# Patient Record
Sex: MALE | Race: BLACK OR AFRICAN AMERICAN | NOT HISPANIC OR LATINO | Employment: OTHER | URBAN - METROPOLITAN AREA
[De-identification: names, ages, dates, MRNs, and addresses within clinical notes are randomized per-mention and may not be internally consistent; named-entity substitution may affect disease eponyms.]

---

## 2017-01-03 ENCOUNTER — TELEPHONE (OUTPATIENT)
Dept: ENDOCRINOLOGY | Facility: CLINIC | Age: 57
End: 2017-01-03

## 2017-01-03 DIAGNOSIS — I15.2 HYPERTENSION ASSOCIATED WITH DIABETES: ICD-10-CM

## 2017-01-03 DIAGNOSIS — E55.9 VITAMIN D DEFICIENCY: ICD-10-CM

## 2017-01-03 DIAGNOSIS — E11.59 HYPERTENSION ASSOCIATED WITH DIABETES: ICD-10-CM

## 2017-01-03 NOTE — TELEPHONE ENCOUNTER
Left vm message for patient to call back regarding scheduling labs as well as a urine collection prior to his next visit.  Orders are in so he can schedule with whoever answers the phone. He does not necessarily need to talk with me

## 2017-01-03 NOTE — TELEPHONE ENCOUNTER
----- Message from Cira Wilson sent at 1/3/2017  8:06 AM CST -----  Contact: pt  [phone]  Oz  -   Patient calling to get labs orders drawn before seeing the DrMarjorie  .   Patient can be reached at [phone]  Thanks,

## 2017-01-11 ENCOUNTER — TELEPHONE (OUTPATIENT)
Dept: UROLOGY | Facility: CLINIC | Age: 57
End: 2017-01-11

## 2017-01-11 NOTE — TELEPHONE ENCOUNTER
----- Message from Radha Rai sent at 1/11/2017  8:16 AM CST -----  Contact: Pt  Pt called to speak to the nurse to schedule a follow up appt with the provider for elevated PSA and would like a call back.     Pt can be reached at [phone].    Thanks

## 2017-01-23 ENCOUNTER — TELEPHONE (OUTPATIENT)
Dept: ENDOCRINOLOGY | Facility: CLINIC | Age: 57
End: 2017-01-23

## 2017-01-23 NOTE — TELEPHONE ENCOUNTER
Spoke with patient and gave results and instructions per dr rebolledo patient verbalize understanding

## 2017-01-23 NOTE — TELEPHONE ENCOUNTER
----- Message from Ines Bell LPN sent at 1/23/2017 10:32 AM CST -----  Contact: Self [phone]  I gave it to you it was some lab results   ----- Message -----     From: Lauren Munguia MD     Sent: 1/23/2017  10:24 AM       To: Ines Bell LPN    What fax, no idea.   SD   ----- Message -----     From: Ines Bell LPN     Sent: 1/20/2017  11:55 AM       To: Lauren Munguia MD        ----- Message -----     From: Ila Otoole     Sent: 1/20/2017  10:16 AM       To: Nilda BEE Staff    Pt is requesting a call to discuss information that was faxed earlier in the week. Pt can be reached at [phone].

## 2017-01-23 NOTE — TELEPHONE ENCOUNTER
----- Message from Viri Emanuel sent at 1/23/2017 10:19 AM CST -----  Contact: Patient Angel:   Patient is asking if his medicine needs to be decreased.  Patient can be reached at [phone].  Please call.    Thanks

## 2017-01-23 NOTE — TELEPHONE ENCOUNTER
Logs:  Before breakfast 89 - 96  Before dinner: 102 - 112    LABS:  MCR 67.1mg/g  TSH 0.4  A1C 5.9%   Hgb 14.9 g/dl   Creat 1.1 mg/dl  Calcium 9 mg/dl       PLAN:  Please call patient.   A1C is well controlled, good kidney function  Continue glucophage and levemir  No hypoglycemic episodes but only checking twice daily.

## 2018-07-06 ENCOUNTER — TELEPHONE (OUTPATIENT)
Dept: ORTHOPEDICS | Facility: CLINIC | Age: 58
End: 2018-07-06
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
## 2020-07-21 ENCOUNTER — LAB VISIT (OUTPATIENT)
Dept: PRIMARY CARE CLINIC | Facility: OTHER | Age: 60
End: 2020-07-21
Attending: INTERNAL MEDICINE
Payer: COMMERCIAL

## 2020-07-21 DIAGNOSIS — Z11.59 SCREENING FOR VIRAL DISEASE: ICD-10-CM

## 2020-07-21 PROCEDURE — U0003 INFECTIOUS AGENT DETECTION BY NUCLEIC ACID (DNA OR RNA); SEVERE ACUTE RESPIRATORY SYNDROME CORONAVIRUS 2 (SARS-COV-2) (CORONAVIRUS DISEASE [COVID-19]), AMPLIFIED PROBE TECHNIQUE, MAKING USE OF HIGH THROUGHPUT TECHNOLOGIES AS DESCRIBED BY CMS-2020-01-R: HCPCS

## 2020-07-25 LAB — SARS-COV-2 RNA RESP QL NAA+PROBE: NEGATIVE

## 2022-04-28 ENCOUNTER — HOSPITAL ENCOUNTER (INPATIENT)
Facility: HOSPITAL | Age: 62
LOS: 4 days | Discharge: HOME HEALTH | DRG: 871 | End: 2022-05-02
Attending: EMERGENCY MEDICINE | Admitting: EMERGENCY MEDICINE
Payer: COMMERCIAL

## 2022-04-28 DIAGNOSIS — E78.5 HYPERLIPIDEMIA, UNSPECIFIED HYPERLIPIDEMIA TYPE: ICD-10-CM

## 2022-04-28 DIAGNOSIS — R33.9 URINARY RETENTION: ICD-10-CM

## 2022-04-28 DIAGNOSIS — N13.8 BENIGN PROSTATIC HYPERPLASIA WITH URINARY OBSTRUCTION: ICD-10-CM

## 2022-04-28 DIAGNOSIS — R73.9 HYPERGLYCEMIA: ICD-10-CM

## 2022-04-28 DIAGNOSIS — E87.5 HYPERKALEMIA: ICD-10-CM

## 2022-04-28 DIAGNOSIS — N40.1 BENIGN PROSTATIC HYPERPLASIA WITH URINARY OBSTRUCTION: ICD-10-CM

## 2022-04-28 DIAGNOSIS — R00.0 TACHYCARDIA: ICD-10-CM

## 2022-04-28 DIAGNOSIS — N17.9 ACUTE RENAL FAILURE: ICD-10-CM

## 2022-04-28 DIAGNOSIS — I10 PRIMARY HYPERTENSION: ICD-10-CM

## 2022-04-28 DIAGNOSIS — E83.39 HYPERPHOSPHATEMIA: ICD-10-CM

## 2022-04-28 DIAGNOSIS — E87.20 METABOLIC ACIDOSIS: ICD-10-CM

## 2022-04-28 DIAGNOSIS — N13.9 OBSTRUCTIVE UROPATHY: ICD-10-CM

## 2022-04-28 DIAGNOSIS — D64.9 NORMOCYTIC ANEMIA: ICD-10-CM

## 2022-04-28 DIAGNOSIS — I10 HYPERTENSION, UNSPECIFIED TYPE: ICD-10-CM

## 2022-04-28 DIAGNOSIS — I16.1 HYPERTENSIVE EMERGENCY: ICD-10-CM

## 2022-04-28 DIAGNOSIS — N17.9 AKI (ACUTE KIDNEY INJURY): Primary | ICD-10-CM

## 2022-04-28 DIAGNOSIS — N13.30 BILATERAL HYDRONEPHROSIS: ICD-10-CM

## 2022-04-28 DIAGNOSIS — R07.9 CHEST PAIN: ICD-10-CM

## 2022-04-28 LAB
ALBUMIN SERPL BCP-MCNC: 3.1 G/DL (ref 3.5–5.2)
ALP SERPL-CCNC: 113 U/L (ref 55–135)
ALT SERPL W/O P-5'-P-CCNC: 18 U/L (ref 10–44)
ANION GAP SERPL CALC-SCNC: 32 MMOL/L (ref 8–16)
AST SERPL-CCNC: 16 U/L (ref 10–40)
B-OH-BUTYR BLD STRIP-SCNC: 4.4 MMOL/L (ref 0–0.5)
BACTERIA #/AREA URNS HPF: ABNORMAL /HPF
BASOPHILS # BLD AUTO: 0.02 K/UL (ref 0–0.2)
BASOPHILS NFR BLD: 0.1 % (ref 0–1.9)
BILIRUB SERPL-MCNC: 0.4 MG/DL (ref 0.1–1)
BILIRUB UR QL STRIP: NEGATIVE
BUN SERPL-MCNC: 142 MG/DL (ref 8–23)
CALCIUM SERPL-MCNC: 9.2 MG/DL (ref 8.7–10.5)
CHLORIDE SERPL-SCNC: 91 MMOL/L (ref 95–110)
CLARITY UR: ABNORMAL
CO2 SERPL-SCNC: 6 MMOL/L (ref 23–29)
COLOR UR: YELLOW
CREAT SERPL-MCNC: 18.8 MG/DL (ref 0.5–1.4)
DIFFERENTIAL METHOD: ABNORMAL
EOSINOPHIL # BLD AUTO: 0 K/UL (ref 0–0.5)
EOSINOPHIL NFR BLD: 0 % (ref 0–8)
ERYTHROCYTE [DISTWIDTH] IN BLOOD BY AUTOMATED COUNT: 13.3 % (ref 11.5–14.5)
EST. GFR  (AFRICAN AMERICAN): 3 ML/MIN/1.73 M^2
EST. GFR  (NON AFRICAN AMERICAN): 2 ML/MIN/1.73 M^2
GLUCOSE SERPL-MCNC: 244 MG/DL (ref 70–110)
GLUCOSE UR QL STRIP: ABNORMAL
HCO3 UR-SCNC: 9.3 MMOL/L (ref 24–28)
HCT VFR BLD AUTO: 38.2 % (ref 40–54)
HCT VFR BLD CALC: 39 %PCV (ref 36–54)
HGB BLD-MCNC: 13 G/DL
HGB BLD-MCNC: 13.7 G/DL (ref 14–18)
HGB UR QL STRIP: ABNORMAL
HYALINE CASTS #/AREA URNS LPF: 2 /LPF
IMM GRANULOCYTES # BLD AUTO: 0.18 K/UL (ref 0–0.04)
IMM GRANULOCYTES NFR BLD AUTO: 1.3 % (ref 0–0.5)
KETONES UR QL STRIP: NEGATIVE
LACTATE SERPL-SCNC: 1.5 MMOL/L (ref 0.5–2.2)
LEUKOCYTE ESTERASE UR QL STRIP: ABNORMAL
LIPASE SERPL-CCNC: 233 U/L (ref 4–60)
LYMPHOCYTES # BLD AUTO: 0.9 K/UL (ref 1–4.8)
LYMPHOCYTES NFR BLD: 6.5 % (ref 18–48)
MAGNESIUM SERPL-MCNC: 3.1 MG/DL (ref 1.6–2.6)
MCH RBC QN AUTO: 29.7 PG (ref 27–31)
MCHC RBC AUTO-ENTMCNC: 35.9 G/DL (ref 32–36)
MCV RBC AUTO: 83 FL (ref 82–98)
MICROSCOPIC COMMENT: ABNORMAL
MONOCYTES # BLD AUTO: 1.6 K/UL (ref 0.3–1)
MONOCYTES NFR BLD: 11.9 % (ref 4–15)
NEUTROPHILS # BLD AUTO: 10.7 K/UL (ref 1.8–7.7)
NEUTROPHILS NFR BLD: 80.2 % (ref 38–73)
NITRITE UR QL STRIP: NEGATIVE
NRBC BLD-RTO: 0 /100 WBC
PCO2 BLDA: 26.3 MMHG (ref 35–45)
PH SMN: 7.16 [PH] (ref 7.35–7.45)
PH UR STRIP: 6 [PH] (ref 5–8)
PHOSPHATE SERPL-MCNC: 14.5 MG/DL (ref 2.7–4.5)
PLATELET # BLD AUTO: 332 K/UL (ref 150–450)
PMV BLD AUTO: 9.6 FL (ref 9.2–12.9)
PO2 BLDA: 29 MMHG (ref 40–60)
POC BE: -19 MMOL/L
POC SATURATED O2: 40 % (ref 95–100)
POC TCO2: 10 MMOL/L (ref 24–29)
POTASSIUM BLD-SCNC: 5.2 MMOL/L (ref 3.5–5.1)
POTASSIUM SERPL-SCNC: 5.6 MMOL/L (ref 3.5–5.1)
PROT SERPL-MCNC: 7.7 G/DL (ref 6–8.4)
PROT UR QL STRIP: ABNORMAL
RBC # BLD AUTO: 4.62 M/UL (ref 4.6–6.2)
RBC #/AREA URNS HPF: 5 /HPF (ref 0–4)
SAMPLE: ABNORMAL
SODIUM BLD-SCNC: 126 MMOL/L (ref 136–145)
SODIUM SERPL-SCNC: 129 MMOL/L (ref 136–145)
SP GR UR STRIP: 1.01 (ref 1–1.03)
SQUAMOUS #/AREA URNS HPF: 4 /HPF
TROPONIN I SERPL DL<=0.01 NG/ML-MCNC: 0.08 NG/ML (ref 0–0.03)
URN SPEC COLLECT METH UR: ABNORMAL
UROBILINOGEN UR STRIP-ACNC: NEGATIVE EU/DL
WBC # BLD AUTO: 13.4 K/UL (ref 3.9–12.7)
WBC #/AREA URNS HPF: 22 /HPF (ref 0–5)
YEAST URNS QL MICRO: ABNORMAL

## 2022-04-28 PROCEDURE — 84484 ASSAY OF TROPONIN QUANT: CPT | Performed by: STUDENT IN AN ORGANIZED HEALTH CARE EDUCATION/TRAINING PROGRAM

## 2022-04-28 PROCEDURE — 80053 COMPREHEN METABOLIC PANEL: CPT | Performed by: EMERGENCY MEDICINE

## 2022-04-28 PROCEDURE — 87040 BLOOD CULTURE FOR BACTERIA: CPT | Performed by: STUDENT IN AN ORGANIZED HEALTH CARE EDUCATION/TRAINING PROGRAM

## 2022-04-28 PROCEDURE — 96361 HYDRATE IV INFUSION ADD-ON: CPT

## 2022-04-28 PROCEDURE — 12000002 HC ACUTE/MED SURGE SEMI-PRIVATE ROOM

## 2022-04-28 PROCEDURE — 85025 COMPLETE CBC W/AUTO DIFF WBC: CPT | Performed by: EMERGENCY MEDICINE

## 2022-04-28 PROCEDURE — 87086 URINE CULTURE/COLONY COUNT: CPT | Performed by: EMERGENCY MEDICINE

## 2022-04-28 PROCEDURE — 93010 EKG 12-LEAD: ICD-10-PCS | Mod: ,,, | Performed by: INTERNAL MEDICINE

## 2022-04-28 PROCEDURE — 96374 THER/PROPH/DIAG INJ IV PUSH: CPT

## 2022-04-28 PROCEDURE — U0002 COVID-19 LAB TEST NON-CDC: HCPCS | Performed by: STUDENT IN AN ORGANIZED HEALTH CARE EDUCATION/TRAINING PROGRAM

## 2022-04-28 PROCEDURE — 83735 ASSAY OF MAGNESIUM: CPT | Performed by: EMERGENCY MEDICINE

## 2022-04-28 PROCEDURE — 99291 CRITICAL CARE FIRST HOUR: CPT | Mod: 25

## 2022-04-28 PROCEDURE — 81000 URINALYSIS NONAUTO W/SCOPE: CPT | Performed by: EMERGENCY MEDICINE

## 2022-04-28 PROCEDURE — 93005 ELECTROCARDIOGRAM TRACING: CPT

## 2022-04-28 PROCEDURE — 83605 ASSAY OF LACTIC ACID: CPT | Performed by: EMERGENCY MEDICINE

## 2022-04-28 PROCEDURE — 25000003 PHARM REV CODE 250: Performed by: EMERGENCY MEDICINE

## 2022-04-28 PROCEDURE — 82010 KETONE BODYS QUAN: CPT | Performed by: EMERGENCY MEDICINE

## 2022-04-28 PROCEDURE — 63600175 PHARM REV CODE 636 W HCPCS: Performed by: EMERGENCY MEDICINE

## 2022-04-28 PROCEDURE — 84100 ASSAY OF PHOSPHORUS: CPT | Performed by: EMERGENCY MEDICINE

## 2022-04-28 PROCEDURE — 83690 ASSAY OF LIPASE: CPT | Performed by: EMERGENCY MEDICINE

## 2022-04-28 PROCEDURE — 82803 BLOOD GASES ANY COMBINATION: CPT

## 2022-04-28 PROCEDURE — 93010 ELECTROCARDIOGRAM REPORT: CPT | Mod: ,,, | Performed by: INTERNAL MEDICINE

## 2022-04-28 PROCEDURE — 82962 GLUCOSE BLOOD TEST: CPT

## 2022-04-28 PROCEDURE — 99900035 HC TECH TIME PER 15 MIN (STAT)

## 2022-04-28 RX ORDER — HEPARIN SODIUM 5000 [USP'U]/ML
5000 INJECTION, SOLUTION INTRAVENOUS; SUBCUTANEOUS EVERY 8 HOURS
Status: DISCONTINUED | OUTPATIENT
Start: 2022-04-29 | End: 2022-05-02 | Stop reason: HOSPADM

## 2022-04-28 RX ORDER — IBUPROFEN 200 MG
24 TABLET ORAL
Status: DISCONTINUED | OUTPATIENT
Start: 2022-04-29 | End: 2022-04-29

## 2022-04-28 RX ORDER — NALOXONE HCL 0.4 MG/ML
0.02 VIAL (ML) INJECTION
Status: DISCONTINUED | OUTPATIENT
Start: 2022-04-29 | End: 2022-04-29

## 2022-04-28 RX ORDER — IBUPROFEN 200 MG
16 TABLET ORAL
Status: DISCONTINUED | OUTPATIENT
Start: 2022-04-29 | End: 2022-05-02 | Stop reason: HOSPADM

## 2022-04-28 RX ORDER — LABETALOL HYDROCHLORIDE 5 MG/ML
10 INJECTION, SOLUTION INTRAVENOUS ONCE
Status: DISCONTINUED | OUTPATIENT
Start: 2022-04-28 | End: 2022-04-28

## 2022-04-28 RX ORDER — HYDRALAZINE HYDROCHLORIDE 20 MG/ML
5 INJECTION INTRAMUSCULAR; INTRAVENOUS
Status: COMPLETED | OUTPATIENT
Start: 2022-04-28 | End: 2022-04-28

## 2022-04-28 RX ORDER — GLUCAGON 1 MG
1 KIT INJECTION
Status: DISCONTINUED | OUTPATIENT
Start: 2022-04-29 | End: 2022-04-29

## 2022-04-28 RX ORDER — SODIUM CHLORIDE 0.9 % (FLUSH) 0.9 %
10 SYRINGE (ML) INJECTION EVERY 12 HOURS PRN
Status: DISCONTINUED | OUTPATIENT
Start: 2022-04-29 | End: 2022-05-02 | Stop reason: HOSPADM

## 2022-04-28 RX ADMIN — HYDRALAZINE HYDROCHLORIDE 5 MG: 20 INJECTION, SOLUTION INTRAMUSCULAR; INTRAVENOUS at 09:04

## 2022-04-28 RX ADMIN — SODIUM CHLORIDE 1000 ML: 0.9 INJECTION, SOLUTION INTRAVENOUS at 08:04

## 2022-04-28 RX ADMIN — SODIUM CHLORIDE 1000 ML: 0.9 INJECTION, SOLUTION INTRAVENOUS at 09:04

## 2022-04-29 PROBLEM — N13.30 BILATERAL HYDRONEPHROSIS: Status: ACTIVE | Noted: 2022-04-29

## 2022-04-29 PROBLEM — I16.1 HYPERTENSIVE EMERGENCY: Status: RESOLVED | Noted: 2022-04-28 | Resolved: 2022-04-29

## 2022-04-29 PROBLEM — N13.9 OBSTRUCTIVE UROPATHY: Status: ACTIVE | Noted: 2022-04-29

## 2022-04-29 PROBLEM — E83.39 HYPERPHOSPHATEMIA: Status: ACTIVE | Noted: 2022-04-29

## 2022-04-29 PROBLEM — D64.9 NORMOCYTIC ANEMIA: Status: ACTIVE | Noted: 2022-04-29

## 2022-04-29 PROBLEM — N13.8 BENIGN PROSTATIC HYPERPLASIA WITH URINARY OBSTRUCTION: Status: ACTIVE | Noted: 2022-04-29

## 2022-04-29 PROBLEM — N40.1 BENIGN PROSTATIC HYPERPLASIA WITH URINARY OBSTRUCTION: Status: ACTIVE | Noted: 2022-04-29

## 2022-04-29 PROBLEM — R33.9 URINARY RETENTION: Status: ACTIVE | Noted: 2022-04-29

## 2022-04-29 LAB
ALBUMIN SERPL BCP-MCNC: 2.5 G/DL (ref 3.5–5.2)
ALP SERPL-CCNC: 97 U/L (ref 55–135)
ALT SERPL W/O P-5'-P-CCNC: 21 U/L (ref 10–44)
AMPHET+METHAMPHET UR QL: NEGATIVE
ANION GAP SERPL CALC-SCNC: 18 MMOL/L (ref 8–16)
ANION GAP SERPL CALC-SCNC: 20 MMOL/L (ref 8–16)
ANION GAP SERPL CALC-SCNC: 23 MMOL/L (ref 8–16)
ANION GAP SERPL CALC-SCNC: 24 MMOL/L (ref 8–16)
ANION GAP SERPL CALC-SCNC: 26 MMOL/L (ref 8–16)
ANION GAP SERPL CALC-SCNC: 26 MMOL/L (ref 8–16)
ANION GAP SERPL CALC-SCNC: 27 MMOL/L (ref 8–16)
AORTIC ROOT ANNULUS: 3.1 CM
AST SERPL-CCNC: 11 U/L (ref 10–40)
AV INDEX (PROSTH): 0.9
AV MEAN GRADIENT: 7 MMHG
AV PEAK GRADIENT: 13 MMHG
AV VALVE AREA: 2.43 CM2
AV VELOCITY RATIO: 0.66
BARBITURATES UR QL SCN>200 NG/ML: NEGATIVE
BASOPHILS # BLD AUTO: 0.02 K/UL (ref 0–0.2)
BASOPHILS NFR BLD: 0.2 % (ref 0–1.9)
BENZODIAZ UR QL SCN>200 NG/ML: NEGATIVE
BILIRUB SERPL-MCNC: 0.5 MG/DL (ref 0.1–1)
BNP SERPL-MCNC: 148 PG/ML (ref 0–99)
BSA FOR ECHO PROCEDURE: 1.9 M2
BUN SERPL-MCNC: 114 MG/DL (ref 8–23)
BUN SERPL-MCNC: 116 MG/DL (ref 8–23)
BUN SERPL-MCNC: 117 MG/DL (ref 8–23)
BUN SERPL-MCNC: 125 MG/DL (ref 8–23)
BUN SERPL-MCNC: 134 MG/DL (ref 8–23)
BUN SERPL-MCNC: 134 MG/DL (ref 8–23)
BUN SERPL-MCNC: 137 MG/DL (ref 8–23)
BZE UR QL SCN: NEGATIVE
CALCIUM SERPL-MCNC: 8.5 MG/DL (ref 8.7–10.5)
CALCIUM SERPL-MCNC: 8.5 MG/DL (ref 8.7–10.5)
CALCIUM SERPL-MCNC: 8.7 MG/DL (ref 8.7–10.5)
CALCIUM SERPL-MCNC: 8.8 MG/DL (ref 8.7–10.5)
CALCIUM SERPL-MCNC: 9.1 MG/DL (ref 8.7–10.5)
CALCIUM SERPL-MCNC: 9.3 MG/DL (ref 8.7–10.5)
CALCIUM SERPL-MCNC: 9.3 MG/DL (ref 8.7–10.5)
CANNABINOIDS UR QL SCN: NEGATIVE
CHLORIDE SERPL-SCNC: 100 MMOL/L (ref 95–110)
CHLORIDE SERPL-SCNC: 101 MMOL/L (ref 95–110)
CHLORIDE SERPL-SCNC: 102 MMOL/L (ref 95–110)
CHLORIDE SERPL-SCNC: 98 MMOL/L (ref 95–110)
CHLORIDE SERPL-SCNC: 99 MMOL/L (ref 95–110)
CHOLEST SERPL-MCNC: 143 MG/DL (ref 120–199)
CHOLEST/HDLC SERPL: 4.5 {RATIO} (ref 2–5)
CO2 SERPL-SCNC: 10 MMOL/L (ref 23–29)
CO2 SERPL-SCNC: 16 MMOL/L (ref 23–29)
CO2 SERPL-SCNC: 19 MMOL/L (ref 23–29)
CO2 SERPL-SCNC: 23 MMOL/L (ref 23–29)
CO2 SERPL-SCNC: 6 MMOL/L (ref 23–29)
CO2 SERPL-SCNC: 9 MMOL/L (ref 23–29)
CO2 SERPL-SCNC: 9 MMOL/L (ref 23–29)
CREAT SERPL-MCNC: 11.4 MG/DL (ref 0.5–1.4)
CREAT SERPL-MCNC: 12.2 MG/DL (ref 0.5–1.4)
CREAT SERPL-MCNC: 13.4 MG/DL (ref 0.5–1.4)
CREAT SERPL-MCNC: 14.6 MG/DL (ref 0.5–1.4)
CREAT SERPL-MCNC: 16.1 MG/DL (ref 0.5–1.4)
CREAT SERPL-MCNC: 16.1 MG/DL (ref 0.5–1.4)
CREAT SERPL-MCNC: 16.9 MG/DL (ref 0.5–1.4)
CREAT UR-MCNC: 31.9 MG/DL (ref 23–375)
CREAT UR-MCNC: 44.2 MG/DL (ref 23–375)
CREAT UR-MCNC: 47.2 MG/DL (ref 23–375)
CTP QC/QA: YES
CV ECHO LV RWT: 0.91 CM
DIFFERENTIAL METHOD: ABNORMAL
DOP CALC AO PEAK VEL: 1.78 M/S
DOP CALC AO VTI: 19.55 CM
DOP CALC LVOT AREA: 2.7 CM2
DOP CALC LVOT DIAMETER: 1.85 CM
DOP CALC LVOT PEAK VEL: 1.18 M/S
DOP CALC LVOT STROKE VOLUME: 47.45 CM3
DOP CALC MV VTI: 12.64 CM
DOP CALCLVOT PEAK VEL VTI: 17.66 CM
E WAVE DECELERATION TIME: 155.23 MSEC
E/A RATIO: 0.73
E/E' RATIO: 7.29 M/S
ECHO LV POSTERIOR WALL: 1.56 CM (ref 0.6–1.1)
EJECTION FRACTION: 55 %
EOSINOPHIL # BLD AUTO: 0 K/UL (ref 0–0.5)
EOSINOPHIL NFR BLD: 0 % (ref 0–8)
ERYTHROCYTE [DISTWIDTH] IN BLOOD BY AUTOMATED COUNT: 13.3 % (ref 11.5–14.5)
EST. GFR  (AFRICAN AMERICAN): 3 ML/MIN/1.73 M^2
EST. GFR  (AFRICAN AMERICAN): 4 ML/MIN/1.73 M^2
EST. GFR  (AFRICAN AMERICAN): 4 ML/MIN/1.73 M^2
EST. GFR  (AFRICAN AMERICAN): 5 ML/MIN/1.73 M^2
EST. GFR  (AFRICAN AMERICAN): 5 ML/MIN/1.73 M^2
EST. GFR  (NON AFRICAN AMERICAN): 3 ML/MIN/1.73 M^2
EST. GFR  (NON AFRICAN AMERICAN): 4 ML/MIN/1.73 M^2
EST. GFR  (NON AFRICAN AMERICAN): 4 ML/MIN/1.73 M^2
ESTIMATED AVG GLUCOSE: ABNORMAL MG/DL (ref 68–131)
ETHANOL SERPL-MCNC: <10 MG/DL
FERRITIN SERPL-MCNC: 785 NG/ML (ref 20–300)
FRACTIONAL SHORTENING: 27 % (ref 28–44)
GLUCOSE SERPL-MCNC: 221 MG/DL (ref 70–110)
GLUCOSE SERPL-MCNC: 243 MG/DL (ref 70–110)
GLUCOSE SERPL-MCNC: 274 MG/DL (ref 70–110)
GLUCOSE SERPL-MCNC: 276 MG/DL (ref 70–110)
GLUCOSE SERPL-MCNC: 276 MG/DL (ref 70–110)
GLUCOSE SERPL-MCNC: 319 MG/DL (ref 70–110)
GLUCOSE SERPL-MCNC: 374 MG/DL (ref 70–110)
HBA1C MFR BLD: >14 % (ref 4–5.6)
HCT VFR BLD AUTO: 37.3 % (ref 40–54)
HDLC SERPL-MCNC: 32 MG/DL (ref 40–75)
HDLC SERPL: 22.4 % (ref 20–50)
HGB BLD-MCNC: 13.3 G/DL (ref 14–18)
IMM GRANULOCYTES # BLD AUTO: 0.14 K/UL (ref 0–0.04)
IMM GRANULOCYTES NFR BLD AUTO: 1.2 % (ref 0–0.5)
INTERVENTRICULAR SEPTUM: 1.65 CM (ref 0.6–1.1)
IRON SERPL-MCNC: 75 UG/DL (ref 45–160)
LA MAJOR: 4.53 CM
LA MINOR: 3.88 CM
LA WIDTH: 3.11 CM
LDLC SERPL CALC-MCNC: 88.2 MG/DL (ref 63–159)
LEFT ATRIUM SIZE: 2.87 CM
LEFT ATRIUM VOLUME INDEX MOD: 11.1 ML/M2
LEFT ATRIUM VOLUME INDEX: 16.9 ML/M2
LEFT ATRIUM VOLUME MOD: 20.89 CM3
LEFT ATRIUM VOLUME: 31.71 CM3
LEFT INTERNAL DIMENSION IN SYSTOLE: 2.48 CM (ref 2.1–4)
LEFT VENTRICLE DIASTOLIC VOLUME INDEX: 25.45 ML/M2
LEFT VENTRICLE DIASTOLIC VOLUME: 47.85 ML
LEFT VENTRICLE MASS INDEX: 111 G/M2
LEFT VENTRICLE SYSTOLIC VOLUME INDEX: 11.6 ML/M2
LEFT VENTRICLE SYSTOLIC VOLUME: 21.81 ML
LEFT VENTRICULAR INTERNAL DIMENSION IN DIASTOLE: 3.41 CM (ref 3.5–6)
LEFT VENTRICULAR MASS: 208.99 G
LV LATERAL E/E' RATIO: 7.29 M/S
LV SEPTAL E/E' RATIO: 7.29 M/S
LYMPHOCYTES # BLD AUTO: 0.7 K/UL (ref 1–4.8)
LYMPHOCYTES NFR BLD: 6 % (ref 18–48)
MAGNESIUM SERPL-MCNC: 2.8 MG/DL (ref 1.6–2.6)
MCH RBC QN AUTO: 29.4 PG (ref 27–31)
MCHC RBC AUTO-ENTMCNC: 35.7 G/DL (ref 32–36)
MCV RBC AUTO: 82 FL (ref 82–98)
METHADONE UR QL SCN>300 NG/ML: NEGATIVE
MONOCYTES # BLD AUTO: 1.5 K/UL (ref 0.3–1)
MONOCYTES NFR BLD: 12.3 % (ref 4–15)
MV MEAN GRADIENT: 0 MMHG
MV PEAK A VEL: 0.7 M/S
MV PEAK E VEL: 0.51 M/S
MV PEAK GRADIENT: 2 MMHG
MV STENOSIS PRESSURE HALF TIME: 45.02 MS
MV VALVE AREA BY CONTINUITY EQUATION: 3.75 CM2
MV VALVE AREA P 1/2 METHOD: 4.89 CM2
NEUTROPHILS # BLD AUTO: 9.5 K/UL (ref 1.8–7.7)
NEUTROPHILS NFR BLD: 80.3 % (ref 38–73)
NONHDLC SERPL-MCNC: 111 MG/DL
NRBC BLD-RTO: 0 /100 WBC
OPIATES UR QL SCN: NEGATIVE
OSMOLALITY SERPL: 340 MOSM/KG (ref 280–300)
OSMOLALITY UR: 263 MOSM/KG (ref 50–1200)
PCP UR QL SCN>25 NG/ML: NEGATIVE
PHOSPHATE SERPL-MCNC: 11.7 MG/DL (ref 2.7–4.5)
PLATELET # BLD AUTO: 339 K/UL (ref 150–450)
PMV BLD AUTO: 9.5 FL (ref 9.2–12.9)
POCT GLUCOSE: 254 MG/DL (ref 70–110)
POCT GLUCOSE: 271 MG/DL (ref 70–110)
POCT GLUCOSE: 282 MG/DL (ref 70–110)
POCT GLUCOSE: 317 MG/DL (ref 70–110)
POCT GLUCOSE: 379 MG/DL (ref 70–110)
POTASSIUM SERPL-SCNC: 3.3 MMOL/L (ref 3.5–5.1)
POTASSIUM SERPL-SCNC: 3.7 MMOL/L (ref 3.5–5.1)
POTASSIUM SERPL-SCNC: 4.2 MMOL/L (ref 3.5–5.1)
POTASSIUM SERPL-SCNC: 4.4 MMOL/L (ref 3.5–5.1)
POTASSIUM SERPL-SCNC: 4.8 MMOL/L (ref 3.5–5.1)
POTASSIUM SERPL-SCNC: 4.8 MMOL/L (ref 3.5–5.1)
POTASSIUM SERPL-SCNC: 5.8 MMOL/L (ref 3.5–5.1)
PROCALCITONIN SERPL IA-MCNC: 2.62 NG/ML
PROT SERPL-MCNC: 6.6 G/DL (ref 6–8.4)
PROT UR-MCNC: 534 MG/DL (ref 0–15)
PROT/CREAT UR: 11.31 MG/G{CREAT} (ref 0–0.2)
RA MAJOR: 3.4 CM
RA PRESSURE: 3 MMHG
RA WIDTH: 2.47 CM
RBC # BLD AUTO: 4.53 M/UL (ref 4.6–6.2)
RIGHT VENTRICULAR END-DIASTOLIC DIMENSION: 2.11 CM
SALICYLATES SERPL-MCNC: <5 MG/DL (ref 15–30)
SARS-COV-2 RDRP RESP QL NAA+PROBE: NEGATIVE
SATURATED IRON: 36 % (ref 20–50)
SODIUM SERPL-SCNC: 131 MMOL/L (ref 136–145)
SODIUM SERPL-SCNC: 135 MMOL/L (ref 136–145)
SODIUM SERPL-SCNC: 135 MMOL/L (ref 136–145)
SODIUM SERPL-SCNC: 136 MMOL/L (ref 136–145)
SODIUM SERPL-SCNC: 138 MMOL/L (ref 136–145)
SODIUM SERPL-SCNC: 139 MMOL/L (ref 136–145)
SODIUM SERPL-SCNC: 142 MMOL/L (ref 136–145)
SODIUM UR-SCNC: 84 MMOL/L (ref 20–250)
TDI LATERAL: 0.07 M/S
TDI SEPTAL: 0.07 M/S
TDI: 0.07 M/S
TOTAL IRON BINDING CAPACITY: 206 UG/DL (ref 250–450)
TOXICOLOGY INFORMATION: NORMAL
TRANSFERRIN SERPL-MCNC: 139 MG/DL (ref 200–375)
TRIGL SERPL-MCNC: 114 MG/DL (ref 30–150)
TROPONIN I SERPL DL<=0.01 NG/ML-MCNC: 0.09 NG/ML (ref 0–0.03)
TROPONIN I SERPL DL<=0.01 NG/ML-MCNC: 0.1 NG/ML (ref 0–0.03)
TSH SERPL DL<=0.005 MIU/L-ACNC: 0.54 UIU/ML (ref 0.4–4)
WBC # BLD AUTO: 11.84 K/UL (ref 3.9–12.7)

## 2022-04-29 PROCEDURE — 25000003 PHARM REV CODE 250: Performed by: STUDENT IN AN ORGANIZED HEALTH CARE EDUCATION/TRAINING PROGRAM

## 2022-04-29 PROCEDURE — 99222 PR INITIAL HOSPITAL CARE,LEVL II: ICD-10-PCS | Mod: ,,, | Performed by: UROLOGY

## 2022-04-29 PROCEDURE — 82077 ASSAY SPEC XCP UR&BREATH IA: CPT | Performed by: STUDENT IN AN ORGANIZED HEALTH CARE EDUCATION/TRAINING PROGRAM

## 2022-04-29 PROCEDURE — 11000001 HC ACUTE MED/SURG PRIVATE ROOM

## 2022-04-29 PROCEDURE — 25000003 PHARM REV CODE 250

## 2022-04-29 PROCEDURE — 84100 ASSAY OF PHOSPHORUS: CPT | Performed by: STUDENT IN AN ORGANIZED HEALTH CARE EDUCATION/TRAINING PROGRAM

## 2022-04-29 PROCEDURE — 83735 ASSAY OF MAGNESIUM: CPT | Performed by: STUDENT IN AN ORGANIZED HEALTH CARE EDUCATION/TRAINING PROGRAM

## 2022-04-29 PROCEDURE — 85025 COMPLETE CBC W/AUTO DIFF WBC: CPT | Performed by: STUDENT IN AN ORGANIZED HEALTH CARE EDUCATION/TRAINING PROGRAM

## 2022-04-29 PROCEDURE — 63600175 PHARM REV CODE 636 W HCPCS: Performed by: STUDENT IN AN ORGANIZED HEALTH CARE EDUCATION/TRAINING PROGRAM

## 2022-04-29 PROCEDURE — 84466 ASSAY OF TRANSFERRIN: CPT | Performed by: STUDENT IN AN ORGANIZED HEALTH CARE EDUCATION/TRAINING PROGRAM

## 2022-04-29 PROCEDURE — 80048 BASIC METABOLIC PNL TOTAL CA: CPT | Mod: XB | Performed by: STUDENT IN AN ORGANIZED HEALTH CARE EDUCATION/TRAINING PROGRAM

## 2022-04-29 PROCEDURE — 80053 COMPREHEN METABOLIC PANEL: CPT | Performed by: STUDENT IN AN ORGANIZED HEALTH CARE EDUCATION/TRAINING PROGRAM

## 2022-04-29 PROCEDURE — 83930 ASSAY OF BLOOD OSMOLALITY: CPT | Performed by: STUDENT IN AN ORGANIZED HEALTH CARE EDUCATION/TRAINING PROGRAM

## 2022-04-29 PROCEDURE — 80179 DRUG ASSAY SALICYLATE: CPT | Performed by: STUDENT IN AN ORGANIZED HEALTH CARE EDUCATION/TRAINING PROGRAM

## 2022-04-29 PROCEDURE — 99291 PR CRITICAL CARE, E/M 30-74 MINUTES: ICD-10-PCS | Mod: ,,, | Performed by: INTERNAL MEDICINE

## 2022-04-29 PROCEDURE — C9399 UNCLASSIFIED DRUGS OR BIOLOG: HCPCS

## 2022-04-29 PROCEDURE — 83935 ASSAY OF URINE OSMOLALITY: CPT | Performed by: STUDENT IN AN ORGANIZED HEALTH CARE EDUCATION/TRAINING PROGRAM

## 2022-04-29 PROCEDURE — 83880 ASSAY OF NATRIURETIC PEPTIDE: CPT | Performed by: STUDENT IN AN ORGANIZED HEALTH CARE EDUCATION/TRAINING PROGRAM

## 2022-04-29 PROCEDURE — 83036 HEMOGLOBIN GLYCOSYLATED A1C: CPT | Performed by: STUDENT IN AN ORGANIZED HEALTH CARE EDUCATION/TRAINING PROGRAM

## 2022-04-29 PROCEDURE — 80307 DRUG TEST PRSMV CHEM ANLYZR: CPT | Performed by: STUDENT IN AN ORGANIZED HEALTH CARE EDUCATION/TRAINING PROGRAM

## 2022-04-29 PROCEDURE — 84443 ASSAY THYROID STIM HORMONE: CPT | Performed by: STUDENT IN AN ORGANIZED HEALTH CARE EDUCATION/TRAINING PROGRAM

## 2022-04-29 PROCEDURE — 51700 IRRIGATION OF BLADDER: CPT

## 2022-04-29 PROCEDURE — 94640 AIRWAY INHALATION TREATMENT: CPT

## 2022-04-29 PROCEDURE — 82570 ASSAY OF URINE CREATININE: CPT | Performed by: STUDENT IN AN ORGANIZED HEALTH CARE EDUCATION/TRAINING PROGRAM

## 2022-04-29 PROCEDURE — 80048 BASIC METABOLIC PNL TOTAL CA: CPT | Mod: 91,XB | Performed by: STUDENT IN AN ORGANIZED HEALTH CARE EDUCATION/TRAINING PROGRAM

## 2022-04-29 PROCEDURE — C9399 UNCLASSIFIED DRUGS OR BIOLOG: HCPCS | Performed by: STUDENT IN AN ORGANIZED HEALTH CARE EDUCATION/TRAINING PROGRAM

## 2022-04-29 PROCEDURE — 84484 ASSAY OF TROPONIN QUANT: CPT | Performed by: STUDENT IN AN ORGANIZED HEALTH CARE EDUCATION/TRAINING PROGRAM

## 2022-04-29 PROCEDURE — 84145 PROCALCITONIN (PCT): CPT | Performed by: STUDENT IN AN ORGANIZED HEALTH CARE EDUCATION/TRAINING PROGRAM

## 2022-04-29 PROCEDURE — 82728 ASSAY OF FERRITIN: CPT | Performed by: STUDENT IN AN ORGANIZED HEALTH CARE EDUCATION/TRAINING PROGRAM

## 2022-04-29 PROCEDURE — 84300 ASSAY OF URINE SODIUM: CPT | Performed by: STUDENT IN AN ORGANIZED HEALTH CARE EDUCATION/TRAINING PROGRAM

## 2022-04-29 PROCEDURE — 25000003 PHARM REV CODE 250: Performed by: INTERNAL MEDICINE

## 2022-04-29 PROCEDURE — 99291 CRITICAL CARE FIRST HOUR: CPT | Mod: ,,, | Performed by: INTERNAL MEDICINE

## 2022-04-29 PROCEDURE — 25000242 PHARM REV CODE 250 ALT 637 W/ HCPCS: Performed by: STUDENT IN AN ORGANIZED HEALTH CARE EDUCATION/TRAINING PROGRAM

## 2022-04-29 PROCEDURE — 63600175 PHARM REV CODE 636 W HCPCS

## 2022-04-29 PROCEDURE — 84156 ASSAY OF PROTEIN URINE: CPT | Performed by: INTERNAL MEDICINE

## 2022-04-29 PROCEDURE — 51702 INSERT TEMP BLADDER CATH: CPT

## 2022-04-29 PROCEDURE — 94760 N-INVAS EAR/PLS OXIMETRY 1: CPT

## 2022-04-29 PROCEDURE — 80061 LIPID PANEL: CPT | Performed by: STUDENT IN AN ORGANIZED HEALTH CARE EDUCATION/TRAINING PROGRAM

## 2022-04-29 PROCEDURE — 63600175 PHARM REV CODE 636 W HCPCS: Performed by: INTERNAL MEDICINE

## 2022-04-29 PROCEDURE — 99222 1ST HOSP IP/OBS MODERATE 55: CPT | Mod: ,,, | Performed by: UROLOGY

## 2022-04-29 PROCEDURE — 36415 COLL VENOUS BLD VENIPUNCTURE: CPT | Performed by: STUDENT IN AN ORGANIZED HEALTH CARE EDUCATION/TRAINING PROGRAM

## 2022-04-29 RX ORDER — CALCIUM GLUCONATE 20 MG/ML
1 INJECTION, SOLUTION INTRAVENOUS
Status: COMPLETED | OUTPATIENT
Start: 2022-04-29 | End: 2022-04-29

## 2022-04-29 RX ORDER — CALCIUM GLUCONATE 20 MG/ML
1 INJECTION, SOLUTION INTRAVENOUS EVERY 10 MIN PRN
Status: DISCONTINUED | OUTPATIENT
Start: 2022-04-29 | End: 2022-05-02 | Stop reason: HOSPADM

## 2022-04-29 RX ORDER — SODIUM CHLORIDE, SODIUM LACTATE, POTASSIUM CHLORIDE, CALCIUM CHLORIDE 600; 310; 30; 20 MG/100ML; MG/100ML; MG/100ML; MG/100ML
INJECTION, SOLUTION INTRAVENOUS CONTINUOUS
Status: DISCONTINUED | OUTPATIENT
Start: 2022-04-29 | End: 2022-05-02

## 2022-04-29 RX ORDER — ALBUTEROL SULFATE 2.5 MG/.5ML
10 SOLUTION RESPIRATORY (INHALATION)
Status: COMPLETED | OUTPATIENT
Start: 2022-04-29 | End: 2022-04-29

## 2022-04-29 RX ORDER — INSULIN ASPART 100 [IU]/ML
0-5 INJECTION, SOLUTION INTRAVENOUS; SUBCUTANEOUS EVERY 6 HOURS PRN
Status: DISCONTINUED | OUTPATIENT
Start: 2022-04-29 | End: 2022-04-29

## 2022-04-29 RX ORDER — POLYETHYLENE GLYCOL 3350 17 G/17G
17 POWDER, FOR SOLUTION ORAL 2 TIMES DAILY PRN
Status: DISCONTINUED | OUTPATIENT
Start: 2022-04-29 | End: 2022-05-02 | Stop reason: HOSPADM

## 2022-04-29 RX ORDER — CALCIUM GLUCONATE 20 MG/ML
1 INJECTION, SOLUTION INTRAVENOUS ONCE
Status: COMPLETED | OUTPATIENT
Start: 2022-04-29 | End: 2022-04-29

## 2022-04-29 RX ORDER — GLUCAGON 1 MG
1 KIT INJECTION
Status: DISCONTINUED | OUTPATIENT
Start: 2022-04-29 | End: 2022-04-29

## 2022-04-29 RX ORDER — NIFEDIPINE 30 MG/1
30 TABLET, EXTENDED RELEASE ORAL DAILY
Status: DISCONTINUED | OUTPATIENT
Start: 2022-04-29 | End: 2022-04-29

## 2022-04-29 RX ORDER — LABETALOL HYDROCHLORIDE 5 MG/ML
10 INJECTION, SOLUTION INTRAVENOUS EVERY 6 HOURS PRN
Status: DISCONTINUED | OUTPATIENT
Start: 2022-04-29 | End: 2022-04-29

## 2022-04-29 RX ORDER — MUPIROCIN 20 MG/G
OINTMENT TOPICAL 2 TIMES DAILY
Status: DISCONTINUED | OUTPATIENT
Start: 2022-04-29 | End: 2022-05-02 | Stop reason: HOSPADM

## 2022-04-29 RX ORDER — ATORVASTATIN CALCIUM 40 MG/1
40 TABLET, FILM COATED ORAL DAILY
Status: DISCONTINUED | OUTPATIENT
Start: 2022-04-29 | End: 2022-05-02 | Stop reason: HOSPADM

## 2022-04-29 RX ORDER — SEVELAMER CARBONATE 800 MG/1
800 TABLET, FILM COATED ORAL
Status: DISCONTINUED | OUTPATIENT
Start: 2022-04-29 | End: 2022-05-02

## 2022-04-29 RX ORDER — TAMSULOSIN HYDROCHLORIDE 0.4 MG/1
0.4 CAPSULE ORAL DAILY
Status: DISCONTINUED | OUTPATIENT
Start: 2022-04-29 | End: 2022-05-02 | Stop reason: HOSPADM

## 2022-04-29 RX ORDER — INSULIN ASPART 100 [IU]/ML
1-10 INJECTION, SOLUTION INTRAVENOUS; SUBCUTANEOUS EVERY 6 HOURS PRN
Status: DISCONTINUED | OUTPATIENT
Start: 2022-04-29 | End: 2022-05-02 | Stop reason: HOSPADM

## 2022-04-29 RX ADMIN — MUPIROCIN: 20 OINTMENT TOPICAL at 09:04

## 2022-04-29 RX ADMIN — INSULIN DETEMIR 10 UNITS: 100 INJECTION, SOLUTION SUBCUTANEOUS at 12:04

## 2022-04-29 RX ADMIN — INSULIN ASPART 4 UNITS: 100 INJECTION, SOLUTION INTRAVENOUS; SUBCUTANEOUS at 05:04

## 2022-04-29 RX ADMIN — CALCIUM GLUCONATE 1 G: 20 INJECTION, SOLUTION INTRAVENOUS at 12:04

## 2022-04-29 RX ADMIN — INSULIN ASPART 10 UNITS: 100 INJECTION, SOLUTION INTRAVENOUS; SUBCUTANEOUS at 12:04

## 2022-04-29 RX ADMIN — SODIUM BICARBONATE: 84 INJECTION, SOLUTION INTRAVENOUS at 02:04

## 2022-04-29 RX ADMIN — ATORVASTATIN CALCIUM 40 MG: 40 TABLET, FILM COATED ORAL at 12:04

## 2022-04-29 RX ADMIN — INSULIN ASPART 1 UNITS: 100 INJECTION, SOLUTION INTRAVENOUS; SUBCUTANEOUS at 02:04

## 2022-04-29 RX ADMIN — INSULIN DETEMIR 10 UNITS: 100 INJECTION, SOLUTION SUBCUTANEOUS at 06:04

## 2022-04-29 RX ADMIN — MUPIROCIN: 20 OINTMENT TOPICAL at 08:04

## 2022-04-29 RX ADMIN — INSULIN HUMAN 5 UNITS: 100 INJECTION, SOLUTION PARENTERAL at 04:04

## 2022-04-29 RX ADMIN — HEPARIN SODIUM 5000 UNITS: 5000 INJECTION INTRAVENOUS; SUBCUTANEOUS at 10:04

## 2022-04-29 RX ADMIN — ALBUTEROL SULFATE 10 MG: 2.5 SOLUTION RESPIRATORY (INHALATION) at 12:04

## 2022-04-29 RX ADMIN — HEPARIN SODIUM 5000 UNITS: 5000 INJECTION INTRAVENOUS; SUBCUTANEOUS at 05:04

## 2022-04-29 RX ADMIN — SODIUM BICARBONATE: 84 INJECTION, SOLUTION INTRAVENOUS at 06:04

## 2022-04-29 RX ADMIN — TAMSULOSIN HYDROCHLORIDE 0.4 MG: 0.4 CAPSULE ORAL at 09:04

## 2022-04-29 RX ADMIN — SEVELAMER CARBONATE 800 MG: 800 TABLET, FILM COATED ORAL at 12:04

## 2022-04-29 RX ADMIN — SEVELAMER CARBONATE 800 MG: 800 TABLET, FILM COATED ORAL at 09:04

## 2022-04-29 RX ADMIN — SODIUM CHLORIDE, SODIUM LACTATE, POTASSIUM CHLORIDE, AND CALCIUM CHLORIDE 1000 ML: .6; .31; .03; .02 INJECTION, SOLUTION INTRAVENOUS at 12:04

## 2022-04-29 RX ADMIN — HEPARIN SODIUM 5000 UNITS: 5000 INJECTION INTRAVENOUS; SUBCUTANEOUS at 01:04

## 2022-04-29 RX ADMIN — CALCIUM GLUCONATE 1 G: 20 INJECTION, SOLUTION INTRAVENOUS at 03:04

## 2022-04-29 RX ADMIN — SODIUM BICARBONATE: 84 INJECTION, SOLUTION INTRAVENOUS at 09:04

## 2022-04-29 RX ADMIN — SEVELAMER CARBONATE 800 MG: 800 TABLET, FILM COATED ORAL at 05:04

## 2022-04-29 RX ADMIN — CEFTRIAXONE 1 G: 1 INJECTION, SOLUTION INTRAVENOUS at 04:04

## 2022-04-29 RX ADMIN — SODIUM CHLORIDE, SODIUM LACTATE, POTASSIUM CHLORIDE, AND CALCIUM CHLORIDE: .6; .31; .03; .02 INJECTION, SOLUTION INTRAVENOUS at 08:04

## 2022-04-29 RX ADMIN — INSULIN ASPART 6 UNITS: 100 INJECTION, SOLUTION INTRAVENOUS; SUBCUTANEOUS at 06:04

## 2022-04-29 NOTE — EICU
New Patient Evaluation    HPI:  61 M history of DM (HbA1C 9.4) but not on medication for over a year, hypertension and dyslipidemia. He presented with generalized weakness, abdominal distension, decreased urine output, dry mouth and decreased appetite.    On work up creatinine 18.8, , K 5.6, Na 129, CO2 6, betahydroxybutyrate. Given fluids and insulin.    CT abdomen with markedly distended urinary bladder with hydronephrosis and hydroureter    Camera Assessment:  /73    O2 98%  Seen not in distress    Data:  WBC 13.4, H/H 13.7/38.2, platelets 332    Assessment and Plans:  · HERBERT secondary to obstructive uropathy. Tavera catheter inserted with initial output of 1 liter. Potassium shifted. Nephrology consulted.  · DM on sliding scale insulin. Ketones likely secondary to starvation as with poor intake.

## 2022-04-29 NOTE — NURSING TRANSFER
Nursing Transfer Note      4/29/2022     Reason patient is being transferred: downgraded    Transfer To: 468    Transfer via wheelchair    Transfer with cardiac monitoring    Transported by RN and transporter    Medicines sent: Insulin pens    Any special needs or follow-up needed: irrigate alvarenga Q 4hrs    Chart send with patient: Yes    Notified: spouse    Patient reassessed at: 4/29/220 1400     Upon arrival to floor: cardiac monitor applied, patient oriented to room, call bell in reach and bed in lowest position    All pt belongings transported with pt.   Report called to Antonette prior to transport.  Meds endorsed to Radha Dyer.

## 2022-04-29 NOTE — NURSING
Pt admitted from ED. DX ARF. Pt scooted over to bed and placed on continuous monitoring. VS wnl somewhat hypertensive. Will monitor. Tavera patent with bloody urine with sediment no clots. Instructed re NPO status and plan of care. No complaints of pain or n/v. Abdomen is somewhat distended but soft/ improved as per ED nurse as well. Labs to be drawn.Safety maintained. Wife at bs.

## 2022-04-29 NOTE — ED NOTES
RN in room with admit team. Dr. Lam. Informed them that Phos is 14.5. And Stated they were going to order medication for his blood pressure.

## 2022-04-29 NOTE — PLAN OF CARE
TN went to meet with patient. Patient reports he is independent at home and lives with his spouse Josseline. He does not have any DME or Home Health. Patient reports he will need a new glucometer at discharge. Patient still drives, but his wife will transport home. Patient does not have a PCP, but would like to be established with a PCP at discharge. He is agreeable with PCC follow-up at discharge. Patient encouraged to call with any questions or concerns.   will continue to follow patient through transitions of care and assist with any discharge needs.     TN requested PCC follow-up from access navigator. Will continue to follow patient and assess discharge needs/follow-ups needed. (Scheduled)    TN sent message to Roman (). VA listed as primary insurance. Patient mentioned he also has BCBS. Awaiting response. I did speak with patient again and he said he has BCBS and Medicare. He asked that BCBS be primary. Ave with UR updated. TN will call Roman.    1620--TN called and spoke with  Roman. He verified and patient has both BCBS and Medicare A. He stated Medicare A needs to be primary. BCBS is secondary and VA is 3rd. TN updated Ave with UR. Epic insurance information updated.    Patient Contacts    Name Relation Home Work Mobile   Josseline Bell Spouse   [phone]   Maria Eugenia Bell Daughter   [phone]     Future Appointments   Date Time Provider Department Center   5/23/2022 11:00 AM Yolanda Weaver MD [facility]        04/29/22 1215   Discharge Assessment   Assessment Type Discharge Planning Assessment   Confirmed/corrected address, phone number and insurance Yes   Confirmed Demographics Correct on Facesheet   Source of Information patient   Lives With significant other;spouse   Facility Arrived From: Home   Do you expect to return to your current living situation? Yes   Do you have help at home or someone to help you manage your care at home? Yes   Who  are your caregiver(s) and their phone number(s)? Spouse-Josseline   Prior to hospitilization cognitive status: Alert/Oriented   Current cognitive status: Alert/Oriented   Walking or Climbing Stairs Difficulty none   Dressing/Bathing Difficulty none   Equipment Currently Used at Home none   Readmission within 30 days? No   Do you take prescription medications? Yes   Do you have prescription coverage? Yes   Do you have any problems affording any of your prescribed medications? No   Who is going to help you get home at discharge? Spouse-Josseline   How do you get to doctors appointments? car, drives self   Are you on dialysis? No   Do you take coumadin? No   Discharge Plan A Home   Discharge Plan B Home with family   DME Needed Upon Discharge  glucometer   Discharge Plan discussed with: Patient   Discharge Barriers Identified None   Relationship/Environment   Name(s) of Who Lives With Patient [id number]     Mary Rios RN    [phone]

## 2022-04-29 NOTE — CONSULTS
[facility] Consult Note    Date of Admit: 4/28/2022    Chief Complaint/Reason for Consult     Hyperglycemia (Patient reports having an elevated blood glucose of 248 mg/dl at home. Also reports having abdominal pain with constipation x 1 week. ) and Abdominal Pain    Reason for consult: HERBERT, acidosis    Subjective:      History of Present Illness:  Angel Bell is a 61 y.o. male who  has a past medical history of Diabetes mellitus, Hyperlipemia, Hypertension, Migraine headache, and PTSD (post-traumatic stress disorder).. The patient presented to [facility] on 4/28/2022 with a primary complaint of elevated blood glucose. Reports that over the last few weeks he has had decreased urine output and worsening abdominal distention. Reports decreased appetite and worsening lethargy for the last month. Patient checked his blood glucose at home and it was 256 prompting patient and wife to present to hospital.     In ER found to have severe acidosis with bicarb of 6. BUN/Cr 142/18.8 on presentation with baseline Cr 1.5 from 2016. He was found to have distended bladder on imaging and alvarenga was placed; 6 L UOP since placement of alvarenga and patient reports improvement in symptoms.    Past Medical History:  Past Medical History:   Diagnosis Date    Diabetes mellitus     Hyperlipemia     Hypertension     Migraine headache     PTSD (post-traumatic stress disorder)        Past Surgical History:  History reviewed. No pertinent surgical history.    Allergies:  Review of patient's allergies indicates:  No Known Allergies    Home Medications:  Prior to Admission medications    Medication Sig Start Date End Date Taking? Authorizing Provider   aspirin 81 MG Chew Take 1 tablet (81 mg total) by mouth once daily. 7/5/16 7/5/17  Juve Farrar MD   atorvastatin (LIPITOR) 40 MG tablet Take 1 tablet (40 mg total) by mouth once daily. 7/5/16 7/5/17  Juve Farrar MD   blood sugar diagnostic Strp 1 strip by  "Misc.(Non-Drug; Combo Route) route 4 (four) times daily before meals and nightly. 16   Juve Farrar MD   insulin detemir (LEVEMIR FLEXTOUCH) 100 unit/mL (3 mL) SubQ InPn pen Inject 20 Units into the skin every evening. 16  Juve Farrar MD   lancets Misc 1 lancet by Misc.(Non-Drug; Combo Route) route 4 (four) times daily before meals and nightly. 16   Juve Farrar MD   metformin (GLUCOPHAGE) 1000 MG tablet  16   Historical Provider   metoprolol tartrate (LOPRESSOR) 50 MG tablet Take 1 tablet (50 mg total) by mouth 2 (two) times daily. 16  Anastacio Woods MD   pen needle, diabetic 29 gauge Ndle 1 Units by Misc.(Non-Drug; Combo Route) route 4 (four) times daily before meals and nightly. 16   Juve Farrar MD       Family History:  Family History   Problem Relation Age of Onset    Diabetes Mother        Social History:  Social History     Tobacco Use    Smoking status: Never Smoker    Smokeless tobacco: Never Used   Substance Use Topics    Alcohol use: No    Drug use: No       Review of Systems:  Pertinent positives and negatives are listed in HPI.  All other systems are reviewed and are negative.     Objective:   Last 24 Hour Vital Signs:  BP  Min: 114/55  Max: 226/102  Temp  Av.4 °F (36.9 °C)  Min: 98.1 °F (36.7 °C)  Max: 98.9 °F (37.2 °C)  Pulse  Av.5  Min: 97  Max: 132  Resp  Av.7  Min: 17  Max: 27  SpO2  Av.5 %  Min: 97 %  Max: 100 %  Height  Av' 7" (170.2 cm)  Min: 5' 7" (170.2 cm)  Max: 5' 7" (170.2 cm)  Weight  Av.6 kg (168 lb 12.7 oz)  Min: 76.2 kg (168 lb)  Max: 77.1 kg (170 lb)  Body mass index is 26.31 kg/m².  I/O last 3 completed shifts:  In: 3423.2 [I.V.:433.3; IV Piggyback:2989.9]  Out: 4955 [Urine:4955]    Physical Examination:  Gen: NAD, AAOx3  HEENT: NCAT, EOMI, PERRL, MM, pink conjunctiva  Neck: no thyroidmegaly, no LAD  Cardio: RRR, normal S1/S2, no MRG, JVP wnl  Lungs: CTAB  Abd: soft non tender, non " distended, normal bowel sounds, no hepatosplenomegaly  Ext: 2+ pulses B/L, no clubbing, cyanosis, edema  : Alvarenga in place  Skin: warm, dry, no rashes noted  Neuro: CN 2-12 grossly intact, UE/LE motor 5/5, sensation intact  Psych: conversational, responsive      Laboratory:  Most Recent Data:  CBC:   Lab Results   Component Value Date    WBC 11.84 04/29/2022    HGB 13.3 (L) 04/29/2022    HCT 37.3 (L) 04/29/2022     04/29/2022    MCV 82 04/29/2022    RDW 13.3 04/29/2022     BMP:   Lab Results   Component Value Date     04/29/2022    K 4.4 04/29/2022     04/29/2022    CO2 10 (L) 04/29/2022     (H) 04/29/2022    CREATININE 14.6 (H) 04/29/2022     (H) 04/29/2022    CALCIUM 8.8 04/29/2022    MG 2.8 (H) 04/29/2022    PHOS 11.7 (HH) 04/29/2022     Urinalysis:   Lab Results   Component Value Date    COLORU Yellow 04/28/2022    SPECGRAV 1.010 04/28/2022    NITRITE Negative 04/28/2022    KETONESU Negative 04/28/2022    UROBILINOGEN Negative 04/28/2022    WBCUA 22 (H) 04/28/2022       Radiology:  CT Abdomen Pelvis  Without Contrast   Final Result      Markedly distended urinary bladder with hydronephrosis and hydroureter.  Correlate for bladder outlet obstruction.      Increased perinephric stranding.  This can be seen with renal obstruction or pyelonephritis.         Electronically signed by: Yang Flaherty   Date:    04/28/2022   Time:    23:45      X-Ray Chest AP Portable   Final Result      No acute abnormality.         Electronically signed by: Yang Flaherty   Date:    04/28/2022   Time:    22:27           Assessment and Plan:      Angel Bell is a 61 y.o.male    HERBERT stage III on CKD stage IIIa  - baseline Cr around 1.5 from 2016  - history of uncontrolled DM; A1c > 14  - now presents with BUN/Cr 142/18.8. Found to have obstructive uropathy and alvarenga catheter placed; 6 L UOP subsequently and BUN/Cr improved to 125/14.6 today. Currently on bicarb drip; recommend continuing drip  and monitor renal function closely. No emergent indication for dialysis currently.  - strict I/O, daily weights  - please avoid gadolinium, fleets, phos-based laxatives, NSAIDs    Anion gap metabolic acidosis  - Bicarb of 6 on presentation. On bicarb drip; improving to 10 once obstructive uropathy resolved.    Hyperphosphatemia  - Improving with improvement in renal function. 14.5 -> 11.7. Continue Renvela.    Plan discussed with attending Dr. Holman.    Thank you for allowing us to participate in taking care of your patient. Please call us if you have any questions regarding this consult.     Que Palacio MD  [facility] Fellow    If after 5pm please forward any questions to the [facility] Fellow/Attending on call.

## 2022-04-29 NOTE — CONSULTS
Consult Note  [facility]    Attending: Gail Avina MD  Fellow: Steve  Admit Date: 4/28/2022  Today's Date: 04/29/2022  Reason for Consult:  Acute Renal Failure    SUBJECTIVE:     HPI: Mr. Bell is a 61-year-old man with a past medical history of type 2 diabetes, hyperlipidemia, hypertension, and migraine headaches who presented for a 2 week history of increasing confusion and urination.  History obtained from patient and wife who is at bedside.  Both report that over the last 2 weeks patient has had increasing confusion, abdominal distension and decreased activity.  Patient reports over this time frame that it he has had progressive abdominal distension and decreased urination volume with increased frequency.  He reports numerous nighttime awakenings with minimal urine flow.  Denies any fever/chills, flank pain, or shortness of breath.     Review of patient's allergies indicates:  No Known Allergies    Past Medical History:   Diagnosis Date    Diabetes mellitus     Hyperlipemia     Hypertension     Migraine headache     PTSD (post-traumatic stress disorder)      History reviewed. No pertinent surgical history.  Family History   Problem Relation Age of Onset    Diabetes Mother      Social History     Tobacco Use    Smoking status: Never Smoker    Smokeless tobacco: Never Used   Substance Use Topics    Alcohol use: No    Drug use: No       All medications reviewed.    Review of Systems   Constitutional: Positive for malaise/fatigue. Negative for chills, fever and weight loss.   HENT: Negative for sore throat.    Eyes: Negative for blurred vision.   Respiratory: Negative for cough, sputum production and shortness of breath.    Cardiovascular: Negative for chest pain and leg swelling.   Gastrointestinal: Negative for abdominal pain and melena.   Genitourinary: Positive for frequency and hematuria. Negative for dysuria and flank pain.   Musculoskeletal: Negative for myalgias.    Neurological: Negative for dizziness and loss of consciousness.   Psychiatric/Behavioral: Negative for substance abuse.       OBJECTIVE:     Vital Signs Trends/Hx Reviewed  Vitals:    04/29/22 0545 04/29/22 0600 04/29/22 0615 04/29/22 0630   BP:  (!) 120/59  (!) 123/56   Pulse: 102 105 101 105   Resp: (!) 21 (!) 22 20 19   Temp:       TempSrc:       SpO2: 98% 98% 97% 97%   Weight:       Height:         Physical Exam:  General: NAD, cooperative & interactive.  HEENT: AT/NC, PERRL, EOMI, oral and nasal mucosa moist.   Neck: Supple without JVD or palpable LAD.   Cardiac: normal rate, regular rhythm, 2/6 systolic murmur in left upper sternal border  Respiratory: Normal inspection. Symmetric chest rise. Normal palpation and percussion. Auscultation clear bilaterally. No increased work of breathing noted.   Abdomen: Soft, NT/ND. +BS. No hepatosplenomegaly.   : alvarenga in place with pink urine output, no suprapubic tenderness  Extremities: No edema.   Neuro: Grossly intact to brief exam. Oriented x3 with appropriate mood/affect to situation.       Laboratory:  Recent Labs   Lab 04/28/22 2143   PH 7.156*   PCO2 26.3*   PO2 29*   HCO3 9.3*   POCSATURATED 40*   BE -19     Recent Labs   Lab 04/29/22 0443   WBC 11.84   RBC 4.53*   HGB 13.3*   HCT 37.3*      MCV 82   MCH 29.4   MCHC 35.7     Recent Labs   Lab 04/29/22  0443   *  135*   K 4.8  4.8     100   CO2 9*  9*   *  134*   CREATININE 16.1*  16.1*   MG 2.8*       Microbiology Data:   Microbiology Results (last 7 days)     Procedure Component Value Units Date/Time    Blood culture [[id number]] Collected: 04/28/22 2217    Order Status: Sent Specimen: Blood Updated: 04/29/22 0116    Blood culture [[id number]] Collected: 04/28/22 2217    Order Status: Sent Specimen: Blood Updated: 04/29/22 0116    Urine culture [[id number]] Collected: 04/28/22 2010    Order Status: No result Specimen: Urine Updated: 04/28/22 2056           Chest Imaging:   No  new imaging.     Infusions:     sodium bicarbonate drip 125 mL/hr at 04/29/22 0720       Scheduled Medications:    cefTRIAXone (ROCEPHIN) IVPB  1 g Intravenous Q12H    heparin (porcine)  5,000 Units Subcutaneous Q8H    insulin detemir U-100  10 Units Subcutaneous Daily    mupirocin   Nasal BID    NIFEdipine  30 mg Oral Daily    sevelamer carbonate  800 mg Oral TID WM    tamsulosin  0.4 mg Oral Daily       PRN Medications:   [COMPLETED] calcium gluconate IVPB **AND** calcium gluconate IVPB, dextrose 10%, dextrose 10%, glucagon (human recombinant), glucose, glucose, insulin aspart U-100, labetalol, naloxone, polyethylene glycol, sodium chloride 0.9%    Assessment & Plan:   Patient Active Problem List   Diagnosis    Type 2 diabetes, uncontrolled, with renal manifestation    Metabolic acidosis    Hyperglycemia    Acute renal failure    Abnormal liver enzymes    Hyperlipidemia    Hypertension    Elevated PSA, less than 10 ng/ml    Family history of prostate cancer in father       ASSESSMENT & RECOMMENDATIONS     CNS/Neuro:  # acute encephalopathy, resolved  - encephalopathy likely secondary to patient's acute urinary retention  - TSH 0.536, urine tox negative, ethanol negative, salicylate negative  - patient returned to baseline mental status following relief of obstruction with catheter    Cardiovascular:  # history of hypertension  - on initial presentation severely hypertensive with blood pressure 220/110  - following relief of urinary obstruction, SBP is improved to 1 20s  - patient previously prescribed metoprolol tartrate in the outpatient setting for hypertension    Respiratory:  No acute issues    GI/Metabolic:  GI Ppx:  None  Diet:  Diabetic and renal    Renal:  # acute kidney injury on CKD secondary to obstructive uropathy  - patient initially presented with distended abdomen and bladder.  - CT abdomen demonstrated markedly distended bladder with hydronephrosis and hydroureter with increased  perinephric stranding.  - patient had Tavera catheter placed with immediate drainage of 3 L with another 1.6 L following irrigation Tavera  - patient initially demonstrated hyperkalemia of 5.8, status post shifting and calcium gluconate administration  - FeNa 34%  - UA, 3+ protein, 3+ glucose without ketones, 1+ leukocytes.  Pending culture.  - currently on Rocephin  - patient following relief of the obstruction continues to urinate with improving renal function  - nephrology following  - continue to monitor for need of renal replacement therapy  - continue sevelamer    # high anion gap metabolic acidosis secondary to azotemia  - patient patient on initial presentation demonstrated anion gap metabolic acidosis with AG of 34 and bicarb of 6.  Likely secondary elevated BUN  - patient currently on bicarb drip demonstrating continued improvement in anion gap and on 04/29/2022 AG 26  - lactate normal, glucose 250s on admission unlikely DKA    Heme:  DVT Ppx: heparin    Endo:   Strict Glucose control with goal 140-180  # type 2 diabetes mellitus  - PT previously prescribed metformin and lantus nightly  - patient has not taken any medication in several years  - HbA1c greater than 14%  - currently on Levemir 10 nightly, continue to monitor.    ID:  # urinary tract infection  - patient afebrile with mildly elevated WBC of 13.4 on admission  - UA positive for leukocytes but negative for esterase and nitrates.  - empiric coverage with Rocephin daily   - pending urine culture.    Code Status: Full    Thank you for allowing us to participate in the care of this patient. Please call with questions.    Yang Shepard MD  [facility], PGY-2      Pulmonary/Critical Care Attending with Dr. Shepard    Patient seen and examined on rounds with team after review of PMH, ON hospital course, labs and xrays.  I have reviewed, we discussed, and I have verified his findings, assessment and recommendations.  The patient presented  in a confused state and was found to have an obstructed bladder for quite some time, which improved once he had a Tavera catheter placed and his bladder was drained.  His BP was also very high on arrival, and that has also dropped after his obstruction was resolved.   His GFR also dropped significantly before his urinary obstruction was resolved and it remains in that range now.  He is being treated for a UTI.  This morning, he was awake, lucid and felt better.  His DM is not well-controlled and has been untreated for many years (~300's)  He was started on Levimer.  His CO2 is also improving but is still low.  Urology is also following him for HERBERT III on CKD III to see if and his kidneys will begin to improve.  Critical care time 34 min for review of chart, lab data and images, examination of the patient, monitoring, and adjusting as necessary the management plan for support of vital organ system (urinary/metabolic), and also for discussion with other caregivers.    Dora Correia MD  4/29/2022   2:22 PM

## 2022-04-29 NOTE — PLAN OF CARE
I have spoken to Dr. Asher with hospitals nephrology regarding patient and his acute renal failure. After reviewing labs and CT ab/pelvis I believe this is likely 2/2 urinary obstruction rather than DKA as initially reported. Tavera has been ordered STAT and patient has received 3 L IVF. Dr. Asher agrees with holding off on insulin gtt, and recommends shifting K and starting Bicarb drip at this time. Will check q4 hr BMP and continue to assess need for HD. Patient is agreeable to trialysis line and HD initiation in the event of emergent need.      Evy Martinez M.D.  [facility]  04/29/2022 12:18 AM

## 2022-04-29 NOTE — H&P
"[facility] Medicine H&P Note     Admitting Team: [facility] Hospitalist Team A  Attending Physician: Gail Avina MD  Resident: Juan  Intern: Karen    Date of Admit: 4/28/2022    Chief Complaint     Elevated Blood sugar x 1 day    Subjective:      History of Present Illness:  Angel Bell is a 61 year old male with a PMH of type 2 DM and HTN who is presenting with a chief complaint of elevated blood sugar x 1 day.    The patient was in their usual state of health until about 2 weeks ago when he began feeling "bad".  He symptoms are vague that have progressed over the last month but include decreased appetite and increased lethargy.  He is complaining of worsening abdominal distention over the last few days as well as decreased urinary output over the last month.  He is also complaining of dry mouth.   His wife is at bedside, who states he has had decreased energy, abdominal distention and shortness of breath on exertion over the last month.  They decided to check his blood sugar today  since he has not been feeling well and found that his BG was 256, which is why they decided to come to the hospital. He does have a history of type 2 diabetes, previously on insulin, but has not taken any of his medications in over a year.  He does not regularly check his blood sugar at home.  He denies any cough, SOB, CP, fevers, chills, body aches, polydipsia, polyuria or dysuria.      Past Medical History:  Past Medical History:   Diagnosis Date    Diabetes mellitus     Hyperlipemia     Migraine headache     PTSD (post-traumatic stress disorder)        Past Surgical History:  None    Allergies:  Review of patient's allergies indicates:  No Known Allergies    Home Medications:  None    Family History:  Mother - renal failure  Father - MI    Social History:  Social History     Tobacco Use    Smoking status: Never Smoker    Smokeless tobacco: Never Used   Substance Use Topics    Alcohol use: No    Drug use: No     Lives with " "wife    Review of Systems:  Pertinent items are noted in HPI. All other systems are reviewed and are negative.    Health Maintaince :   Primary Care Physician: none    Immunizations:   TDap not UTD  Flu not UTD  Pna not UTD  COVID X 2      Cancer Screening:  Colonoscopy: not UTD     Objective:   Last 24 Hour Vital Signs:  BP  Min: 154/73  Max: 226/102  Temp  Av.3 °F (36.8 °C)  Min: 98.1 °F (36.7 °C)  Max: 98.8 °F (37.1 °C)  Pulse  Av.9  Min: 97  Max: 132  Resp  Av.8  Min: 17  Max: 27  SpO2  Av.6 %  Min: 98 %  Max: 100 %  Height  Av' 7" (170.2 cm)  Min: 5' 7" (170.2 cm)  Max: 5' 7" (170.2 cm)  Weight  Av.7 kg (169 lb 1.6 oz)  Min: 76.5 kg (168 lb 10.4 oz)  Max: 77.1 kg (170 lb)  Body mass index is 26.41 kg/m².  No intake/output data recorded.    Physical Examination:  Examination  General: Patient lying in bed comfortably,  in NAD  Head: normocephalic, atraumatic  Eyes: PERRL, EOMI, no conjunctival injections or icterus  Mouth: MMM, posterior oropharynx without erythema  Neck: No thyromegaly or masses   Cardiac: RRR, no murmurs appreciated, no extra heart sounds  Pulmonary/Chest: CTAB, symmetric chest rise, no wheezing or crackles  GI: Distended, tender to palpation in lower quadrants; normoactive bowel sounds  Extremities: no edema, clubbing, or cyanosis  Skin: dry, warm, intact. No bruising or rashes.  Neuro: Alert and oriented, moving all extremities, no asterixis present      Laboratory:  Most Recent Data:  CBC:   Lab Results   Component Value Date    WBC 13.40 (H) 2022    HGB 13.7 (L) 2022    HCT 39 2022     2022    MCV 83 2022    RDW 13.3 2022     WBC Differential: 80.2 % N, 1.3 % Bands, 6.5 % L, 11.9 % M, 0 % Eo, 0.1 % Baso, no additional cells seen  BMP:   Lab Results   Component Value Date     (L) 2022    K 5.6 (H) 2022    CL 91 (L) 2022    CO2 6 (LL) 2022     (H) 2022    CREATININE 18.8 (H) " 04/28/2022     (H) 04/28/2022    CALCIUM 9.2 04/28/2022    MG 3.1 (H) 04/28/2022    PHOS 14.5 (HH) 04/28/2022     LFTs:   Lab Results   Component Value Date    PROT 7.7 04/28/2022    ALBUMIN 3.1 (L) 04/28/2022    BILITOT 0.4 04/28/2022    AST 16 04/28/2022    ALKPHOS 113 04/28/2022    ALT 18 04/28/2022     Coags:   Lab Results   Component Value Date    INR 1.1 07/11/2016     FLP:   Lab Results   Component Value Date    CHOL 246 (H) 07/03/2016    HDL 37 (L) 07/03/2016    LDLCALC 145.2 07/03/2016    TRIG 319 (H) 07/03/2016    CHOLHDL 15.0 (L) 07/03/2016     DM:   Lab Results   Component Value Date    HGBA1C 9.4 (H) 07/03/2016    LDLCALC 145.2 07/03/2016    CREATININE 18.8 (H) 04/28/2022     Thyroid:   Lab Results   Component Value Date    TSH 1.096 07/02/2016     Anemia: No results found for: IRON, TIBC, FERRITIN, CVINGGRK02, FOLATE  Cardiac:   Lab Results   Component Value Date    TROPONINI 0.084 (H) 04/28/2022    BNP <10 07/11/2016     Urinalysis:   Lab Results   Component Value Date    COLORU Yellow 04/28/2022    SPECGRAV 1.010 04/28/2022    NITRITE Negative 04/28/2022    KETONESU Negative 04/28/2022    UROBILINOGEN Negative 04/28/2022    WBCUA 22 (H) 04/28/2022       Trended Lab Data:  Recent Labs   Lab 04/28/22 2023 04/28/22 2143   WBC 13.40*  --    HGB 13.7*  --    HCT 38.2* 39     --    MCV 83  --    RDW 13.3  --    *  --    K 5.6*  --    CL 91*  --    CO2 6*  --    *  --    CREATININE 18.8*  --    *  --    PROT 7.7  --    ALBUMIN 3.1*  --    BILITOT 0.4  --    AST 16  --    ALKPHOS 113  --    ALT 18  --        Trended Cardiac Data:  Recent Labs   Lab 04/28/22  2142   TROPONINI 0.084*       Microbiology Data:  Bcx pending  Urine Cx pending    Other Results:  EKG (my interpretation): sinus tachycardia with evidence of LVH    Radiology:  Imaging Results          CT Abdomen Pelvis  Without Contrast (Final result)  Result time 04/28/22 23:45:25    Final result by Yang BEE  MD Krystina (04/28/22 23:45:25)                 Impression:      Markedly distended urinary bladder with hydronephrosis and hydroureter.  Correlate for bladder outlet obstruction.    Increased perinephric stranding.  This can be seen with renal obstruction or pyelonephritis.      Electronically signed by: Yang Flaherty  Date:    04/28/2022  Time:    23:45             Narrative:    EXAMINATION:  CT ABDOMEN PELVIS WITHOUT CONTRAST    CLINICAL HISTORY:  Abdominal pain, acute, nonlocalized;    TECHNIQUE:  Low dose axial images, sagittal and coronal reformations were obtained from the lung bases to the pubic symphysis, 30 mL of oral Omnipaque 350 was administered..    Low dose axial images, sagittal and coronal reformations were obtained from the lung bases to the pubic symphysis.  Oral contrast was not administered.    COMPARISON:  None    FINDINGS:  Heart: Normal in size. No pericardial effusion.    Lung Bases: Well aerated, without consolidation or pleural fluid.    Liver: Normal in size and attenuation, with no focal hepatic lesions.    Gallbladder: No calcified gallstones.    Bile Ducts: No evidence of dilated ducts.    Pancreas: No mass or peripancreatic fat stranding.    Spleen: Unremarkable.    Adrenals: Unremarkable.    Kidneys/ Ureters: Hydronephrosis and hydroureter without calcified stone.  Renal cyst noted mainly on the right.  Perinephric stranding in the perinephric fat.    Bladder: Bladder wall thickening and distension extending out of the pelvis to the level of the umbilicus.  No discrete mass is seen.    Reproductive organs: Prostate is enlarged measuring 6.4 x 4.8 by 7.4 cm.    GI Tract/Mesentery: No evidence of bowel obstruction or inflammation.  Small sliding-type hiatal hernia.    Peritoneal Space: No ascites. No free air.    Retroperitoneum: No significant adenopathy.    Abdominal wall: Unremarkable.    Vasculature: No significant atherosclerosis or aneurysm.    Bones: No acute fracture.                                X-Ray Chest AP Portable (Final result)  Result time 04/28/22 22:27:10    Final result by Yang Flaherty MD (04/28/22 22:27:10)                 Impression:      No acute abnormality.      Electronically signed by: Yang Flaherty  Date:    04/28/2022  Time:    22:27             Narrative:    EXAMINATION:  XR CHEST AP PORTABLE    CLINICAL HISTORY:  hypertensive emergency;    TECHNIQUE:  Single frontal view of the chest was performed.    COMPARISON:  07/11/2016    FINDINGS:  The lungs are clear, with normal appearance of pulmonary vasculature and no pleural effusion or pneumothorax.    The cardiac silhouette is normal in size. The hilar and mediastinal contours are unremarkable.    Bones are intact.                                 Assessment:     Angel Bell is a 61 year old male with a PMH of type 2 DM and HTN who is presenting with a chief complaint of elevated blood sugar x 1 day found to be in acute renal failure 2/2 enlarged prostate.    Plan:     Neuro  Per pt and wife he has been more lethargic over last 2 months.  Wife reports some episodes of confusion.   -Pt is alert and oriented x 4 on exam  -Symptoms of lethargy likely related to uremia with BUN of 142     Cardiovascular  #HTN  History of HTN, previously on Lopressor 50mg BID  -Reports not taking any medications in >1 year  -/102 on admit  -EKG sinus tachycardia with evidence of LVH, likely secondary to longstanding uncontrolled HTN  -given 5mg IV Hydralazine in ED  -MAP goal 110-124 in first 24 hours  -prn Labetalol overnight; starting on Nifedipine 30mg PO daily in AM     Respiratory  Sating 100% on RA; CXR without focal infiltrate or effusion     GI/FEN  Presenting with history of constipation relieved with OTC dulcolax  -prn miralax  NPO for now  Sodium Bicarb gtt in D5 @ 100ml/hr     Renal   #Acute Renal Failure 2/2 Obstructive Uropathy, likely BPH  Presenting with history of abdominal distention and decreased UOP for  2 weeks   -BUN/Cr 142/18.8; last documented Cr 1.6 5 years ago  -CT Abd/Pelvis revealed largely distended bladder with evidence of hydronephrosis  -FENa 22.6%, consistent with post-renal/obstructive   -Tavera inserted with >900ml UOP  -Nephrology consulted, no need for urgent dialysis at this point, continue management with relief of obstructive uropathy, bicarb gtt, and correction of electrolytes  -starting flomax  -shifting K, sevelamer for phos of 14.5    #AGMA  -CO2 6, AG 32 on admit  -beta hydroxybutyrate 4.4, UA negative for ketones  -LA 1.5  -  -VBG with pH 7.1  -starting on sodium bicarb drip for now, discussed case with nephrology, holding off on urgent dialysis and line placement for now    #Hyperkalemia  K 5.6 on admit, given calcium gluconate, sodium bicarb, albuterol; repeat 5.8, given calcium gluconate, shifted with IV insulin, repeat 4.8    #Hypochloremia  -likely secondary to volume overlod    #Hyponatremia  -likely secondary to volume overload    #Hyperphosphatemia  -secondary to renal failure  -starting on phos binders TID       Heme  #Normocytic Anemia  -Iron studies, ferritin, B12, Folate pending     Endocrine  #Diabetes Mellitus, type 2  Reports history of DM, previously on insulin, but not taking any medications in over a year  -A1C >14  -betahydroxybutyrate 4.4 on admit  -UA negative for ketones  -SSI, starting on Levemir 10 units      Infectious Disease  #UTI  -UA with bacteria, WBCs  -Ceftriaxone    DVT Ppx: sq heparin  Code: Full  Dispo: Admit to ICU       Zunilda Casanova MD  [facility] Resident, [facility]    [facility] Medicine Hospitalist Pager numbers:   U Hospitalist Medicine Team A (Juanjose/Fabrice): [phone]  U Hospitalist Medicine Team B (Michael/Shanti):  [phone]

## 2022-04-29 NOTE — PROGRESS NOTES
"[facility] Hospital Medicine Progress Note    Primary Team: [facility] Hospitalist Team A  Attending Physician: Gail Avina MD  Resident: Juan  Intern: Karen    Subjective:      No acute events overnight.  Mr. Bell is feeling well this morning.  He reports his abdominal distention and discomfort are improved after insertion of alvarenga catheter.      Objective:     Last 24 Hour Vital Signs:  BP  Min: 114/55  Max: 226/102  Temp  Av.4 °F (36.9 °C)  Min: 98.1 °F (36.7 °C)  Max: 98.9 °F (37.2 °C)  Pulse  Av  Min: 97  Max: 132  Resp  Av.8  Min: 17  Max: 27  SpO2  Av.6 %  Min: 97 %  Max: 100 %  Height  Av' 7" (170.2 cm)  Min: 5' 7" (170.2 cm)  Max: 5' 7" (170.2 cm)  Weight  Av.7 kg (168 lb 15.8 oz)  Min: 76.5 kg (168 lb 10.4 oz)  Max: 77.1 kg (170 lb)  I/O last 3 completed shifts:  In: 3423.2 [I.V.:433.3; IV Piggyback:2989.9]  Out: 4955 [Urine:4955]    Physical Examination:  Examination  General: Patient lying in bed comfortably,  in NAD  Head: normocephalic, atraumatic  Eyes: PERRL, EOMI, no conjunctival injections or icterus  Mouth: MMM, posterior oropharynx without erythema  Neck: No thyromegaly or masses   Cardiac: RRR, no murmurs appreciated, no extra heart sounds  Pulmonary/Chest: CTAB, symmetric chest rise, no wheezing or crackles  GI: Distended, tender to palpation in lower quadrants; normoactive bowel sounds  Extremities: no edema, clubbing, or cyanosis  Skin: dry, warm, intact. No bruising or rashes.  Neuro: Alert and oriented, moving all extremities, no asterixis present       Laboratory:  Laboratory Data   Recent Labs   Lab 22  2142 22  2143 22  2217 22  0209 22  0443   WBC 13.40*  --   --   --   --  11.84   HGB 13.7*  --   --   --   --  13.3*   HCT 38.2*  --  39  --   --  37.3*     --   --   --   --  339   MCV 83  --   --   --   --  82   *  --   --   --  131* 135*  135*   K 5.6*  --   --   --  5.8* 4.8  4.8   CL 91*  --   --   " --  98 100  100   CO2 6*  --   --   --  6* 9*  9*   *  --   --   --  137* 134*  134*   CREATININE 18.8*  --   --   --  16.9* 16.1*  16.1*   *  --   --   --  243* 276*  276*   CALCIUM 9.2  --   --   --  9.1 9.3  9.3   PROT 7.7  --   --   --   --  6.6   ALBUMIN 3.1*  --   --   --   --  2.5*   PHOS  --   --   --  14.5*  --  11.7*   MG  --   --   --  3.1*  --  2.8*   AST 16  --   --   --   --  11   ALT 18  --   --   --   --  21   ALKPHOS 113  --   --   --   --  97   TROPONINI  --  0.084*  --   --  0.096*  --    BNP  --   --   --   --  148*  --      Invalid input(s): POCTGLU  Recent Labs   Lab 04/29/22  0209   HGBA1C >14.0*       Microbiology Data   Bcx pending  Ucx pending    Other Results:  EKG (my interpretation): no new    Radiology Data  CT Abdomen Pelvis  Without Contrast   Final Result      Markedly distended urinary bladder with hydronephrosis and hydroureter.  Correlate for bladder outlet obstruction.      Increased perinephric stranding.  This can be seen with renal obstruction or pyelonephritis.         Electronically signed by: Yang Flaherty   Date:    04/28/2022   Time:    23:45      X-Ray Chest AP Portable   Final Result      No acute abnormality.         Electronically signed by: Yang Flaherty   Date:    04/28/2022   Time:    22:27      X-Ray Chest AP Portable    (Results Pending)       Current Medications:     Infusions:   sodium bicarbonate drip 125 mL/hr at 04/29/22 0720        Scheduled:   [START ON 4/30/2022] cefTRIAXone (ROCEPHIN) IVPB  1 g Intravenous Q24H    heparin (porcine)  5,000 Units Subcutaneous Q8H    insulin detemir U-100  10 Units Subcutaneous Daily    mupirocin   Nasal BID    NIFEdipine  30 mg Oral Daily    sevelamer carbonate  800 mg Oral TID WM    tamsulosin  0.4 mg Oral Daily        PRN:  [COMPLETED] calcium gluconate IVPB **AND** calcium gluconate IVPB, dextrose 10%, dextrose 10%, glucagon (human recombinant), glucose, glucose, insulin aspart U-100,  labetalol, naloxone, polyethylene glycol, sodium chloride 0.9%    Antibiotics and Day Number of Therapy:  Ceftriaxone    Lines and Day Number of Therapy:  Tavera    Assessment:   Angel Bell is a 61 year old male with a PMH of type 2 DM and HTN who is presenting with a chief complaint of elevated blood sugar x 1 day found to be in acute renal failure 2/2 obstructive uropathy 2/2 enlarged prostate.    Plan:     Neuro  Per pt and wife he has been more lethargic over last 2 months.  Wife reports some episodes of confusion.   -Pt is alert and oriented x 4 on exam  -Symptoms of lethargy likely related to uremia with BUN of 142     Cardiovascular  #HTN  History of HTN, previously on Lopressor 50mg BID  -Reports not taking any medications in >1 year  -/102 on admit  -EKG sinus tachycardia with evidence of LVH, likely secondary to longstanding uncontrolled HTN  -given 5mg IV Hydralazine in ED  -MAP goal 110-124 in first 24 hours  -Improving this morning; holding off on starting antihypertensives      Respiratory  Sating 100% on RA; CXR without focal infiltrate or effusion     GI/FEN  Presenting with history of constipation relieved with OTC dulcolax  -prn miralax  NPO for now  Sodium Bicarb gtt in D5 @ 100ml/hr     Renal   #Acute Renal Failure 2/2 Obstructive Uropathy, likely BPH  Presenting with history of abdominal distention and decreased UOP for 2 weeks   -BUN/Cr 142/18.8; last documented Cr 1.6 5 years ago  -CT Abd/Pelvis revealed largely distended bladder with evidence of hydronephrosis  -FENa 22.6%, consistent with post-renal/obstructive   -Tavera inserted with >900ml UOP  -Nephrology consulted, no need for urgent dialysis at this point, continue management with relief of obstructive uropathy, bicarb gtt, and correction of electrolytes  -Consulting urology, appreciate recs  -starting flomax  -shifting K, sevelamer for phos of 14.5     #AGMA  -CO2 6, AG 32 on admit >> 24  -beta hydroxybutyrate 4.4, UA negative for  ketones  -LA 1.5  -  -VBG with pH 7.1  -ethanol <10, salicylate <5, drug screen negative  -likely multifactorial secondary to renal failure, starvation ketosis  -starting on sodium bicarb drip for now, discussed case with nephrology, holding off on urgent dialysis and line placement for now     #Hyperkalemia  K 5.6 on admit, shifted x 2  -4.4 this morning; continue q4 BMPs     #Hypochloremia  -likely secondary to volume overload, resolved     #Hyponatremia  -likely secondary to volume overload, resolved     #Hyperphosphatemia  -secondary to renal failure  -starting on phos binders TID        Heme  #Normocytic Anemia  -Iron studies, ferritin, B12, Folate pending     Endocrine  #Diabetes Mellitus, type 2  Reports history of DM, previously on insulin, but not taking any medications in over a year  -A1C >14  -betahydroxybutyrate 4.4 on admit  -UA negative for ketones  -SSI, starting on Levemir 10 units      Infectious Disease  #UTI  -UA with bacteria, WBCs  -Ceftriaxone     DVT Ppx: sq heparin  Code: Full  Dispo: Admit to ICU         Zunilda Casanova MD  [facility], [facility]    [facility] Medicine Hospitalist Pager numbers:   [facility] Hospitalist Medicine Team A (Juanjose/Fabrice): [phone]  [facility] Hospitalist Medicine Team B (Michael/Shanti):  [phone]

## 2022-04-29 NOTE — ED NOTES
Patient ambulated to bathroom, steady gait to have a BM. Back in bed. Medication administered. Patient awake, alert, oriented. GCS of 15. Denies CP and SOB. No increased WOB.

## 2022-04-29 NOTE — CLINICAL REVIEW
IP Sepsis Screen (most recent)     Sepsis Screen - 04/29/22 4583     Is the patient's history or complaint suggestive of a possible infection? Yes  -LW    Are there at least two of the following signs and symptoms present? Yes  -LW    Sepsis signs/symptoms - Tachycardia Tachycardia     >90  -LW    Sepsis signs/symptoms - Tachypnea Tachypnea     >20  -LW    Are any of the following organ dysfunction criteria present and not considered to be due to a chronic condition? Yes  -LW    Organ Dysfunction Criteria Creatinine > 2.0  -LW    Initiate Sepsis Protocol No  -LW    Reason sepsis not considered Pt. receiving appropriate management  -LW          User Key  (r) = Recorded By, (t) = Taken By, (c) = Cosigned By    Initials Name    JUAN Zhong RN

## 2022-04-29 NOTE — CONSULTS
[facility] - Intensive Care  Urology  Consult Note    Patient Name: Angel eBll  MRN: [id number]  Admission Date: 4/28/2022  Attending Provider: Gail Avina MD   Consulting Provider: Ted Garvin MD  Principal Problem:Acute renal failure    Consults    Subjective:     HPI:   Angel Bell is a 61 y.o. male who presents with abdominal distention, lethargy, difficulty urinating.  Notes decreased UOP for 1-2 weeks and only small amounts of urine.  Denies any hematuria or dysuria.  Reports no significant LUTs prior to 1-2 weeks ago.  On admission noted to have ARF and CT showing bilateral hydronephrosis with distended bladder.  Tavera placed for >1 liter urine.  Urine now blood tinged but originally clear per patient/nurse.  Urology consulted.       H/o elevated PSA in past, no biopsy done.          Past Medical History:   Diagnosis Date    Diabetes mellitus     Hyperlipemia     Hypertension     Migraine headache     PTSD (post-traumatic stress disorder)        History reviewed. No pertinent surgical history.    Review of patient's allergies indicates:  No Known Allergies    Family History     Problem Relation (Age of Onset)    Diabetes Mother          Tobacco Use    Smoking status: Never Smoker    Smokeless tobacco: Never Used   Substance and Sexual Activity    Alcohol use: No    Drug use: No    Sexual activity: Not on file       Review of Systems   Constitutional: Positive for activity change and appetite change. Negative for chills and fever.   Respiratory: Negative for chest tightness and shortness of breath.    Cardiovascular: Negative for chest pain and palpitations.   Gastrointestinal: Negative for abdominal distention, abdominal pain, nausea and vomiting.   Genitourinary: Positive for difficulty urinating. Negative for flank pain, frequency, hematuria, nocturia and urgency.   Musculoskeletal: Negative for arthralgias and neck pain.   Skin: Negative for wound.   Neurological: Negative for  dizziness.   Psychiatric/Behavioral: Negative for confusion.       Objective:     Temp:  [98.1 °F (36.7 °C)-98.9 °F (37.2 °C)] 98.6 °F (37 °C)  Pulse:  [] 97  Resp:  [17-27] 19  SpO2:  [97 %-100 %] 97 %  BP: (114-226)/() 114/55       NAD  RRR  CTAB  ABD S/ND/NTTP       Penis normal       Alvarenga alvarenga clear slight red tinge w/o clots       Ext: no edema      Significant Labs:  BMP:  Recent Labs   Lab 04/28/22 2023 04/29/22  0209 04/29/22  0443   * 131* 135*  135*   K 5.6* 5.8* 4.8  4.8   CL 91* 98 100  100   CO2 6* 6* 9*  9*   * 137* 134*  134*   CREATININE 18.8* 16.9* 16.1*  16.1*   CALCIUM 9.2 9.1 9.3  9.3       CBC:  Recent Labs   Lab 04/28/22 2023 04/28/22 2143 04/29/22 0443   WBC 13.40*  --  11.84   HGB 13.7*  --  13.3*   HCT 38.2* 39 37.3*     --  339       Urinalysis  Recent Labs   Lab 04/28/22 2010   COLORU Yellow   SPECGRAV 1.010   PHUR 6.0   PROTEINUA 3+*   BACTERIA Moderate*   NITRITE Negative   LEUKOCYTESUR 1+*   UROBILINOGEN Negative   HYALINECASTS 2*       Results for orders placed or performed during the hospital encounter of 04/28/22 (from the past 2160 hour(s))   CT Abdomen Pelvis  Without Contrast    Impression    Markedly distended urinary bladder with hydronephrosis and hydroureter.  Correlate for bladder outlet obstruction.    Increased perinephric stranding.  This can be seen with renal obstruction or pyelonephritis.      Electronically signed by: Yang Flaherty  Date:    04/28/2022  Time:    23:45       Significant Imaging:  CT: I have reviewed all results within the past 24 hours and my personal findings are:  bilateral hydronephrosis, distended bladder, HERRERA      Assessment:     Problem Noted   Urinary Retention 4/29/2022    Distended bladder with bilateral hydroneprosis, ARF     Bilateral Hydronephrosis 4/29/2022   Acute Renal Failure 7/3/2016     Plan:    1. Irrigate alvarenga q4h, follow Cr, renal US on Monday to assess hydronephrosis  2. Limited   coverage over weekend  3. Will see Monday, call with questions    Thank you for your consult.     Ted Garvin MD  [facility]

## 2022-04-29 NOTE — ED PROVIDER NOTES
"Encounter Date: 4/28/2022       History     Chief Complaint   Patient presents with    Hyperglycemia     Patient reports having an elevated blood glucose of 248 mg/dl at home. Also reports having abdominal pain with constipation x 1 week.     Abdominal Pain     This is a 61-year-old male history of hypertension, diabetes PTSD presents the ER for multiple complaints.  Patient sources he has been taking his medication for low.  He reports there is no reason for him not to take it.  He states that it's my fault".  Also was complaining of constipation.  X1 week.  He took his laxatives today, had a bowel and endorses that it feels much better.        Review of patient's allergies indicates:  No Known Allergies  Past Medical History:   Diagnosis Date    Diabetes mellitus     Hyperlipemia     Hypertension     Migraine headache     PTSD (post-traumatic stress disorder)      History reviewed. No pertinent surgical history.  Family History   Problem Relation Age of Onset    Diabetes Mother      Social History     Tobacco Use    Smoking status: Never Smoker    Smokeless tobacco: Never Used   Substance Use Topics    Alcohol use: No    Drug use: No     Review of Systems   Constitutional: Negative for fatigue and fever.   Respiratory: Negative for shortness of breath.    Cardiovascular: Negative for chest pain.   Gastrointestinal: Positive for constipation. Negative for abdominal pain.   All other systems reviewed and are negative.      Physical Exam     Initial Vitals [04/28/22 1935]   BP Pulse Resp Temp SpO2   (S) (!) 226/102 (S) (!) 132 18 98.4 °F (36.9 °C) 100 %      MAP       --         Physical Exam    Nursing note and vitals reviewed.  Constitutional: He appears well-developed and well-nourished.   HENT:   Head: Normocephalic and atraumatic.   Eyes: Pupils are equal, round, and reactive to light.   Neck:   Normal range of motion.  Cardiovascular:   Tachycardic rate and rhythm   Pulmonary/Chest: Breath sounds " normal. No respiratory distress.   Abdominal: Abdomen is soft. He exhibits no distension. There is no abdominal tenderness.   Musculoskeletal:         General: Normal range of motion.      Cervical back: Normal range of motion.     Neurological: He is alert and oriented to person, place, and time. He has normal strength. GCS score is 15. GCS eye subscore is 4. GCS verbal subscore is 5. GCS motor subscore is 6.   Skin: Skin is warm and dry. Capillary refill takes less than 2 seconds.   Psychiatric: He has a normal mood and affect. Thought content normal.         ED Course   Critical Care    Date/Time: 4/29/2022 1:27 AM  Performed by: Margi Nunez MD  Authorized by: Margi Nunez MD   Total critical care time (exclusive of procedural time) : 55 minutes  Critical care was necessary to treat or prevent imminent or life-threatening deterioration of the following conditions: renal failure, metabolic crisis and dehydration.        Labs Reviewed   CBC W/ AUTO DIFFERENTIAL - Abnormal; Notable for the following components:       Result Value    WBC 13.40 (*)     Hemoglobin 13.7 (*)     Hematocrit 38.2 (*)     Immature Granulocytes 1.3 (*)     Gran # (ANC) 10.7 (*)     Immature Grans (Abs) 0.18 (*)     Lymph # 0.9 (*)     Mono # 1.6 (*)     Gran % 80.2 (*)     Lymph % 6.5 (*)     All other components within normal limits   COMPREHENSIVE METABOLIC PANEL - Abnormal; Notable for the following components:    Sodium 129 (*)     Potassium 5.6 (*)     Chloride 91 (*)     CO2 6 (*)     Glucose 244 (*)      (*)     Creatinine 18.8 (*)     Albumin 3.1 (*)     Anion Gap 32 (*)     eGFR if  3 (*)     eGFR if non  2 (*)     All other components within normal limits    Narrative:        critical result(s) called and verbal readback obtained from   Deshaun VILLARREAL 04/28/2022 21:09   LIPASE - Abnormal; Notable for the following components:    Lipase 233 (*)     All other components within normal  limits   URINALYSIS, REFLEX TO URINE CULTURE - Abnormal; Notable for the following components:    Appearance, UA Hazy (*)     Protein, UA 3+ (*)     Glucose, UA 3+ (*)     Occult Blood UA 2+ (*)     Leukocytes, UA 1+ (*)     All other components within normal limits    Narrative:     Specimen Source->Urine   BETA - HYDROXYBUTYRATE, SERUM - Abnormal; Notable for the following components:    Beta-Hydroxybutyrate 4.4 (*)     All other components within normal limits   URINALYSIS MICROSCOPIC - Abnormal; Notable for the following components:    RBC, UA 5 (*)     WBC, UA 22 (*)     Bacteria Moderate (*)     Hyaline Casts, UA 2 (*)     All other components within normal limits    Narrative:     Specimen Source->Urine   TROPONIN I - Abnormal; Notable for the following components:    Troponin I 0.084 (*)     All other components within normal limits   MAGNESIUM - Abnormal; Notable for the following components:    Magnesium 3.1 (*)     All other components within normal limits    Narrative:        critical result(s) called and verbal readback obtained from   Er by SP3 04/28/2022 22:54   PHOSPHORUS - Abnormal; Notable for the following components:    Phosphorus 14.5 (*)     All other components within normal limits    Narrative:        critical result(s) called and verbal readback obtained from   ER. by SP3 04/28/2022 22:53   ISTAT PROCEDURE - Abnormal; Notable for the following components:    POC PH 7.156 (*)     POC PCO2 26.3 (*)     POC PO2 29 (*)     POC HCO3 9.3 (*)     POC SATURATED O2 40 (*)     POC Sodium 126 (*)     POC Potassium 5.2 (*)     POC TCO2 10 (*)     All other components within normal limits   POCT GLUCOSE - Abnormal; Notable for the following components:    POCT Glucose 254 (*)     All other components within normal limits   CULTURE, URINE   CULTURE, BLOOD   CULTURE, BLOOD   LACTIC ACID, PLASMA   COMPREHENSIVE METABOLIC PANEL   MAGNESIUM   PHOSPHORUS   BASIC METABOLIC PANEL   LIPID PANEL   CBC W/ AUTO  DIFFERENTIAL   SARS-COV-2 RDRP GENE    Narrative:     This test utilizes isothermal nucleic acid amplification   technology to detect the SARS-CoV-2 RdRp nucleic acid segment.   The analytical sensitivity (limit of detection) is 125 genome   equivalents/mL.   A POSITIVE result implies infection with the SARS-CoV-2 virus;   the patient is presumed to be contagious.     A NEGATIVE result means that SARS-CoV-2 nucleic acids are not   present above the limit of detection. A NEGATIVE result should be   treated as presumptive. It does not rule out the possibility of   COVID-19 and should not be the sole basis for treatment decisions.   If COVID-19 is strongly suspected based on clinical and exposure   history, re-testing using an alternate molecular assay should be   considered.   This test is only for use under the Food and Drug   Administration s Emergency Use Authorization (EUA).   Commercial kits are provided by [employer].   Performance characteristics of the EUA have been independently   verified by [facility] Department of   Pathology and Laboratory Medicine.   _________________________________________________________________   The authorized Fact Sheet for Healthcare Providers and the authorized Fact   Sheet for Patients of the ID NOW COVID-19 are available on the FDA   website:     https://www.fda.gov/media/409472/download  https://www.fda.gov/media/975866/download          POCT GLUCOSE MONITORING CONTINUOUS   POCT GLUCOSE MONITORING CONTINUOUS          Imaging Results          CT Abdomen Pelvis  Without Contrast (Final result)  Result time 04/28/22 23:45:25    Final result by Yang Flaherty MD (04/28/22 23:45:25)                 Impression:      Markedly distended urinary bladder with hydronephrosis and hydroureter.  Correlate for bladder outlet obstruction.    Increased perinephric stranding.  This can be seen with renal obstruction or pyelonephritis.      Electronically signed  by: Yang Flaherty  Date:    04/28/2022  Time:    23:45             Narrative:    EXAMINATION:  CT ABDOMEN PELVIS WITHOUT CONTRAST    CLINICAL HISTORY:  Abdominal pain, acute, nonlocalized;    TECHNIQUE:  Low dose axial images, sagittal and coronal reformations were obtained from the lung bases to the pubic symphysis, 30 mL of oral Omnipaque 350 was administered..    Low dose axial images, sagittal and coronal reformations were obtained from the lung bases to the pubic symphysis.  Oral contrast was not administered.    COMPARISON:  None    FINDINGS:  Heart: Normal in size. No pericardial effusion.    Lung Bases: Well aerated, without consolidation or pleural fluid.    Liver: Normal in size and attenuation, with no focal hepatic lesions.    Gallbladder: No calcified gallstones.    Bile Ducts: No evidence of dilated ducts.    Pancreas: No mass or peripancreatic fat stranding.    Spleen: Unremarkable.    Adrenals: Unremarkable.    Kidneys/ Ureters: Hydronephrosis and hydroureter without calcified stone.  Renal cyst noted mainly on the right.  Perinephric stranding in the perinephric fat.    Bladder: Bladder wall thickening and distension extending out of the pelvis to the level of the umbilicus.  No discrete mass is seen.    Reproductive organs: Prostate is enlarged measuring 6.4 x 4.8 by 7.4 cm.    GI Tract/Mesentery: No evidence of bowel obstruction or inflammation.  Small sliding-type hiatal hernia.    Peritoneal Space: No ascites. No free air.    Retroperitoneum: No significant adenopathy.    Abdominal wall: Unremarkable.    Vasculature: No significant atherosclerosis or aneurysm.    Bones: No acute fracture.                               X-Ray Chest AP Portable (Final result)  Result time 04/28/22 22:27:10    Final result by Yang Flaherty MD (04/28/22 22:27:10)                 Impression:      No acute abnormality.      Electronically signed by: Yang Flaherty  Date:    04/28/2022  Time:    22:27              Narrative:    EXAMINATION:  XR CHEST AP PORTABLE    CLINICAL HISTORY:  hypertensive emergency;    TECHNIQUE:  Single frontal view of the chest was performed.    COMPARISON:  07/11/2016    FINDINGS:  The lungs are clear, with normal appearance of pulmonary vasculature and no pleural effusion or pneumothorax.    The cardiac silhouette is normal in size. The hilar and mediastinal contours are unremarkable.    Bones are intact.                                 Medications   sodium chloride 0.9% flush 10 mL (has no administration in time range)   naloxone 0.4 mg/mL injection 0.02 mg (has no administration in time range)   glucose chewable tablet 16 g (has no administration in time range)   glucose chewable tablet 24 g (has no administration in time range)   heparin (porcine) injection 5,000 Units (has no administration in time range)   sodium bicarbonate 150 mEq in dextrose 5 % 1,000 mL infusion ( Intravenous New Bag 4/29/22 0200)   calcium gluconat 1 g in NS IVPB (premixed) (0 g Intravenous Stopped 4/29/22 0106)     And   calcium gluconat 1 g in NS IVPB (premixed) (has no administration in time range)   glucagon (human recombinant) injection 1 mg (has no administration in time range)   dextrose 10% bolus 125 mL (has no administration in time range)   dextrose 10% bolus 250 mL (has no administration in time range)   insulin aspart U-100 pen 0-5 Units (1 Units Subcutaneous Given 4/29/22 0200)   tamsulosin 24 hr capsule 0.4 mg (has no administration in time range)   sevelamer carbonate tablet 800 mg (has no administration in time range)   mupirocin 2 % ointment (has no administration in time range)   cefTRIAXone (ROCEPHIN) 1 g/50 mL D5W IVPB (has no administration in time range)   labetaloL injection 10 mg (has no administration in time range)   NIFEdipine 24 hr tablet 30 mg (has no administration in time range)   calcium gluconat 1 g in NS IVPB (premixed) (has no administration in time range)   insulin regular injection  5 Units (has no administration in time range)   sodium chloride 0.9% bolus 1,000 mL (0 mLs Intravenous Stopped 4/28/22 2159)   sodium chloride 0.9% bolus 1,000 mL (0 mLs Intravenous Stopped 4/28/22 2325)   hydrALAZINE injection 5 mg (5 mg Intravenous Given 4/28/22 2132)   lactated ringers bolus 1,000 mL (0 mLs Intravenous Stopped 4/29/22 0115)   albuterol sulfate nebulizer solution 10 mg (10 mg Nebulization Given 4/29/22 0042)     Medical Decision Making:   Initial Assessment:   61-year-old male presents the ER for evaluation of multiple complaints.  Complaining of hypertension hyperuricemia constipation.  She has not been taking his medication for quite some time.  He requests referral for primary care.  He is persistently tachycardic aggressive physical exam reassuring.  Will plan basic blood work, restart medications, reassess.  Possible discharge..  Differential Diagnosis:   DKA, hyperglycemia, infection, HERBERT malignant hypertension other cause             ED Course as of 04/29/22 0317   u Apr 28, 2022 2202 EKG sinus tach 108 beats per minute LVH no STEMI [SE]   2218 Resting bed no acute distress.  Labs reviewed.  Anion gap metabolic acidosis noted.  HERBERT noted elevated beta. AGMA could be multifoactorial. Possible from uremia. Maybe dka, though low suspicion.  Discussed with [facility], request a Nephrology consult.  Nephrology will observe patient progressed heavy fluid resuscitation, insulin drip and reassess need for dialysis in the morning. [SE]      ED Course User Index  [SE] Margi Nunez MD             Clinical Impression:   Final diagnoses:  [R00.0] Tachycardia  [N17.9] HERBERT (acute kidney injury) (Primary)  [R73.9] Hyperglycemia  [I10] Hypertension, unspecified type  [N17.9] Acute renal failure          ED Disposition Condition    Admit               Margi Nunez MD  04/29/22 0317       Margi Nunez MD  04/29/22 0318

## 2022-04-29 NOTE — ED TRIAGE NOTES
Patient arrived to ED with wife stating he feels constipated, dehydrated, thirsty, and that his blood sugar at home was >200. States he has been feeling lousy for the past couple weeks but after checking his blood sugar this evening he decided to come in. Denies CP, SOB, dizziness, vision changes. Moving all extremities. Awake, alert, oriented.

## 2022-04-30 LAB
ALBUMIN SERPL BCP-MCNC: 2.3 G/DL (ref 3.5–5.2)
ALP SERPL-CCNC: 79 U/L (ref 55–135)
ALT SERPL W/O P-5'-P-CCNC: 9 U/L (ref 10–44)
ANION GAP SERPL CALC-SCNC: 14 MMOL/L (ref 8–16)
ANION GAP SERPL CALC-SCNC: 15 MMOL/L (ref 8–16)
ANION GAP SERPL CALC-SCNC: 17 MMOL/L (ref 8–16)
AST SERPL-CCNC: 7 U/L (ref 10–40)
BACTERIA UR CULT: NORMAL
BACTERIA UR CULT: NORMAL
BASOPHILS # BLD AUTO: 0.02 K/UL (ref 0–0.2)
BASOPHILS NFR BLD: 0.2 % (ref 0–1.9)
BILIRUB SERPL-MCNC: 0.5 MG/DL (ref 0.1–1)
BUN SERPL-MCNC: 102 MG/DL (ref 8–23)
BUN SERPL-MCNC: 102 MG/DL (ref 8–23)
BUN SERPL-MCNC: 109 MG/DL (ref 8–23)
BUN SERPL-MCNC: 86 MG/DL (ref 8–23)
BUN SERPL-MCNC: 94 MG/DL (ref 8–23)
CALCIUM SERPL-MCNC: 8.3 MG/DL (ref 8.7–10.5)
CALCIUM SERPL-MCNC: 8.3 MG/DL (ref 8.7–10.5)
CALCIUM SERPL-MCNC: 8.6 MG/DL (ref 8.7–10.5)
CALCIUM SERPL-MCNC: 8.9 MG/DL (ref 8.7–10.5)
CALCIUM SERPL-MCNC: 9 MG/DL (ref 8.7–10.5)
CHLORIDE SERPL-SCNC: 104 MMOL/L (ref 95–110)
CHLORIDE SERPL-SCNC: 105 MMOL/L (ref 95–110)
CHLORIDE SERPL-SCNC: 106 MMOL/L (ref 95–110)
CO2 SERPL-SCNC: 22 MMOL/L (ref 23–29)
CO2 SERPL-SCNC: 23 MMOL/L (ref 23–29)
CO2 SERPL-SCNC: 23 MMOL/L (ref 23–29)
CO2 SERPL-SCNC: 25 MMOL/L (ref 23–29)
CO2 SERPL-SCNC: 26 MMOL/L (ref 23–29)
CREAT SERPL-MCNC: 10.1 MG/DL (ref 0.5–1.4)
CREAT SERPL-MCNC: 7.5 MG/DL (ref 0.5–1.4)
CREAT SERPL-MCNC: 8.4 MG/DL (ref 0.5–1.4)
CREAT SERPL-MCNC: 9.5 MG/DL (ref 0.5–1.4)
CREAT SERPL-MCNC: 9.5 MG/DL (ref 0.5–1.4)
DIFFERENTIAL METHOD: ABNORMAL
EOSINOPHIL # BLD AUTO: 0 K/UL (ref 0–0.5)
EOSINOPHIL NFR BLD: 0.1 % (ref 0–8)
ERYTHROCYTE [DISTWIDTH] IN BLOOD BY AUTOMATED COUNT: 13.2 % (ref 11.5–14.5)
EST. GFR  (AFRICAN AMERICAN): 6 ML/MIN/1.73 M^2
EST. GFR  (AFRICAN AMERICAN): 7 ML/MIN/1.73 M^2
EST. GFR  (AFRICAN AMERICAN): 8 ML/MIN/1.73 M^2
EST. GFR  (NON AFRICAN AMERICAN): 5 ML/MIN/1.73 M^2
EST. GFR  (NON AFRICAN AMERICAN): 6 ML/MIN/1.73 M^2
EST. GFR  (NON AFRICAN AMERICAN): 7 ML/MIN/1.73 M^2
GLUCOSE SERPL-MCNC: 116 MG/DL (ref 70–110)
GLUCOSE SERPL-MCNC: 116 MG/DL (ref 70–110)
GLUCOSE SERPL-MCNC: 129 MG/DL (ref 70–110)
GLUCOSE SERPL-MCNC: 147 MG/DL (ref 70–110)
GLUCOSE SERPL-MCNC: 169 MG/DL (ref 70–110)
HCT VFR BLD AUTO: 32.8 % (ref 40–54)
HGB BLD-MCNC: 11.8 G/DL (ref 14–18)
IMM GRANULOCYTES # BLD AUTO: 0.13 K/UL (ref 0–0.04)
IMM GRANULOCYTES NFR BLD AUTO: 1.1 % (ref 0–0.5)
LYMPHOCYTES # BLD AUTO: 1.7 K/UL (ref 1–4.8)
LYMPHOCYTES NFR BLD: 13.6 % (ref 18–48)
MAGNESIUM SERPL-MCNC: 2.2 MG/DL (ref 1.6–2.6)
MCH RBC QN AUTO: 29.2 PG (ref 27–31)
MCHC RBC AUTO-ENTMCNC: 36 G/DL (ref 32–36)
MCV RBC AUTO: 81 FL (ref 82–98)
MONOCYTES # BLD AUTO: 1.6 K/UL (ref 0.3–1)
MONOCYTES NFR BLD: 12.8 % (ref 4–15)
NEUTROPHILS # BLD AUTO: 8.9 K/UL (ref 1.8–7.7)
NEUTROPHILS NFR BLD: 72.2 % (ref 38–73)
NRBC BLD-RTO: 0 /100 WBC
PHOSPHATE SERPL-MCNC: 7.4 MG/DL (ref 2.7–4.5)
PLATELET # BLD AUTO: 324 K/UL (ref 150–450)
PMV BLD AUTO: 9.2 FL (ref 9.2–12.9)
POTASSIUM SERPL-SCNC: 3.6 MMOL/L (ref 3.5–5.1)
POTASSIUM SERPL-SCNC: 3.7 MMOL/L (ref 3.5–5.1)
POTASSIUM SERPL-SCNC: 4 MMOL/L (ref 3.5–5.1)
PROT SERPL-MCNC: 5.3 G/DL (ref 6–8.4)
RBC # BLD AUTO: 4.04 M/UL (ref 4.6–6.2)
SODIUM SERPL-SCNC: 144 MMOL/L (ref 136–145)
SODIUM SERPL-SCNC: 145 MMOL/L (ref 136–145)
WBC # BLD AUTO: 12.34 K/UL (ref 3.9–12.7)

## 2022-04-30 PROCEDURE — 99232 SBSQ HOSP IP/OBS MODERATE 35: CPT | Mod: ,,, | Performed by: INTERNAL MEDICINE

## 2022-04-30 PROCEDURE — 25000003 PHARM REV CODE 250: Performed by: STUDENT IN AN ORGANIZED HEALTH CARE EDUCATION/TRAINING PROGRAM

## 2022-04-30 PROCEDURE — 51700 IRRIGATION OF BLADDER: CPT

## 2022-04-30 PROCEDURE — 97535 SELF CARE MNGMENT TRAINING: CPT

## 2022-04-30 PROCEDURE — 63600175 PHARM REV CODE 636 W HCPCS: Performed by: STUDENT IN AN ORGANIZED HEALTH CARE EDUCATION/TRAINING PROGRAM

## 2022-04-30 PROCEDURE — 25000003 PHARM REV CODE 250: Performed by: INTERNAL MEDICINE

## 2022-04-30 PROCEDURE — C9399 UNCLASSIFIED DRUGS OR BIOLOG: HCPCS | Performed by: STUDENT IN AN ORGANIZED HEALTH CARE EDUCATION/TRAINING PROGRAM

## 2022-04-30 PROCEDURE — 63600175 PHARM REV CODE 636 W HCPCS: Performed by: INTERNAL MEDICINE

## 2022-04-30 PROCEDURE — 36415 COLL VENOUS BLD VENIPUNCTURE: CPT | Performed by: STUDENT IN AN ORGANIZED HEALTH CARE EDUCATION/TRAINING PROGRAM

## 2022-04-30 PROCEDURE — 97165 OT EVAL LOW COMPLEX 30 MIN: CPT

## 2022-04-30 PROCEDURE — 83735 ASSAY OF MAGNESIUM: CPT | Performed by: STUDENT IN AN ORGANIZED HEALTH CARE EDUCATION/TRAINING PROGRAM

## 2022-04-30 PROCEDURE — 11000001 HC ACUTE MED/SURG PRIVATE ROOM

## 2022-04-30 PROCEDURE — 85025 COMPLETE CBC W/AUTO DIFF WBC: CPT | Performed by: STUDENT IN AN ORGANIZED HEALTH CARE EDUCATION/TRAINING PROGRAM

## 2022-04-30 PROCEDURE — 84100 ASSAY OF PHOSPHORUS: CPT | Performed by: STUDENT IN AN ORGANIZED HEALTH CARE EDUCATION/TRAINING PROGRAM

## 2022-04-30 PROCEDURE — 80048 BASIC METABOLIC PNL TOTAL CA: CPT | Mod: 91,XB | Performed by: STUDENT IN AN ORGANIZED HEALTH CARE EDUCATION/TRAINING PROGRAM

## 2022-04-30 PROCEDURE — 80053 COMPREHEN METABOLIC PANEL: CPT | Performed by: STUDENT IN AN ORGANIZED HEALTH CARE EDUCATION/TRAINING PROGRAM

## 2022-04-30 PROCEDURE — 99232 PR SUBSEQUENT HOSPITAL CARE,LEVL II: ICD-10-PCS | Mod: ,,, | Performed by: INTERNAL MEDICINE

## 2022-04-30 PROCEDURE — 97161 PT EVAL LOW COMPLEX 20 MIN: CPT

## 2022-04-30 PROCEDURE — 25000003 PHARM REV CODE 250

## 2022-04-30 PROCEDURE — 80048 BASIC METABOLIC PNL TOTAL CA: CPT | Mod: XB | Performed by: STUDENT IN AN ORGANIZED HEALTH CARE EDUCATION/TRAINING PROGRAM

## 2022-04-30 RX ADMIN — HEPARIN SODIUM 5000 UNITS: 5000 INJECTION INTRAVENOUS; SUBCUTANEOUS at 02:04

## 2022-04-30 RX ADMIN — MUPIROCIN: 20 OINTMENT TOPICAL at 09:04

## 2022-04-30 RX ADMIN — SEVELAMER CARBONATE 800 MG: 800 TABLET, FILM COATED ORAL at 12:04

## 2022-04-30 RX ADMIN — SEVELAMER CARBONATE 800 MG: 800 TABLET, FILM COATED ORAL at 07:04

## 2022-04-30 RX ADMIN — CEFTRIAXONE 1 G: 1 INJECTION, SOLUTION INTRAVENOUS at 03:04

## 2022-04-30 RX ADMIN — HEPARIN SODIUM 5000 UNITS: 5000 INJECTION INTRAVENOUS; SUBCUTANEOUS at 09:04

## 2022-04-30 RX ADMIN — TAMSULOSIN HYDROCHLORIDE 0.4 MG: 0.4 CAPSULE ORAL at 09:04

## 2022-04-30 RX ADMIN — SODIUM CHLORIDE, SODIUM LACTATE, POTASSIUM CHLORIDE, AND CALCIUM CHLORIDE: .6; .31; .03; .02 INJECTION, SOLUTION INTRAVENOUS at 02:04

## 2022-04-30 RX ADMIN — SEVELAMER CARBONATE 800 MG: 800 TABLET, FILM COATED ORAL at 04:04

## 2022-04-30 RX ADMIN — INSULIN DETEMIR 20 UNITS: 100 INJECTION, SOLUTION SUBCUTANEOUS at 09:04

## 2022-04-30 RX ADMIN — ATORVASTATIN CALCIUM 40 MG: 40 TABLET, FILM COATED ORAL at 09:04

## 2022-04-30 RX ADMIN — HEPARIN SODIUM 5000 UNITS: 5000 INJECTION INTRAVENOUS; SUBCUTANEOUS at 05:04

## 2022-04-30 RX ADMIN — SODIUM CHLORIDE, SODIUM LACTATE, POTASSIUM CHLORIDE, AND CALCIUM CHLORIDE: .6; .31; .03; .02 INJECTION, SOLUTION INTRAVENOUS at 09:04

## 2022-04-30 RX ADMIN — POLYETHYLENE GLYCOL 3350 17 G: 17 POWDER, FOR SOLUTION ORAL at 11:04

## 2022-04-30 NOTE — NURSING
Care plans reviewed. No c/o of SOB or chest pains, no acute distress noted. Patient worked with PT/OT walking in the hallway, tolerated well. All medications given as ordered. Safety maintained, bed in lowest position, bed alarm on, bed wheels locked, call light with in reach. Will continue to monitor.

## 2022-04-30 NOTE — PLAN OF CARE
Problem: Occupational Therapy  Goal: Occupational Therapy Goal  Outcome: Adequate for Care Transition     Pt performing at baseline for ADLs and functional mobility. No further OT needs.

## 2022-04-30 NOTE — PT/OT/SLP EVAL
Physical Therapy Evaluation    Patient Name:  Angel Bell   MRN:  [id number]    Recommendations:     Discharge Recommendations:  home   Discharge Equipment Recommendations: none   Barriers to discharge: None    Assessment:     Angel Bell is a 61 y.o. male admitted with a medical diagnosis of HERBERT (acute kidney injury).  He presents with the following impairments/functional limitations:   (N/A) pt is functional at his prior level; no further acute PT needs required    Recent Surgery: * No surgery found *      Plan:     During this hospitalization, patient does not require further acute PT services    · Plan of Care Expires:  04/30/22    Subjective     Chief Complaint: catheter causing discomfort  Patient/Family Comments/goals: none stated  Pain/Comfort:  · Pain Rating 1: 0/10  · Pain Rating Post-Intervention 1: 0/10    Patients cultural, spiritual, Amish conflicts given the current situation: no    Living Environment:  Pt lives with spouse and family in [facility], no steps to enter, [place]   Previous level of function: independent; drives; does not own DME  Equipment Used at home:  none  Assistance upon Discharge: from family if required     Objective:     Communicated with nurse prior to session.  Patient found HOB elevated with telemetry, peripheral IV  upon PT entry to room.    General Precautions: Standard, fall   Orthopedic Precautions:N/A   Braces: N/A  Respiratory Status: Room air    Exams:  · Cognitive Exam: WFL; follows multistep commands  · Postural Exam:  Patient presented with the following abnormalities:    · -       No postural abnormalities identified  · Sensation: no complaints of paresthesias  · Skin Integrity/Edema: no noted swelling  · BLE ROM: WFL  · BLE Strength: WFL    Functional Mobility:  · Bed Mobility:     · Scooting: modified independence  · Supine to Sit: modified independence  · Sit to Supine: modified independence  · Transfers:     · Sit to Stand:  modified independence with no  AD  · Gait: Patient amb Shanna in hallways with balance challenges-head turns, body turns, quick stops, backward stepping; good josey, step length   · Balance: good to N    Therapeutic Activities and Exercises:   patient educated on role of PT/POC; importance of taking his meds in keeping diabetes from progressing as quickly and importance of keeping active.    AM-PAC 6 CLICK MOBILITY  Total Score:24     Patient left HOB elevated with all lines intact, call button in reach, bed alarm on, nurse notified and wife present.    GOALS:   Multidisciplinary Problems     Physical Therapy Goals     Not on file          Multidisciplinary Problems (Resolved)        Problem: Physical Therapy    Goal Priority Disciplines Outcome Goal Variances Interventions   Physical Therapy Goal   (Resolved)     PT, PT/OT Met                     History:     Past Medical History:   Diagnosis Date    Diabetes mellitus     Hyperlipemia     Hypertension     Migraine headache     PTSD (post-traumatic stress disorder)        History reviewed. No pertinent surgical history.    Time Tracking:     PT Received On: 04/30/22  PT Start Time: 1340     PT Stop Time: 1400  PT Total Time (min): 20 min with OT    Billable Minutes: Evaluation 10 Self care 10      04/30/2022

## 2022-04-30 NOTE — NURSING
Occupational Therapy  Visit Type: treatment  Precautions:  Medical precautions:  fall risk; standard precautions.  Goggles worn during session  Lines:     Basic: capped IV and telemetry  Vision:     Current vision: wears glasses all the time  Safety Measures: bed alarm and chair alarm    SUBJECTIVE  Patient agreed to participate in therapy this date.  \"I need to get to the bathroom.\"  Patient / Family Goal: unable to state    OBJECTIVE     Oriented to person     Disoriented to place and situation  Patient activity tolerance: 1 to 1 activity to rest  Functional Communication/Cognition    Overall status:  Impaired (baseline dementia)    Commands: follows one step commands with repetition.    Safety judgement: decreased awareness of need for assistance and decreased awareness of need for safety.    Awareness of deficits: decreased awareness of deficits and assistance required to compensate for deficits.  PostureStanding:  Trunk lean right, forward head and trunk lean anterior    Transfers:    Assistive devices: 2-wheeled walker, gait belt and 1 person    Sit to stand: minimal assist    Stand to sit: minimal assist    Training completed:    Tasks: sit to stand, stand to sit and toilet    Education details: patient safety and body mechanics    Cues for hand placement and sequencing; minimal assist for anterior weight shift with with sit to stand off toilet; cues for upright standing posture  Functional Ambulation:    Assistance:minimal assist   Assistive device:1 person, gait belt and 2-wheeled walker    Distance (ft): 15;15      Education details: patient safety and body mechanics  Details/Comments: Facilitory cues for upright standing posture; reduced carry-over towards end of session due to fatigue; fall risk; sequencing and safety cues  Activities of Daily Living (ADLs):  Grooming/Oral Hygiene:     Grooming assist: supervision    Oral hygiene assist: supervision    Position: standing at sink    Assist needed for:  Bladder irrigation complete, inserted 60 ml pink tinge flowing thru tube, tolerated well will monitor   set up, verbal cueing, supervision/safety, increased time to complete, standing with assistive device, wash/dry hands, wash/dry face, brushing hair and teeth care  Upper Body Dressing:    Assist: minimal assist    Position: chair    Assist needed for: verbal cueing, supervision/safety, increased time to complete, pull around back and fasteners  Lower Body Dressing:     Assist: minimal assist    Position: chair    Footwear assistance: maximal assist    Footwear position: chair    Assist needed for: set up, verbal cueing, supervision/safety, increased time to complete, don/doff left sock, don/doff right sock, thread left lower extremity into underwear, thread right lower extremity into underwear and pull up over hips  Toilet transfer:     Assist: minimal assist    Device: 2-wheeled walker, gait belt and 1 person  Toileting:     Assist: minimal assist    Assist needed for: verbal cueing, supervision/safety, increased time to complete, clothing management up, clothing management down and perineal hygiene    Equipment: grab bar use  Activities of daily living training:   Cues for safety/problem-solving; gradual gains in standing and activity tolerance; flexed posture in standing and cues for upright posture for safety.      Interventions    Training provided: ADL training, activity tolerance, compensatory techniques, transfer training, cognitive training, functional ambulation, postural re-education and safety training  Skilled input: verbal instruction/cues and tactile instruction/cues  Verbal Consent: Writer verbally educated and received verbal consent for hand placement, positioning of patient, and techniques to be performed today from patient for clothing adjustments for techniques and therapist position for techniques as described above and how they are pertinent to the patient's plan of care.        ASSESSMENT    Impairments: activity tolerance, balance, safety awareness, strength, bed mobility, cognitive,  decreased insight into deficits and range of motion  Functional Limitations: functional mobility, grooming, bathing, toileting, functional transfers, dressing, bed mobility, eating and showering  Patient seen on [address] [place] [address].  Today's treatment focused on walker safety during transfers and functional room ambulation, facilitating initiation and problem-solving with bathroom self cares of toileting, grooming and dressing tasks. Facilitating upright standing posture for fall prevention.  The patient is demonstrating gradual progress as evidenced by progressed from use of Elsa Stedy on previous session to use of two wheeled walker with minimal assist. Facilitory cues for upright standing posture. Improved initiation of toileting and grooming self cares with improved sequencing of these tasks. Gradual gains in activity tolerance.       At this time the patient continues to demonstrate impairments in activity tolerance, balance, cognitive changes, limited knowledge of compensatory strategies, ROM, safety awareness and strength which are limiting the performance of bed mobility, toilet transfers, tub/shower transfers, sit to/from stand transfers, stand pivot transfers, ambulation, upper body dressing, lower body dressing, upper body bathing, lower body bathing, item retrieval and grooming.   Further skilled occupational therapy services are reasonable and necessary to address the above impairments and performance deficits to maximize the patient's independence.    OT Identified Barriers to Discharge: fall risk; cogntive impairments           Discharge Recommendations  Recommendations for Discharge: OT WI: Less intensive rehab            PT/OT Mobility Equipment for Discharge: may need wheeled walker  PT/OT ADL Equipment for Discharge: shower bench/grab bars; expect limited carry-over of dressing adaptive equipment  OT Identified Barriers to Discharge: fall risk; cogntive impairments     Skilled therapy is required  to address these limitations in attempt to maximize the patient's independence.  Progress: improving as expected    End of Session:   Location: in chair  Safety measures: call light within reach and alarm system in place/re-engaged    PLAN  Suggestions for next session as indicated: Treatment plan for next session: Safe functional transfers; full body bathing/dressing with emphasis on lower body tasks; facilitate initiation of self cares; orientation to tasks as needed; fall prevention    OT Frequency: 5 days/week     Frequency Comments: 2/5 til 1/9, home alone, caretaker 12 hours/week; fall at home; recommend subacute rehab, [facility]     Interventions: activity tolerance training, ADL retraining, balance, functional transfer training, patient education, safety training, transfer training, therapeutic activity, bed mobility training and cognitive retraining  Agreement to plan and goals: patient agrees with goals and treatment plan      GOALS  Long Term Goals: (to be met by time of discharge from hospital)  Feeding: Patient will complete feeding tasks modified independent.  Grooming: Patient will complete grooming tasks in sitting supervision.  Upper body dressing: Patient will complete upper body dressing in sitting supervision.  Lower body dressing: Patient will complete lower body dressing in sitting supervision.  Toileting: Patient will complete toileting supervision.  Bathing: Patient will complete bathing sitting at sinksupervision   Documented in the chart in the following areas: Pain. Assessment. Plan.      Therapy procedure time and total treatment time can be found documented on the Time Entry flowsheet

## 2022-04-30 NOTE — PLAN OF CARE
Problem: Physical Therapy  Goal: Physical Therapy Goal  Outcome: Met   Patient performing transfers and amb at baseline of function; no further acute PT needs; can amb with supervision/SBA of nursing staff.

## 2022-04-30 NOTE — PT/OT/SLP EVAL
Occupational Therapy   Evaluation and Discharge Note    Name: Angel Bell  MRN: [id number]  Admitting Diagnosis:  HERBERT (acute kidney injury)   Recent Surgery: * No surgery found *      Recommendations:     Discharge Recommendations: home  Discharge Equipment Recommendations:  none  Barriers to discharge:  None    Assessment:     Angel Bell is a 61 y.o. male with a medical diagnosis of HERBERT (acute kidney injury). At this time, patient is functioning at their prior level of function and does not require further acute OT services.     Pt performing at baseline for ADLs and functional mobility. No further OT needs.     Plan:     During this hospitalization, patient does not require further acute OT services.  Please re-consult if situation changes.    · Plan of Care Reviewed with: patient, spouse, daughter    Subjective     Chief Complaint: catheter causing discomfort   Patient/Family Comments/goals: none stated     Occupational Profile:  Living Environment: with spouse and family in H, no steps to enter, WIS   Previous level of function: independent; drives; does not own DME  Equipment Used at home:  none  Assistance upon Discharge: from family if required     Pain/Comfort:  · Pain Rating 1: 0/10    Patients cultural, spiritual, Orthodox conflicts given the current situation:      Objective:     Communicated with: panfilo prior to session.   General Precautions: Standard, fall   Orthopedic Precautions:N/A   Braces: N/A    Occupational Performance:    Bed Mobility:    · Patient completed Scooting/Bridging with modified independence  · Patient completed Supine to Sit with modified independence  · Patient completed Sit to Supine with modified independence    Functional Mobility/Transfers:  · Patient completed Sit <> Stand Transfer with modified independence  with  no assistive device   · Functional Mobility: mod I without AD     Activities of Daily Living:  · Lower Body Dressing: modified independence       Cognitive/Visual Perceptual:  WFL    Physical Exam:  BUE WFL   No swelling noted     AMPAC 6 Click ADL:  AMPAC Total Score: 24    Treatment & Education:  Pt performing axs as above   Sat EOB and doffed/donned socks   Functional mobility performed in room and hallway   Education:    Patient left supine with all lines intact, call button in reach, bed alarm on, nsg notified and family  present    GOALS:   Multidisciplinary Problems     Occupational Therapy Goals        Problem: Occupational Therapy    Goal Priority Disciplines Outcome Interventions   Occupational Therapy Goal     OT, PT/OT Adequate for Care Transition                    History:     Past Medical History:   Diagnosis Date    Diabetes mellitus     Hyperlipemia     Hypertension     Migraine headache     PTSD (post-traumatic stress disorder)        History reviewed. No pertinent surgical history.    Time Tracking:     OT Date of Treatment: 04/30/22  OT Start Time: 1341  OT Stop Time: 1401  OT Total Time (min): 20 min    Billable Minutes:Evaluation 10 co tx with PT     4/30/2022

## 2022-04-30 NOTE — PROGRESS NOTES
"[facility] Hospital Medicine Progress Note    Primary Team: [facility] Hospitalist Team A  Attending Physician: Gail Avina MD  Resident: Dr. Flores  Intern: Dr. Moran    Subjective:      Overnight patient no events  Today patient feeling great; Step down to floor - on subq insulin    Continue Ceftriaxone for UTI  Continue Sevelamer for hyperphosphatemia (rest of electrolyte abnormalities resolved)    Anion Gap 17 (improving); Bicarb 22;   Glucose 147  BUN 94; Cr 8.4 ( improving)    Renal US on Monday to assess hydronephrosis; alvarenga irriagtion q4h; Following Cr  Echo: EF 55%; Trivial pericardial effusion  Denies HA, fever, chills, chest pain, SOB, abdominal pain, N/V/D.      Objective:     Last 24 Hour Vital Signs:  BP  Min: 95/50  Max: 139/64  Temp  Av.8 °F (36.6 °C)  Min: 97.3 °F (36.3 °C)  Max: 98.3 °F (36.8 °C)  Pulse  Av.6  Min: 81  Max: 124  Resp  Av.3  Min: 18  Max: 21  SpO2  Av.7 %  Min: 95 %  Max: 99 %  Height  Av' 7" (170.2 cm)  Min: 5' 7" (170.2 cm)  Max: 5' 7" (170.2 cm)  Weight  Av.2 kg (168 lb)  Min: 76.2 kg (168 lb)  Max: 76.2 kg (168 lb)    Intake/Output Summary (Last 24 hours) at 2022 0807  Last data filed at 2022 0604  Gross per 24 hour   Intake 1833.91 ml   Output 4295 ml   Net -2461.09 ml      Physical Examination:    Examination  General: Patient lying in bed comfortably,  in NAD  Head: normocephalic, atraumatic  Eyes: PERRL, EOMI, no conjunctival injections or icterus  Mouth: MMM, posterior oropharynx without erythema  Neck: No thyromegaly or masses   Cardiac: RRR, no murmurs appreciated, no extra heart sounds  Pulmonary/Chest: CTAB, symmetric chest rise, no wheezing or crackles  GI: Distended, non- tender to palpation in lower quadrants; normoactive bowel sounds  Extremities: no edema, clubbing, or cyanosis  Skin: dry, warm, intact. No bruising or rashes.  Neuro: Alert and oriented, moving all extremities, no asterixis present    Laboratory:  Laboratory Data " Reviewed: yes  Pertinent Findings:  Recent Labs   Lab 04/28/22 2023 04/28/22  2143 04/29/22  0209 04/29/22  0443 04/29/22  0758 04/29/22 2026 04/30/22  0046 04/30/22  0416   WBC 13.40*  --   --  11.84  --   --   --  12.34   HGB 13.7*  --   --  13.3*  --   --   --  11.8*   HCT 38.2* 39  --  37.3*  --   --   --  32.8*     --   --  339  --   --   --  324   MCV 83  --   --  82  --   --   --  81*   RDW 13.3  --   --  13.3  --   --   --  13.2   *  --    < > 135*  135*   < > 142 145 145  145   K 5.6*  --    < > 4.8  4.8   < > 3.3* 3.6 3.7  3.7   CL 91*  --    < > 100  100   < > 101 104 105  105   CO2 6*  --    < > 9*  9*   < > 23 26 23  23   *  --    < > 134*  134*   < > 114* 109* 102*  102*   CREATININE 18.8*  --    < > 16.1*  16.1*   < > 11.4* 10.1* 9.5*  9.5*   *  --    < > 276*  276*   < > 221* 129* 116*  116*   PROT 7.7  --   --  6.6  --   --   --  5.3*   ALBUMIN 3.1*  --   --  2.5*  --   --   --  2.3*   BILITOT 0.4  --   --  0.5  --   --   --  0.5   AST 16  --   --  11  --   --   --  7*   ALKPHOS 113  --   --  97  --   --   --  79   ALT 18  --   --  21  --   --   --  9*    < > = values in this interval not displayed.       Microbiology Data Reviewed: yes  Pertinent Findings:  Microbiology Results (last 7 days)     Procedure Component Value Units Date/Time    Blood culture [[id number]] Collected: 04/28/22 2217    Order Status: Completed Specimen: Blood Updated: 04/30/22 0745     Blood Culture, Routine No Growth to date    Blood culture [[id number]] Collected: 04/28/22 2217    Order Status: Completed Specimen: Blood Updated: 04/30/22 0715     Blood Culture, Routine No Growth to date    Urine culture [[id number]] Collected: 04/28/22 2010    Order Status: Completed Specimen: Urine Updated: 04/30/22 0054     Urine Culture, Routine Multiple organisms isolated. None in predominance.  Repeat if      clinically necessary.    Narrative:      Specimen Source->Urine           Other  Results:  EKG (my interpretation):   ECG Results          EKG 12-lead (Final result)  Result time 04/29/22 08:16:16    Final result by Interface, Lab In [facility] (04/29/22 08:16:16)                 Narrative:    Test Reason : R00.0,    Vent. Rate : 108 BPM     Atrial Rate : 108 BPM     P-R Int : 158 ms          QRS Dur : 070 ms      QT Int : 322 ms       P-R-T Axes : 071 034 081 degrees     QTc Int : 431 ms    Sinus tachycardia  LVH with repolarization abnormality  Nonspecific ST and T wave abnormality  Abnormal ECG  When compared with ECG of 11-JUL-2016 09:39,  Non-specific change in ST segment in Inferior leads  Non-specific change in ST segment in Lateral leads  Nonspecific T wave abnormality now evident in Lateral leads  Confirmed by Caroline Lopez MD ([id number]) on 4/29/2022 8:16:05 AM    Referred By: [email or web address]   SELF           Confirmed By:Caroline Lopez MD                             Results for orders placed or performed during the hospital encounter of 04/28/22   EKG 12-lead    Collection Time: 04/28/22  8:26 PM    Narrative    Test Reason : R00.0,    Vent. Rate : 108 BPM     Atrial Rate : 108 BPM     P-R Int : 158 ms          QRS Dur : 070 ms      QT Int : 322 ms       P-R-T Axes : 071 034 081 degrees     QTc Int : 431 ms    Sinus tachycardia  LVH with repolarization abnormality  Nonspecific ST and T wave abnormality  Abnormal ECG  When compared with ECG of 11-JUL-2016 09:39,  Non-specific change in ST segment in Inferior leads  Non-specific change in ST segment in Lateral leads  Nonspecific T wave abnormality now evident in Lateral leads  Confirmed by Caroline Lopez MD ([id number]) on 4/29/2022 8:16:05 AM    Referred By: [email or web address]   SELF           Confirmed By:Caroline Lopez MD      Radiology Data Reviewed: yes  Pertinent Findings:  X-Ray Chest AP Portable    Result Date: 4/28/2022  EXAMINATION: XR CHEST AP PORTABLE CLINICAL HISTORY: hypertensive emergency; TECHNIQUE: Single frontal view of the chest was performed.  COMPARISON: 07/11/2016 FINDINGS: The lungs are clear, with normal appearance of pulmonary vasculature and no pleural effusion or pneumothorax. The cardiac silhouette is normal in size. The hilar and mediastinal contours are unremarkable. Bones are intact.     No acute abnormality. Electronically signed by: Yang Flaherty Date:    04/28/2022 Time:    22:27    Echo    Result Date: 4/29/2022  · Concentric remodeling and normal systolic function. · The estimated ejection fraction is 55%. · Normal left ventricular diastolic function. · Normal right ventricular size with normal right ventricular systolic function. · Normal central venous pressure (3 mmHg). · Trivial pericardial effusion.      CT Abdomen Pelvis  Without Contrast    Result Date: 4/28/2022  EXAMINATION: CT ABDOMEN PELVIS WITHOUT CONTRAST CLINICAL HISTORY: Abdominal pain, acute, nonlocalized; TECHNIQUE: Low dose axial images, sagittal and coronal reformations were obtained from the lung bases to the pubic symphysis, 30 mL of oral Omnipaque 350 was administered.. Low dose axial images, sagittal and coronal reformations were obtained from the lung bases to the pubic symphysis.  Oral contrast was not administered. COMPARISON: None FINDINGS: Heart: Normal in size. No pericardial effusion. Lung Bases: Well aerated, without consolidation or pleural fluid. Liver: Normal in size and attenuation, with no focal hepatic lesions. Gallbladder: No calcified gallstones. Bile Ducts: No evidence of dilated ducts. Pancreas: No mass or peripancreatic fat stranding. Spleen: Unremarkable. Adrenals: Unremarkable. Kidneys/ Ureters: Hydronephrosis and hydroureter without calcified stone.  Renal cyst noted mainly on the right.  Perinephric stranding in the perinephric fat. Bladder: Bladder wall thickening and distension extending out of the pelvis to the level of the umbilicus.  No discrete mass is seen. Reproductive organs: Prostate is enlarged measuring 6.4 x 4.8 by 7.4 cm. GI  Tract/Mesentery: No evidence of bowel obstruction or inflammation.  Small sliding-type hiatal hernia. Peritoneal Space: No ascites. No free air. Retroperitoneum: No significant adenopathy. Abdominal wall: Unremarkable. Vasculature: No significant atherosclerosis or aneurysm. Bones: No acute fracture.     Markedly distended urinary bladder with hydronephrosis and hydroureter.  Correlate for bladder outlet obstruction. Increased perinephric stranding.  This can be seen with renal obstruction or pyelonephritis. Electronically signed by: Yang Flaherty Date:    04/28/2022 Time:    23:45     Current Medications:     Scheduled:   atorvastatin  40 mg Oral Daily    cefTRIAXone (ROCEPHIN) IVPB  1 g Intravenous Q24H    heparin (porcine)  5,000 Units Subcutaneous Q8H    insulin detemir U-100  20 Units Subcutaneous Daily    mupirocin   Nasal BID    sevelamer carbonate  800 mg Oral TID WM    tamsulosin  0.4 mg Oral Daily         Infusions:   lactated ringers 150 mL/hr at 04/29/22 2012        PRN:  [COMPLETED] calcium gluconate IVPB **AND** calcium gluconate IVPB, dextrose 10%, glucose, insulin aspart U-100, polyethylene glycol, sodium chloride 0.9%  Antibiotics and Day Number of Therapy:  Antibiotics (From admission, onward)            Start     Stop Route Frequency Ordered    04/30/22 0415  cefTRIAXone (ROCEPHIN) 1 g/50 mL D5W IVPB         -- IV Every 24 hours (non-standard times) 04/29/22 0747    04/29/22 0900  mupirocin 2 % ointment         05/04 0859 Nasl 2 times daily 04/29/22 0142           Lines and Day Number of Therapy:      Assessment:     Angel Bell is a 61 y.o. male with a PMHx of  Patient Active Problem List    Diagnosis Date Noted    Urinary retention 04/29/2022    Bilateral hydronephrosis 04/29/2022    Obstructive uropathy 04/29/2022    Normocytic anemia 04/29/2022    Benign prostatic hyperplasia with urinary obstruction 04/29/2022    Hyperphosphatemia 04/29/2022    Elevated PSA, less than  10 ng/ml 08/16/2016    Family history of prostate cancer in father 08/16/2016    Hypertension 07/12/2016    Hyperlipidemia 07/04/2016    Type 2 diabetes, uncontrolled, with renal manifestation 07/03/2016    Hyperkalemia 07/03/2016    Metabolic acidosis 07/03/2016    Hyperglycemia 07/03/2016    HERBERT (acute kidney injury) 07/03/2016    Abnormal liver enzymes 07/03/2016       Plan:     #AGMA  -CO2 6, AG 32 on admit  - AG 17 today improving  -beta hydroxybutyrate 4.4, UA negative for ketones  -starting on sodium bicarb drip for now, discussed case with nephrology, holding off on urgent dialysis and line placement for now  - Pt step down  - LR gtt  - switched to Insulin Detemir 20 units subq    #Acute Renal Failure 2/2 Obstructive Uropathy, likely BPH  Presenting with history of abdominal distention and decreased UOP for 2 weeks   -BUN/Cr 142/18.8; last documented Cr 1.6 5 years ago  -CT Abd/Pelvis revealed largely distended bladder with evidence of hydronephrosis  -FENa 22.6%, consistent with post-renal/obstructive   -Tavera inserted with >900ml UOP  -Nephrology consulted, no need for urgent dialysis at this point, continue management with relief of obstructive uropathy, bicarb gtt, and correction of electrolytes  -starting flomax  -shifting K, sevelamer for phos of 14.5    #Diabetes Mellitus, type 2  Reports history of DM, previously on insulin, but not taking any medications in over a year  -A1C >14  -betahydroxybutyrate 4.4 on admit  -UA negative for ketones    #UTI  -UA with bacteria, WBCs  -continue Ceftriaxone 1g IV    #Hyperphosphatemia   -secondary to renal failure  -starting on phos binders TID     Acute Encephalopathy - resolved   Per pt and wife he has been more lethargic over last 2 months.  Wife reports some episodes of confusion.   - Sating 100% on RA; CXR without focal infiltrate or effusion  -Pt is alert and oriented x 4 today  -Symptoms of lethargy likely related to uremia with BUN of  142     #HTN - resolved  History of HTN, previously on Lopressor 50mg BID  -Reports not taking any medications in >1 year  -/102 on admit; 114/63 today  -EKG sinus tachycardia with evidence of LVH, likely secondary to longstanding uncontrolled HTN  Echo: EF 55%; Trivial pericardial effusion  -given 5mg IV Hydralazine in ED  -MAP goal 110-124 in first 24 hours  -prn Labetalol overnight;   - starting on Nifedipine 30mg PO daily in AM    #Hyperkalemia - resolved  K 5.6 on admit, given calcium gluconate, sodium bicarb, albuterol; repeat 5.8, given calcium gluconate, shifted with IV insulin, repeat 4.8     #Hypochloremia - resolved  -likely secondary to volume overlod     #Hyponatremia - resolved  -likely secondary to volume overload     #Normocytic Anemia  - Ferritin 785; Iron 75; Transferrin 139; TIBC 206         DVT Ppx: sq heparin  Code: Full  Dispo: step down to ICU; continued management/monitoring of AG closure    Miguelito Moran MD  [facility] Internal Medicine, [facility] Medicine Hospitalist Pager numbers:   [facility] Hospitalist Medicine Team A (Juanjose/Fabrice): [phone]  [facility] Hospitalist Medicine Team B (Michael/Shanti):  [phone]

## 2022-05-01 LAB
ALBUMIN SERPL BCP-MCNC: 2.1 G/DL (ref 3.5–5.2)
ALP SERPL-CCNC: 60 U/L (ref 55–135)
ALT SERPL W/O P-5'-P-CCNC: 14 U/L (ref 10–44)
ANION GAP SERPL CALC-SCNC: 11 MMOL/L (ref 8–16)
AST SERPL-CCNC: 11 U/L (ref 10–40)
BASOPHILS # BLD AUTO: 0.03 K/UL (ref 0–0.2)
BASOPHILS NFR BLD: 0.4 % (ref 0–1.9)
BILIRUB SERPL-MCNC: 0.5 MG/DL (ref 0.1–1)
BUN SERPL-MCNC: 59 MG/DL (ref 8–23)
CALCIUM SERPL-MCNC: 8.7 MG/DL (ref 8.7–10.5)
CHLORIDE SERPL-SCNC: 111 MMOL/L (ref 95–110)
CO2 SERPL-SCNC: 25 MMOL/L (ref 23–29)
CREAT SERPL-MCNC: 5.2 MG/DL (ref 0.5–1.4)
DIFFERENTIAL METHOD: ABNORMAL
EOSINOPHIL # BLD AUTO: 0 K/UL (ref 0–0.5)
EOSINOPHIL NFR BLD: 0.2 % (ref 0–8)
ERYTHROCYTE [DISTWIDTH] IN BLOOD BY AUTOMATED COUNT: 13.9 % (ref 11.5–14.5)
EST. GFR  (AFRICAN AMERICAN): 13 ML/MIN/1.73 M^2
EST. GFR  (NON AFRICAN AMERICAN): 11 ML/MIN/1.73 M^2
GLUCOSE SERPL-MCNC: 109 MG/DL (ref 70–110)
HCT VFR BLD AUTO: 31 % (ref 40–54)
HGB BLD-MCNC: 10.8 G/DL (ref 14–18)
IMM GRANULOCYTES # BLD AUTO: 0.09 K/UL (ref 0–0.04)
IMM GRANULOCYTES NFR BLD AUTO: 1.1 % (ref 0–0.5)
LYMPHOCYTES # BLD AUTO: 1.6 K/UL (ref 1–4.8)
LYMPHOCYTES NFR BLD: 19.2 % (ref 18–48)
MAGNESIUM SERPL-MCNC: 1.8 MG/DL (ref 1.6–2.6)
MCH RBC QN AUTO: 29.3 PG (ref 27–31)
MCHC RBC AUTO-ENTMCNC: 34.8 G/DL (ref 32–36)
MCV RBC AUTO: 84 FL (ref 82–98)
MONOCYTES # BLD AUTO: 1 K/UL (ref 0.3–1)
MONOCYTES NFR BLD: 12.1 % (ref 4–15)
NEUTROPHILS # BLD AUTO: 5.4 K/UL (ref 1.8–7.7)
NEUTROPHILS NFR BLD: 67 % (ref 38–73)
NRBC BLD-RTO: 0 /100 WBC
PHOSPHATE SERPL-MCNC: 4 MG/DL (ref 2.7–4.5)
PLATELET # BLD AUTO: 325 K/UL (ref 150–450)
PMV BLD AUTO: 9.3 FL (ref 9.2–12.9)
POCT GLUCOSE: 208 MG/DL (ref 70–110)
POCT GLUCOSE: 77 MG/DL (ref 70–110)
POTASSIUM SERPL-SCNC: 3.6 MMOL/L (ref 3.5–5.1)
PROT SERPL-MCNC: 5.7 G/DL (ref 6–8.4)
RBC # BLD AUTO: 3.69 M/UL (ref 4.6–6.2)
SODIUM SERPL-SCNC: 147 MMOL/L (ref 136–145)
WBC # BLD AUTO: 8.12 K/UL (ref 3.9–12.7)

## 2022-05-01 PROCEDURE — 80053 COMPREHEN METABOLIC PANEL: CPT | Performed by: STUDENT IN AN ORGANIZED HEALTH CARE EDUCATION/TRAINING PROGRAM

## 2022-05-01 PROCEDURE — 84100 ASSAY OF PHOSPHORUS: CPT | Performed by: STUDENT IN AN ORGANIZED HEALTH CARE EDUCATION/TRAINING PROGRAM

## 2022-05-01 PROCEDURE — 63600175 PHARM REV CODE 636 W HCPCS: Performed by: STUDENT IN AN ORGANIZED HEALTH CARE EDUCATION/TRAINING PROGRAM

## 2022-05-01 PROCEDURE — 85025 COMPLETE CBC W/AUTO DIFF WBC: CPT | Performed by: STUDENT IN AN ORGANIZED HEALTH CARE EDUCATION/TRAINING PROGRAM

## 2022-05-01 PROCEDURE — 51700 IRRIGATION OF BLADDER: CPT

## 2022-05-01 PROCEDURE — 99232 SBSQ HOSP IP/OBS MODERATE 35: CPT | Mod: ,,, | Performed by: INTERNAL MEDICINE

## 2022-05-01 PROCEDURE — 11000001 HC ACUTE MED/SURG PRIVATE ROOM

## 2022-05-01 PROCEDURE — 25000003 PHARM REV CODE 250: Performed by: STUDENT IN AN ORGANIZED HEALTH CARE EDUCATION/TRAINING PROGRAM

## 2022-05-01 PROCEDURE — 36415 COLL VENOUS BLD VENIPUNCTURE: CPT | Performed by: STUDENT IN AN ORGANIZED HEALTH CARE EDUCATION/TRAINING PROGRAM

## 2022-05-01 PROCEDURE — 83735 ASSAY OF MAGNESIUM: CPT | Performed by: STUDENT IN AN ORGANIZED HEALTH CARE EDUCATION/TRAINING PROGRAM

## 2022-05-01 PROCEDURE — 25000003 PHARM REV CODE 250: Performed by: INTERNAL MEDICINE

## 2022-05-01 PROCEDURE — 25000003 PHARM REV CODE 250

## 2022-05-01 PROCEDURE — 63600175 PHARM REV CODE 636 W HCPCS: Performed by: INTERNAL MEDICINE

## 2022-05-01 PROCEDURE — 99232 PR SUBSEQUENT HOSPITAL CARE,LEVL II: ICD-10-PCS | Mod: ,,, | Performed by: INTERNAL MEDICINE

## 2022-05-01 RX ADMIN — SODIUM CHLORIDE, SODIUM LACTATE, POTASSIUM CHLORIDE, AND CALCIUM CHLORIDE: .6; .31; .03; .02 INJECTION, SOLUTION INTRAVENOUS at 04:05

## 2022-05-01 RX ADMIN — MUPIROCIN: 20 OINTMENT TOPICAL at 09:05

## 2022-05-01 RX ADMIN — SODIUM CHLORIDE, SODIUM LACTATE, POTASSIUM CHLORIDE, AND CALCIUM CHLORIDE: .6; .31; .03; .02 INJECTION, SOLUTION INTRAVENOUS at 06:05

## 2022-05-01 RX ADMIN — SODIUM CHLORIDE, SODIUM LACTATE, POTASSIUM CHLORIDE, AND CALCIUM CHLORIDE: .6; .31; .03; .02 INJECTION, SOLUTION INTRAVENOUS at 11:05

## 2022-05-01 RX ADMIN — HEPARIN SODIUM 5000 UNITS: 5000 INJECTION INTRAVENOUS; SUBCUTANEOUS at 05:05

## 2022-05-01 RX ADMIN — HEPARIN SODIUM 5000 UNITS: 5000 INJECTION INTRAVENOUS; SUBCUTANEOUS at 09:05

## 2022-05-01 RX ADMIN — HEPARIN SODIUM 5000 UNITS: 5000 INJECTION INTRAVENOUS; SUBCUTANEOUS at 02:05

## 2022-05-01 RX ADMIN — SEVELAMER CARBONATE 800 MG: 800 TABLET, FILM COATED ORAL at 08:05

## 2022-05-01 RX ADMIN — ATORVASTATIN CALCIUM 40 MG: 40 TABLET, FILM COATED ORAL at 08:05

## 2022-05-01 RX ADMIN — SEVELAMER CARBONATE 800 MG: 800 TABLET, FILM COATED ORAL at 11:05

## 2022-05-01 RX ADMIN — TAMSULOSIN HYDROCHLORIDE 0.4 MG: 0.4 CAPSULE ORAL at 08:05

## 2022-05-01 RX ADMIN — SEVELAMER CARBONATE 800 MG: 800 TABLET, FILM COATED ORAL at 05:05

## 2022-05-01 RX ADMIN — MUPIROCIN: 20 OINTMENT TOPICAL at 08:05

## 2022-05-01 RX ADMIN — CEFTRIAXONE 1 G: 1 INJECTION, SOLUTION INTRAVENOUS at 04:05

## 2022-05-01 NOTE — NURSING
Care plans reviewed. No c/o of SOB or chest pains, no acute distress noted. Patient continues on LR at 175 mL/hr. All medications given as ordered. Safety maintained, bed in the lowest position, bed alarm on, bed wheels locked, call light with in reach. Will continue to monitor.

## 2022-05-01 NOTE — PROGRESS NOTES
Progress Note  Nephrology      Consult Requested By: Gail Avina MD      SUBJECTIVE:     Overnight events  Patient is a 61 y.o. male seen and examined.  He is looking much better today and is without c/o CO, SOB, N/V, abdominal pain, C/F.    Patient Active Problem List   Diagnosis    Type 2 diabetes, uncontrolled, with renal manifestation    Hyperkalemia    Metabolic acidosis    Hyperglycemia    HERBERT (acute kidney injury)    Abnormal liver enzymes    Hyperlipidemia    Hypertension    Elevated PSA, less than 10 ng/ml    Family history of prostate cancer in father    Urinary retention    Bilateral hydronephrosis    Obstructive uropathy    Normocytic anemia    Benign prostatic hyperplasia with urinary obstruction    Hyperphosphatemia     Past Medical History:   Diagnosis Date    Diabetes mellitus     Hyperlipemia     Hypertension     Migraine headache     PTSD (post-traumatic stress disorder)               OBJECTIVE:     Vitals:    05/01/22 0400 05/01/22 0443 05/01/22 0748 05/01/22 0800   BP:  137/65 (!) 141/75    BP Location:  Right arm Left arm    Patient Position:  Lying Lying    Pulse: 86 93 95 100   Resp:  18 20    Temp:  97.5 °F (36.4 °C) 98.3 °F (36.8 °C)    TempSrc:  Oral Oral    SpO2:  96% 98%    Weight:  74 kg (163 lb 2.3 oz)     Height:           Temp: 98.3 °F (36.8 °C) (05/01/22 0748)  Pulse: 100 (05/01/22 0800)  Resp: 20 (05/01/22 0748)  BP: (!) 141/75 (05/01/22 0748)  SpO2: 98 % (05/01/22 0748)    Date 05/01/22 0700 - 05/02/22 0659   Shift 8428-3769 5703-8101 0303-2334 24 Hour Total   INTAKE   Shift Total(mL/kg)       OUTPUT   Urine(mL/kg/hr) 600   600   Shift Total(mL/kg) 600(8.1)   600(8.1)   Weight (kg) 74 74 74 74             Medications:   atorvastatin  40 mg Oral Daily    cefTRIAXone (ROCEPHIN) IVPB  1 g Intravenous Q24H    heparin (porcine)  5,000 Units Subcutaneous Q8H    insulin detemir U-100  10 Units Subcutaneous Daily    mupirocin   Nasal BID    sevelamer  carbonate  800 mg Oral TID WM    tamsulosin  0.4 mg Oral Daily      lactated ringers 175 mL/hr at 05/01/22 0748               Physical Exam:    General appearance: well developed, well nourished  Lungs:  clear to auscultation bilaterally and normal respiratory effort  Chest wall: no tenderness  Heart: regular rate and rhythm, S1, S2 normal, no murmur, click, rub or gallop  Abdomen: soft, non-tender non-distented; bowel sounds normal; no masses,  no organomegaly  Extremities: no cyanosis or edema, or clubbing  Skin: Skin color, texture, turgor normal. No rashes or lesions  Neurologic: Normal strength and tone. No focal numbness or weakness    Laboratory:    Lab 04/29/22  0443 04/29/22  0758 04/30/22  0416 04/30/22  0818 04/30/22  1206 05/01/22  0256   WBC 11.84  --  12.34  --   --  8.12   HGB 13.3*  --  11.8*  --   --  10.8*   HCT 37.3*  --  32.8*  --   --  31.0*     --  324  --   --  325   MCV 82  --  81*  --   --  84   RDW 13.3  --  13.2  --   --  13.9   *  135*   < > 145  145 145 144 147*   K 4.8  4.8   < > 3.7  3.7 3.7 4.0 3.6     100   < > 105  105 106 105 111*   CO2 9*  9*   < > 23  23 22* 25 25   *  134*   < > 102*  102* 94* 86* 59*   CREATININE 16.1*  16.1*   < > 9.5*  9.5* 8.4* 7.5* 5.2*   *  276*   < > 116*  116* 147* 169* 109   PROT 6.6  --  5.3*  --   --  5.7*   ALBUMIN 2.5*  --  2.3*  --   --  2.1*   BILITOT 0.5  --  0.5  --   --  0.5   AST 11  --  7*  --   --  11   ALKPHOS 97  --  79  --   --  60   ALT 21  --  9*  --   --  14         ABG  Labs reviewed  Recent Results (from the past 336 hour(s))   Basic Metabolic Panel (BMP)    Collection Time: 04/30/22 12:06 PM   Result Value Ref Range    Sodium 144 136 - 145 mmol/L    Potassium 4.0 3.5 - 5.1 mmol/L    Chloride 105 95 - 110 mmol/L    CO2 25 23 - 29 mmol/L    BUN 86 (H) 8 - 23 mg/dL    Creatinine 7.5 (H) 0.5 - 1.4 mg/dL    Calcium 9.0 8.7 - 10.5 mg/dL    Anion Gap 14 8 - 16 mmol/L   Basic Metabolic Panel  (BMP)    Collection Time: 04/30/22  8:18 AM   Result Value Ref Range    Sodium 145 136 - 145 mmol/L    Potassium 3.7 3.5 - 5.1 mmol/L    Chloride 106 95 - 110 mmol/L    CO2 22 (L) 23 - 29 mmol/L    BUN 94 (H) 8 - 23 mg/dL    Creatinine 8.4 (H) 0.5 - 1.4 mg/dL    Calcium 8.6 (L) 8.7 - 10.5 mg/dL    Anion Gap 17 (H) 8 - 16 mmol/L   Basic Metabolic Panel (BMP)    Collection Time: 04/30/22  4:16 AM   Result Value Ref Range    Sodium 145 136 - 145 mmol/L    Potassium 3.7 3.5 - 5.1 mmol/L    Chloride 105 95 - 110 mmol/L    CO2 23 23 - 29 mmol/L     (H) 8 - 23 mg/dL    Creatinine 9.5 (H) 0.5 - 1.4 mg/dL    Calcium 8.3 (L) 8.7 - 10.5 mg/dL    Anion Gap 17 (H) 8 - 16 mmol/L     Recent Results (from the past 336 hour(s))   CBC with Automated Differential    Collection Time: 05/01/22  2:56 AM   Result Value Ref Range    WBC 8.12 3.90 - 12.70 K/uL    Hemoglobin 10.8 (L) 14.0 - 18.0 g/dL    Hematocrit 31.0 (L) 40.0 - 54.0 %    Platelets 325 150 - 450 K/uL   CBC with Automated Differential    Collection Time: 04/30/22  4:16 AM   Result Value Ref Range    WBC 12.34 3.90 - 12.70 K/uL    Hemoglobin 11.8 (L) 14.0 - 18.0 g/dL    Hematocrit 32.8 (L) 40.0 - 54.0 %    Platelets 324 150 - 450 K/uL   CBC with Automated Differential    Collection Time: 04/29/22  4:43 AM   Result Value Ref Range    WBC 11.84 3.90 - 12.70 K/uL    Hemoglobin 13.3 (L) 14.0 - 18.0 g/dL    Hematocrit 37.3 (L) 40.0 - 54.0 %    Platelets 339 150 - 450 K/uL     Urinalysis  No results for input(s): COLORU, CLARITYU, SPECGRAV, PHUR, PROTEINUA, GLUCOSEU, BILIRUBINCON, BLOODU, WBCU, RBCU, BACTERIA, MUCUS, NITRITE, LEUKOCYTESUR, UROBILINOGEN, HYALINECASTS in the last 24 hours.    Diagnostic Results:  X-Ray: Reviewed  US: Reviewed  Echo: Reviewed  ACCESS    ASSESSMENT/PLAN:     Patient Active Problem List   Diagnosis    Type 2 diabetes, uncontrolled, with renal manifestation    Hyperkalemia    Metabolic acidosis    Hyperglycemia    HERBERT (acute kidney injury)     Abnormal liver enzymes    Hyperlipidemia    Hypertension    Elevated PSA, less than 10 ng/ml    Family history of prostate cancer in father    Urinary retention    Bilateral hydronephrosis    Obstructive uropathy    Normocytic anemia    Benign prostatic hyperplasia with urinary obstruction    Hyperphosphatemia       Plan:   Angel Bell is a 61 y.o.male     1.HERBERT stage III on CKD stage IIIa  - baseline Cr around 1.5 from 2016  - history of uncontrolled DM; A1c > 14  - now presents with BUN/Cr 142/18.8. Found to have obstructive uropathy and alvarenga catheter placed; 6 L UOP subsequently and BUN/Cr improved to 125/14.6 -->86/7.5 -->59/5.2 today. Monitor renal function closely. No emergent indication for dialysis currently.  - strict I/O, daily weights  - please avoid gadolinium, fleets, phos-based laxatives, NSAIDs     2.Anion gap metabolic acidosis  - Bicarb of 6 on presentation, now 25. Much better today.     3. Hyperphosphatemia  - Improving with improvement in renal function. 14.5 -> 11.7 -->7.4. Continue Renvela.    4. UTI - On Ceftriaxone    5. DM 2 - A1c > 14 - Needs tight glycemic control.    6. Hyperkalemia - Better, now may have to replete it K goes lower.       Thank you for allowing us to participate in taking care of your patient. Please call us if you have any questions regarding this consult.     Garret Asher, DO  [facility]

## 2022-05-01 NOTE — PROGRESS NOTES
"Progress Note  Nephrology      Consult Requested By: Gail Avina MD      SUBJECTIVE:     Overnight events  Patient is a 61 y.o. male presented with hyperglycemia, confusion, and severe acidosis. It turned out the patient had severe obstructive nephropathy when a alvarenga was finally placed with rapid improvement in clinical parameters and symptoms.  He is currently without c/o CP, SOB, N/V.    "Angel Bell is a 61 y.o. male who  has a past medical history of Diabetes mellitus, Hyperlipemia, Hypertension, Migraine headache, and PTSD (post-traumatic stress disorder).. The patient presented to [facility] on 4/28/2022 with a primary complaint of elevated blood glucose. Reports that over the last few weeks he has had decreased urine output and worsening abdominal distention. Reports decreased appetite and worsening lethargy for the last month. Patient checked his blood glucose at home and it was 256 prompting patient and wife to present to hospital."      "In ER found to have severe acidosis with bicarb of 6. BUN/Cr 142/18.8 on presentation with baseline Cr 1.5 from 2016. He was found to have distended bladder on imaging and alvarenga was placed; 6 L UOP since placement of alvarenga and patient reports improvement in symptoms."    Patient Active Problem List   Diagnosis    Type 2 diabetes, uncontrolled, with renal manifestation    Hyperkalemia    Metabolic acidosis    Hyperglycemia    HERBERT (acute kidney injury)    Abnormal liver enzymes    Hyperlipidemia    Hypertension    Elevated PSA, less than 10 ng/ml    Family history of prostate cancer in father    Urinary retention    Bilateral hydronephrosis    Obstructive uropathy    Normocytic anemia    Benign prostatic hyperplasia with urinary obstruction    Hyperphosphatemia     Past Medical History:   Diagnosis Date    Diabetes mellitus     Hyperlipemia     Hypertension     Migraine headache     PTSD (post-traumatic stress disorder)  "              OBJECTIVE:     Vitals:    05/01/22 0400 05/01/22 0443 05/01/22 0748 05/01/22 0800   BP:  137/65 (!) 141/75    BP Location:  Right arm Left arm    Patient Position:  Lying Lying    Pulse: 86 93 95 100   Resp:  18 20    Temp:  97.5 °F (36.4 °C) 98.3 °F (36.8 °C)    TempSrc:  Oral Oral    SpO2:  96% 98%    Weight:  74 kg (163 lb 2.3 oz)     Height:           Temp: 98.3 °F (36.8 °C) (05/01/22 0748)  Pulse: 100 (05/01/22 0800)  Resp: 20 (05/01/22 0748)  BP: (!) 141/75 (05/01/22 0748)  SpO2: 98 % (05/01/22 0748)    Date 05/01/22 0700 - 05/02/22 0659   Shift 3529-9366 2782-8076 2081-4336 24 Hour Total   INTAKE   Shift Total(mL/kg)       OUTPUT   Urine(mL/kg/hr) 600   600   Shift Total(mL/kg) 600(8.1)   600(8.1)   Weight (kg) 74 74 74 74             Medications:   atorvastatin  40 mg Oral Daily    cefTRIAXone (ROCEPHIN) IVPB  1 g Intravenous Q24H    heparin (porcine)  5,000 Units Subcutaneous Q8H    insulin detemir U-100  10 Units Subcutaneous Daily    mupirocin   Nasal BID    sevelamer carbonate  800 mg Oral TID WM    tamsulosin  0.4 mg Oral Daily      lactated ringers 175 mL/hr at 05/01/22 0748               Physical Exam:  General appearance: well developed, well nourished  Lungs:  clear to auscultation bilaterally and normal respiratory effort  Chest wall: no tenderness  Heart: regular rate and rhythm, S1, S2 normal, no murmur, click, rub or gallop  Abdomen: soft, non-tender non-distented; bowel sounds normal; no masses,  no organomegaly  Extremities: no cyanosis or edema, or clubbing  Skin: Skin color, texture, turgor normal. No rashes or lesions  Neurologic: Normal strength and tone. No focal numbness or weakness      Laboratory:  ABG  Labs reviewed  Recent Results (from the past 336 hour(s))   Basic Metabolic Panel (BMP)    Collection Time: 04/30/22 12:06 PM   Result Value Ref Range    Sodium 144 136 - 145 mmol/L    Potassium 4.0 3.5 - 5.1 mmol/L    Chloride 105 95 - 110 mmol/L    CO2 25 23 - 29  mmol/L    BUN 86 (H) 8 - 23 mg/dL    Creatinine 7.5 (H) 0.5 - 1.4 mg/dL    Calcium 9.0 8.7 - 10.5 mg/dL    Anion Gap 14 8 - 16 mmol/L   Basic Metabolic Panel (BMP)    Collection Time: 04/30/22  8:18 AM   Result Value Ref Range    Sodium 145 136 - 145 mmol/L    Potassium 3.7 3.5 - 5.1 mmol/L    Chloride 106 95 - 110 mmol/L    CO2 22 (L) 23 - 29 mmol/L    BUN 94 (H) 8 - 23 mg/dL    Creatinine 8.4 (H) 0.5 - 1.4 mg/dL    Calcium 8.6 (L) 8.7 - 10.5 mg/dL    Anion Gap 17 (H) 8 - 16 mmol/L   Basic Metabolic Panel (BMP)    Collection Time: 04/30/22  4:16 AM   Result Value Ref Range    Sodium 145 136 - 145 mmol/L    Potassium 3.7 3.5 - 5.1 mmol/L    Chloride 105 95 - 110 mmol/L    CO2 23 23 - 29 mmol/L     (H) 8 - 23 mg/dL    Creatinine 9.5 (H) 0.5 - 1.4 mg/dL    Calcium 8.3 (L) 8.7 - 10.5 mg/dL    Anion Gap 17 (H) 8 - 16 mmol/L     Recent Results (from the past 336 hour(s))   CBC with Automated Differential    Collection Time: 05/01/22  2:56 AM   Result Value Ref Range    WBC 8.12 3.90 - 12.70 K/uL    Hemoglobin 10.8 (L) 14.0 - 18.0 g/dL    Hematocrit 31.0 (L) 40.0 - 54.0 %    Platelets 325 150 - 450 K/uL   CBC with Automated Differential    Collection Time: 04/30/22  4:16 AM   Result Value Ref Range    WBC 12.34 3.90 - 12.70 K/uL    Hemoglobin 11.8 (L) 14.0 - 18.0 g/dL    Hematocrit 32.8 (L) 40.0 - 54.0 %    Platelets 324 150 - 450 K/uL   CBC with Automated Differential    Collection Time: 04/29/22  4:43 AM   Result Value Ref Range    WBC 11.84 3.90 - 12.70 K/uL    Hemoglobin 13.3 (L) 14.0 - 18.0 g/dL    Hematocrit 37.3 (L) 40.0 - 54.0 %    Platelets 339 150 - 450 K/uL     Urinalysis  No results for input(s): COLORU, CLARITYU, SPECGRAV, PHUR, PROTEINUA, GLUCOSEU, BILIRUBINCON, BLOODU, WBCU, RBCU, BACTERIA, MUCUS, NITRITE, LEUKOCYTESUR, UROBILINOGEN, HYALINECASTS in the last 24 hours.    Diagnostic Results:  X-Ray: Reviewed  US: Reviewed  Echo: Reviewed  ACCESS    ASSESSMENT/PLAN:     Patient Active Problem List    Diagnosis    Type 2 diabetes, uncontrolled, with renal manifestation    Hyperkalemia    Metabolic acidosis    Hyperglycemia    HERBERT (acute kidney injury)    Abnormal liver enzymes    Hyperlipidemia    Hypertension    Elevated PSA, less than 10 ng/ml    Family history of prostate cancer in father    Urinary retention    Bilateral hydronephrosis    Obstructive uropathy    Normocytic anemia    Benign prostatic hyperplasia with urinary obstruction    Hyperphosphatemia       Plan:   Angel Bell is a 61 y.o.male     1.HERBERT stage III on CKD stage IIIa  - baseline Cr around 1.5 from 2016  - history of uncontrolled DM; A1c > 14  - now presents with BUN/Cr 142/18.8. Found to have obstructive uropathy and alvarenga catheter placed; 6 L UOP subsequently and BUN/Cr improved to 125/14.6 -->86/7.5 today. Currently off bicarb drip; recommend continuing drip and monitor renal function closely. No emergent indication for dialysis currently.  - strict I/O, daily weights  - please avoid gadolinium, fleets, phos-based laxatives, NSAIDs     2.Anion gap metabolic acidosis  - Bicarb of 6 on presentation. Much better today.     3. Hyperphosphatemia  - Improving with improvement in renal function. 14.5 -> 11.7 -->7.4. Continue Renvela.    4. UTI - On Ceftriaxone     5. DM 2 - A1c > 14 - Needs tight glycemic control.     6. Hyperkalemia - Better, now may have to replete it K goes lower.     Thank you for allowing us to participate in taking care of your patient. Please call us if you have any questions regarding this consult.      Garret Asher, DO  [facility]

## 2022-05-01 NOTE — PROGRESS NOTES
[facility] Medicine Progress Note    Primary Team: [facility] [place] Hospitalist Team A  Attending Physician: Gail Avina MD  Resident: Dr. Flores  Intern: Dr. Regalado    Subjective:      No acute events overnight.  Reports feeling well without complaints.  No back pain, abdominal pain, fever, chills, dysuria.     Objective:     Last 24 Hour Vital Signs:  BP  Min: 132/64  Max: 141/75  Temp  Av.5 °F (36.4 °C)  Min: 96.6 °F (35.9 °C)  Max: 98.3 °F (36.8 °C)  Pulse  Av.3  Min: 81  Max: 103  Resp  Av.8  Min: 18  Max: 20  SpO2  Av.2 %  Min: 96 %  Max: 98 %  Weight  Av kg (163 lb 2.3 oz)  Min: 74 kg (163 lb 2.3 oz)  Max: 74 kg (163 lb 2.3 oz)    Intake/Output Summary (Last 24 hours) at 2022 0759  Last data filed at 2022 0705  Gross per 24 hour   Intake --   Output 5270 ml   Net -5270 ml      Physical Examination:    Examination  General: Patient lying in bed comfortably,  in NAD  Head: normocephalic, atraumatic  Eyes: PERRL, EOMI, no conjunctival injections or icterus  Mouth: MMM  Neck: No thyromegaly or masses   Cardiac: RRR, no murmurs appreciated, no extra heart sounds  Pulmonary/Chest: CTAB, symmetric chest rise, no wheezing or crackles  GI: Distended, non- tender to palpation  Extremities: no edema, clubbing, or cyanosis  Skin: dry, warm, intact. No bruising or rashes.  Neuro: Alert and oriented, moving all extremities    Laboratory:  Laboratory Data Reviewed: yes  Pertinent Findings:  Recent Labs   Lab 22  0443 22  0758 22  0416 22  0818 22  1206 22  0256   WBC 11.84  --  12.34  --   --  8.12   HGB 13.3*  --  11.8*  --   --  10.8*   HCT 37.3*  --  32.8*  --   --  31.0*     --  324  --   --  325   MCV 82  --  81*  --   --  84   RDW 13.3  --  13.2  --   --  13.9   *  135*   < > 145  145 145 144 147*   K 4.8  4.8   < > 3.7  3.7 3.7 4.0 3.6     100   < > 105  105 106 105 111*   CO2 9*  9*   < > 23  23 22* 25 25   *  134*    < > 102*  102* 94* 86* 59*   CREATININE 16.1*  16.1*   < > 9.5*  9.5* 8.4* 7.5* 5.2*   *  276*   < > 116*  116* 147* 169* 109   PROT 6.6  --  5.3*  --   --  5.7*   ALBUMIN 2.5*  --  2.3*  --   --  2.1*   BILITOT 0.5  --  0.5  --   --  0.5   AST 11  --  7*  --   --  11   ALKPHOS 97  --  79  --   --  60   ALT 21  --  9*  --   --  14    < > = values in this interval not displayed.       Microbiology Data Reviewed: yes  Pertinent Findings:  Microbiology Results (last 7 days)     Procedure Component Value Units Date/Time    Blood culture [[id number]] Collected: 04/28/22 2217    Order Status: Completed Specimen: Blood Updated: 05/01/22 0613     Blood Culture, Routine No Growth to date      No Growth to date    Blood culture [[id number]] Collected: 04/28/22 2217    Order Status: Completed Specimen: Blood Updated: 05/01/22 0613     Blood Culture, Routine No Growth to date      No Growth to date    Urine culture [[id number]] Collected: 04/28/22 2010    Order Status: Completed Specimen: Urine Updated: 04/30/22 0054     Urine Culture, Routine Multiple organisms isolated. None in predominance.  Repeat if      clinically necessary.    Narrative:      Specimen Source->Urine           Other Results:  EKG (my interpretation):   ECG Results          EKG 12-lead (Final result)  Result time 04/29/22 08:16:16    Final result by Interface, Lab In [facility] (04/29/22 08:16:16)                 Narrative:    Test Reason : R00.0,    Vent. Rate : 108 BPM     Atrial Rate : 108 BPM     P-R Int : 158 ms          QRS Dur : 070 ms      QT Int : 322 ms       P-R-T Axes : 071 034 081 degrees     QTc Int : 431 ms    Sinus tachycardia  LVH with repolarization abnormality  Nonspecific ST and T wave abnormality  Abnormal ECG  When compared with ECG of 11-JUL-2016 09:39,  Non-specific change in ST segment in Inferior leads  Non-specific change in ST segment in Lateral leads  Nonspecific T wave abnormality now evident in Lateral leads  Confirmed  by Caroline Lopez MD ([id number]) on 4/29/2022 8:16:05 AM    Referred By: [email or web address]   SELF           Confirmed By:Caroline Lopez MD                             Results for orders placed or performed during the hospital encounter of 04/28/22   EKG 12-lead    Collection Time: 04/28/22  8:26 PM    Narrative    Test Reason : R00.0,    Vent. Rate : 108 BPM     Atrial Rate : 108 BPM     P-R Int : 158 ms          QRS Dur : 070 ms      QT Int : 322 ms       P-R-T Axes : 071 034 081 degrees     QTc Int : 431 ms    Sinus tachycardia  LVH with repolarization abnormality  Nonspecific ST and T wave abnormality  Abnormal ECG  When compared with ECG of 11-JUL-2016 09:39,  Non-specific change in ST segment in Inferior leads  Non-specific change in ST segment in Lateral leads  Nonspecific T wave abnormality now evident in Lateral leads  Confirmed by Caroline Lopez MD ([id number]) on 4/29/2022 8:16:05 AM    Referred By: AAAREFROSY   SELF           Confirmed By:Caroline Lopez MD      Radiology Data Reviewed: yes  Pertinent Findings:  X-Ray Chest AP Portable    Result Date: 4/28/2022  EXAMINATION: XR CHEST AP PORTABLE CLINICAL HISTORY: hypertensive emergency; TECHNIQUE: Single frontal view of the chest was performed. COMPARISON: 07/11/2016 FINDINGS: The lungs are clear, with normal appearance of pulmonary vasculature and no pleural effusion or pneumothorax. The cardiac silhouette is normal in size. The hilar and mediastinal contours are unremarkable. Bones are intact.     No acute abnormality. Electronically signed by: Yang Flaherty Date:    04/28/2022 Time:    22:27    Echo    Result Date: 4/29/2022  · Concentric remodeling and normal systolic function. · The estimated ejection fraction is 55%. · Normal left ventricular diastolic function. · Normal right ventricular size with normal right ventricular systolic function. · Normal central venous pressure (3 mmHg). · Trivial pericardial effusion.      CT Abdomen Pelvis  Without Contrast    Result Date:  4/28/2022  EXAMINATION: CT ABDOMEN PELVIS WITHOUT CONTRAST CLINICAL HISTORY: Abdominal pain, acute, nonlocalized; TECHNIQUE: Low dose axial images, sagittal and coronal reformations were obtained from the lung bases to the pubic symphysis, 30 mL of oral Omnipaque 350 was administered.. Low dose axial images, sagittal and coronal reformations were obtained from the lung bases to the pubic symphysis.  Oral contrast was not administered. COMPARISON: None FINDINGS: Heart: Normal in size. No pericardial effusion. Lung Bases: Well aerated, without consolidation or pleural fluid. Liver: Normal in size and attenuation, with no focal hepatic lesions. Gallbladder: No calcified gallstones. Bile Ducts: No evidence of dilated ducts. Pancreas: No mass or peripancreatic fat stranding. Spleen: Unremarkable. Adrenals: Unremarkable. Kidneys/ Ureters: Hydronephrosis and hydroureter without calcified stone.  Renal cyst noted mainly on the right.  Perinephric stranding in the perinephric fat. Bladder: Bladder wall thickening and distension extending out of the pelvis to the level of the umbilicus.  No discrete mass is seen. Reproductive organs: Prostate is enlarged measuring 6.4 x 4.8 by 7.4 cm. GI Tract/Mesentery: No evidence of bowel obstruction or inflammation.  Small sliding-type hiatal hernia. Peritoneal Space: No ascites. No free air. Retroperitoneum: No significant adenopathy. Abdominal wall: Unremarkable. Vasculature: No significant atherosclerosis or aneurysm. Bones: No acute fracture.     Markedly distended urinary bladder with hydronephrosis and hydroureter.  Correlate for bladder outlet obstruction. Increased perinephric stranding.  This can be seen with renal obstruction or pyelonephritis. Electronically signed by: Yang Flaherty Date:    04/28/2022 Time:    23:45     Current Medications:     Scheduled:   atorvastatin  40 mg Oral Daily    cefTRIAXone (ROCEPHIN) IVPB  1 g Intravenous Q24H    heparin (porcine)  5,000  Units Subcutaneous Q8H    insulin detemir U-100  20 Units Subcutaneous Daily    mupirocin   Nasal BID    sevelamer carbonate  800 mg Oral TID WM    tamsulosin  0.4 mg Oral Daily         Infusions:   lactated ringers 175 mL/hr at 05/01/22 0748        PRN:  [COMPLETED] calcium gluconate IVPB **AND** calcium gluconate IVPB, dextrose 10%, glucose, insulin aspart U-100, polyethylene glycol, sodium chloride 0.9%  Antibiotics and Day Number of Therapy:  Antibiotics (From admission, onward)            Start     Stop Route Frequency Ordered    04/30/22 0415  cefTRIAXone (ROCEPHIN) 1 g/50 mL D5W IVPB         -- IV Every 24 hours (non-standard times) 04/29/22 0747    04/29/22 0900  mupirocin 2 % ointment         05/04 0859 Nasl 2 times daily 04/29/22 0142           Lines and Day Number of Therapy:      Assessment:     Angel Bell is a 61 y.o. male with a PMHx of  Patient Active Problem List    Diagnosis Date Noted    Urinary retention 04/29/2022    Bilateral hydronephrosis 04/29/2022    Obstructive uropathy 04/29/2022    Normocytic anemia 04/29/2022    Benign prostatic hyperplasia with urinary obstruction 04/29/2022    Hyperphosphatemia 04/29/2022    Elevated PSA, less than 10 ng/ml 08/16/2016    Family history of prostate cancer in father 08/16/2016    Hypertension 07/12/2016    Hyperlipidemia 07/04/2016    Type 2 diabetes, uncontrolled, with renal manifestation 07/03/2016    Hyperkalemia 07/03/2016    Metabolic acidosis 07/03/2016    Hyperglycemia 07/03/2016    HERBERT (acute kidney injury) 07/03/2016    Abnormal liver enzymes 07/03/2016       Plan:     #AGMA  -CO2 6, AG 32 on admit  -beta hydroxybutyrate 4.4, UA negative for ketones  -starting on sodium bicarb drip for now, discussed case with nephrology, holding off on urgent dialysis and line placement for now  - switched to Insulin Detemir 10 units subq, continue to monitor glucose  -Na and Cl increased as stated below, AG closed, increased LR  per below    #Acute Renal Failure 2/2 Obstructive Uropathy, likely BPH  Presenting with history of abdominal distention and decreased UOP for 2 weeks   -BUN/Cr 142/18.8; last documented Cr 1.6 5 years ago  -CT Abd/Pelvis revealed largely distended bladder with evidence of hydronephrosis  -FENa 22.6%, consistent with post-renal/obstructive   -Tavera inserted with >900ml UOP  -Nephrology consulted, no need for urgent dialysis at this point, continue management with relief of obstructive uropathy  -starting flomax  -shifted K (stable), sevelamer for phos of 14.5     #Diabetes Mellitus, type 2  Reports history of DM, previously on insulin, but not taking any medications in over a year  -A1C >14  -betahydroxybutyrate 4.4 on admit  -UA negative for ketones  -started subQ detemir 20U, glucose dropped, will decrease to 15U    #Hyponatremia - resolved, now hypernatremic s/p post-obstructive UTI  -initially fluid overloaded, improved  -Cr improving  -Na 142>>>147 s/p sepsis 2/2 post-obstructive UTI  -net negative 4.6L in 24h  -increased fluids to  cc/hr    #UTI  -UA with bacteria, WBCs  -continue Ceftriaxone 1g IV q24h    #Hyperphosphatemia   -secondary to renal failure  -started on phos binders TID (sevelamer 800mg TID)     Acute Encephalopathy - resolved   Per pt and wife he has been more lethargic over last 2 months.  Wife reports some episodes of confusion.   - Sating 100% on RA; CXR without focal infiltrate or effusion  -Pt is alert and oriented x 4 today  -Symptoms of lethargy likely related to uremia with BUN of 142, improved     #HTN - resolved  History of HTN, previously on Lopressor 50mg BID  -Reports not taking any medications in >1 year  -/102 on admit; 114/63 today  -EKG sinus tachycardia with evidence of LVH, likely secondary to longstanding uncontrolled HTN  Echo: EF 55%; Trivial pericardial effusion  -given 5mg IV Hydralazine in ED  -MAP goal 110-124 in first 24 hours  -prn Labetalol overnight;   -  started Nifedipine 30mg PO daily in AM    #Hyperkalemia - resolved  K 5.6 on admit, given calcium gluconate, sodium bicarb, albuterol; repeat 5.8, given calcium gluconate, shifted with IV insulin, repeat 4.8, stable     #Hypochloremia - resolved, now hyperchloremic  -likely secondary to volume overload, improved; followed by volume depletion 2/2 post-obstructive UTI  -Cr improving  -increased LR to 175cc/hr  -continue to monitor     #Normocytic Anemia  - Ferritin 785; Iron 75; Transferrin 139; TIBC 206         DVT Ppx: heparin  Code: Full  Dispo: pending improvement in kidney function    Idania Regalado MD  [facility], [facility]    [facility] Medicine Hospitalist Pager numbers:   [facility] Hospitalist Medicine Team A (Juanjose/Fabrice): [phone]  [facility] Hospitalist Medicine Team B (Michael/Shanti):  [phone]

## 2022-05-02 VITALS
OXYGEN SATURATION: 97 % | SYSTOLIC BLOOD PRESSURE: 157 MMHG | TEMPERATURE: 98 F | HEART RATE: 91 BPM | BODY MASS INDEX: 25.6 KG/M2 | HEIGHT: 67 IN | WEIGHT: 163.13 LBS | DIASTOLIC BLOOD PRESSURE: 74 MMHG | RESPIRATION RATE: 18 BRPM

## 2022-05-02 LAB
ALBUMIN SERPL BCP-MCNC: 2.2 G/DL (ref 3.5–5.2)
ALP SERPL-CCNC: 61 U/L (ref 55–135)
ALT SERPL W/O P-5'-P-CCNC: 9 U/L (ref 10–44)
ANION GAP SERPL CALC-SCNC: 9 MMOL/L (ref 8–16)
AST SERPL-CCNC: 13 U/L (ref 10–40)
BASOPHILS # BLD AUTO: 0.02 K/UL (ref 0–0.2)
BASOPHILS NFR BLD: 0.3 % (ref 0–1.9)
BILIRUB SERPL-MCNC: 0.4 MG/DL (ref 0.1–1)
BUN SERPL-MCNC: 28 MG/DL (ref 8–23)
CALCIUM SERPL-MCNC: 8 MG/DL (ref 8.7–10.5)
CHLORIDE SERPL-SCNC: 110 MMOL/L (ref 95–110)
CO2 SERPL-SCNC: 24 MMOL/L (ref 23–29)
CREAT SERPL-MCNC: 3.1 MG/DL (ref 0.5–1.4)
DIFFERENTIAL METHOD: ABNORMAL
EOSINOPHIL # BLD AUTO: 0 K/UL (ref 0–0.5)
EOSINOPHIL NFR BLD: 0.5 % (ref 0–8)
ERYTHROCYTE [DISTWIDTH] IN BLOOD BY AUTOMATED COUNT: 13.7 % (ref 11.5–14.5)
EST. GFR  (AFRICAN AMERICAN): 24 ML/MIN/1.73 M^2
EST. GFR  (NON AFRICAN AMERICAN): 20 ML/MIN/1.73 M^2
GLUCOSE SERPL-MCNC: 183 MG/DL (ref 70–110)
HCT VFR BLD AUTO: 29.5 % (ref 40–54)
HGB BLD-MCNC: 9.8 G/DL (ref 14–18)
IMM GRANULOCYTES # BLD AUTO: 0.07 K/UL (ref 0–0.04)
IMM GRANULOCYTES NFR BLD AUTO: 0.9 % (ref 0–0.5)
LYMPHOCYTES # BLD AUTO: 1.4 K/UL (ref 1–4.8)
LYMPHOCYTES NFR BLD: 18.6 % (ref 18–48)
MAGNESIUM SERPL-MCNC: 1.4 MG/DL (ref 1.6–2.6)
MCH RBC QN AUTO: 29 PG (ref 27–31)
MCHC RBC AUTO-ENTMCNC: 33.2 G/DL (ref 32–36)
MCV RBC AUTO: 87 FL (ref 82–98)
MONOCYTES # BLD AUTO: 0.8 K/UL (ref 0.3–1)
MONOCYTES NFR BLD: 10.2 % (ref 4–15)
NEUTROPHILS # BLD AUTO: 5.4 K/UL (ref 1.8–7.7)
NEUTROPHILS NFR BLD: 69.5 % (ref 38–73)
NRBC BLD-RTO: 0 /100 WBC
PHOSPHATE SERPL-MCNC: 2.1 MG/DL (ref 2.7–4.5)
PLATELET # BLD AUTO: 285 K/UL (ref 150–450)
PMV BLD AUTO: 8.6 FL (ref 9.2–12.9)
POTASSIUM SERPL-SCNC: 4 MMOL/L (ref 3.5–5.1)
PROT SERPL-MCNC: 4.9 G/DL (ref 6–8.4)
RBC # BLD AUTO: 3.38 M/UL (ref 4.6–6.2)
SODIUM SERPL-SCNC: 143 MMOL/L (ref 136–145)
WBC # BLD AUTO: 7.76 K/UL (ref 3.9–12.7)

## 2022-05-02 PROCEDURE — 63600175 PHARM REV CODE 636 W HCPCS: Performed by: STUDENT IN AN ORGANIZED HEALTH CARE EDUCATION/TRAINING PROGRAM

## 2022-05-02 PROCEDURE — 99232 SBSQ HOSP IP/OBS MODERATE 35: CPT | Mod: ,,, | Performed by: UROLOGY

## 2022-05-02 PROCEDURE — 36415 COLL VENOUS BLD VENIPUNCTURE: CPT | Performed by: STUDENT IN AN ORGANIZED HEALTH CARE EDUCATION/TRAINING PROGRAM

## 2022-05-02 PROCEDURE — 83735 ASSAY OF MAGNESIUM: CPT | Performed by: STUDENT IN AN ORGANIZED HEALTH CARE EDUCATION/TRAINING PROGRAM

## 2022-05-02 PROCEDURE — 80053 COMPREHEN METABOLIC PANEL: CPT | Performed by: STUDENT IN AN ORGANIZED HEALTH CARE EDUCATION/TRAINING PROGRAM

## 2022-05-02 PROCEDURE — 99232 PR SUBSEQUENT HOSPITAL CARE,LEVL II: ICD-10-PCS | Mod: ,,, | Performed by: UROLOGY

## 2022-05-02 PROCEDURE — 63600175 PHARM REV CODE 636 W HCPCS: Performed by: INTERNAL MEDICINE

## 2022-05-02 PROCEDURE — 84100 ASSAY OF PHOSPHORUS: CPT | Performed by: STUDENT IN AN ORGANIZED HEALTH CARE EDUCATION/TRAINING PROGRAM

## 2022-05-02 PROCEDURE — 85025 COMPLETE CBC W/AUTO DIFF WBC: CPT | Performed by: STUDENT IN AN ORGANIZED HEALTH CARE EDUCATION/TRAINING PROGRAM

## 2022-05-02 PROCEDURE — 25000003 PHARM REV CODE 250: Performed by: STUDENT IN AN ORGANIZED HEALTH CARE EDUCATION/TRAINING PROGRAM

## 2022-05-02 PROCEDURE — 25000003 PHARM REV CODE 250

## 2022-05-02 RX ORDER — INSULIN ASPART 100 [IU]/ML
4 INJECTION, SOLUTION INTRAVENOUS; SUBCUTANEOUS 3 TIMES DAILY
Qty: 15 ML | Refills: 3 | Status: SHIPPED | OUTPATIENT
Start: 2022-05-02 | End: 2022-09-14

## 2022-05-02 RX ORDER — NIFEDIPINE 30 MG/1
30 TABLET, EXTENDED RELEASE ORAL DAILY
Status: DISCONTINUED | OUTPATIENT
Start: 2022-05-02 | End: 2022-05-02 | Stop reason: HOSPADM

## 2022-05-02 RX ORDER — DEXTROSE 4 G
1 TABLET,CHEWABLE ORAL 4 TIMES DAILY
Qty: 1 EACH | Refills: 0 | Status: SHIPPED | OUTPATIENT
Start: 2022-05-02 | End: 2023-03-08

## 2022-05-02 RX ORDER — TAMSULOSIN HYDROCHLORIDE 0.4 MG/1
0.4 CAPSULE ORAL DAILY
Qty: 30 CAPSULE | Refills: 11 | Status: SHIPPED | OUTPATIENT
Start: 2022-05-03 | End: 2023-03-08

## 2022-05-02 RX ORDER — NIFEDIPINE 30 MG/1
30 TABLET, EXTENDED RELEASE ORAL DAILY
Qty: 30 TABLET | Refills: 3 | Status: SHIPPED | OUTPATIENT
Start: 2022-05-03 | End: 2022-07-13 | Stop reason: SDUPTHER

## 2022-05-02 RX ORDER — INSULIN ASPART 100 [IU]/ML
4 INJECTION, SOLUTION INTRAVENOUS; SUBCUTANEOUS
Status: DISCONTINUED | OUTPATIENT
Start: 2022-05-02 | End: 2022-05-02 | Stop reason: HOSPADM

## 2022-05-02 RX ORDER — ATORVASTATIN CALCIUM 40 MG/1
40 TABLET, FILM COATED ORAL DAILY
Qty: 30 TABLET | Refills: 3 | Status: SHIPPED | OUTPATIENT
Start: 2022-05-03 | End: 2022-08-17 | Stop reason: SDUPTHER

## 2022-05-02 RX ADMIN — SODIUM CHLORIDE, SODIUM LACTATE, POTASSIUM CHLORIDE, AND CALCIUM CHLORIDE: .6; .31; .03; .02 INJECTION, SOLUTION INTRAVENOUS at 04:05

## 2022-05-02 RX ADMIN — TAMSULOSIN HYDROCHLORIDE 0.4 MG: 0.4 CAPSULE ORAL at 08:05

## 2022-05-02 RX ADMIN — HEPARIN SODIUM 5000 UNITS: 5000 INJECTION INTRAVENOUS; SUBCUTANEOUS at 02:05

## 2022-05-02 RX ADMIN — SEVELAMER CARBONATE 800 MG: 800 TABLET, FILM COATED ORAL at 08:05

## 2022-05-02 RX ADMIN — CEFTRIAXONE 1 G: 1 INJECTION, SOLUTION INTRAVENOUS at 04:05

## 2022-05-02 RX ADMIN — NIFEDIPINE 30 MG: 30 TABLET, FILM COATED, EXTENDED RELEASE ORAL at 11:05

## 2022-05-02 RX ADMIN — ATORVASTATIN CALCIUM 40 MG: 40 TABLET, FILM COATED ORAL at 08:05

## 2022-05-02 RX ADMIN — MUPIROCIN: 20 OINTMENT TOPICAL at 08:05

## 2022-05-02 RX ADMIN — HEPARIN SODIUM 5000 UNITS: 5000 INJECTION INTRAVENOUS; SUBCUTANEOUS at 05:05

## 2022-05-02 RX ADMIN — INSULIN ASPART 4 UNITS: 100 INJECTION, SOLUTION INTRAVENOUS; SUBCUTANEOUS at 11:05

## 2022-05-02 NOTE — SUBJECTIVE & OBJECTIVE
Interval History:    Pt stable- doing better overall and feels better- wants to go home.      Review of patient's allergies indicates:  No Known Allergies  Current Facility-Administered Medications   Medication Frequency    atorvastatin tablet 40 mg Daily    calcium gluconat 1 g in NS IVPB (premixed) Q10 Min PRN    cefTRIAXone (ROCEPHIN) 1 g/50 mL D5W IVPB Q24H    dextrose 10% bolus 125 mL PRN    glucose chewable tablet 16 g PRN    heparin (porcine) injection 5,000 Units Q8H    insulin aspart U-100 pen 1-10 Units Q6H PRN    insulin aspart U-100 pen 4 Units TIDWM    [START ON 5/3/2022] insulin detemir U-100 pen 15 Units Daily    mupirocin 2 % ointment BID    NIFEdipine 24 hr tablet 30 mg Daily    polyethylene glycol packet 17 g BID PRN    sodium chloride 0.9% flush 10 mL Q12H PRN    tamsulosin 24 hr capsule 0.4 mg Daily       Objective:     Vital Signs (Most Recent):  Temp: 98.2 °F (36.8 °C) (05/02/22 1132)  Pulse: 91 (05/02/22 1132)  Resp: 18 (05/02/22 1132)  BP: (!) 157/74 (05/02/22 1132)  SpO2: 97 % (05/02/22 1132)  O2 Device (Oxygen Therapy): room air (04/29/22 1200)   Vital Signs (24h Range):  Temp:  [97.8 °F (36.6 °C)-99 °F (37.2 °C)] 98.2 °F (36.8 °C)  Pulse:  [70-99] 91  Resp:  [18] 18  SpO2:  [97 %-99 %] 97 %  BP: (155-168)/(70-91) 157/74     Weight: 74 kg (163 lb 2.3 oz) (05/01/22 0443)  Body mass index is 25.55 kg/m².  Body surface area is 1.87 meters squared.    I/O last 3 completed shifts:  In: 240 [P.O.:240]  Out: 7770 [Urine:7650; Other:120]    Physical Exam  Vitals reviewed.   Constitutional:       Appearance: Normal appearance.   HENT:      Head: Normocephalic and atraumatic.   Cardiovascular:      Rate and Rhythm: Normal rate and regular rhythm.      Heart sounds: Normal heart sounds.   Pulmonary:      Breath sounds: Normal breath sounds.   Abdominal:      General: Bowel sounds are normal.      Palpations: Abdomen is soft.   Musculoskeletal:         General: No tenderness.      Cervical back: Neck  supple.   Skin:     General: Skin is warm and dry.   Neurological:      General: No focal deficit present.      Mental Status: He is alert. Mental status is at baseline.       Significant Labs:  Cardiac Markers: No results for input(s): CKMB, TROPONINT, MYOGLOBIN in the last 168 hours.  CBC:   Recent Labs   Lab 05/02/22  0355   WBC 7.76   RBC 3.38*   HGB 9.8*   HCT 29.5*      MCV 87   MCH 29.0   MCHC 33.2     CMP:   Recent Labs   Lab 05/02/22  0355   *   CALCIUM 8.0*   ALBUMIN 2.2*   PROT 4.9*      K 4.0   CO2 24      BUN 28*   CREATININE 3.1*   ALKPHOS 61   ALT 9*   AST 13   BILITOT 0.4     Recent Labs   Lab 04/28/22 2010   COLORU Yellow   SPECGRAV 1.010   PHUR 6.0   PROTEINUA 3+*   BACTERIA Moderate*   NITRITE Negative   LEUKOCYTESUR 1+*   UROBILINOGEN Negative   HYALINECASTS 2*        Significant Imaging:  Labs: Reviewed  X-Ray: Reviewed  US: Reviewed

## 2022-05-02 NOTE — PHARMACY MED REC
"Admission Medication History     The home medication history was taken by Coral Soto PharmD.    Medication history obtained from patient    You may go to "Admission" then "Reconcile Home Medications" tabs to review and/or act upon these items.      The home medication list has been updated by the Pharmacy department.    Please read ALL comments highlighted in yellow.    Please address this information as you see fit.     Feel free to contact us if you have any questions or require assistance.    Patient reports taking no home medications        Coral Soto PharmD.                .          "

## 2022-05-02 NOTE — ASSESSMENT & PLAN NOTE
HERBERT due to obstruction- cont indwelling alvarenga catheter and monitor urine output.  Cont to match intake to urine output to avoid post obstructive ATN due to overdiuresis.

## 2022-05-02 NOTE — PLAN OF CARE
Patient has PCC follow-up scheduled. TN requested Urology and Nephrology from access navigator. Awaiting appointment to be scheduled. Therapy recommends home, no DME needs. Patient is a potential discharge for today. He will go home with a alvarenga. Team also reports patient will go home on insulin injections. TN placed DM educator OP referral. I also requested MD team write for a new glucometer (Dr. Nails notified). Will update nursing staff. TN will continue to follow.    1352--Discharge orders noted. No DME or HH ordered. PCC, Urology and DM educator follow-up scheduled. Nephrology follow-up requested.     1437--TN spoke with patient via [employer] monitor. He was aware of follow-up appointments scheduled. He will be called with Nephrology follow-up information. No further CM needs.    1722--CM received call from Nurse Crandall patient is requesting HH for his alvarenga. I informed nurse patient needs to be taught prior to discharge home. Bedside nurse did educate. HH nurse would not be there everyday to perform alvarenga care. Nurse stated she spoke with MD team and they are writing home health orders. They understand HH nurse will not come everyday.CM awaiting orders.    MD placed HH orders for a SN. I did inform the team nurse will not come out everyday, unsure how many visits he would qualify for. I sent HH orders to [facility] via [employer] to see about acceptance. CM will follow-up tomorrow on referral.    5/3 at 0917--[facility] unable to see patient until Friday. Referral sent to [facility]. TN spoke with Nena. They have accepted patient and will see tomorrow. TN to notify patient. I also updated Roe with [facility].    I called patient and updated him on home health information. All questions answered.    [facility] [phone]  [address] [place] [id number] Steps: Follow upAppointment: Instructions: Home Health Company    Patient  Contacts    Name Relation Home Work Mobile   Josseline Bell Spouse   [phone]   Maria Eugenia Bell Daughter   [phone]     Future Appointments   Date Time Provider Department Center   5/17/2022  9:30 AM Tde Garvin MD [facility] UROLOGY [facility]   5/19/2022 10:30 AM Iqra Angela RD [facility] HELEN [place]   5/23/2022 11:00 AM Yolanda Weaver MD [facility]     Garret Asher, DO Garret Asher, [phone] [address] [place] [id number] Next Steps: Follow up Appointment: Instructions: Nephrology Follow-up. You will be contacted with follow-up information. Provider will be assigned.     05/02/22 1349   Final Note   Assessment Type Final Discharge Note   Hospital Resources/Appts/Education Provided Appointments scheduled by Navigator/Coordinator   Post-Acute Status   Discharge Delays None known at this time     Mary Rios RN    [phone]

## 2022-05-02 NOTE — PROGRESS NOTES
[facility] - Telemetry  Urology  Progress Note    Patient Name: Angel Bell  MRN: [id number]  Admission Date: 4/28/2022  Hospital Length of Stay: 4 days        Subjective:     Interval History: TONI tolerating alvarenga, good UOP.     Objective:     Temp:  [97.8 °F (36.6 °C)-99.1 °F (37.3 °C)] 98.5 °F (36.9 °C)  Pulse:  [] 70  Resp:  [18-20] 18  SpO2:  [97 %-99 %] 98 %  BP: (139-163)/(70-83) 155/70       NAD  RRR  CTAB  ABD S/ND/NTTP       Penis normal        Alvarenga clear    Significant Labs:  BMP:  Recent Labs   Lab 04/30/22  1206 05/01/22  0256 05/02/22  0355    147* 143   K 4.0 3.6 4.0    111* 110   CO2 25 25 24   BUN 86* 59* 28*   CREATININE 7.5* 5.2* 3.1*   CALCIUM 9.0 8.7 8.0*       CBC:  Recent Labs   Lab 04/30/22  0416 05/01/22  0256 05/02/22  0355   WBC 12.34 8.12 7.76   HGB 11.8* 10.8* 9.8*   HCT 32.8* 31.0* 29.5*    325 285     QUOC:  Right moderate and left mild hydronephrosis, improved from prior. BWT.     Urology Specific Assessment:     Problem Noted   Urinary Retention 4/29/2022    Distended bladder with bilateral hydroneprosis, ARF     Bilateral Hydronephrosis 4/29/2022   Mauricio (Acute Kidney Injury) 7/3/2016     Plan:   1. Renal function improved  2. QUOC reviewed, chronic hydronephrosis that is improving.  Given cr improvement would not place stents  3. Recommend keep alvarenga on d/c and follow up in office in 2 weeks with QUOC prior  4. Will need bladder outlet procedure after  5. Will sign off please call with questions.       Ted Garvin MD  Urology

## 2022-05-02 NOTE — MEDICAL/APP STUDENT
[facility] Medicine Progress Note    Primary Team: [facility] Hospitalist Team A  Attending Physician: Gail Avina MD  Resident: Juan  Intern: Jesu    Subjective:      Mr. Bell resting comfortably this morning, feeling well without complaints. He had no acute events overnight. Denies abdominal pain, n/v, dysuria, fever/chills, back pain. He is tolerating diet. Last BM was yesterday. UOP net negative 13.9.      Objective:     Last 24 Hour Vital Signs:  BP  Min: 139/70  Max: 163/83  Temp  Av.6 °F (37 °C)  Min: 97.8 °F (36.6 °C)  Max: 99.1 °F (37.3 °C)  Pulse  Av.7  Min: 70  Max: 100  Resp  Av  Min: 18  Max: 20  SpO2  Av.2 %  Min: 97 %  Max: 99 %  I/O last 3 completed shifts:  In: 240 [P.O.:240]  Out: 7770 [Urine:7650; Other:120]    Physical Examination:  General: Patient lying in bed comfortably,  in NAD  Head: normocephalic, atraumatic  Eyes: PERRL, EOMI, no conjunctival injections or icterus  Mouth: MMM  Neck: No thyromegaly or masses   Cardiac: RRR, no murmurs appreciated, no extra heart sounds  Pulmonary/Chest: CTAB, symmetric chest rise, no wheezing or crackles  GI: Distended, non- tender to palpation  Extremities: no edema, clubbing, or cyanosis  Skin: dry, warm, intact. No bruising or rashes.  Neuro: Alert and oriented, moving all extremities    Laboratory:  Laboratory Data Reviewed: yes  Pertinent Findings:  H/H: 9.8/29.5    BUN: 28  Cr: 3.1    Glucose: 183    Ca: 8.0  Phos: 2.1  M.4    Albumin: 2.2    Microbiology Data Reviewed: yes  Pertinent Findings:   Blood Culture - No growth to date   Urine Culture - multiple organisms isolated - none in predominance    Other Results:  EKG (my interpretation): NSR with evidence of LVH, last EKG     Radiology Data Reviewed: yes  Pertinent Findings:   CXR:  No acute abnormality    : CT AP  Markedly distended urinary bladder with hydronephrosis and hydroureter.  Correlate for bladder outlet obstruction.  Increased  perinephric stranding.  This can be seen with renal obstruction or pyelonephritis.    4/29: TTE  · Concentric remodeling and normal systolic function.  · The estimated ejection fraction is 55%.  · Normal left ventricular diastolic function.  · Normal right ventricular size with normal right ventricular systolic function.  · Normal central venous pressure (3 mmHg).  · Trivial pericardial effusion.    5/2: Renal US  1. Moderate right and mild left hydronephrosis.  2. Diffuse bladder wall thickening out of proportion for degree of nondistention with increased wall vascularity.  Findings may represent sequela of acute cystitis and/or chronic bladder outlet obstruction.  3. Mildly elevated bilateral arterial resistive indices which can represent sequela of hydronephrosis or medical renal disease.  4. Simple right renal cyst.    Current Medications:     Infusions:   lactated ringers 175 mL/hr at 05/02/22 0448        Scheduled:   atorvastatin  40 mg Oral Daily    cefTRIAXone (ROCEPHIN) IVPB  1 g Intravenous Q24H    heparin (porcine)  5,000 Units Subcutaneous Q8H    insulin detemir U-100  10 Units Subcutaneous Daily    mupirocin   Nasal BID    sevelamer carbonate  800 mg Oral TID WM    tamsulosin  0.4 mg Oral Daily        PRN:  [COMPLETED] calcium gluconate IVPB **AND** calcium gluconate IVPB, dextrose 10%, glucose, insulin aspart U-100, polyethylene glycol, sodium chloride 0.9%    Antibiotics and Day Number of Therapy:  Rocephin 4/29-present    Lines and Day Number of Therapy:  Peripheral IV - R Lateral Posterior Wrist - 2 Days    Uretheral Catheter 3 days    Assessment:     Angel Bell is a 61 y.o.male with PMH of T2DM and HTN presenting with HERBERT 2/2 enlarged prostate.     Patient Active Problem List    Diagnosis Date Noted    Urinary retention 04/29/2022    Bilateral hydronephrosis 04/29/2022    Obstructive uropathy 04/29/2022    Normocytic anemia 04/29/2022    Benign prostatic hyperplasia with  urinary obstruction 04/29/2022    Hyperphosphatemia 04/29/2022    Elevated PSA, less than 10 ng/ml 08/16/2016    Family history of prostate cancer in father 08/16/2016    Hypertension 07/12/2016    Hyperlipidemia 07/04/2016    Type 2 diabetes, uncontrolled, with renal manifestation 07/03/2016    Hyperkalemia 07/03/2016    Metabolic acidosis 07/03/2016    Hyperglycemia 07/03/2016    HERBERT (acute kidney injury) 07/03/2016    Abnormal liver enzymes 07/03/2016        Plan:     1. AGMA  -CO2 6, AG 32 on admit  -beta hydroxybutyrate 4.4, UA negative for ketones  -starting on sodium bicarb drip for now, discussed case with nephrology, holding off on urgent dialysis and line placement for now  - switched to Insulin Detemir 10 units subq, continue to monitor glucose  -Na and Cl increased as stated below, AG closed, increased LR per below    2. Acute Renal Failure 2/2 Obstructive Uropathy, likely BPH  Presenting with history of abdominal distention and decreased UOP for 2 weeks   -BUN/Cr 142/18.8; last documented Cr 1.6 5 years ago  -CT Abd/Pelvis revealed largely distended bladder with evidence of hydronephrosis  -FENa 22.6%, consistent with post-renal/obstructive   -Tavera inserted with >900ml UOP  -Nephrology consulted, no need for urgent dialysis at this point, continue management with relief of obstructive uropathy  -starting flomax  -shifted K (stable), sevelamer for phos of 14.5  - Renal U/S completed   - per urology no stent placement needed    3. Diabetes Mellitus, type 2  Reports history of DM, previously on insulin, but not taking any medications in over a year  -A1C >14  -betahydroxybutyrate 4.4 on admit  -UA negative for ketones  -started subQ detemir 20U, glucose dropped, will decrease to 10u    4. Hyponatremia - resolved, now hypernatremic s/p post-obstructive UTI  -initially fluid overloaded, improved  -Cr improving  -Na 142>>>147 s/p sepsis 2/2 post-obstructive UTI  -net negative 4.6L in  24h  -increased fluids to  cc/hr    5. UTI  -UA with bacteria, WBCs  -continue Ceftriaxone 1g IV q24h  - Ceftriaxone 4/28 - present    6. Hyperphosphatemia   -secondary to renal failure  -started on phos binders TID (sevelamer 800mg TID)    7. Acute Encephalopathy - resolved   Per pt and wife he has been more lethargic over last 2 months.  Wife reports some episodes of confusion.   - Sating 100% on RA; CXR without focal infiltrate or effusion  -Pt is alert and oriented x 4 today  -Symptoms of lethargy likely related to uremia with BUN of 142, improved     8. HTN - resolved  History of HTN, previously on Lopressor 50mg BID  -Reports not taking any medications in >1 year  -/102 on admit  -EKG sinus tachycardia with evidence of LVH, likely secondary to longstanding uncontrolled HTN  Echo: EF 55%; Trivial pericardial effusion  -given 5mg IV Hydralazine in ED  -MAP goal 110-124 in first 24 hours  -prn Labetalol overnight;   - started Nifedipine 30mg PO daily in AM     9. Hyperkalemia - resolved  K 5.6 on admit, given calcium gluconate, sodium bicarb, albuterol; repeat 5.8, given calcium gluconate, shifted with IV insulin, repeat 4.8, stable     10. Hypochloremia - resolved, now hyperchloremic  -likely secondary to volume overload, improved; followed by volume depletion 2/2 post-obstructive UTI  -Cr improving  -increased LR to 175cc/hr  -continue to monitor     11. Normocytic Anemia  - Ferritin 785; Iron 75; Transferrin 139; TIBC 206    Code: Full  PPX: Heparin  Dispo: Pending improvement in kidney function       Nena Molina  U PA-S3    [facility] Hospitalist Pager numbers:   [facility] Hospitalist Medicine Team A (Juanjose/Fabrice): [phone]  [facility] Hospitalist Medicine Team B (Michael/Shanti):  [phone]

## 2022-05-02 NOTE — DISCHARGE SUMMARY
[facility] Medicine Discharge Summary    Primary Team: \[facility]\"" Hospitalist Team B  Attending Physician: MD Juanjose  Resident: Evy Martinez MD  Intern: Parag Nails MD    Date of Admit: 4/28/2022  Date of Discharge: 5/2/2022    Discharge to: Home  Condition: Stable    Discharge Diagnoses     Patient Active Problem List   Diagnosis    Type 2 diabetes, uncontrolled, with renal manifestation    Hyperkalemia    Metabolic acidosis    Hyperglycemia    HERBERT (acute kidney injury)    Abnormal liver enzymes    Hyperlipidemia    Hypertension    Elevated PSA, less than 10 ng/ml    Family history of prostate cancer in father    Urinary retention    Bilateral hydronephrosis    Obstructive uropathy    Normocytic anemia    Benign prostatic hyperplasia with urinary obstruction    Hyperphosphatemia       Consultants and Procedures     Consultants:  Nephrology   Urology     Procedures:   None     Imaging:  US Retroperitoneal Complete   Final Result   Abnormal      1. Moderate right and mild left hydronephrosis.   2. Diffuse bladder wall thickening out of proportion for degree of nondistention with increased wall vascularity.  Findings may represent sequela of acute cystitis and/or chronic bladder outlet obstruction.   3. Mildly elevated bilateral arterial resistive indices which can represent sequela of hydronephrosis or medical renal disease.   4. Simple right renal cyst.   This report was flagged in Epic as abnormal.         Electronically signed by: Britton Cohen   Date:    05/01/2022   Time:    14:11      CT Abdomen Pelvis  Without Contrast   Final Result      Markedly distended urinary bladder with hydronephrosis and hydroureter.  Correlate for bladder outlet obstruction.      Increased perinephric stranding.  This can be seen with renal obstruction or pyelonephritis.         Electronically signed by: Yang Flaherty   Date:    04/28/2022   Time:    23:45      X-Ray Chest AP Portable   Final Result      No acute  "abnormality.         Electronically signed by: Yang Flaherty   Date:    04/28/2022   Time:    22:27      US Retroperitoneal Complete    (Results Pending)         Brief History of Present Illness      Angel Bell is a 61 year old male with a PMH of type 2 DM and HTN who is presented to [facility] on 4/28/22 with a chief complaint of elevated blood sugar x 1 day.     The patient was in their usual state of health until about 2 weeks prior to presentation when he began feeling "bad".  His symptoms are vague but has progressed over the last month but include decreased appetite and increased lethargy.  He complained of worsening abdominal distention during the few days prior to admission,  as well as, decreased urinary output over the last month.  He has also complaining of dry mouth.   His wife endorses that he has had decreased energy, abdominal distention and shortness of breath on exertion over the last month.  They decided to check his blood sugar on the day of admission since he has not been feeling well and found that his BG was 256, which is why they decided to come to the hospital. He does have a history of type 2 diabetes, previously on insulin, but has not taken any of his medications in over a year.  He does not regularly check his blood sugar at home.      On admission, after reviewing his labs and CT abd/pelvis Dr. Asher with [facility] nephrology regarding patient and his acute renal failure was contacted.  Findings were consistent with urinary obstruction rather than DKA as initially reported. Alvarenga inserted as patient had received 3 L IVF for AGMA. Dr. Asher agreed with plan to hold off initiating insulin gtt, and recommended shifting K and starting Bicarb drip on 4/29. Following alvarenga insertion >1 L urine obtained from catheter.  He was also initiated on Rocephin for UTI. Urology consulted after urine became bloody after having initially been clear.    He continued to have high volume urine output requiring IVF " resuscitation.  Bicarb gtt discontinued on afternoon of 4/29 with improvement in bicarb.  His condition continued to improved throughout remainder of his admission.  Urine output slowed and patient renal function continued to improve.  He was discharged with close follow-up with urology who recommended discharge with alvarenga catheter.       For the full HPI please refer to the History & Physical from this admission.    Hospital Course By Problem with Pertinent Findings     #AGMA, resolved  -CO2 6, AG 32 on admit; improved to 23 on 4/29 and remained WNL for remainder of inpatient stay  -beta hydroxybutyrate 4.4, UA negative for ketones  -starting on sodium bicarb drip for now, discussed case with nephrology, holding off on urgent dialysis and line placement for now  - switched to Insulin Detemir subq; discharged on 15 units   - bicarb gtt discontinued on evening of 4/29 with improvement in bicarb      #Acute Renal Failure 2/2 Obstructive Uropathy, likely BPH  Presenting with history of abdominal distention and decreased UOP for 2 weeks   -BUN/Cr 142/18.8; last documented Cr 1.6 5 years ago  -CT Abd/Pelvis revealed largely distended bladder with evidence of hydronephrosis  -FENa 22.6%, consistent with post-renal/obstructive   -Alvarenga inserted with >900ml UOP  -Nephrology consulted, no need for urgent dialysis at this point, continue management with relief of obstructive uropathy  -starting flomax  -shifted K (stable), sevelamer for phos of 14.5  - alvarenga catheter remains in place with good urine output  - final urology recommendations in place;  No need for stent placement at this time 2/2 improvement in hydro and creatinine   - discharge with indwelling alvarenga catheter and urology follow-up  - obstructive uropathy cause for concern about possibility of prostate cancer in light of h/o elevated PSA in past without hx of biopsy       #Diabetes Mellitus, type 2  Reports history of DM, previously on insulin, but not taking any  medications in over a year  -A1C >14  -betahydroxybutyrate 4.4 on admit  -UA negative for ketones  -started subQ detemir 20U, glucose dropped, decreased to 15U; then 10 units  - discharged on detemir 15 Units daily       #Hyponatremia - resolved, now hypernatremic s/p post-obstructive UTI  -initially fluid overloaded, improved  -Cr improving  -Na 142>>>147 s/p sepsis 2/2 post-obstructive UTI  -net negative 4.9L in 24h  -increased fluids to  cc/hr; discontinued on 5/2  - creatinine improved to 3.1 on 5/2     #UTI  -UA with bacteria, WBCs  -continue Ceftriaxone 1g IV q24h; Received 5 days of ceftriaxone     #Hyperphosphatemia, resolved    -secondary to renal failure  -started on phos binders TID (sevelamer 800mg TID)  Discontinued on discharge      Acute Encephalopathy - resolved   Per pt and wife he has been more lethargic over last 2 months.  Wife reports some episodes of confusion.   - Sating 100% on RA; CXR without focal infiltrate or effusion  -Pt is alert and oriented x 4 today  -Symptoms of lethargy likely related to uremia with BUN of 142, improved     #HTN - resolved  History of HTN, previously on Lopressor 50mg BID  -Reports not taking any medications in >1 year  -/102 on admit; 114/63 today  -EKG sinus tachycardia with evidence of LVH, likely secondary to longstanding uncontrolled HTN  Echo: EF 55%; Trivial pericardial effusion  -given 5mg IV Hydralazine in ED  -MAP goal 110-124 in first 24 hours  -prn Labetalol overnight;   - started Nifedipine 30mg PO daily in AM     #Hyperkalemia - resolved  K 5.6 on admit, given calcium gluconate, sodium bicarb, albuterol; repeat 5.8, given calcium gluconate, shifted with IV insulin, repeat 4.8, stable     #Hypochloremia - resolved, now hyperchloremic  -likely secondary to volume overload, improved; followed by volume depletion 2/2 post-obstructive UTI  -Cr improving  -increased LR to 175cc/hr  -continue to monitor     #Normocytic Anemia  - Ferritin 785;  "Iron 75; Transferrin 139; TIBC 206    Discharge Medications        Medication List      START taking these medications    atorvastatin 40 MG tablet  Commonly known as: LIPITOR  Take 1 tablet (40 mg total) by mouth once daily.  Start taking on: May 3, 2022     LEVEMIR FLEXTOUCH U-100 INSULN 100 unit/mL (3 mL) Inpn pen  Generic drug: insulin detemir U-100  Inject 15 Units into the skin once daily.  Start taking on: May 3, 2022     NIFEdipine 30 MG (OSM) 24 hr tablet  Commonly known as: PROCARDIA-XL  Take 1 tablet (30 mg total) by mouth once daily.  Start taking on: May 3, 2022     NovoLOG Flexpen U-100 Insulin 100 unit/mL (3 mL) Inpn pen  Generic drug: insulin aspart U-100  Inject 4 Units into the skin 3 (three) times daily.     tamsulosin 0.4 mg Cap  Commonly known as: FLOMAX  Take 1 capsule (0.4 mg total) by mouth once daily.  Start taking on: May 3, 2022     TRUE METRIX GLUCOSE METER Misc  Generic drug: blood-glucose meter  1 Device by Misc.(Non-Drug; Combo Route) route 4 (four) times daily.        ASK your doctor about these medications    BD ULTRA-FINE SHORT PEN NEEDLE 31 gauge x 5/16" Ndle  Generic drug: pen needle, diabetic  use 4 times a day  Ask about: Which instructions should I use?     EASY TOUCH TWIST LANCETS 33 gauge Misc  Generic drug: lancets  USE 4 TIMES A DAY  Ask about: Which instructions should I use?     TRUE METRIX GLUCOSE TEST STRIP Strp  Generic drug: blood sugar diagnostic  TEST 4 TIMES A DAY  Ask about: Which instructions should I use?           Where to Get Your Medications      These medications were sent to [facility]  [address], [place] [address]    Hours: Mon-Fri, 8a-5:30p Phone: [phone]   · atorvastatin 40 MG tablet  · BD ULTRA-FINE SHORT PEN NEEDLE 31 gauge x 5/16" Ndle  · EASY TOUCH TWIST LANCETS 33 gauge Misc  · LEVEMIR FLEXTOUCH U-100 INSULN 100 unit/mL (3 mL) Inpn pen  · NIFEdipine 30 MG (OSM) 24 hr tablet  · NovoLOG Flexpen U-100 Insulin 100 " unit/mL (3 mL) Inpn pen  · tamsulosin 0.4 mg Cap  · TRUE METRIX GLUCOSE METER Misc  · TRUE METRIX GLUCOSE TEST STRIP Strp         Discharge Information:     Diet:  Regular    Physical Activity:  As tolerated             Instructions:  1. Take all medications as prescribed  2. Keep all follow-up appointments  3. Return to the hospital or call your primary care physicians if any worsening symptoms such as fever, chest pain, shortness of breath, return of symptoms, or any other concerns.    Follow-Up Appointments:  Urology     Parag Nails MD  [facility], [place]

## 2022-05-02 NOTE — PROGRESS NOTES
[facility] - Telemetry  Nephrology  Progress Note    Patient Name: Angel Bell  MRN: [id number]  Admission Date: 4/28/2022  Hospital Length of Stay: 4 days  Attending Provider: Tamiko Sow MD   Primary Care Physician: Yang Correia MD  Principal Problem:HERBERT (acute kidney injury)    Subjective:     HPI:   Patient is a 61 y.o. male seen and examined.  He is looking much better today and is without c/o CO, SOB, N/V, abdominal pain, C/F.         Patient Active Problem List   Diagnosis    Type 2 diabetes, uncontrolled, with renal manifestation    Hyperkalemia    Metabolic acidosis    Hyperglycemia    HERBERT (acute kidney injury)    Abnormal liver enzymes    Hyperlipidemia    Hypertension    Elevated PSA, less than 10 ng/ml    Family history of prostate cancer in father    Urinary retention    Bilateral hydronephrosis    Obstructive uropathy    Normocytic anemia    Benign prostatic hyperplasia with urinary obstruction    Hyperphosphatemia           Past Medical History:   Diagnosis Date    Diabetes mellitus      Hyperlipemia      Hypertension      Migraine headache      PTSD (post-traumatic stress disorder)                    OBJECTIVE:      Vitals          Vitals:     05/01/22 0400 05/01/22 0443 05/01/22 0748 05/01/22 0800   BP:   137/65 (!) 141/75     BP Location:   Right arm Left arm     Patient Position:   Lying Lying     Pulse: 86 93 95 100   Resp:   18 20     Temp:   97.5 °F (36.4 °C) 98.3 °F (36.8 °C)     TempSrc:   Oral Oral     SpO2:   96% 98%     Weight:   74 kg (163 lb 2.3 oz)       Height:                    Temp: 98.3 °F (36.8 °C) (05/01/22 0748)  Pulse: 100 (05/01/22 0800)  Resp: 20 (05/01/22 0748)  BP: (!) 141/75 (05/01/22 0748)  SpO2: 98 % (05/01/22 0748)            Date 05/01/22 0700 - 05/02/22 0659   Shift 4984-3967 5871-4837 1730-2496 24 Hour Total   INTAKE   Shift Total(mL/kg)           OUTPUT   Urine(mL/kg/hr) 600     600   Shift Total(mL/kg) 600(8.1)     600(8.1)   Weight (kg)  74 74 74 74                  Medications:   atorvastatin  40 mg Oral Daily    cefTRIAXone (ROCEPHIN) IVPB  1 g Intravenous Q24H    heparin (porcine)  5,000 Units Subcutaneous Q8H    insulin detemir U-100  10 Units Subcutaneous Daily    mupirocin   Nasal BID    sevelamer carbonate  800 mg Oral TID WM    tamsulosin  0.4 mg Oral Daily       lactated ringers 175 mL/hr at 05/01/22 0748              Pt seen earlier on rounds-pt eager to go home- feels better and doing better.  Still has good output and  saw pt and told him he needs indwelling alvarenga catheter for at least 2 weeks after d/c and FU with Urology.      Interval History:    Pt stable- doing better overall and feels better- wants to go home.      Review of patient's allergies indicates:  No Known Allergies  Current Facility-Administered Medications   Medication Frequency    atorvastatin tablet 40 mg Daily    calcium gluconat 1 g in NS IVPB (premixed) Q10 Min PRN    cefTRIAXone (ROCEPHIN) 1 g/50 mL D5W IVPB Q24H    dextrose 10% bolus 125 mL PRN    glucose chewable tablet 16 g PRN    heparin (porcine) injection 5,000 Units Q8H    insulin aspart U-100 pen 1-10 Units Q6H PRN    insulin aspart U-100 pen 4 Units TIDWM    [START ON 5/3/2022] insulin detemir U-100 pen 15 Units Daily    mupirocin 2 % ointment BID    NIFEdipine 24 hr tablet 30 mg Daily    polyethylene glycol packet 17 g BID PRN    sodium chloride 0.9% flush 10 mL Q12H PRN    tamsulosin 24 hr capsule 0.4 mg Daily       Objective:     Vital Signs (Most Recent):  Temp: 98.2 °F (36.8 °C) (05/02/22 1132)  Pulse: 91 (05/02/22 1132)  Resp: 18 (05/02/22 1132)  BP: (!) 157/74 (05/02/22 1132)  SpO2: 97 % (05/02/22 1132)  O2 Device (Oxygen Therapy): room air (04/29/22 1200)   Vital Signs (24h Range):  Temp:  [97.8 °F (36.6 °C)-99 °F (37.2 °C)] 98.2 °F (36.8 °C)  Pulse:  [70-99] 91  Resp:  [18] 18  SpO2:  [97 %-99 %] 97 %  BP: (155-168)/(70-91) 157/74     Weight: 74 kg (163 lb 2.3 oz) (05/01/22  0443)  Body mass index is 25.55 kg/m².  Body surface area is 1.87 meters squared.    I/O last 3 completed shifts:  In: 240 [P.O.:240]  Out: 7770 [Urine:7650; Other:120]    Physical Exam  Vitals reviewed.   Constitutional:       Appearance: Normal appearance.   HENT:      Head: Normocephalic and atraumatic.   Cardiovascular:      Rate and Rhythm: Normal rate and regular rhythm.      Heart sounds: Normal heart sounds.   Pulmonary:      Breath sounds: Normal breath sounds.   Abdominal:      General: Bowel sounds are normal.      Palpations: Abdomen is soft.   Musculoskeletal:         General: No tenderness.      Cervical back: Neck supple.   Skin:     General: Skin is warm and dry.   Neurological:      General: No focal deficit present.      Mental Status: He is alert. Mental status is at baseline.       Significant Labs:  Cardiac Markers: No results for input(s): CKMB, TROPONINT, MYOGLOBIN in the last 168 hours.  CBC:   Recent Labs   Lab 05/02/22  0355   WBC 7.76   RBC 3.38*   HGB 9.8*   HCT 29.5*      MCV 87   MCH 29.0   MCHC 33.2     CMP:   Recent Labs   Lab 05/02/22  0355   *   CALCIUM 8.0*   ALBUMIN 2.2*   PROT 4.9*      K 4.0   CO2 24      BUN 28*   CREATININE 3.1*   ALKPHOS 61   ALT 9*   AST 13   BILITOT 0.4     Recent Labs   Lab 04/28/22 2010   COLORU Yellow   SPECGRAV 1.010   PHUR 6.0   PROTEINUA 3+*   BACTERIA Moderate*   NITRITE Negative   LEUKOCYTESUR 1+*   UROBILINOGEN Negative   HYALINECASTS 2*        Significant Imaging:  Labs: Reviewed  X-Ray: Reviewed  US: Reviewed    Assessment/Plan:     * HERBERT (acute kidney injury)  HERBERT due to obstruction- cont indwelling alvarenga catheter and monitor urine output.  Cont to match intake to urine output to avoid post obstructive ATN due to overdiuresis.    Benign prostatic hyperplasia with urinary obstruction  Urology following- cont indwelling alvarenga catheter to monitor diuresis.        Thank you for your consult. I will follow-up with patient.  Please contact us if you have any additional questions.    Traci Lou MD  Nephrology  [facility]

## 2022-05-02 NOTE — NURSING
AVS reviewed with VN. Pt educated on alvarenga care and insulin administration. Understanding demonstrated. Pt's request for homehealth reported to case management as well as primary care team. Wife at bedside. Wheelchair transport requested .

## 2022-05-02 NOTE — PLAN OF CARE
Problem: Adult Inpatient Plan of Care  Goal: Plan of Care Review  5/2/2022 1546 by Bri Nicholson RN  Outcome: Met  5/2/2022 1409 by Bri Nicholson RN  Outcome: Ongoing, Progressing  Goal: Patient-Specific Goal (Individualized)  Outcome: Met  Goal: Absence of Hospital-Acquired Illness or Injury  Outcome: Met  Goal: Optimal Comfort and Wellbeing  Outcome: Met  Goal: Readiness for Transition of Care  Outcome: Met     Problem: Infection  Goal: Absence of Infection Signs and Symptoms  Outcome: Met     Problem: Diabetes Comorbidity  Goal: Blood Glucose Level Within Targeted Range  Outcome: Met     Problem: Fluid and Electrolyte Imbalance (Acute Kidney Injury/Impairment)  Goal: Fluid and Electrolyte Balance  Outcome: Met     Problem: Oral Intake Inadequate (Acute Kidney Injury/Impairment)  Goal: Optimal Nutrition Intake  Outcome: Met     Problem: Renal Function Impairment (Acute Kidney Injury/Impairment)  Goal: Effective Renal Function  Outcome: Met     Problem: Fall Injury Risk  Goal: Absence of Fall and Fall-Related Injury  Outcome: Met     Problem: Occupational Therapy  Goal: Occupational Therapy Goal  Outcome: Met

## 2022-05-02 NOTE — PROGRESS NOTES
Patient has received discharge instructions. Prescriptions received. Instructions reviewed with pt using teachback method. All questions answered to pt satisfaction. Any non-implanted  IV access and telemetry present,  have been  removed per bedside nurse. Transport will be requested by bedside nurse.         05/02/22 6461    Notification    Notified Of Discharge Status   Admission   Communication Issues? None   Shift   Virtual Nurse - Rounding Complete  (discharge)   Virtual Nurse - Patient Verbalized Approval Of Camera Use   Safety/Activity   Patient Rounds bed in low position;call light in patient/parent reach;visualized patient;clutter free environment maintained   Safety Promotion/Fall Prevention side rails raised x 2   Positioning   Body Position supine   Head of Bed (HOB) Positioning HOB elevated   .

## 2022-05-02 NOTE — PROGRESS NOTES
[facility]                    Pharmacy       Discharge Medication Education    Patient ACCEPTED medication education. Pharmacy has provided education on the name, indication, and possible side effects of the medication(s) prescribed, using teach-back method.     The following medications have also been discussed, during this admission.        Medication List        START taking these medications      atorvastatin 40 MG tablet  Commonly known as: LIPITOR  Take 1 tablet (40 mg total) by mouth once daily.  Start taking on: May 3, 2022     blood-glucose meter Misc  Commonly known as: TRUE METRIX GLUCOSE METER  1 Device by Misc.(Non-Drug; Combo Route) route 4 (four) times daily.     insulin aspart U-100 100 unit/mL (3 mL) Inpn pen  Commonly known as: NovoLOG  Inject 4 Units into the skin 3 (three) times daily.     insulin detemir U-100 100 unit/mL (3 mL) Inpn pen  Commonly known as: Levemir FLEXTOUCH  Inject 15 Units into the skin once daily.  Start taking on: May 3, 2022     NIFEdipine 30 MG (OSM) 24 hr tablet  Commonly known as: PROCARDIA-XL  Take 1 tablet (30 mg total) by mouth once daily.  Start taking on: May 3, 2022     tamsulosin 0.4 mg Cap  Commonly known as: FLOMAX  Take 1 capsule (0.4 mg total) by mouth once daily.  Start taking on: May 3, 2022            ASK your doctor about these medications      blood sugar diagnostic Strp  Commonly known as: TRUE METRIX GLUCOSE TEST STRIP  TEST 4 TIMES A DAY  Ask about: Which instructions should I use?     lancets 33 gauge Misc  USE 4 TIMES A DAY  Ask about: Which instructions should I use?               Where to Get Your Medications        These medications were sent to [facility]  [address], [place] [address]      Hours: Mon-Fri, 8a-5:30p Phone: [phone]   atorvastatin 40 MG tablet  blood sugar diagnostic Strp  blood-glucose meter Misc  insulin aspart U-100 100 unit/mL (3 mL) Inpn pen  insulin detemir U-100 100  unit/mL (3 mL) Inpn pen  lancets 33 gauge Misc  NIFEdipine 30 MG (OSM) 24 hr tablet  tamsulosin 0.4 mg Cap          Thank you  Coral Soto, PharmD  [phone]

## 2022-05-02 NOTE — DISCHARGE INSTRUCTIONS
[facility]    We are committed to providing you the best follow-up care after discharge from the hospital. You have a scheduled hospital follow up appointment with the [facility]. Our team will review your hospital diagnosis, treatment and medications. We coordinate care with your Primary Care Provider. We plan to follow you closely for approximately 30-60 days from your hospital discharge date. Your first appointment will be an hour long and each additional appointment is thirty minutes. You will see our Internal Medicine physician, Dr. Yolanda Weaver. Please contact us for any medically related problems, including change in your health status, and medication issues; we will make an effort to address these needs expeditiously.    [phone]

## 2022-05-02 NOTE — HPI
Patient is a 61 y.o. male seen and examined.  He is looking much better today and is without c/o CO, SOB, N/V, abdominal pain, C/F.         Patient Active Problem List   Diagnosis    Type 2 diabetes, uncontrolled, with renal manifestation    Hyperkalemia    Metabolic acidosis    Hyperglycemia    HERBERT (acute kidney injury)    Abnormal liver enzymes    Hyperlipidemia    Hypertension    Elevated PSA, less than 10 ng/ml    Family history of prostate cancer in father    Urinary retention    Bilateral hydronephrosis    Obstructive uropathy    Normocytic anemia    Benign prostatic hyperplasia with urinary obstruction    Hyperphosphatemia           Past Medical History:   Diagnosis Date    Diabetes mellitus      Hyperlipemia      Hypertension      Migraine headache      PTSD (post-traumatic stress disorder)                    OBJECTIVE:      Vitals          Vitals:     05/01/22 0400 05/01/22 0443 05/01/22 0748 05/01/22 0800   BP:   137/65 (!) 141/75     BP Location:   Right arm Left arm     Patient Position:   Lying Lying     Pulse: 86 93 95 100   Resp:   18 20     Temp:   97.5 °F (36.4 °C) 98.3 °F (36.8 °C)     TempSrc:   Oral Oral     SpO2:   96% 98%     Weight:   74 kg (163 lb 2.3 oz)       Height:                    Temp: 98.3 °F (36.8 °C) (05/01/22 0748)  Pulse: 100 (05/01/22 0800)  Resp: 20 (05/01/22 0748)  BP: (!) 141/75 (05/01/22 0748)  SpO2: 98 % (05/01/22 0748)            Date 05/01/22 0700 - 05/02/22 0659   Shift 4551-7821 6834-8763 4682-8730 24 Hour Total   INTAKE   Shift Total(mL/kg)           OUTPUT   Urine(mL/kg/hr) 600     600   Shift Total(mL/kg) 600(8.1)     600(8.1)   Weight (kg) 74 74 74 74                  Medications:   atorvastatin  40 mg Oral Daily    cefTRIAXone (ROCEPHIN) IVPB  1 g Intravenous Q24H    heparin (porcine)  5,000 Units Subcutaneous Q8H    insulin detemir U-100  10 Units Subcutaneous Daily    mupirocin   Nasal BID    sevelamer carbonate  800 mg Oral TID WM    tamsulosin  0.4 mg Oral  Daily       lactated ringers 175 mL/hr at 05/01/22 0748              Pt seen earlier on rounds-pt eager to go home- feels better and doing better.  Still has good output and  saw pt and told him he needs indwelling alvarenga catheter for at least 2 weeks after d/c and FU with Urology.

## 2022-05-04 ENCOUNTER — PATIENT MESSAGE (OUTPATIENT)
Dept: ADMINISTRATIVE | Facility: CLINIC | Age: 62
End: 2022-05-04
Payer: COMMERCIAL

## 2022-05-04 ENCOUNTER — PATIENT OUTREACH (OUTPATIENT)
Dept: ADMINISTRATIVE | Facility: CLINIC | Age: 62
End: 2022-05-04
Payer: COMMERCIAL

## 2022-05-04 DIAGNOSIS — N17.9 AKI (ACUTE KIDNEY INJURY): Primary | ICD-10-CM

## 2022-05-04 NOTE — PROGRESS NOTES
C3 nurse spoke with Angel Bell for a TCC post hospital discharge follow up call. The patient does not have a scheduled HOSFU appointment with Yang Correia MD   within 5-7 days post hospital discharge date 05/02/2022  C3 nurse was unable to schedule HOSFU appointment in [facility].  Please contact patient and schedule follow up appointment using HOSFU visit type on or before 05/09/2022

## 2022-05-05 LAB
BACTERIA BLD CULT: NORMAL
BACTERIA BLD CULT: NORMAL

## 2022-05-06 ENCOUNTER — HOSPITAL ENCOUNTER (EMERGENCY)
Facility: HOSPITAL | Age: 62
Discharge: HOME | End: 2022-05-06
Attending: EMERGENCY MEDICINE
Payer: COMMERCIAL

## 2022-05-06 VITALS
HEART RATE: 99 BPM | SYSTOLIC BLOOD PRESSURE: 156 MMHG | RESPIRATION RATE: 18 BRPM | WEIGHT: 161 LBS | HEIGHT: 66 IN | OXYGEN SATURATION: 99 % | DIASTOLIC BLOOD PRESSURE: 73 MMHG | TEMPERATURE: 99 F | BODY MASS INDEX: 25.88 KG/M2

## 2022-05-06 DIAGNOSIS — N47.2 PARAPHIMOSIS: Primary | ICD-10-CM

## 2022-05-06 DIAGNOSIS — N47.6 BALANOPOSTHITIS: ICD-10-CM

## 2022-05-06 LAB
BACTERIA #/AREA URNS HPF: NORMAL /HPF
BILIRUB UR QL STRIP: NEGATIVE
CLARITY UR: ABNORMAL
COLOR UR: COLORLESS
GLUCOSE UR QL STRIP: ABNORMAL
HGB UR QL STRIP: ABNORMAL
HYALINE CASTS #/AREA URNS LPF: 1 /LPF
KETONES UR QL STRIP: NEGATIVE
LEUKOCYTE ESTERASE UR QL STRIP: NEGATIVE
MICROSCOPIC COMMENT: NORMAL
NITRITE UR QL STRIP: NEGATIVE
PH UR STRIP: 5 [PH] (ref 5–8)
PROT UR QL STRIP: ABNORMAL
RBC #/AREA URNS HPF: 1 /HPF (ref 0–4)
SP GR UR STRIP: 1 (ref 1–1.03)
SQUAMOUS #/AREA URNS HPF: 0 /HPF
URN SPEC COLLECT METH UR: ABNORMAL
UROBILINOGEN UR STRIP-ACNC: NEGATIVE EU/DL

## 2022-05-06 PROCEDURE — 99283 EMERGENCY DEPT VISIT LOW MDM: CPT

## 2022-05-06 PROCEDURE — 81000 URINALYSIS NONAUTO W/SCOPE: CPT | Performed by: PHYSICIAN ASSISTANT

## 2022-05-06 RX ORDER — CLOTRIMAZOLE 1 %
CREAM (GRAM) TOPICAL 2 TIMES DAILY
Qty: 24 G | Refills: 2 | Status: SHIPPED | OUTPATIENT
Start: 2022-05-06 | End: 2022-05-23

## 2022-05-07 NOTE — FIRST PROVIDER EVALUATION
Emergency Department TeleTriage Encounter Note      CHIEF COMPLAINT    Chief Complaint   Patient presents with    Groin Swelling     Patient states he has a catheter in and its red and swollen around the penis. Patient states this started yesterday. Patient states he was taking a bath and pulled back to clean it then noticed the redness. Patient states no pain. Patient states the catheter was inserted one week ago.        VITAL SIGNS   Initial Vitals [05/06/22 1952]   BP Pulse Resp Temp SpO2   (!) 165/86 (!) 129 18 98.9 °F (37.2 °C) 100 %      MAP       --            ALLERGIES    Review of patient's allergies indicates:  No Known Allergies    PROVIDER TRIAGE NOTE  Patient with redness and swelling to the penis at site of catheter insertion. Normal appearing urine and no systemic symptoms.        ORDERS  Labs Reviewed - No data to display    ED Orders (720h ago, onward)    None            Virtual Visit Note: The provider triage portion of this emergency department evaluation and documentation was performed via [employer], a HIPAA-compliant telemedicine application, in concert with a tele-presenter in the room. A face to face patient evaluation with one of my colleagues will occur once the patient is placed in an emergency department room.      DISCLAIMER: This note was prepared with [employer] voice recognition transcription software. Garbled syntax, mangled pronouns, and other bizarre constructions may be attributed to that software system.

## 2022-05-07 NOTE — DISCHARGE INSTRUCTIONS
Thank you for visiting us Today!    Please follow up with your primary care physician and your Urologist concerning your symptoms.

## 2022-05-07 NOTE — ED PROVIDER NOTES
Encounter Date: 5/6/2022       History     Chief Complaint   Patient presents with    Groin Swelling     Patient states he has a catheter in and its red and swollen around the penis. Patient states this started yesterday. Patient states he was taking a bath and pulled back to clean it then noticed the redness. Patient states no pain. Patient states the catheter was inserted one week ago.      Ms. Bell is a 61-year-old male past medical history of hypertension, diabetes, PTSD, BPH (with Tavera catheter) who presents to the emergency department due to penile swelling.  He states that yesterday he had taken the bath and had retracted his foreskin in order to clean his penis. He was having some trouble pulling his foreskin back and then today he noticed that he penis was swollen and red. He was concerned that he might have an infection and so came to the emergency department for evaluation.  He denies any pain or burning with urination. He denies any pain in his testicles. He denies any fever, chills, nausea, vomiting, chest pain or abdominal pain.         Review of patient's allergies indicates:  No Known Allergies  Past Medical History:   Diagnosis Date    Diabetes mellitus     Hyperlipemia     Hypertension     Migraine headache     PTSD (post-traumatic stress disorder)      No past surgical history on file.  Family History   Problem Relation Age of Onset    Diabetes Mother      Social History     Tobacco Use    Smoking status: Never Smoker    Smokeless tobacco: Never Used   Substance Use Topics    Alcohol use: No    Drug use: No     Review of Systems   Constitutional: Negative for activity change, appetite change, chills, fatigue and fever.   HENT: Negative for congestion, ear pain, postnasal drip, rhinorrhea, sinus pain, sore throat and trouble swallowing.    Eyes: Negative for photophobia and visual disturbance.   Respiratory: Negative for cough, chest tightness, shortness of breath and wheezing.     Cardiovascular: Negative for chest pain and palpitations.   Gastrointestinal: Negative for abdominal pain, constipation, diarrhea, nausea and vomiting.   Endocrine: Negative for polyphagia and polyuria.   Genitourinary: Positive for penile pain and penile swelling. Negative for difficulty urinating, flank pain, hematuria and testicular pain.   Musculoskeletal: Negative for arthralgias, joint swelling, myalgias, neck pain and neck stiffness.   Skin: Negative for color change and wound.   Neurological: Negative for dizziness, syncope, weakness, light-headedness, numbness and headaches.   Psychiatric/Behavioral: Negative for agitation and confusion.       Physical Exam     Initial Vitals [05/06/22 1952]   BP Pulse Resp Temp SpO2   (!) 165/86 (!) 129 18 98.9 °F (37.2 °C) 100 %      MAP       --         Physical Exam    Nursing note and vitals reviewed.  Constitutional: He appears well-developed and well-nourished. He is not diaphoretic. No distress.   HENT:   Head: Normocephalic and atraumatic.   Mouth/Throat: Oropharynx is clear and moist. No oropharyngeal exudate.   Eyes: Conjunctivae and EOM are normal. Pupils are equal, round, and reactive to light. Right eye exhibits no discharge. Left eye exhibits no discharge.   Neck: No tracheal deviation present. No JVD present.   Normal range of motion.  Cardiovascular: Normal rate, normal heart sounds and intact distal pulses. Exam reveals no gallop.    No murmur heard.  Pulmonary/Chest: Breath sounds normal. No respiratory distress. He has no wheezes. He has no rales. He exhibits no tenderness.   Abdominal: Abdomen is soft. Bowel sounds are normal. He exhibits no distension. There is no abdominal tenderness. There is no guarding.   Genitourinary: Uncircumcised. Paraphimosis and penile erythema present. No discharge found.    Genitourinary Comments: Enlarged and erythematous glans  Constricting band immediately behind glans     Musculoskeletal:         General: No  tenderness or edema. Normal range of motion.      Cervical back: Normal range of motion.     Neurological: He is alert and oriented to person, place, and time.   Skin: Skin is warm. Capillary refill takes less than 2 seconds.         ED Course   Procedures  Labs Reviewed   URINALYSIS, REFLEX TO URINE CULTURE - Abnormal; Notable for the following components:       Result Value    Color, UA Colorless (*)     Appearance, UA Hazy (*)     Protein, UA Trace (*)     Glucose, UA Trace (*)     Occult Blood UA 2+ (*)     All other components within normal limits    Narrative:     Specimen Source->Urine   URINALYSIS MICROSCOPIC    Narrative:     Specimen Source->Urine          Imaging Results    None          Medications - No data to display  Medical Decision Making:   History:   Old Medical Records: I decided to obtain old medical records.  Old Records Summarized: records from previous admission(s).  Initial Assessment:   Ms. Bell is a 61-year-old male past medical history of hypertension, diabetes, PTSD, BPH (with Tavera catheter) who presents to the emergency department due to penile swelling.   Differential Diagnosis:   Paraphimosis  Phimosis  Balanitis  Epididymitis  Clinical Tests:   Lab Tests: Ordered and Reviewed  ED Management:  Patient was examined,  Urinalysis was obtained which did not show signs of infection.  Physical exam pointed towards paraphimosis and patient's swelling was successfully reduced  Patient was observed for over 30 minutes and on reassessment he stated that he has pain and swelling had resolved and he was able to freely move his foreskin around  Attempt was successfully made to retract patient's foreskin and it did so easily  Patient was given a prescription for Clotrimazole and advised on how to use it  He was advised to follow-up with his urologist concerning his visit  He was discharged in stable condition and return precautions were given.                       Clinical Impression:   Final  diagnoses:  [N47.2] Paraphimosis (Primary)  [N47.6] Balanoposthitis          ED Disposition Condition    Discharge Stable        ED Prescriptions     Medication Sig Dispense Start Date End Date Auth. Provider    clotrimazole (LOTRIMIN) 1 % cream Apply topically 2 (two) times daily. for 10 days 24 g 5/6/2022 5/16/2022 Ray Shipley MD        Follow-up Information     Follow up With Specialties Details Why Contact Info Additional Information    Yang Correia MD Family Medicine Schedule an appointment as soon as possible for a visit in 3 days   [id number]  [id number]  [facility] [address]  [phone]       [facility] Urology Schedule an appointment as soon as possible for a visit in 3 days  [address]  [facility] [address] [phone] Please park in [address] or [address] and use [address]. Take Medical [facility] Building elevators.           Gabriella Prieto MD  Resident  05/07/22 0025

## 2022-05-07 NOTE — ED NOTES
Pt c/o penile swelling x 1 day. Indwelling Tavera catheter in place, swelling and redness noted to foreskin, glan exposed. Pt reports swelling began after cleaning penis w/ Dove soap last night. Pt reports mild pain, states he doesn't want pain medication. Pt recently admitted for hyperglycemia and urinary retention r/t enlarged prostate, discharged 5 days ago. Recently began taking medications for DM and hypertension, reports compliance. Pt AAOx3. Respirations even and unlabored. Skin is warm and dry. NAD noted. Spouse at bedside. CB within reach.    APPEARANCE: Alert, oriented and in no acute distress.  CARDIAC: Hypertensive, tachycardic. Normal rate. Pt denies chest pain.   PERIPHERAL VASCULAR: Normal cap refill. No edema. Warm to touch.    RESPIRATORY: Respirations are even and unlabored. Normal rate. Pt denies SOB. Symmetrical chest expansion bilaterally. No obvious signs of distress.  GASTRO: Abdomen soft, non-tender, no distention.  GENITOURINARY: Penile swelling, Tavera catheter in place.  MUSC: Full ROM. No bony tenderness or soft tissue tenderness. No obvious deformity.  SKIN: Skin is warm and dry.  NEURO: Keke coma scale: eyes open spontaneously-4, oriented & converses-5, obeys commands-6. No neurological abnormalities.   MENTAL STATUS: Awake, alert and aware of environment.

## 2022-05-08 NOTE — PROVIDER PROGRESS NOTES - EMERGENCY DEPT.
Encounter Date: 5/6/2022    ED Physician Progress Notes               Orthopedic Injury    Date/Time: 5/7/2022 8:21 PM  Performed by: Ray Shipley MD  Authorized by: Ray Shipley MD     Location procedure was performed:  [facility] EMERGENCY DEPARTMENT  Injury:     Injury location: penis - paraphimosis reduction.      Pre-procedure assessment:     Neurovascular status: Neurovascularly intact        Post-procedure assessment:     Neurovascular status: Neurovascularly intact      Patient tolerance:  Patient tolerated the procedure well with no immediate complications     Paraphimosis reduced successfully with slow traction on foreskin. No complications.

## 2022-05-09 ENCOUNTER — TELEPHONE (OUTPATIENT)
Dept: OPTOMETRY | Facility: CLINIC | Age: 62
End: 2022-05-09
Payer: COMMERCIAL

## 2022-05-09 ENCOUNTER — TELEPHONE (OUTPATIENT)
Dept: OPHTHALMOLOGY | Facility: CLINIC | Age: 62
End: 2022-05-09
Payer: COMMERCIAL

## 2022-05-09 LAB
POCT GLUCOSE: 107 MG/DL (ref 70–110)
POCT GLUCOSE: 111 MG/DL (ref 70–110)
POCT GLUCOSE: 116 MG/DL (ref 70–110)
POCT GLUCOSE: 124 MG/DL (ref 70–110)
POCT GLUCOSE: 159 MG/DL (ref 70–110)
POCT GLUCOSE: 186 MG/DL (ref 70–110)
POCT GLUCOSE: 187 MG/DL (ref 70–110)
POCT GLUCOSE: 193 MG/DL (ref 70–110)
POCT GLUCOSE: 214 MG/DL (ref 70–110)
POCT GLUCOSE: 224 MG/DL (ref 70–110)

## 2022-05-09 NOTE — TELEPHONE ENCOUNTER
"----- Message from Tacho Castle sent at 5/9/2022  8:31 AM CDT -----  Regarding: Appt Request  Contact: Angel  Consult/Advisory:           Name Of Caller: Angel  Contact Preference?: [phone]           Provider Name: New Pt  Does patient feel the need to be seen today? Yes           What is the nature of the call?:    Pt is calling to get schd with a same day appt for a diabetic eye exam as he says his vison has changed and he cannot make it to the first available in July.      Additional Notes:  "Thank you for all that you do for our patients'"     "

## 2022-05-09 NOTE — TELEPHONE ENCOUNTER
"----- Message from Vita Jung sent at 5/9/2022  9:34 AM CDT -----  Regarding: FW: Returning Call  Contact: Angel    ----- Message -----  From: Tacho Castle  Sent: 5/9/2022   9:19 AM CDT  To: Marbella Manley Staff  Subject: Returning Call                                   Consult/Advisory:           Name Of Caller: Angel  Contact Preference?: [phone]              What is the nature of the call?:    Angel is returning a call to Hari in regards to setting up an appt in [facility].      Additional Notes:  "Thank you for all that you do for our patients'"       "

## 2022-05-16 ENCOUNTER — HOSPITAL ENCOUNTER (OUTPATIENT)
Dept: RADIOLOGY | Facility: HOSPITAL | Age: 62
Discharge: HOME | End: 2022-05-16
Attending: UROLOGY
Payer: COMMERCIAL

## 2022-05-16 DIAGNOSIS — R33.9 URINARY RETENTION: ICD-10-CM

## 2022-05-16 PROCEDURE — 76770 US EXAM ABDO BACK WALL COMP: CPT | Mod: TC,PO

## 2022-05-17 ENCOUNTER — OFFICE VISIT (OUTPATIENT)
Dept: OPTOMETRY | Facility: CLINIC | Age: 62
End: 2022-05-17
Payer: COMMERCIAL

## 2022-05-17 ENCOUNTER — OFFICE VISIT (OUTPATIENT)
Dept: UROLOGY | Facility: CLINIC | Age: 62
End: 2022-05-17
Payer: COMMERCIAL

## 2022-05-17 ENCOUNTER — TELEPHONE (OUTPATIENT)
Dept: OPHTHALMOLOGY | Facility: CLINIC | Age: 62
End: 2022-05-17
Payer: COMMERCIAL

## 2022-05-17 ENCOUNTER — LAB VISIT (OUTPATIENT)
Dept: LAB | Facility: HOSPITAL | Age: 62
End: 2022-05-17
Attending: UROLOGY
Payer: COMMERCIAL

## 2022-05-17 VITALS
WEIGHT: 156.06 LBS | BODY MASS INDEX: 25.08 KG/M2 | HEIGHT: 66 IN | SYSTOLIC BLOOD PRESSURE: 193 MMHG | DIASTOLIC BLOOD PRESSURE: 100 MMHG | HEART RATE: 118 BPM

## 2022-05-17 DIAGNOSIS — H54.62 VISION LOSS OF LEFT EYE: ICD-10-CM

## 2022-05-17 DIAGNOSIS — R33.9 URINARY RETENTION: Primary | ICD-10-CM

## 2022-05-17 DIAGNOSIS — H52.4 PRESBYOPIA: ICD-10-CM

## 2022-05-17 DIAGNOSIS — H25.11 NS (NUCLEAR SCLEROSIS), RIGHT: ICD-10-CM

## 2022-05-17 DIAGNOSIS — N13.30 BILATERAL HYDRONEPHROSIS: ICD-10-CM

## 2022-05-17 DIAGNOSIS — R33.9 URINARY RETENTION: ICD-10-CM

## 2022-05-17 PROCEDURE — 3046F HEMOGLOBIN A1C LEVEL >9.0%: CPT | Mod: CPTII,S$GLB,, | Performed by: OPTOMETRIST

## 2022-05-17 PROCEDURE — 99999 PR PBB SHADOW E&M-EST. PATIENT-LVL II: CPT | Mod: PBBFAC,,, | Performed by: OPTOMETRIST

## 2022-05-17 PROCEDURE — 3046F HEMOGLOBIN A1C LEVEL >9.0%: CPT | Mod: CPTII,S$GLB,, | Performed by: UROLOGY

## 2022-05-17 PROCEDURE — 1159F MED LIST DOCD IN RCRD: CPT | Mod: CPTII,S$GLB,, | Performed by: UROLOGY

## 2022-05-17 PROCEDURE — 84153 ASSAY OF PSA TOTAL: CPT | Performed by: UROLOGY

## 2022-05-17 PROCEDURE — 84154 ASSAY OF PSA FREE: CPT | Mod: 59 | Performed by: UROLOGY

## 2022-05-17 PROCEDURE — 1111F PR DISCHARGE MEDS RECONCILED W/ CURRENT OUTPATIENT MED LIST: ICD-10-PCS | Mod: CPTII,S$GLB,, | Performed by: UROLOGY

## 2022-05-17 PROCEDURE — 3008F BODY MASS INDEX DOCD: CPT | Mod: CPTII,S$GLB,, | Performed by: UROLOGY

## 2022-05-17 PROCEDURE — 92250 COLOR FUNDUS PHOTOGRAPHY - OU - BOTH EYES: ICD-10-PCS | Mod: S$GLB,,, | Performed by: OPTOMETRIST

## 2022-05-17 PROCEDURE — 3080F PR MOST RECENT DIASTOLIC BLOOD PRESSURE >= 90 MM HG: ICD-10-PCS | Mod: CPTII,S$GLB,, | Performed by: UROLOGY

## 2022-05-17 PROCEDURE — 1160F PR REVIEW ALL MEDS BY PRESCRIBER/CLIN PHARMACIST DOCUMENTED: ICD-10-PCS | Mod: CPTII,S$GLB,, | Performed by: UROLOGY

## 2022-05-17 PROCEDURE — 92004 COMPRE OPH EXAM NEW PT 1/>: CPT | Mod: S$GLB,,, | Performed by: OPTOMETRIST

## 2022-05-17 PROCEDURE — 92250 FUNDUS PHOTOGRAPHY W/I&R: CPT | Mod: S$GLB,,, | Performed by: OPTOMETRIST

## 2022-05-17 PROCEDURE — 3077F PR MOST RECENT SYSTOLIC BLOOD PRESSURE >= 140 MM HG: ICD-10-PCS | Mod: CPTII,S$GLB,, | Performed by: UROLOGY

## 2022-05-17 PROCEDURE — 99999 PR PBB SHADOW E&M-EST. PATIENT-LVL III: ICD-10-PCS | Mod: PBBFAC,,, | Performed by: UROLOGY

## 2022-05-17 PROCEDURE — 3080F DIAST BP >= 90 MM HG: CPT | Mod: CPTII,S$GLB,, | Performed by: UROLOGY

## 2022-05-17 PROCEDURE — 1160F RVW MEDS BY RX/DR IN RCRD: CPT | Mod: CPTII,S$GLB,, | Performed by: UROLOGY

## 2022-05-17 PROCEDURE — 3008F PR BODY MASS INDEX (BMI) DOCUMENTED: ICD-10-PCS | Mod: CPTII,S$GLB,, | Performed by: UROLOGY

## 2022-05-17 PROCEDURE — 3077F SYST BP >= 140 MM HG: CPT | Mod: CPTII,S$GLB,, | Performed by: UROLOGY

## 2022-05-17 PROCEDURE — 92015 PR REFRACTION: ICD-10-PCS | Mod: S$GLB,,, | Performed by: OPTOMETRIST

## 2022-05-17 PROCEDURE — 99999 PR PBB SHADOW E&M-EST. PATIENT-LVL III: CPT | Mod: PBBFAC,,, | Performed by: UROLOGY

## 2022-05-17 PROCEDURE — 99999 PR PBB SHADOW E&M-EST. PATIENT-LVL II: ICD-10-PCS | Mod: PBBFAC,,, | Performed by: OPTOMETRIST

## 2022-05-17 PROCEDURE — 99214 PR OFFICE/OUTPT VISIT, EST, LEVL IV, 30-39 MIN: ICD-10-PCS | Mod: S$GLB,,, | Performed by: UROLOGY

## 2022-05-17 PROCEDURE — 1111F DSCHRG MED/CURRENT MED MERGE: CPT | Mod: CPTII,S$GLB,, | Performed by: UROLOGY

## 2022-05-17 PROCEDURE — 92004 PR EYE EXAM, NEW PATIENT,COMPREHESV: ICD-10-PCS | Mod: S$GLB,,, | Performed by: OPTOMETRIST

## 2022-05-17 PROCEDURE — 1159F PR MEDICATION LIST DOCUMENTED IN MEDICAL RECORD: ICD-10-PCS | Mod: CPTII,S$GLB,, | Performed by: UROLOGY

## 2022-05-17 PROCEDURE — 36415 COLL VENOUS BLD VENIPUNCTURE: CPT | Performed by: UROLOGY

## 2022-05-17 PROCEDURE — 99214 OFFICE O/P EST MOD 30 MIN: CPT | Mod: S$GLB,,, | Performed by: UROLOGY

## 2022-05-17 PROCEDURE — 3046F PR MOST RECENT HEMOGLOBIN A1C LEVEL > 9.0%: ICD-10-PCS | Mod: CPTII,S$GLB,, | Performed by: UROLOGY

## 2022-05-17 PROCEDURE — 92015 DETERMINE REFRACTIVE STATE: CPT | Mod: S$GLB,,, | Performed by: OPTOMETRIST

## 2022-05-17 PROCEDURE — 3046F PR MOST RECENT HEMOGLOBIN A1C LEVEL > 9.0%: ICD-10-PCS | Mod: CPTII,S$GLB,, | Performed by: OPTOMETRIST

## 2022-05-17 RX ORDER — SILDENAFIL CITRATE 20 MG/1
TABLET ORAL
COMMUNITY
Start: 2022-01-24 | End: 2022-06-11

## 2022-05-17 NOTE — TELEPHONE ENCOUNTER
----- Message from Nicole Shannon, OD sent at 5/17/2022  2:48 PM CDT -----  Please call pt to schedule diabetic retina eval

## 2022-05-17 NOTE — PROGRESS NOTES
Subjective:       Patient ID: Angel Bell is a 61 y.o. male.    Chief Complaint: Follow-up (Hospital follow up acute renal failure)     This is a 61 y.o.  male patient with urinary retention, BPH, bilateral hydronephrosis.  In late April 2022. CT scan at that time showed massive bilateral hydronephrosis and enlarged prostate with urinary retention.  Tavera catheter was placed after creatinine was noted to be in the 12-14 range.  His creatinine continued to improve.  Renal ultrasound showed improvement in hydronephrosis.  He presents today for follow-up.  Tavera catheter remains in place.  Denies any significant lower urinary tract symptoms prior to recent hospitalization.  He has an AUA symptom score of 8 with a bother of 3. Main issues with urination are intermittency urgency and weak stream.      Hemoglobin A1c was recently greater than 14. He does have a history of prostate cancer in his father.  He denies ever having a PSA test.       I personally reviewed the images: CT 4/22, QUOC 5/22 as above      Lab Results   Component Value Date    CREATININE 3.1 (H) 05/02/2022       ---  Past Medical History:   Diagnosis Date    Diabetes mellitus     Hyperlipemia     Hypertension     Migraine headache     PTSD (post-traumatic stress disorder)        No past surgical history on file.    Family History   Problem Relation Age of Onset    Diabetes Mother        Social History     Tobacco Use    Smoking status: Never Smoker    Smokeless tobacco: Never Used   Substance Use Topics    Alcohol use: No    Drug use: No       Current Outpatient Medications on File Prior to Visit   Medication Sig Dispense Refill    atorvastatin (LIPITOR) 40 MG tablet Take 1 tablet (40 mg total) by mouth once daily. 30 tablet 3    blood sugar diagnostic (TRUE METRIX GLUCOSE TEST STRIP) Strp TEST 4 TIMES A  each 11    blood-glucose meter (TRUE METRIX GLUCOSE METER) Misc 1 Device by Misc.(Non-Drug; Combo Route) route 4 (four)  "times daily. 1 each 0    insulin aspart U-100 (NOVOLOG) 100 unit/mL (3 mL) InPn pen Inject 4 Units into the skin 3 (three) times daily. 15 mL 3    insulin detemir U-100 (LEVEMIR FLEXTOUCH) 100 unit/mL (3 mL) SubQ InPn pen Inject 15 Units into the skin once daily. 15 mL 3    lancets 33 gauge Misc USE 4 TIMES A  each 11    NIFEdipine (PROCARDIA-XL) 30 MG (OSM) 24 hr tablet Take 1 tablet (30 mg total) by mouth once daily. 30 tablet 3    pen needle, diabetic 31 gauge x 5/16" Ndle use 4 times a day 100 each 11    sildenafil (REVATIO) 20 mg Tab SMARTSI-3 Tablet(s) By Mouth Daily      tamsulosin (FLOMAX) 0.4 mg Cap Take 1 capsule (0.4 mg total) by mouth once daily. 30 capsule 11    clotrimazole (LOTRIMIN) 1 % cream Apply topically 2 (two) times daily. for 10 days 24 g 2     No current facility-administered medications on file prior to visit.       Review of patient's allergies indicates:  No Known Allergies    Review of Systems   Constitutional: Negative for activity change, chills and fever.   HENT: Negative for congestion.    Respiratory: Negative for cough, chest tightness and shortness of breath.    Cardiovascular: Negative for chest pain and palpitations.   Gastrointestinal: Negative for abdominal distention, abdominal pain, nausea and vomiting.   Genitourinary: Positive for difficulty urinating and frequency. Negative for flank pain, hematuria, penile pain, scrotal swelling and testicular pain.   Musculoskeletal: Negative for gait problem.       Objective:      Physical Exam  Constitutional:       Appearance: Normal appearance.   HENT:      Head: Normocephalic.   Pulmonary:      Effort: Pulmonary effort is normal.      Breath sounds: Normal breath sounds.   Abdominal:      General: Abdomen is flat. Bowel sounds are normal.      Palpations: Abdomen is soft.      Tenderness: There is no abdominal tenderness. There is no right CVA tenderness, left CVA tenderness or guarding.   Genitourinary:     Penis: " Normal.    Musculoskeletal:         General: Normal range of motion.      Cervical back: Normal range of motion.   Skin:     General: Skin is warm.   Neurological:      Mental Status: He is alert.         Assessment:     Problem Noted   Urinary Retention 4/29/2022    Distended bladder with bilateral hydroneprosis, ARF  Prostate volume calculated 112 myself (larger on CT measurements, 89gm on QUOC)     Bilateral Hydronephrosis 4/29/2022    QUOC 5/22: mild right hydronephrosis, greatly improved from CT         Plan:     1. Check PSA  2. Given severe DM, HgA1c >14 and UR with bilateral hydronephrosis, recommend urodynamics to assess bladder function/reflux   3. Discussed TURP, GLL PVP, HoLEP, RASP with patient.  Given size of prostate recommend HoLEP vs Robotic assisted simple prostatectomy (RASP); hesitant to do RASP given severe uncontrolled DM and infection risk.  Will discuss further after UDS.   4. Check PSA, discussed can be confounding given catheter in place, yet has strong FH of PCa in father, MRI if needed  5. Follow up after UDS to discuss.     Ted Garvin MD

## 2022-05-18 LAB
PROSTATE SPECIFIC ANTIGEN, TOTAL: 15.2 NG/ML (ref 0–4)
PSA FREE MFR SERPL: 26.45 %
PSA FREE SERPL-MCNC: 4.02 NG/ML (ref 0–1.5)

## 2022-05-19 ENCOUNTER — CLINICAL SUPPORT (OUTPATIENT)
Dept: DIABETES | Facility: CLINIC | Age: 62
End: 2022-05-19
Payer: COMMERCIAL

## 2022-05-19 PROCEDURE — 99999 PR PBB SHADOW E&M-EST. PATIENT-LVL I: ICD-10-PCS | Mod: PBBFAC,,, | Performed by: DIETITIAN, REGISTERED

## 2022-05-19 PROCEDURE — 99999 PR PBB SHADOW E&M-EST. PATIENT-LVL I: CPT | Mod: PBBFAC,,, | Performed by: DIETITIAN, REGISTERED

## 2022-05-19 PROCEDURE — G0108 PR DIAB MANAGE TRN  PER INDIV: ICD-10-PCS | Mod: S$GLB,,, | Performed by: DIETITIAN, REGISTERED

## 2022-05-19 PROCEDURE — G0108 DIAB MANAGE TRN  PER INDIV: HCPCS | Mod: S$GLB,,, | Performed by: DIETITIAN, REGISTERED

## 2022-05-19 NOTE — PROGRESS NOTES
Diabetes Care Specialist Progress Note  Author: Iqra Angela RD  Date: 5/19/2022    Program Intake  Reason for Diabetes Program Visit:: Initial Diabetes Assessment  Current diabetes risk level:: high  In the last 12 months, have you:: been admitted to a hospital  Permission to speak with others about care:: no    Lab Results   Component Value Date    HGBA1C >14.0 (H) 04/29/2022       Clinical  Novolog 4u TID  Levemir 15u     Monitoring 4x/day   110mg/dl fasting today, notes average    No Exercise  24hr Recall  B -  eggs, grits  L - chicken or pork chop, potatoes  D - same as lunch  Water, apple juice    Patient Health Rating  Compared to other people your age, how would you rate your health?: Good    Problem Review  Reviewed Problem List with Patient: yes  Active comorbidities affecting diabetes self-care.: no  Reviewed health maintenance: yes    Clinical Assessment  Current Diabetes Treatment: Insulin  Have you ever experienced hypoglycemia (low blood sugar)?: no  Have you ever experienced hyperglycemia (high blood sugar)?: yes  Are you able to tell when your blood sugar is high?: Yes  What are your symptoms?: blurry vision    Medication Information  How do you obtain your medications?: Patient drives  How many days a week do you miss your medications?: Never  Do you sometimes have difficulty refilling your medications?: No  Medication adherence impacting ability to self-manage diabetes?: No    Labs  Do you have regular lab work to monitor your medications?: Yes  Type of Regular Lab Work: A1c  Where do you get your labs drawn?: [facility]  Lab Compliance Barriers: No    Nutritional Status  Diet: Regular  Change in appetite?: No  Current nutritional status an area of need that is impacting patient's ability to self-manage diabetes?: No    Additional Social History    Support  Does anyone support you with your diabetes care?: yes  Who takes you to your medical appointments?: self  Does the current support meet the  patient's needs?: Yes  Is Support an area impacting ability to self-manage diabetes?: No    Access to Mass Media & Technology  Does the patient have access to any of the following devices or technologies?: Smart phone  Media or technology needs impacting ability to self-manage diabetes?: No    Cognitive/Behavioral Health  Alert and Oriented: Yes  Difficulty Thinking: No  Requires Prompting: No  Requires assistance for routine expression?: No  Cognitive or behavioral barriers impacting ability to self-manage diabetes?: No    Culture/Confucianism  Culture or Sikhism beliefs that may impact ability to access healthcare: No    Communication  Language preference: English  Hearing Problems: No  Vision Problems: No  Communication needs impacting ability to self-manage diabetes?: No    Health Literacy  Preferred Learning Method: Face to Face  How often do you need to have someone help you read instructions, pamphlets, or written material from your doctor or pharmacy?: Sometimes  Health literacy needs impacting ability to self-manage diabetes?: No      Diabetes Self-Management Skills Assessment    Diabetes Disease Process/Treatment Options  Patient/caregiver able to state what happens when someone has diabetes.: yes  Patient/caregiver knows what type of diabetes they have.: yes  Diabetes Type : Type II  Patient/caregiver able to identify at least three signs and symptoms of diabetes.: yes  Patient able to identify at least three risk factors for diabetes.: yes  Diabetes Disease Process/Treatment Options: Skills Assessment Completed: Yes  Assessment indicates:: Adequate understanding  Area of need?: No    Nutrition/Healthy Eating  Challenges to healthy eating:: portion control  Method of carbohydrate measurement:: no knowledge of what carbs are  Patient can identify foods that impact blood sugar.: yes  Patient-identified foods:: sweets  Nutrition/Healthy Eating Skills Assessment Completed:: Yes  Assessment indicates:: Knowledge  deficit, Instruction Needed  Area of need?: Yes    Physical Activity/Exercise  Patient's daily activity level:: sedentary  Patient formally exercises outside of work.: no  Patient can identify forms of physical activity.: yes  Patient can identify reasons why exercise/physical activity is important in diabetes management.: yes  Physical Activity/Exercise Skills Assessment Completed: : Yes  Assessment indicates:: Adequate understanding  Area of need?: No    Medications  Patient is able to describe current diabetes management routine.: yes  Diabetes management routine:: insulin  Patient is able to identify current diabetes medications, dosages, and appropriate timing of medications.: yes  Patient understands the purpose of the medications taken for diabetes.: yes  Patient reports problems or concerns with current medication regimen.: no  Medication Skills Assessment Completed:: Yes  Assessment indicates:: Adequate understanding  Area of need?: No    Home Blood Glucose Monitoring  Patient states that blood sugar is checked at home daily.: yes  Monitoring Method:: home glucometer  How often do you check your blood sugar?: 4 times a day  When do you check your blood sugar?: Before breakfast, Before lunch, Before dinner, Before bedtime  Blood glucose logs:: encouraged to bring logs to provider visits  Home Blood Glucose Monitoring Skills Assessment Completed: : Yes  Assessment indicates:: Adequate understanding  Area of need?: No    Acute Complications  Patient is able to identify types of acute complications: No  Acute Complications Skills Assessment Completed: : Yes  Assessment indicates:: Instruction Needed, Knowledge deficit  Area of need?: Yes    Chronic Complications  Patient can identify major chronic complications of diabetes.: yes  Patient can identify ways to prevent or delay diabetes complications.: yes  Patient is aware that having diabetes increases risk of heart disease?: Yes  Patient is aware that heart  disease is the leading cause of death and disability in people with diabetes?: Yes  Patient able to state risk factors for heart disease?: Yes  Patient is taking statin?: Yes  Chronic Complications Skills Assessment Completed: : Yes  Assessment indicates:: Adequate understanding  Area of need?: No    Psychosocial/Coping  Patient can identify ways of coping with chronic disease.: yes  Psychosocial/Coping Skills Assessment Completed: : Yes  Assessment indicates:: Adequate understanding  Area of need?: No    Assessment Summary and Plan  Based on today's diabetes care assessment, the following areas of need were identified:      Social 5/19/2022   Support No   Access to [employer]/Tech No   Cognitive/Behavioral Health No   Culture/Denominational No   Communication No   Health Literacy No        Clinical 5/19/2022   Medication Adherence No   Lab Compliance No   Nutritional Status No        Diabetes Self-Management Skills 5/19/2022   Diabetes Disease Process/Treatment Options No   Nutrition/Healthy Eating Yes - see care plan   Physical Activity/Exercise No   Medication No   Home Blood Glucose Monitoring No   Acute Complications Yes - Reviewed blood glucose goals, prevention, detection, signs and symptoms, and treatment of hypoglycemia and hyperglycemia, and when to contact the clinic.   Chronic Complications No   Psychosocial/Coping No          Today's interventions were provided through individual discussion, instruction, and written materials were provided.      Patient verbalized understanding of instruction and written materials.  Pt was able to return back demonstration of instructions today. Patient understood key points, needs reinforcement and further instruction.     Diabetes Self-Management Care Plan:    Today's Diabetes Self-Management Care Plan was developed with Angel's input. Angel has agreed to work toward the following goal(s) to improve his/her overall diabetes control.      Care Plan: Diabetes Management    Updates made since 4/19/2022 12:00 AM      Problem: Healthy Eating       Goal: Eat 3 meals daily with 3 servings of Carbohydrate per meal.    Start Date: 5/19/2022   Expected End Date: 8/1/2022   Priority: High   Barriers: Lack of Motivation to Change      Task: Reviewed the sources and role of Carbohydrate, Protein, and Fat and how each nutrient impacts blood sugar. Completed 5/19/2022      Task: Provided visual examples using dry measuring cups, food models, and other familiar objects such as computer mouse, deck or cards, tennis ball etc. to help with visualization of portions. Completed 5/19/2022      Task: Explained how to count carbohydrates using dry measuring cups for accurate carb counting. Completed 5/19/2022      Task: Discussed strategies for choosing healthier menu options when dining out.       Task: Recommended replacing beverages containing high sugar content with noncaloric/sugar free options and/or water. Completed 5/19/2022      Task: Review the importance of balancing carbohydrates with each meal using portion control techniques to count servings of carbohydrate and label reading to identify serving size and amount of total carbs per serving.       Task: Provided Sample plate method and reviewed the use of the plate to estimate amounts of carbohydrate per meal. Completed 5/19/2022          Follow Up Plan     Follow up in about 4 weeks (around 6/16/2022).    Today's care plan and follow up schedule was discussed with patient.  Angel verbalized understanding of the care plan, goals, and agrees to follow up plan.        The patient was encouraged to communicate with his/her health care provider/physician and care team regarding his/her condition(s) and treatment.  I provided the patient with my contact information today and encouraged to contact me via phone or [facility]'s Patient Portal as needed.     Length of Visit   Total Time: 60 Minutes

## 2022-05-23 ENCOUNTER — OFFICE VISIT (OUTPATIENT)
Dept: ENDOCRINOLOGY | Facility: CLINIC | Age: 62
End: 2022-05-23
Payer: COMMERCIAL

## 2022-05-23 ENCOUNTER — OFFICE VISIT (OUTPATIENT)
Dept: PRIMARY CARE CLINIC | Facility: CLINIC | Age: 62
End: 2022-05-23
Payer: COMMERCIAL

## 2022-05-23 ENCOUNTER — TELEPHONE (OUTPATIENT)
Dept: PRIMARY CARE CLINIC | Facility: CLINIC | Age: 62
End: 2022-05-23

## 2022-05-23 ENCOUNTER — CLINICAL SUPPORT (OUTPATIENT)
Dept: LAB | Facility: HOSPITAL | Age: 62
End: 2022-05-23
Attending: INTERNAL MEDICINE
Payer: COMMERCIAL

## 2022-05-23 ENCOUNTER — TELEPHONE (OUTPATIENT)
Dept: UROLOGY | Facility: CLINIC | Age: 62
End: 2022-05-23
Payer: COMMERCIAL

## 2022-05-23 VITALS
WEIGHT: 156.31 LBS | BODY MASS INDEX: 25.12 KG/M2 | DIASTOLIC BLOOD PRESSURE: 80 MMHG | HEIGHT: 66 IN | SYSTOLIC BLOOD PRESSURE: 150 MMHG

## 2022-05-23 VITALS
DIASTOLIC BLOOD PRESSURE: 80 MMHG | OXYGEN SATURATION: 97 % | WEIGHT: 156.94 LBS | HEART RATE: 131 BPM | TEMPERATURE: 98 F | BODY MASS INDEX: 25.22 KG/M2 | SYSTOLIC BLOOD PRESSURE: 149 MMHG | HEIGHT: 66 IN

## 2022-05-23 DIAGNOSIS — R00.0 TACHYCARDIA: ICD-10-CM

## 2022-05-23 DIAGNOSIS — E78.5 HYPERLIPIDEMIA, UNSPECIFIED HYPERLIPIDEMIA TYPE: ICD-10-CM

## 2022-05-23 DIAGNOSIS — I10 HYPERTENSION, ESSENTIAL: ICD-10-CM

## 2022-05-23 DIAGNOSIS — N13.9 OBSTRUCTIVE UROPATHY: ICD-10-CM

## 2022-05-23 DIAGNOSIS — R97.20 ELEVATED PSA: ICD-10-CM

## 2022-05-23 DIAGNOSIS — R33.9 URINARY RETENTION: ICD-10-CM

## 2022-05-23 DIAGNOSIS — N13.8 BENIGN PROSTATIC HYPERPLASIA WITH URINARY OBSTRUCTION: ICD-10-CM

## 2022-05-23 DIAGNOSIS — E11.65 UNCONTROLLED TYPE 2 DIABETES MELLITUS WITH HYPERGLYCEMIA: Primary | ICD-10-CM

## 2022-05-23 DIAGNOSIS — N40.1 BENIGN PROSTATIC HYPERPLASIA WITH URINARY OBSTRUCTION: ICD-10-CM

## 2022-05-23 DIAGNOSIS — I10 PRIMARY HYPERTENSION: ICD-10-CM

## 2022-05-23 DIAGNOSIS — N17.9 AKI (ACUTE KIDNEY INJURY): Primary | ICD-10-CM

## 2022-05-23 PROBLEM — E83.39 HYPERPHOSPHATEMIA: Status: RESOLVED | Noted: 2022-04-29 | Resolved: 2022-05-23

## 2022-05-23 PROCEDURE — 1111F PR DISCHARGE MEDS RECONCILED W/ CURRENT OUTPATIENT MED LIST: ICD-10-PCS | Mod: CPTII,S$GLB,, | Performed by: INTERNAL MEDICINE

## 2022-05-23 PROCEDURE — 3008F PR BODY MASS INDEX (BMI) DOCUMENTED: ICD-10-PCS | Mod: CPTII,S$GLB,, | Performed by: GENERAL ACUTE CARE HOSPITAL

## 2022-05-23 PROCEDURE — 3079F PR MOST RECENT DIASTOLIC BLOOD PRESSURE 80-89 MM HG: ICD-10-PCS | Mod: CPTII,S$GLB,, | Performed by: INTERNAL MEDICINE

## 2022-05-23 PROCEDURE — 3046F PR MOST RECENT HEMOGLOBIN A1C LEVEL > 9.0%: ICD-10-PCS | Mod: CPTII,S$GLB,, | Performed by: INTERNAL MEDICINE

## 2022-05-23 PROCEDURE — 99999 PR PBB SHADOW E&M-EST. PATIENT-LVL V: CPT | Mod: PBBFAC,,, | Performed by: INTERNAL MEDICINE

## 2022-05-23 PROCEDURE — 1111F PR DISCHARGE MEDS RECONCILED W/ CURRENT OUTPATIENT MED LIST: ICD-10-PCS | Mod: CPTII,S$GLB,, | Performed by: GENERAL ACUTE CARE HOSPITAL

## 2022-05-23 PROCEDURE — 93010 ELECTROCARDIOGRAM REPORT: CPT | Mod: ,,, | Performed by: INTERNAL MEDICINE

## 2022-05-23 PROCEDURE — 1160F RVW MEDS BY RX/DR IN RCRD: CPT | Mod: CPTII,S$GLB,, | Performed by: GENERAL ACUTE CARE HOSPITAL

## 2022-05-23 PROCEDURE — 3079F DIAST BP 80-89 MM HG: CPT | Mod: CPTII,S$GLB,, | Performed by: INTERNAL MEDICINE

## 2022-05-23 PROCEDURE — 1159F PR MEDICATION LIST DOCUMENTED IN MEDICAL RECORD: ICD-10-PCS | Mod: CPTII,S$GLB,, | Performed by: GENERAL ACUTE CARE HOSPITAL

## 2022-05-23 PROCEDURE — 3077F PR MOST RECENT SYSTOLIC BLOOD PRESSURE >= 140 MM HG: ICD-10-PCS | Mod: CPTII,S$GLB,, | Performed by: GENERAL ACUTE CARE HOSPITAL

## 2022-05-23 PROCEDURE — 1159F PR MEDICATION LIST DOCUMENTED IN MEDICAL RECORD: ICD-10-PCS | Mod: CPTII,S$GLB,, | Performed by: INTERNAL MEDICINE

## 2022-05-23 PROCEDURE — 99999 PR PBB SHADOW E&M-EST. PATIENT-LVL V: ICD-10-PCS | Mod: PBBFAC,,, | Performed by: INTERNAL MEDICINE

## 2022-05-23 PROCEDURE — 1159F MED LIST DOCD IN RCRD: CPT | Mod: CPTII,S$GLB,, | Performed by: INTERNAL MEDICINE

## 2022-05-23 PROCEDURE — 93005 ELECTROCARDIOGRAM TRACING: CPT

## 2022-05-23 PROCEDURE — 3046F HEMOGLOBIN A1C LEVEL >9.0%: CPT | Mod: CPTII,S$GLB,, | Performed by: GENERAL ACUTE CARE HOSPITAL

## 2022-05-23 PROCEDURE — 3008F BODY MASS INDEX DOCD: CPT | Mod: CPTII,S$GLB,, | Performed by: GENERAL ACUTE CARE HOSPITAL

## 2022-05-23 PROCEDURE — 3079F PR MOST RECENT DIASTOLIC BLOOD PRESSURE 80-89 MM HG: ICD-10-PCS | Mod: CPTII,S$GLB,, | Performed by: GENERAL ACUTE CARE HOSPITAL

## 2022-05-23 PROCEDURE — 1159F MED LIST DOCD IN RCRD: CPT | Mod: CPTII,S$GLB,, | Performed by: GENERAL ACUTE CARE HOSPITAL

## 2022-05-23 PROCEDURE — 3077F SYST BP >= 140 MM HG: CPT | Mod: CPTII,S$GLB,, | Performed by: INTERNAL MEDICINE

## 2022-05-23 PROCEDURE — 99215 PR OFFICE/OUTPT VISIT, EST, LEVL V, 40-54 MIN: ICD-10-PCS | Mod: S$GLB,,, | Performed by: INTERNAL MEDICINE

## 2022-05-23 PROCEDURE — 99215 OFFICE O/P EST HI 40 MIN: CPT | Mod: S$GLB,,, | Performed by: INTERNAL MEDICINE

## 2022-05-23 PROCEDURE — 3008F PR BODY MASS INDEX (BMI) DOCUMENTED: ICD-10-PCS | Mod: CPTII,S$GLB,, | Performed by: INTERNAL MEDICINE

## 2022-05-23 PROCEDURE — 99204 OFFICE O/P NEW MOD 45 MIN: CPT | Mod: S$GLB,,, | Performed by: GENERAL ACUTE CARE HOSPITAL

## 2022-05-23 PROCEDURE — 3046F HEMOGLOBIN A1C LEVEL >9.0%: CPT | Mod: CPTII,S$GLB,, | Performed by: INTERNAL MEDICINE

## 2022-05-23 PROCEDURE — 3077F PR MOST RECENT SYSTOLIC BLOOD PRESSURE >= 140 MM HG: ICD-10-PCS | Mod: CPTII,S$GLB,, | Performed by: INTERNAL MEDICINE

## 2022-05-23 PROCEDURE — 3046F PR MOST RECENT HEMOGLOBIN A1C LEVEL > 9.0%: ICD-10-PCS | Mod: CPTII,S$GLB,, | Performed by: GENERAL ACUTE CARE HOSPITAL

## 2022-05-23 PROCEDURE — 1111F DSCHRG MED/CURRENT MED MERGE: CPT | Mod: CPTII,S$GLB,, | Performed by: GENERAL ACUTE CARE HOSPITAL

## 2022-05-23 PROCEDURE — 3077F SYST BP >= 140 MM HG: CPT | Mod: CPTII,S$GLB,, | Performed by: GENERAL ACUTE CARE HOSPITAL

## 2022-05-23 PROCEDURE — 1111F DSCHRG MED/CURRENT MED MERGE: CPT | Mod: CPTII,S$GLB,, | Performed by: INTERNAL MEDICINE

## 2022-05-23 PROCEDURE — 3079F DIAST BP 80-89 MM HG: CPT | Mod: CPTII,S$GLB,, | Performed by: GENERAL ACUTE CARE HOSPITAL

## 2022-05-23 PROCEDURE — 99999 PR PBB SHADOW E&M-EST. PATIENT-LVL V: ICD-10-PCS | Mod: PBBFAC,,, | Performed by: GENERAL ACUTE CARE HOSPITAL

## 2022-05-23 PROCEDURE — 1160F RVW MEDS BY RX/DR IN RCRD: CPT | Mod: CPTII,S$GLB,, | Performed by: INTERNAL MEDICINE

## 2022-05-23 PROCEDURE — 3008F BODY MASS INDEX DOCD: CPT | Mod: CPTII,S$GLB,, | Performed by: INTERNAL MEDICINE

## 2022-05-23 PROCEDURE — 99204 PR OFFICE/OUTPT VISIT, NEW, LEVL IV, 45-59 MIN: ICD-10-PCS | Mod: S$GLB,,, | Performed by: GENERAL ACUTE CARE HOSPITAL

## 2022-05-23 PROCEDURE — 1160F PR REVIEW ALL MEDS BY PRESCRIBER/CLIN PHARMACIST DOCUMENTED: ICD-10-PCS | Mod: CPTII,S$GLB,, | Performed by: INTERNAL MEDICINE

## 2022-05-23 PROCEDURE — 99999 PR PBB SHADOW E&M-EST. PATIENT-LVL V: CPT | Mod: PBBFAC,,, | Performed by: GENERAL ACUTE CARE HOSPITAL

## 2022-05-23 PROCEDURE — 1160F PR REVIEW ALL MEDS BY PRESCRIBER/CLIN PHARMACIST DOCUMENTED: ICD-10-PCS | Mod: CPTII,S$GLB,, | Performed by: GENERAL ACUTE CARE HOSPITAL

## 2022-05-23 PROCEDURE — 93010 EKG 12-LEAD: ICD-10-PCS | Mod: ,,, | Performed by: INTERNAL MEDICINE

## 2022-05-23 RX ORDER — BLOOD-GLUCOSE TRANSMITTER
1 EACH MISCELLANEOUS CONTINUOUS
Qty: 1 EACH | Refills: 3 | Status: SHIPPED | OUTPATIENT
Start: 2022-05-23 | End: 2022-11-03

## 2022-05-23 RX ORDER — BLOOD-GLUCOSE SENSOR
3 EACH MISCELLANEOUS CONTINUOUS
Qty: 3 EACH | Refills: 11 | Status: SHIPPED | OUTPATIENT
Start: 2022-05-23 | End: 2022-11-03

## 2022-05-23 RX ORDER — IBUPROFEN 200 MG
16 TABLET ORAL
Qty: 50 TABLET | Refills: 12 | COMMUNITY
Start: 2022-05-23 | End: 2022-05-31 | Stop reason: SDUPTHER

## 2022-05-23 NOTE — TELEPHONE ENCOUNTER
Patient currently in office with Dr Weaver.  Reaching out to Urology Team for scheduling of UDS.  Awaiting response.  Dr Weaver nurse walked over to obtain stat-lock for patient (provided)  Will contact patient today.

## 2022-05-23 NOTE — PROGRESS NOTES
[facility]   New Visit Progress Note   Recent Hospital Discharge     PRESENTING HISTORY     Chief Complaint/Reason for Admission:  Follow up Hospital Discharge   PCP: Yang Correia MD    History of Present Illness:  Mr. Angel Bell is a 61 y.o. male who was recently admitted to the hospital.      [facility] Medicine Discharge Summary  Primary Team: [facility] Hospitalist Team B  Attending Physician: MD Juanjose  Resident: Evy Martinez MD  Intern: Parag Nails MD  Date of Admit: 4/28/2022  Date of Discharge: 5/2/2022  Discharge to: Home  Condition: Stable  ___________________________________________________________________    Today:  [facility] up.  Recently hospitalized for acute renal failure 2/2 obstructive uropathy.  BUN/Cr 142/18.8 at presentation, with associated Hyperkalemia, hyponatremia, and hypochloremia.  CT ABD with enlarged prostate and perinephric stranding.   Alvarenga inserted with > 900 ml urine output.   Condition further complicated by DM2, off chronic meds, with Bhydroxybutyrate 4.4 and hyperglycemia on admit.  HgBA1C > 14.  IV fluids, electrolyte replacement, and insulin initiated.  UTI also noted on admit-> treated empirically with Rocephin.   Blood Cx NGTD.  Urine Cx multiple organisms, none in predominance.  Seen in consultation with Nephrology and Urology teams.   Patient responded well to above interventions and supportive care.   Discharged to home with leg bag alvarenga in place.    Patient unaccompanied today.  Ambulatory and independent with ADL's.   Had Urology follow up- urodynamics pending but not yet scheduled.  Reports compliance with all medication.  Has home blood glucose log for review- numbers at goal.  No hypoglycemic episodes.       Review of Systems  General ROS: negative for chills, fever or weight loss  Psychological ROS: negative for hallucination, depression or suicidal ideation  Ophthalmic ROS: negative for blurry  "vision, photophobia or eye pain  ENT ROS: negative for epistaxis, sore throat or rhinorrhea  Respiratory ROS: no cough, shortness of breath, or wheezing  Cardiovascular ROS: no chest pain or dyspnea on exertion  Gastrointestinal ROS: no abdominal pain, change in bowel habits, or black/ bloody stools  Genito-Urinary ROS: no dysuria, trouble voiding, or hematuria  Musculoskeletal ROS: negative for gait disturbance or muscular weakness  Neurological ROS: no syncope or seizures; no ataxia  Dermatological ROS: negative for pruritis, rash and jaundice      PAST HISTORY:     Past Medical History:   Diagnosis Date    Diabetes mellitus     Hyperlipemia     Hypertension     Migraine headache     PTSD (post-traumatic stress disorder)        History reviewed. No pertinent surgical history.    Family History   Problem Relation Age of Onset    Diabetes Mother          MEDICATIONS & ALLERGIES:     Current Outpatient Medications on File Prior to Visit   Medication Sig Dispense Refill    atorvastatin (LIPITOR) 40 MG tablet Take 1 tablet (40 mg total) by mouth once daily. 30 tablet 3    blood sugar diagnostic (TRUE METRIX GLUCOSE TEST STRIP) Strp TEST 4 TIMES A  each 11    blood-glucose meter (TRUE METRIX GLUCOSE METER) Misc 1 Device by Misc.(Non-Drug; Combo Route) route 4 (four) times daily. 1 each 0    insulin aspart U-100 (NOVOLOG) 100 unit/mL (3 mL) InPn pen Inject 4 Units into the skin 3 (three) times daily. 15 mL 3    insulin detemir U-100 (LEVEMIR FLEXTOUCH) 100 unit/mL (3 mL) SubQ InPn pen Inject 15 Units into the skin once daily. 15 mL 3    lancets 33 gauge Misc USE 4 TIMES A  each 11    NIFEdipine (PROCARDIA-XL) 30 MG (OSM) 24 hr tablet Take 1 tablet (30 mg total) by mouth once daily. 30 tablet 3    pen needle, diabetic 31 gauge x 5/16" Ndle use 4 times a day 100 each 11    tamsulosin (FLOMAX) 0.4 mg Cap Take 1 capsule (0.4 mg total) by mouth once daily. 30 capsule 11    clotrimazole (LOTRIMIN) " "1 % cream Apply topically 2 (two) times daily. for 10 days (Patient not taking: Reported on 2022) 24 g 2    sildenafil (REVATIO) 20 mg Tab SMARTSI-3 Tablet(s) By Mouth Daily       No current facility-administered medications on file prior to visit.        Review of patient's allergies indicates:  No Known Allergies    OBJECTIVE:     Vital Signs:  BP (!) 149/80 (BP Location: Left arm, Patient Position: Sitting)   Pulse (!) 131   Temp 98.4 °F (36.9 °C) (Oral)   Ht 5' 6" (1.676 m)   Wt 71.2 kg (156 lb 15.5 oz)   SpO2 97%   BMI 25.34 kg/m²   Wt Readings from Last 3 Encounters:   22 1119 71.2 kg (156 lb 15.5 oz)   22 0929 70.8 kg (156 lb 1.4 oz)   22 1952 73 kg (161 lb)     Body mass index is 25.34 kg/m².        Physical Exam:  BP (!) 149/80 (BP Location: Left arm, Patient Position: Sitting)   Pulse (!) 131   Temp 98.4 °F (36.9 °C) (Oral)   Ht 5' 6" (1.676 m)   Wt 71.2 kg (156 lb 15.5 oz)   SpO2 97%   BMI 25.34 kg/m²   General appearance: alert, cooperative, no distress  Constitutional:Oriented to person, place, and time  + appears well-developed and well-nourished.   HEENT: Normocephalic, atraumatic, neck symmetrical, no nasal discharge   Eyes: conjunctivae/corneas clear, PERRL, EOM's intact  Lungs: clear to auscultation bilaterally, no dullness to percussion bilaterally  Heart: + tachycardia , Regular rhythm without rub; no displacement of the PMI   Abdomen: soft, non-tender; bowel sounds normoactive; no organomegaly  Extremities: extremities symmetric; no clubbing, cyanosis, or edema  + leg back alvarenga in place   Integument: Skin color, texture, turgor normal; no rashes; hair distrubution normal  Neurologic: Alert and oriented X 3, normal strength, normal coordination and gait  Psychiatric: no pressured speech; normal affect; no evidence of impaired cognition     Laboratory  Lab Results   Component Value Date    WBC 7.76 2022    HGB 9.8 (L) 2022    HCT 29.5 (L) " 05/02/2022    MCV 87 05/02/2022     05/02/2022     BMP  Lab Results   Component Value Date     05/02/2022    K 4.0 05/02/2022     05/02/2022    CO2 24 05/02/2022    BUN 28 (H) 05/02/2022    CREATININE 3.1 (H) 05/02/2022    CALCIUM 8.0 (L) 05/02/2022    ANIONGAP 9 05/02/2022    ESTGFRAFRICA 24 (A) 05/02/2022    EGFRNONAA 20 (A) 05/02/2022     Lab Results   Component Value Date    ALT 9 (L) 05/02/2022    AST 13 05/02/2022    ALKPHOS 61 05/02/2022    BILITOT 0.4 05/02/2022     Lab Results   Component Value Date    INR 1.1 07/11/2016    INR 1.0 07/02/2016     Lab Results   Component Value Date    HGBA1C >14.0 (H) 04/29/2022       Diagnostic Results:    US Retroperitoneal 5/1/22:  1. Moderate right and mild left hydronephrosis.  2. Diffuse bladder wall thickening out of proportion for degree of nondistention with increased wall vascularity.  Findings may represent sequela of acute cystitis and/or chronic bladder outlet obstruction.  3. Mildly elevated bilateral arterial resistive indices which can represent sequela of hydronephrosis or medical renal disease.  4. Simple right renal cyst.    CT ABD Pelvis 4/28/22:  Markedly distended urinary bladder with hydronephrosis and hydroureter.  Correlate for bladder outlet obstruction.  Increased perinephric stranding.  This can be seen with renal obstruction or pyelonephritis.      ASSESSMENT & PLAN:     HERBERT (acute kidney injury)  Obstructive uropathy  - alvarenga remains in place while undergoing outpatient urology work up  - labs today  - will see Nephrology team 6/20/22  -     Basic Metabolic Panel; Future; Expected date: 05/23/2022    Benign prostatic hyperplasia with urinary obstruction  Urinary retention  - alvarenga remains in place while undergoing outpatient urology work up  - awaiting outpatient Urodynamics scheduling - results to guide treatment plan     Type 2 diabetes, uncontrolled, with renal manifestation  - previously on Metformin but self discontinued  "years ago and lost to endocrinology follow up  - had hyperglycemia, significant electrolyte abnormalities, and elevated Bhydroxybutyrate at presentation  - basal/prandial insulin initiated  - HgBA1C > 14  - had first session diabetic education  - has all diabetic supplies  - will see Endocrinology today   - reviewed home blood glucose log- numbers in good range   -     Ambulatory referral/consult to Endocrinology; Future; Expected date: 05/30/2022    Tachycardia  - HR in 130's noted, patient without symptoms  -     SCHEDULED EKG 12-LEAD (to Muse); Future    Primary hypertension  - not at goal, monitor    Labs and EKG today.  [facility]  He will see Dr Tiago Ruiz 7/22/22 to establish new Primary Care.       Instructions for the patient:      Scheduled Follow-up :  Future Appointments   Date Time Provider Department Center   5/23/2022  2:00 PM Wilian Byers MD [facility] ENDOC [place]   5/26/2022  9:30 AM Brooke Santos DPM [facility] POD [place]   6/10/2022 10:00 AM Clint Wing MD [facility] OPHTHAL [address]   6/20/2022 11:45 AM Eleanor Will MD [facility]   6/28/2022 11:00 AM Iqra Angela RD [facility] DMTP [place]   7/22/2022 10:00 AM Tiago Ruiz MD [facility] Clini       Post Visit Medication List:     Medication List          Accurate as of May 23, 2022  1:09 PM. If you have any questions, ask your nurse or doctor.            CONTINUE taking these medications    atorvastatin 40 MG tablet  Commonly known as: LIPITOR  Take 1 tablet (40 mg total) by mouth once daily.     BD ULTRA-FINE SHORT PEN NEEDLE 31 gauge x 5/16" Ndle  Generic drug: pen needle, diabetic  use 4 times a day     EASY TOUCH TWIST LANCETS 33 gauge Misc  Generic drug: lancets  USE 4 TIMES A DAY     LEVEMIR FLEXTOUCH U-100 INSULN 100 unit/mL (3 mL) Inpn pen  Generic drug: insulin detemir U-100  Inject 15 Units into the skin once daily.     NIFEdipine 30 MG (OSM) 24 hr tablet  Commonly known as: " PROCARDIA-XL  Take 1 tablet (30 mg total) by mouth once daily.     NovoLOG Flexpen U-100 Insulin 100 unit/mL (3 mL) Inpn pen  Generic drug: insulin aspart U-100  Inject 4 Units into the skin 3 (three) times daily.     sildenafil 20 mg Tab  Commonly known as: REVATIO     tamsulosin 0.4 mg Cap  Commonly known as: FLOMAX  Take 1 capsule (0.4 mg total) by mouth once daily.     TRUE METRIX GLUCOSE METER Misc  Generic drug: blood-glucose meter  1 Device by Misc.(Non-Drug; Combo Route) route 4 (four) times daily.     TRUE METRIX GLUCOSE TEST STRIP Strp  Generic drug: blood sugar diagnostic  TEST 4 TIMES A DAY            Signing Physician:  Yolanda Weaver MD

## 2022-05-23 NOTE — TELEPHONE ENCOUNTER
Notified patient of lab results, patient verbalized understanding, notified patient EKG results are still pending, will call with results.

## 2022-05-23 NOTE — TELEPHONE ENCOUNTER
----- Message from Yolanda Weaver MD sent at 5/23/2022  2:34 PM CDT -----  Please let patient know renal function is improving. Not back to baseline but improved from last labs. I'm still waiting on EKG.

## 2022-05-23 NOTE — TELEPHONE ENCOUNTER
----- Message from Adrianna Finnegan sent at 5/23/2022 11:03 AM CDT -----  Contact: pt  Type:  Needs Medical Advice    Who Called:  pt  Symptoms (please be specific):  said he is waiting for someone to call him from office to arrange Procedure discussed with Dr. Garvin      Would the patient rather a call back or a response via [facility]?  Please call   Best Call Back Number:  [phone]  Additional Information:

## 2022-05-23 NOTE — TELEPHONE ENCOUNTER
Spoke with patient to provide him of an update. Currently awaiting call back for scheduling.  He informed me he would like to have UDS done on June 18th as his wife will not be available before then to take him to appointment.  If I'm given a date sooner than 18th, accept it and he will look for other means of transportation.  I voiced understanding.

## 2022-05-23 NOTE — PROGRESS NOTES
Subjective:      Patient ID: Angel Bell is a 61 y.o. male.    Chief Complaint:  DM2; Initial visit     Patient Active Problem List   Diagnosis    Type 2 diabetes, uncontrolled, with renal manifestation    Hyperkalemia    Metabolic acidosis    Hyperglycemia    Abnormal liver enzymes    Hyperlipidemia    Hypertension    Elevated PSA, less than 10 ng/ml    Family history of prostate cancer in father    Urinary retention    Bilateral hydronephrosis    Obstructive uropathy    Normocytic anemia    Benign prostatic hyperplasia with urinary obstruction    Hyperphosphatemia       History of Present Illness  60 YO Male w/ PMH as above that presents to the endocrine clinic for initial evaluation of DM2.  Pt today reports feels well since being discharged from the hospital. Pt reports that after seeing DM education he feels comfortable with using insulin and knowing the foods he should be eating.  Reports he was having some hypoglycemias initially due to taking Novololg and not eating but now he knows to eat if he uses the Novolog.  Denies significant hyper or hypoglycemic symptoms since hospital discharge.     Recent Hospitalization with HERBERT post obstructive.  Noted to Have A1C >14 and started on Levemir and Novolog     With regards to Diabetes Mellitus:   -Type 2    -Duration:  Dx 2016  -FH of DM? Mother DM2    -Microvascular complications: (Nephropathy/CKD)  -Macrovascular complications:  DENIES   -DKA?  Yes   -HHS? Yes   -DM Medications:  Novolog 4 units TID,   Levemir is 15 units in the morning.    -Previously tried medications:  Metformin (CKD)   -Compliance w/ Meds:  YES   -Hyperglycemia symptoms: DENIES Since DC from hospital   -Hypoglycemia symptoms:  DENIES in the past 2 weeks   -Know how to correct hypoglycemias: Yes      -Glucose monitoring:  Checking 4 times (  70 - 110)      -Diet:   Before hospital admission he was having high carb meals.  For the past 2 weeks he has  Been doing < 150g of  carbs during the day.      -Activity:  Sedentary        -Pancreatitis? DENIES      -Thyroid CA?  DENIES     -ETOH Abuse?  DENIES     Diabetes Management Status    Statin: Taking  ACE/ARB: Not taking    Screening or Prevention Patient's value Goal Complete/Controlled?   HgA1C Testing and Control   Lab Results   Component Value Date    HGBA1C >14.0 (H) 04/29/2022      Annually/Less than 8% Yes   Lipid profile : 04/29/2022 Annually Yes   LDL control Lab Results   Component Value Date    LDLCALC 88.2 04/29/2022    Annually/Less than 100 mg/dl  Yes   Nephropathy screening No results found for: LABMICR  Lab Results   Component Value Date    PROTEINUA Trace (A) 05/06/2022    Annually Yes   Blood pressure BP Readings from Last 1 Encounters:   05/17/22 (!) 193/100    Less than 140/90 No   Dilated retinal exam : 05/17/2022 Annually Yes   Foot exam   Most Recent Foot Exam Date: Not Found Annually No        ROS:   As above    Objective:     There were no vitals taken for this visit.  BP Readings from Last 3 Encounters:   05/17/22 (!) 193/100   05/06/22 (!) 156/73   05/02/22 (!) 157/74     Wt Readings from Last 1 Encounters:   05/23/22 1119 71.2 kg (156 lb 15.5 oz)     There is no height or weight on file to calculate BMI.      Physical Exam  Vitals reviewed.   Constitutional:       General: He is not in acute distress.     Appearance: Normal appearance. He is well-developed. He is not ill-appearing.   HENT:      Nose: Nose normal. No rhinorrhea.      Mouth/Throat:      Mouth: Mucous membranes are moist.   Eyes:      Extraocular Movements: Extraocular movements intact.      Pupils: Pupils are equal, round, and reactive to light.      Comments: No proptosis, No lid lag, No conjunctival erythema    Neck:      Thyroid: No thyromegaly.      Trachea: No tracheal deviation.      Comments: No thyromegaly or Thyroid nodules palpated on exam   Cardiovascular:      Rate and Rhythm: Normal rate and regular rhythm.      Pulses: Normal  pulses.   Pulmonary:      Effort: Pulmonary effort is normal.      Breath sounds: Normal breath sounds.   Abdominal:      Palpations: Abdomen is soft. There is no mass.      Tenderness: There is no abdominal tenderness.      Hernia: No hernia is present.      Comments: No scar tissue, No Violaceous striae    Musculoskeletal:         General: No swelling.      Cervical back: Neck supple. No tenderness.      Right lower leg: No edema.      Left lower leg: No edema.   Skin:     General: Skin is warm.      Findings: No rash.   Neurological:      General: No focal deficit present.      Mental Status: He is alert and oriented to person, place, and time.   Psychiatric:         Mood and Affect: Mood normal.         Judgment: Judgment normal.       Protective Sensation (w/ 10 gram monofilament):  Right: Intact  Left: Intact    Visual Inspection:  Normal -  Bilateral    Pedal Pulses:   Right: Present  Left: Present    Posterior tibialis:   Right:Present  Left: Present           Lab Review:   Lab Results   Component Value Date    HGBA1C >14.0 (H) 04/29/2022     Lab Results   Component Value Date    CHOL 143 04/29/2022    HDL 32 (L) 04/29/2022    LDLCALC 88.2 04/29/2022    TRIG 114 04/29/2022    CHOLHDL 22.4 04/29/2022     Lab Results   Component Value Date     05/02/2022    K 4.0 05/02/2022     05/02/2022    CO2 24 05/02/2022     (H) 05/02/2022    BUN 28 (H) 05/02/2022    CREATININE 3.1 (H) 05/02/2022    CALCIUM 8.0 (L) 05/02/2022    PROT 4.9 (L) 05/02/2022    ALBUMIN 2.2 (L) 05/02/2022    BILITOT 0.4 05/02/2022    ALKPHOS 61 05/02/2022    AST 13 05/02/2022    ALT 9 (L) 05/02/2022    ANIONGAP 9 05/02/2022    ESTGFRAFRICA 24 (A) 05/02/2022    EGFRNONAA 20 (A) 05/02/2022    TSH 0.536 04/29/2022     No results found for: [id number]    Assessment and Plan     Uncontrolled type 2 diabetes mellitus with hyperglycemia  Lab Results   Component Value Date    HGBA1C >14.0 (H) 04/29/2022      -FS log  AT GOAL    -Diet, exercise, lifestyle modifications and A1c Goals discussed   -Hypoglycemia symptoms and plan of action discussed   -Low carb diet   -Seen DM Education?  NO, Will give referral today     PLAN:     Due to A1C > 14 and HERBERT on CKD and now glucose tightly controlled on MDI. I will recommend the following.     Decrease Levemir to 12 units daily     C/w Novolog 4 units TID     Pending on Glucose trend and kidney function will consider introducing other non insulin agents in the future with hopes of decreasing insulin doses     Due to patient showing commitment w/ DM management, being on MDI , checking glucose 4 times a day and due to the COVID pandemic I believe he will benefit from DEXCOM continuous glucose monitor for better control of DM, hypoglycemia prevention and remote monitoring of glucose in between visit.      DM education and ophthalmology referrals given     Labs prior to next visit     Hyperlipidemia, unspecified hyperlipidemia type  LDL at goal On Statin   Low Fat diet     Hypertension, essential  BP at goal     No ARNIE available (Will order and consider ACE or ARB pending on results and trend of GFR after resolution of HERBERT       Elevated PSA    Will give Urology referral

## 2022-05-23 NOTE — PATIENT INSTRUCTIONS
Due to recently starting insulin 4 times a day  with significant imrprovement of your sugar levels. I will recommend the following.     We will decrease the Levemir to 12 units in the morning (Long acting insulin)      We will continue with Novolog 4 units ( 10- 15 minutes before your meals) If not eating do not use the Novolog as it may result in a low blood sugar.     Once your A1C improves we will try to decrease some of the insulin doses and get you on the once a week non insulin injectables.     I will send a DEXCOM continuous glucose monitor to substititue the fingers sticks and help control your sugars better.  I sent the device to the [facility] at the [facility].     I will give you a sample today for you to take it to the diabetes educators for training.      150 grams of carbohydrates daily MAX,  (50 grams or less per meal)     Avoid sugary drinks (Sodas, Sweet Tea, Juices with added sugar, Soft drinks)     Check your sugars 3-4 times daily (Before meals and at bedtime)     Sugar goals before breakfast (70 - 130)  Sugars 2 hours after meals  (less than 180)     Keep sugar log for review at next visit.     Try walking or doing some sort of physical activity at least 30 minutes 3 times a week to increase your sensitivity to insulin, improve sugar levels and hopefully this will help in trying to reduce the doses of your medications in the future.     If having low blood sugar < 70 please correct with 16 grams of carbohydrates or with 4 glucose tablets and re-check your sugars every 15 minutes until symptoms resolve and sugar is above 100.      We will check your A1C and labs prior to next visit.     Ophthalmology appointment once a year.     Diabetes education appointment once a year.     If any questions feel free to contact me.     Have a nice day.     Sincerely,       Wilian Rogers MD   Endocrinology   5/23/2022 2:00 PM

## 2022-05-24 ENCOUNTER — TELEPHONE (OUTPATIENT)
Dept: UROLOGY | Facility: CLINIC | Age: 62
End: 2022-05-24
Payer: COMMERCIAL

## 2022-05-24 NOTE — TELEPHONE ENCOUNTER
----- Message from Ted Garvin MD sent at 5/24/2022 12:40 PM CDT -----  Regarding: Urodynamics  Dr. Prasad,    New urologist at [place] here.  Was hoping to get this gentleman in ASAP for urodynamics.  Let me know if willing to do.  Recently admitted with distended bladder bilateral hydro, Cr 14 on admit.   Hydro improved with alvarenga placement.  I measured prostate at 112 gm.  He has severe DM and HgA1c was 14.  Doing UDS to ensure bladder contraction and make decision on robotic simple prostatectomy vs holmium enucleation. His PSA is high likely due to cath in place, will do MRI before procedure.    Please let me know if able to do UDS for him.  T      Thanks,    Gomez Garvin     Urinary Retention 4/29/2022    Distended bladder with bilateral hydroneprosis, ARF  Prostate volume calculated 112 myself (larger on CT measurements, 89gm on QUOC)     Bilateral Hydronephrosis 4/29/2022    QUOC 5/22: mild right hydronephrosis, greatly improved from CT           Plan:     1. Check PSA  2. Given severe DM, HgA1c >14 and UR with bilateral hydronephrosis, recommend urodynamics to assess bladder function/reflux   3. Discussed TURP, GLL PVP, HoLEP, RASP with patient.  Given size of prostate recommend HoLEP vs Robotic assisted simple prostatectomy (RASP); hesitant to do RASP given severe uncontrolled DM and infection risk.  Will discuss further after UDS.   4. Check PSA, discussed can be confounding given catheter in place, yet has strong FH of PCa in father, MRI if needed  5. Follow up after UDS to discuss.      Ted Garvin MD

## 2022-05-25 ENCOUNTER — PATIENT MESSAGE (OUTPATIENT)
Dept: UROLOGY | Facility: CLINIC | Age: 62
End: 2022-05-25
Payer: COMMERCIAL

## 2022-05-25 ENCOUNTER — TELEPHONE (OUTPATIENT)
Dept: UROLOGY | Facility: CLINIC | Age: 62
End: 2022-05-25
Payer: COMMERCIAL

## 2022-05-25 ENCOUNTER — EXTERNAL HOME HEALTH (OUTPATIENT)
Dept: HOME HEALTH SERVICES | Facility: HOSPITAL | Age: 62
End: 2022-05-25
Payer: COMMERCIAL

## 2022-05-25 NOTE — TELEPHONE ENCOUNTER
----- Message from Elza Kim sent at 5/25/2022 10:22 AM CDT -----  Contact: [phone]/Maye  Who Called: PT  Regarding: cathether  Would the patient rather a call back or a response via [facility]? Call back  Best Call Back Number: [phone]  Additional Information: n/a

## 2022-05-25 NOTE — TELEPHONE ENCOUNTER
Spoke with Maye, home health nurse.  Patient refused to have catheter changed today as he informed her of the difficult placement in hospital.  Last exchange was 4/28 or 29th.  Informed close follow up. Need UDS asap.  She revisits on next Wednesday.

## 2022-05-25 NOTE — PROGRESS NOTES
HPI     Diabetic Eye Exam      Additional comments: Patient Angel Bell is a 61 year old male.              Comments     Pt here for new patient diabetic eye exam. Pt states that he lost his Rx   glasses 1 month ago and is having trouble with near print. Pt states that   distance [facility] is good since RIK. Patient denies diplopia, headaches,   flashes/floaters, and pain.    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 Heterozygous hemoglobin   variants (HbS, HgC, etc)do  not significantly interfere with this assay.     However, presence of multiple variants may affect accuracy.         07/03/2016               9.4 (H)             4.5 - 6.2 %           Final            ----------                   Last edited by Vita Jung on 5/17/2022  1:00 PM. (History)            Assessment /Plan     For exam results, see Encounter Report.    Type 2 diabetes, uncontrolled, with renal manifestation  -   CWS in posterior pole  Today  Color Fundus Photography - OU - Both Eyes   Consult Retina    NS (nuclear sclerosis), right   Borderline VS/ consider cat eval    Vision loss of left eye   Pt reports injury to OS approx 20 years ago    Presbyopia   Rx specs

## 2022-05-26 ENCOUNTER — OFFICE VISIT (OUTPATIENT)
Dept: PODIATRY | Facility: CLINIC | Age: 62
End: 2022-05-26
Payer: COMMERCIAL

## 2022-05-26 VITALS — BODY MASS INDEX: 25.23 KG/M2 | HEIGHT: 66 IN

## 2022-05-26 DIAGNOSIS — E08.42 DIABETIC POLYNEUROPATHY ASSOCIATED WITH DIABETES MELLITUS DUE TO UNDERLYING CONDITION: ICD-10-CM

## 2022-05-26 DIAGNOSIS — L60.9 DISEASE OF NAIL: ICD-10-CM

## 2022-05-26 DIAGNOSIS — E11.9 ENCOUNTER FOR DIABETIC FOOT EXAM: Primary | ICD-10-CM

## 2022-05-26 PROCEDURE — 99203 PR OFFICE/OUTPT VISIT, NEW, LEVL III, 30-44 MIN: ICD-10-PCS | Mod: 25,S$GLB,, | Performed by: STUDENT IN AN ORGANIZED HEALTH CARE EDUCATION/TRAINING PROGRAM

## 2022-05-26 PROCEDURE — 99203 OFFICE O/P NEW LOW 30 MIN: CPT | Mod: 25,S$GLB,, | Performed by: STUDENT IN AN ORGANIZED HEALTH CARE EDUCATION/TRAINING PROGRAM

## 2022-05-26 PROCEDURE — 3046F HEMOGLOBIN A1C LEVEL >9.0%: CPT | Mod: CPTII,S$GLB,, | Performed by: STUDENT IN AN ORGANIZED HEALTH CARE EDUCATION/TRAINING PROGRAM

## 2022-05-26 PROCEDURE — 99999 PR PBB SHADOW E&M-EST. PATIENT-LVL III: CPT | Mod: PBBFAC,,, | Performed by: STUDENT IN AN ORGANIZED HEALTH CARE EDUCATION/TRAINING PROGRAM

## 2022-05-26 PROCEDURE — 11721 DEBRIDE NAIL 6 OR MORE: CPT | Mod: S$GLB,,, | Performed by: STUDENT IN AN ORGANIZED HEALTH CARE EDUCATION/TRAINING PROGRAM

## 2022-05-26 PROCEDURE — 1111F PR DISCHARGE MEDS RECONCILED W/ CURRENT OUTPATIENT MED LIST: ICD-10-PCS | Mod: CPTII,S$GLB,, | Performed by: STUDENT IN AN ORGANIZED HEALTH CARE EDUCATION/TRAINING PROGRAM

## 2022-05-26 PROCEDURE — 1159F MED LIST DOCD IN RCRD: CPT | Mod: CPTII,S$GLB,, | Performed by: STUDENT IN AN ORGANIZED HEALTH CARE EDUCATION/TRAINING PROGRAM

## 2022-05-26 PROCEDURE — 11721 ROUTINE FOOT CARE: ICD-10-PCS | Mod: S$GLB,,, | Performed by: STUDENT IN AN ORGANIZED HEALTH CARE EDUCATION/TRAINING PROGRAM

## 2022-05-26 PROCEDURE — 99999 PR PBB SHADOW E&M-EST. PATIENT-LVL III: ICD-10-PCS | Mod: PBBFAC,,, | Performed by: STUDENT IN AN ORGANIZED HEALTH CARE EDUCATION/TRAINING PROGRAM

## 2022-05-26 PROCEDURE — 3008F PR BODY MASS INDEX (BMI) DOCUMENTED: ICD-10-PCS | Mod: CPTII,S$GLB,, | Performed by: STUDENT IN AN ORGANIZED HEALTH CARE EDUCATION/TRAINING PROGRAM

## 2022-05-26 PROCEDURE — 3008F BODY MASS INDEX DOCD: CPT | Mod: CPTII,S$GLB,, | Performed by: STUDENT IN AN ORGANIZED HEALTH CARE EDUCATION/TRAINING PROGRAM

## 2022-05-26 PROCEDURE — 1111F DSCHRG MED/CURRENT MED MERGE: CPT | Mod: CPTII,S$GLB,, | Performed by: STUDENT IN AN ORGANIZED HEALTH CARE EDUCATION/TRAINING PROGRAM

## 2022-05-26 PROCEDURE — 1159F PR MEDICATION LIST DOCUMENTED IN MEDICAL RECORD: ICD-10-PCS | Mod: CPTII,S$GLB,, | Performed by: STUDENT IN AN ORGANIZED HEALTH CARE EDUCATION/TRAINING PROGRAM

## 2022-05-26 PROCEDURE — 3046F PR MOST RECENT HEMOGLOBIN A1C LEVEL > 9.0%: ICD-10-PCS | Mod: CPTII,S$GLB,, | Performed by: STUDENT IN AN ORGANIZED HEALTH CARE EDUCATION/TRAINING PROGRAM

## 2022-05-26 NOTE — PROGRESS NOTES
Subjective:      Patient ID: Angel Bell is a 61 y.o. male.    Chief Complaint: Diabetes Mellitus (PCP Dr. Ruiz, F. 7/22/22 , she is patients new PCP, 5/23/22 Dr. Begum), Diabetic Foot Exam, and Routine Foot Care    Angel is a 61 y.o. male who presents to the clinic upon referral from Dr. Graff  for evaluation and treatment of diabetic feet. Angel has a past medical history of Diabetes mellitus, Hyperlipemia, Hypertension, Migraine headache, and PTSD (post-traumatic stress disorder). Patient relates no major problem with feet. Only complaints today consist of here for a diabetic foot exam. Relates to elongated and thickened toenails that are difficult to trim. No other pedal complaints.     PCP: Tiago Ruiz MD    Date Last Seen by PCP: 5/23/22 with Dr. Begum  Current shoe gear: Casual shoes    Hemoglobin A1C   Date Value Ref Range Status   04/29/2022 >14.0 (H) 4.0 - 5.6 % Final     Comment:     ADA Screening Guidelines:  5.7-6.4%  Consistent with prediabetes  >or=6.5%  Consistent with diabetes    High levels of fetal hemoglobin interfere with the HbA1C  assay. Heterozygous hemoglobin variants (HbS, HgC, etc)do  not significantly interfere with this assay.   However, presence of multiple variants may affect accuracy.     07/03/2016 9.4 (H) 4.5 - 6.2 % Final           Review of Systems   Constitutional: Negative for chills, decreased appetite, diaphoresis and fever.   HENT: Negative for congestion and hearing loss.    Cardiovascular: Negative for chest pain, claudication, leg swelling and syncope.   Respiratory: Negative for cough and shortness of breath.    Skin: Positive for dry skin and nail changes. Negative for color change, flushing, itching, poor wound healing and rash.   Musculoskeletal: Negative for arthritis, back pain, joint pain and joint swelling.   Gastrointestinal: Negative for nausea and vomiting.   Neurological: Negative for focal weakness, paresthesias and weakness.   Psychiatric/Behavioral:  Negative for altered mental status. The patient is not nervous/anxious.            Objective:      Physical Exam  Constitutional:       General: He is not in acute distress.     Appearance: Normal appearance. He is well-developed. He is not diaphoretic.   Cardiovascular:      Comments: Dorsalis pedis and posterior tibial pulses are within normal limits. Skin temperature is within normal limits. Toes are cool to touch and feet are warm proximally. Hair growth is within normal limits. Skin is normotrophic and without hyperpigmentation. No edema noted. No varicosities or spider veins noted, bilaterally.     Musculoskeletal:         General: No tenderness.      Comments: Adequate joint range of motion without pain, limitation, nor crepitation to foot and ankle joints. Muscle strength is 5/5 in all groups bilaterally.       Feet:      Right foot:      Skin integrity: Dry skin present. No ulcer, blister, erythema or warmth.      Left foot:      Skin integrity: Dry skin present. No ulcer, blister, erythema or warmth.   Skin:     General: Skin is warm and dry.      Capillary Refill: Capillary refill takes less than 2 seconds.      Comments: Skin is warm and dry, no acute signs of infection noted bilaterally      Toenails are thickened by 2-4 mm's, dystrophic, and are darkened in coloration with subungual fungal debris.     Otherwise, no open wounds, macerations or hyperkeratotic lesions, bilaterally            Neurological:      Mental Status: He is alert and oriented to person, place, and time.      Sensory: Sensory deficit present.      Motor: No abnormal muscle tone.      Comments: Light touch within normal limits. Caldwell-Guilherme 5.07 monofilamant testing is within normal limits. Vibratory sensation is mildly diminished bilaterally.   Psychiatric:         Mood and Affect: Mood normal.         Behavior: Behavior normal.         Thought Content: Thought content normal.               Assessment:       Encounter Diagnoses    Name Primary?    Encounter for diabetic foot exam Yes    Disease of nail     Diabetic polyneuropathy associated with diabetes mellitus due to underlying condition          Plan:       Angel was seen today for diabetes mellitus, diabetic foot exam and routine foot care.    Diagnoses and all orders for this visit:    Encounter for diabetic foot exam  -     Routine Foot Care    Disease of nail  -     Routine Foot Care    Diabetic polyneuropathy associated with diabetes mellitus due to underlying condition  -     Routine Foot Care      I counseled the patient on his conditions, their implications and medical management.    RFC per attached note    Recommend K[employer] N[employer] for toenail changes    Shoe inspection. Diabetic Foot Education. Patient reminded of the importance of good nutrition and blood sugar control to help prevent podiatric complications of diabetes. Patient instructed on proper foot hygeine. We discussed wearing proper shoe gear, daily foot inspections, never walking without protective shoe gear, never putting sharp instruments to feet, routine podiatric nail visits      RTC in 1 year, sooner PRN

## 2022-05-26 NOTE — PROCEDURES
"Routine Foot Care    Date/Time: 5/26/2022 9:30 AM  Performed by: Brooke Santos DPM  Authorized by: Brooke Santos DPM     Time out: Immediately prior to procedure a "time out" was called to verify the correct patient, procedure, equipment, support staff and site/side marked as required.    Consent Done?:  Yes (Verbal)  Hyperkeratotic Skin Lesions?: No      Nail Care Type:  Debride  Location(s): All  (Left 1st Toe, Left 3rd Toe, Left 2nd Toe, Left 4th Toe, Left 5th Toe, Right 1st Toe, Right 2nd Toe, Right 3rd Toe, Right 4th Toe and Right 5th Toe)  Patient tolerance:  Patient tolerated the procedure well with no immediate complications      "

## 2022-06-01 ENCOUNTER — PATIENT MESSAGE (OUTPATIENT)
Dept: PRIMARY CARE CLINIC | Facility: CLINIC | Age: 62
End: 2022-06-01
Payer: COMMERCIAL

## 2022-06-01 ENCOUNTER — TELEPHONE (OUTPATIENT)
Dept: UROLOGY | Facility: CLINIC | Age: 62
End: 2022-06-01
Payer: COMMERCIAL

## 2022-06-01 RX ORDER — METOPROLOL TARTRATE 50 MG/1
50 TABLET ORAL 2 TIMES DAILY
Qty: 60 TABLET | Refills: 3 | Status: SHIPPED | OUTPATIENT
Start: 2022-06-01 | End: 2022-07-13

## 2022-06-01 NOTE — TELEPHONE ENCOUNTER
----- Message from Elza Kim sent at 6/1/2022 10:11 AM CDT -----  Contact: [phone]/Maye with home health  Who Called: PT  Regarding: catheter  Would the patient rather a call back or a response via [facility]? Call back  Best Call Back Number: [phone]  Additional Information: N/a

## 2022-06-02 ENCOUNTER — NUTRITION (OUTPATIENT)
Dept: DIABETES | Facility: CLINIC | Age: 62
End: 2022-06-02
Payer: COMMERCIAL

## 2022-06-02 PROCEDURE — G0108 PR DIAB MANAGE TRN  PER INDIV: ICD-10-PCS | Mod: S$GLB,,, | Performed by: DIETITIAN, REGISTERED

## 2022-06-02 PROCEDURE — G0108 DIAB MANAGE TRN  PER INDIV: HCPCS | Mod: S$GLB,,, | Performed by: DIETITIAN, REGISTERED

## 2022-06-02 NOTE — PROGRESS NOTES
Diabetes Care Specialist Follow-up Note  Author: Iqra Angela RD  Date: 6/2/2022    Program Intake  Reason for Diabetes Program Visit:: Intervention  Type of Intervention:: Individual  Individual: Device Training  Device Training: Personal CGM  Permission to speak with others about care:: no    Lab Results   Component Value Date    HGBA1C >14.0 (H) 04/29/2022         Clinical  Patient in office w/ dexcom - inserted and instructed on use.    During today's follow-up visit,  the following areas required further assessment and content was provided/reviewed.    Based on today's diabetes care assessment, the following areas of need were identified:      Social 5/19/2022   Support No   Access to Mass Media/Tech No   Cognitive/Behavioral Health No   Culture/Oriental orthodox No   Communication No   Health Literacy No        Clinical 5/19/2022   Medication Adherence No   Lab Compliance No   Nutritional Status No        Diabetes Self-Management Skills 5/19/2022   Diabetes Disease Process/Treatment Options No   Nutrition/Healthy Eating Yes   Physical Activity/Exercise No   Medication No   Home Blood Glucose Monitoring No   Acute Complications Yes   Chronic Complications No   Psychosocial/Coping No        Today's interventions were provided through individual discussion, instruction, and written materials were provided.    Patient verbalized understanding of instruction and written materials.  Pt was able to return back demonstration of instructions today. Patient understood key points, needs reinforcement and further instruction.     Diabetes Self-Management Care Plan Review:  Diabetes Self-Management Care Plan Review and Evaluation of Progress:    During today's follow-up Kimber Diabetes Self-Management Care Plan progress was reviewed and progress was evaluated including his/her input. Angel has agreed to continue his/her journey to improve/maintain overall diabetes control by continuing to set health goals. See care plan  "progress below.      Care Plan: Diabetes Management   Updates made since 5/3/2022 12:00 AM      Problem: Healthy Eating       Goal: Eat 3 meals daily with 3 servings of Carbohydrate per meal.    Start Date: 5/19/2022   Expected End Date: 8/1/2022   Priority: High   Barriers: Lack of Motivation to Change      Task: Reviewed the sources and role of Carbohydrate, Protein, and Fat and how each nutrient impacts blood sugar. Completed 5/19/2022      Task: Provided visual examples using dry measuring cups, food models, and other familiar objects such as computer mouse, deck or cards, tennis ball etc. to help with visualization of portions. Completed 5/19/2022      Task: Explained how to count carbohydrates using dry measuring cups for accurate carb counting. Completed 5/19/2022      Task: Discussed strategies for choosing healthier menu options when dining out.       Task: Recommended replacing beverages containing high sugar content with noncaloric/sugar free options and/or water. Completed 5/19/2022      Task: Review the importance of balancing carbohydrates with each meal using portion control techniques to count servings of carbohydrate and label reading to identify serving size and amount of total carbs per serving.       Task: Provided Sample plate method and reviewed the use of the plate to estimate amounts of carbohydrate per meal. Completed 5/19/2022      Problem: Blood Glucose Self-Monitoring       Goal: Patient agrees to check and record blood sugars 3-4 times per day.    Start Date: 6/2/2022   Expected End Date: 8/1/2022   Priority: High   Barriers: Knowledge deficit   Note:    DEXCOM G6 CGM TRAINING     Patient referred to clinic today for Dexcom G6 continuous glucose sensor system training.  Patient arrived with  fully charged and Dexcom G6 mobile janell downloaded to phone. Education was provided using "Quick Start Guide" per Dexcom protocol.     Pt will be using his iphone as the primary " .  § Overview:  5min glucose reading updates, trending arrows, BG graph screens, battery life indicator, Blue Tooth Symbol.  § Menus: trend Graph, start sensor, enter BG, events, Alerts, Settings, Shutdown, Stop Sensor  §  Settings:                          * Urgent Low: 55 mg/dL                          * Urgent Low Soon: on                          * Low alert: 75 mg/dl repeat 15 min                          * High alert: 200 mg/dl repeat 30 min                          * Rise rate: off                          * Fall Rate: off                          * Signal Loss: on repeat 20 min                          * No Reading: on repeat 20 min                          * Always sound: on                          * Alert Schedule:  (instructed on how to set up alert schedule if desired)     · Reviewed additional setting options with patient, including Graph Height and Transmitter ID. Transmitter was paired with .    · Reviewed where to find sensor insertion time and sensor expiration date.   · Discussed no need to calibrate sensor during the entirety of the 10 day wear. Discussed that pt can calibrate sensor if desired, but at that time she will need to continue calibrating every 12 hours for sensor to remain accurate. Reviewed appropriate calibration techniques.  · Reviewed sensor site selection. Site selected and prepped using aseptic technique Inserted to right abdomen. Transmitter placed in pod and secured.  · Practiced sensor pod/transmitter removal from site, and removal of transmitter from sensor pod.  · Patient able to demonstrate without difficulty.  Encouraged to review manual prior to starting another sensor.   · Reviewed problem solving aspects of sensor transmission/ variables that can disrupt RF transmission.  range 20 feet, but the first 3 hrs keep within 3 feet of transmitter.  · Pt instructed on lag time of interstitial fluid from CBG and was advised to tx hypoglycemia and  dose insulin based on SMBG values.  · Dexcom technical support contact number given and examples of when to contact them discussed. Pt will download software at home for Dexcom download.   · Patient advised to always check glucose with glucometer should readings do not match symptoms felt (ex. Hypo or hyperglycemia).          Task: Provided patient with a meter today and sent Rx request to provider to send to patients pharmacy.       Task: Reviewed the importance of self-monitoring blood glucose and keeping logs. Completed 6/2/2022      Task: Instructed on how to self-monitor blood glucose using a home glucometer, how to properly dispose of used strips and lancets after use, and how to appropriately store meter and supplies.       Task: Provided patient with blood glucose logs, reviewed appropriate timing and frequency to SMBG, education on parameters on when to notify provider and advised patient to bring logs to all appts with PCP/Endocrinologist/Diabetes Care Specialist.       Task: Discussed ways to minimize pain when monitoring blood glucose.           Follow Up Plan     Follow up in about 3 months (around 9/2/2022).    Today's care plan and follow up schedule was discussed with patient.  Angel verbalized understanding of the care plan, goals, and agrees to follow up plan.        The patient was encouraged to communicate with his/her health care provider/physician and care team regarding his/her condition(s) and treatment.  I provided the patient with my contact information today and encouraged to contact me via phone or [facility] as needed.   \

## 2022-06-02 NOTE — TELEPHONE ENCOUNTER
Contacted Maye, patient still not comfortable having exchange done in home.  Informed recommendation is to have catheter changed every 28-30 days.  If patient does not feel comfortable having exchange done in home, he is welcome to come into office, or he can get exchange when he goes for UDS on 6/9/22.  She voiced understanding and will reiterate recommendations.  At this time no issues with catheter nor urine draining to catheter bag.  Weekend any issues report to ED.

## 2022-06-08 ENCOUNTER — TELEPHONE (OUTPATIENT)
Dept: UROLOGY | Facility: CLINIC | Age: 62
End: 2022-06-08
Payer: COMMERCIAL

## 2022-06-08 NOTE — TELEPHONE ENCOUNTER
Contacted patient on 6/7 and 6/8 to relate message from pre-service, [facility].  Patient informed me he does not [facility].  [employer] is his primary insurance.  Message related to pre-service.

## 2022-06-09 ENCOUNTER — PROCEDURE VISIT (OUTPATIENT)
Dept: UROLOGY | Facility: CLINIC | Age: 62
End: 2022-06-09
Payer: COMMERCIAL

## 2022-06-09 VITALS
HEART RATE: 113 BPM | HEIGHT: 66 IN | WEIGHT: 153 LBS | SYSTOLIC BLOOD PRESSURE: 128 MMHG | DIASTOLIC BLOOD PRESSURE: 67 MMHG | BODY MASS INDEX: 24.59 KG/M2 | TEMPERATURE: 98 F

## 2022-06-09 DIAGNOSIS — R33.9 URINARY RETENTION: ICD-10-CM

## 2022-06-09 DIAGNOSIS — N13.30 BILATERAL HYDRONEPHROSIS: ICD-10-CM

## 2022-06-09 PROCEDURE — 51728 PR COMPLEX CYSTOMETROGRAM VOIDING PRESSURE STUDIES: ICD-10-PCS | Mod: S$GLB,,, | Performed by: UROLOGY

## 2022-06-09 PROCEDURE — 51784 ANAL/URINARY MUSCLE STUDY: CPT | Mod: 26,51,S$GLB, | Performed by: UROLOGY

## 2022-06-09 PROCEDURE — 51784 PR ANAL/URINARY MUSCLE STUDY: ICD-10-PCS | Mod: 26,51,S$GLB, | Performed by: UROLOGY

## 2022-06-09 PROCEDURE — 51741 PR UROFLOWMETRY, COMPLEX: ICD-10-PCS | Mod: 26,51,S$GLB, | Performed by: UROLOGY

## 2022-06-09 PROCEDURE — 51741 ELECTRO-UROFLOWMETRY FIRST: CPT | Mod: 26,51,S$GLB, | Performed by: UROLOGY

## 2022-06-09 PROCEDURE — 51728 CYSTOMETROGRAM W/VP: CPT | Mod: S$GLB,,, | Performed by: UROLOGY

## 2022-06-09 NOTE — PATIENT INSTRUCTIONS
_                                                                                                                                                                                             If any problems after hours or weekends, you may call [phone] and ask for the urology resident on call. SIMPLE URODYNAMIC STUDY (SUDS) DISCHARGE INSTRUCTIONS    You have had a procedure that will require time to properly heal. Follow the instructions you have been given on how to care for yourself once you are home. Below is additional information to help in your recovery.    ACTIVITY  There are no restrictions in activity. Start doing again the things you did before the procedure.  You may experience a slight burning sensation. You may notice a small amount of blood in your urine. This will clear up within a day. Call the clinic if this continues beyond 48 hours.     DIET  Continue your normal diet. You may eat the same foods you ate before your procedure.  Drink plenty of fluids during the first 24-48 hours following your procedure.     MEDICATIONS  Resume all other previous medications from your prescribing physician.  Continue any pre-procedure antibiotics until they are all gone.     SIGNS AND SYMPTOMS TO REPORT TO THE DOCTOR  Chills or fever greater than 101° F within 24 hours of procedure.  Changes in urination, such as increased bleeding, foul smell, cloudy urine, or painful urination.  Call your doctor with any questions or concerns.     For any emergency situation, call 911 immediately or go to your nearest emergency room.     [facility]  [phone]

## 2022-06-11 ENCOUNTER — HOSPITAL ENCOUNTER (EMERGENCY)
Facility: HOSPITAL | Age: 62
Discharge: HOME | End: 2022-06-11
Attending: EMERGENCY MEDICINE
Payer: COMMERCIAL

## 2022-06-11 VITALS
TEMPERATURE: 99 F | BODY MASS INDEX: 24.69 KG/M2 | SYSTOLIC BLOOD PRESSURE: 125 MMHG | OXYGEN SATURATION: 98 % | DIASTOLIC BLOOD PRESSURE: 65 MMHG | WEIGHT: 153 LBS | RESPIRATION RATE: 21 BRPM | HEART RATE: 111 BPM

## 2022-06-11 DIAGNOSIS — R73.9 HYPERGLYCEMIA: Primary | ICD-10-CM

## 2022-06-11 DIAGNOSIS — R00.0 TACHYCARDIA: ICD-10-CM

## 2022-06-11 LAB — POCT GLUCOSE: 152 MG/DL (ref 70–110)

## 2022-06-11 PROCEDURE — 82962 GLUCOSE BLOOD TEST: CPT

## 2022-06-11 PROCEDURE — 99284 EMERGENCY DEPT VISIT MOD MDM: CPT | Mod: 25

## 2022-06-11 PROCEDURE — 93010 EKG 12-LEAD: ICD-10-PCS | Mod: ,,, | Performed by: INTERNAL MEDICINE

## 2022-06-11 PROCEDURE — 93005 ELECTROCARDIOGRAM TRACING: CPT

## 2022-06-11 PROCEDURE — 93010 ELECTROCARDIOGRAM REPORT: CPT | Mod: ,,, | Performed by: INTERNAL MEDICINE

## 2022-06-11 PROCEDURE — 25000003 PHARM REV CODE 250: Performed by: NURSE PRACTITIONER

## 2022-06-11 PROCEDURE — 96360 HYDRATION IV INFUSION INIT: CPT

## 2022-06-11 RX ADMIN — SODIUM CHLORIDE 1000 ML: 0.9 INJECTION, SOLUTION INTRAVENOUS at 03:06

## 2022-06-11 NOTE — ED PROVIDER NOTES
Encounter Date: 6/11/2022    SCRIBE #1 NOTE: I, Meron Rivera, am scribing for, and in the presence of, Nena Peace.       History     Chief Complaint   Patient presents with    Hyperglycemia     Pt reports his sugar was over 200 on his dexcom g6. Pt uses insulin at home. Pt has no complaints but has an elevated heart rate in triage. Pt denies chest pain or shortness of breath. Pt recently started taking metoprolol. Diabetic medications have not changed.      Angel Bell is a 61 y.o. male presenting with hyperglycemia. Patient has a past medical history of Diabetes mellitus, Hyperlipemia, Hypertension, Migraine headache, and PTSD (post-traumatic stress disorder). Patient states that he measured his blood sugar level at home, and it was over 200 bringing him to the ED. Patient denies any other symptoms at this time.    The history is provided by the patient.     Review of patient's allergies indicates:  No Known Allergies  Past Medical History:   Diagnosis Date    Diabetes mellitus     Hyperlipemia     Hypertension     Migraine headache     PTSD (post-traumatic stress disorder)      No past surgical history on file.  Family History   Problem Relation Age of Onset    Diabetes Mother      Social History     Tobacco Use    Smoking status: Never Smoker    Smokeless tobacco: Never Used   Substance Use Topics    Alcohol use: No    Drug use: No     Review of Systems   Constitutional: Negative for fever.   HENT: Negative for sore throat.    Respiratory: Negative for shortness of breath.    Cardiovascular: Negative for chest pain.   Gastrointestinal: Negative for nausea.   Genitourinary: Negative for dysuria.   Musculoskeletal: Negative for back pain.   Skin: Negative for rash.   Neurological: Negative for weakness.   Hematological: Does not bruise/bleed easily.   All other systems reviewed and are negative.    Physical Exam     Initial Vitals [06/11/22 1450]   BP Pulse Resp Temp SpO2   119/64 (!) 143  18 98.6 °F (37 °C) 99 %      MAP       --         Physical Exam    Nursing note and vitals reviewed.  Constitutional: He appears well-developed.   HENT:   Head: Normocephalic and atraumatic.   Eyes: Conjunctivae and EOM are normal. Pupils are equal, round, and reactive to light.   Neck:   Normal range of motion.  Cardiovascular: Normal heart sounds and intact distal pulses. Tachycardia present.  Exam reveals no gallop and no friction rub.    No murmur heard.  Pulmonary/Chest: Breath sounds normal. No respiratory distress. He has no wheezes. He has no rhonchi. He has no rales. He exhibits no tenderness.   Abdominal: Abdomen is soft. Bowel sounds are normal. He exhibits no distension and no mass. There is no abdominal tenderness. There is no rebound and no guarding.   Musculoskeletal:         General: No tenderness or edema. Normal range of motion.      Cervical back: Normal range of motion.     Neurological: He is alert and oriented to person, place, and time. He has normal strength. GCS score is 15. GCS eye subscore is 4. GCS verbal subscore is 5. GCS motor subscore is 6.   Skin: Capillary refill takes less than 2 seconds.       ED Course   Procedures  Labs Reviewed   POCT GLUCOSE - Abnormal; Notable for the following components:       Result Value    POCT Glucose 152 (*)     All other components within normal limits           Medications   sodium chloride 0.9% bolus 1,000 mL (0 mLs Intravenous Stopped 6/11/22 6629)     Medical Decision Making:   Initial Assessment:   60 y/o male which presents with concerns that his blood sugar is elevated. Upon arrival the patient's blood sugar was 152.   Differential Diagnosis:   Tachycardia, dehydration, hyperglycemia  Clinical Tests:   Lab Tests: Ordered and Reviewed       <> Summary of Lab: POCT glucose  ED Management:  Pt examined and has a normal exam except for tachycardia. Review of chart shows the patient has had a work up by cardiology for his tachycardia and recently  started on metoprolol. Pt given 1L of NS and his heart rate improved. His sugar remained WNL. Patient given strict return precautions and voiced understanding of all discharge instructions. Pt was stable at discharge.               Scribe Attestation:   Scribe #1: I performed the above scribed service and the documentation accurately describes the services I performed. I attest to the accuracy of the note.        ED Course as of 06/1960   Sat Jun 11, 2022   1452 POCT Glucose(!): 152 [AT]   1452 BP: 119/64 [AT]   1452 Temp: 98.6 °F (37 °C) [AT]   1452 Temp src: Oral [AT]   1452 Pulse(!): 143 [AT]   1452 Resp: 18 [AT]   1452 SpO2: 99 % [AT]   1532 Pulse(!): 113 [AT]      ED Course User Index  [AT] ANAIS Hurtado             Clinical Impression:   Final diagnoses:  [R00.0] Tachycardia  [R73.9] Hyperglycemia (Primary)          ED Disposition Condition    Discharge Stable        ED Prescriptions     None        Follow-up Information     Follow up With Specialties Details Why Contact Info    Tiago Ruiz MD Family Medicine Schedule an appointment as soon as possible for a visit  As needed [address]  [address]  [facility] [address] [phone]        Patient given strict return precautions and voiced understanding of all discharge instructions. Pt was stable at discharge.          ANAIS Hurtado  06/1960

## 2022-06-11 NOTE — ED TRIAGE NOTES
"Pt came to ED with c/o elevated CBG. Pt reports compliance with insulin. Pt denies fever, chills,n /v/d, sob, cp, abdominal pain. In triage, pt heart rate 140's. Pt states " my heart rate usually runs high. I just was started on metoprolol 3 days ago. I took it this morning but I haven't taken my afternoon dose". Pt aaox4, ambulates independently. NAD Noted    Pt arrived w alvarenga catheter.   "

## 2022-06-11 NOTE — ED NOTES
Pt lying down, respirations even/unlabored. NAD noted. Updated pt on poc. Pt denies any needs. Side rails upx2, call bell within reach. Will continue to monitor

## 2022-06-14 NOTE — PHARMACY MED REC
"  Admission Medication History     The home medication history was taken by Anusha Bledsoe CPhT.    Medication history obtained from, Patient Verified    You may go to "Admission" then "Reconcile Home Medications" tabs to review and/or act upon these items.      The home medication list has been updated by the Pharmacy department.    Please read ALL comments highlighted in yellow.    Please address this information as you see fit.     Feel free to contact us if you have any questions or require assistance.      The medications listed below were removed from the home medication list.  Please reorder if appropriate:  Patient reports no longer taking the following medication(s):   Sildenafil 20 mg        Anusha Bledsoe CPhT.  Ext [phone]              .          " Noted patient has appointment today with pcp

## 2022-06-16 ENCOUNTER — HOSPITAL ENCOUNTER (INPATIENT)
Facility: HOSPITAL | Age: 62
LOS: 3 days | Discharge: HOME HEALTH | DRG: 698 | End: 2022-06-19
Attending: EMERGENCY MEDICINE | Admitting: INTERNAL MEDICINE
Payer: MEDICARE

## 2022-06-16 DIAGNOSIS — R63.0 POOR APPETITE: ICD-10-CM

## 2022-06-16 DIAGNOSIS — R07.9 CHEST PAIN: ICD-10-CM

## 2022-06-16 DIAGNOSIS — N17.9 ACUTE KIDNEY INJURY: ICD-10-CM

## 2022-06-16 DIAGNOSIS — N39.0 URINARY TRACT INFECTION ASSOCIATED WITH INDWELLING URETHRAL CATHETER, INITIAL ENCOUNTER: Primary | ICD-10-CM

## 2022-06-16 DIAGNOSIS — T83.511A URINARY TRACT INFECTION ASSOCIATED WITH INDWELLING URETHRAL CATHETER, INITIAL ENCOUNTER: Primary | ICD-10-CM

## 2022-06-16 DIAGNOSIS — R06.02 SHORTNESS OF BREATH: ICD-10-CM

## 2022-06-16 DIAGNOSIS — R53.83 FATIGUE, UNSPECIFIED TYPE: ICD-10-CM

## 2022-06-16 DIAGNOSIS — E87.1 HYPONATREMIA: ICD-10-CM

## 2022-06-16 PROBLEM — D63.1 ANEMIA DUE TO CHRONIC KIDNEY DISEASE: Status: ACTIVE | Noted: 2022-06-16

## 2022-06-16 PROBLEM — R06.09 DOE (DYSPNEA ON EXERTION): Status: ACTIVE | Noted: 2022-06-16

## 2022-06-16 PROBLEM — N18.9 ACUTE KIDNEY INJURY SUPERIMPOSED ON CHRONIC KIDNEY DISEASE: Status: ACTIVE | Noted: 2022-06-16

## 2022-06-16 PROBLEM — N18.9 ANEMIA DUE TO CHRONIC KIDNEY DISEASE: Status: ACTIVE | Noted: 2022-06-16

## 2022-06-16 PROBLEM — R65.20 SEVERE SEPSIS: Status: ACTIVE | Noted: 2022-06-16

## 2022-06-16 PROBLEM — A41.9 SEVERE SEPSIS: Status: ACTIVE | Noted: 2022-06-16

## 2022-06-16 PROBLEM — E11.9 TYPE 2 DIABETES MELLITUS, WITHOUT LONG-TERM CURRENT USE OF INSULIN: Status: ACTIVE | Noted: 2022-06-16

## 2022-06-16 LAB
ALBUMIN SERPL BCP-MCNC: 2 G/DL (ref 3.5–5.2)
ALP SERPL-CCNC: 108 U/L (ref 55–135)
ALT SERPL W/O P-5'-P-CCNC: 34 U/L (ref 10–44)
ANION GAP SERPL CALC-SCNC: 14 MMOL/L (ref 8–16)
ANISOCYTOSIS BLD QL SMEAR: SLIGHT
AST SERPL-CCNC: 35 U/L (ref 10–40)
BACTERIA #/AREA URNS HPF: ABNORMAL /HPF
BACTERIA #/AREA URNS HPF: ABNORMAL /HPF
BASOPHILS # BLD AUTO: ABNORMAL K/UL (ref 0–0.2)
BASOPHILS NFR BLD: 0 % (ref 0–1.9)
BILIRUB SERPL-MCNC: 0.7 MG/DL (ref 0.1–1)
BILIRUB UR QL STRIP: NEGATIVE
BILIRUB UR QL STRIP: NEGATIVE
BNP SERPL-MCNC: 62 PG/ML (ref 0–99)
BUN SERPL-MCNC: 69 MG/DL (ref 8–23)
BURR CELLS BLD QL SMEAR: ABNORMAL
CALCIUM SERPL-MCNC: 9.3 MG/DL (ref 8.7–10.5)
CHLORIDE SERPL-SCNC: 101 MMOL/L (ref 95–110)
CLARITY UR: ABNORMAL
CLARITY UR: ABNORMAL
CO2 SERPL-SCNC: 14 MMOL/L (ref 23–29)
COLOR UR: YELLOW
COLOR UR: YELLOW
CREAT SERPL-MCNC: 3 MG/DL (ref 0.5–1.4)
CTP QC/QA: YES
D DIMER PPP IA.FEU-MCNC: 8.3 MG/L FEU
DACRYOCYTES BLD QL SMEAR: ABNORMAL
DIFFERENTIAL METHOD: ABNORMAL
EOSINOPHIL # BLD AUTO: ABNORMAL K/UL (ref 0–0.5)
EOSINOPHIL NFR BLD: 0 % (ref 0–8)
ERYTHROCYTE [DISTWIDTH] IN BLOOD BY AUTOMATED COUNT: 14.7 % (ref 11.5–14.5)
EST. GFR  (AFRICAN AMERICAN): 25 ML/MIN/1.73 M^2
EST. GFR  (NON AFRICAN AMERICAN): 21 ML/MIN/1.73 M^2
GLUCOSE SERPL-MCNC: 211 MG/DL (ref 70–110)
GLUCOSE UR QL STRIP: NEGATIVE
GLUCOSE UR QL STRIP: NEGATIVE
HCT VFR BLD AUTO: 24.1 % (ref 40–54)
HGB BLD-MCNC: 8.5 G/DL (ref 14–18)
HGB UR QL STRIP: ABNORMAL
HGB UR QL STRIP: ABNORMAL
HYALINE CASTS #/AREA URNS LPF: 0 /LPF
HYALINE CASTS #/AREA URNS LPF: 0 /LPF
HYPOCHROMIA BLD QL SMEAR: ABNORMAL
IMM GRANULOCYTES # BLD AUTO: ABNORMAL K/UL (ref 0–0.04)
IMM GRANULOCYTES NFR BLD AUTO: ABNORMAL % (ref 0–0.5)
KETONES UR QL STRIP: NEGATIVE
KETONES UR QL STRIP: NEGATIVE
LACTATE SERPL-SCNC: 0.7 MMOL/L (ref 0.5–2.2)
LEUKOCYTE ESTERASE UR QL STRIP: ABNORMAL
LEUKOCYTE ESTERASE UR QL STRIP: ABNORMAL
LYMPHOCYTES # BLD AUTO: ABNORMAL K/UL (ref 1–4.8)
LYMPHOCYTES NFR BLD: 10 % (ref 18–48)
MCH RBC QN AUTO: 29.1 PG (ref 27–31)
MCHC RBC AUTO-ENTMCNC: 35.3 G/DL (ref 32–36)
MCV RBC AUTO: 83 FL (ref 82–98)
MICROSCOPIC COMMENT: ABNORMAL
MICROSCOPIC COMMENT: ABNORMAL
MONOCYTES # BLD AUTO: ABNORMAL K/UL (ref 0.3–1)
MONOCYTES NFR BLD: 4 % (ref 4–15)
NEUTROPHILS NFR BLD: 86 % (ref 38–73)
NITRITE UR QL STRIP: NEGATIVE
NITRITE UR QL STRIP: NEGATIVE
NRBC BLD-RTO: 0 /100 WBC
OVALOCYTES BLD QL SMEAR: ABNORMAL
PH UR STRIP: 6 [PH] (ref 5–8)
PH UR STRIP: 6 [PH] (ref 5–8)
PLATELET # BLD AUTO: 440 K/UL (ref 150–450)
PMV BLD AUTO: 9.2 FL (ref 9.2–12.9)
POCT GLUCOSE: 116 MG/DL (ref 70–110)
POCT GLUCOSE: 210 MG/DL (ref 70–110)
POIKILOCYTOSIS BLD QL SMEAR: SLIGHT
POTASSIUM SERPL-SCNC: 4.5 MMOL/L (ref 3.5–5.1)
PROT SERPL-MCNC: 7.7 G/DL (ref 6–8.4)
PROT UR QL STRIP: ABNORMAL
PROT UR QL STRIP: ABNORMAL
RBC # BLD AUTO: 2.92 M/UL (ref 4.6–6.2)
RBC #/AREA URNS HPF: 7 /HPF (ref 0–4)
RBC #/AREA URNS HPF: 9 /HPF (ref 0–4)
SARS-COV-2 RDRP RESP QL NAA+PROBE: NEGATIVE
SODIUM SERPL-SCNC: 129 MMOL/L (ref 136–145)
SP GR UR STRIP: 1.01 (ref 1–1.03)
SP GR UR STRIP: 1.01 (ref 1–1.03)
TARGETS BLD QL SMEAR: ABNORMAL
TROPONIN I SERPL DL<=0.01 NG/ML-MCNC: 0.01 NG/ML (ref 0–0.03)
URN SPEC COLLECT METH UR: ABNORMAL
URN SPEC COLLECT METH UR: ABNORMAL
UROBILINOGEN UR STRIP-ACNC: NEGATIVE EU/DL
UROBILINOGEN UR STRIP-ACNC: NEGATIVE EU/DL
WBC # BLD AUTO: 35.07 K/UL (ref 3.9–12.7)
WBC #/AREA URNS HPF: >100 /HPF (ref 0–5)
WBC #/AREA URNS HPF: >100 /HPF (ref 0–5)
WBC CLUMPS URNS QL MICRO: ABNORMAL
WBC CLUMPS URNS QL MICRO: ABNORMAL
WBC TOXIC VACUOLES BLD QL SMEAR: PRESENT

## 2022-06-16 PROCEDURE — 25000003 PHARM REV CODE 250: Performed by: EMERGENCY MEDICINE

## 2022-06-16 PROCEDURE — 94761 N-INVAS EAR/PLS OXIMETRY MLT: CPT

## 2022-06-16 PROCEDURE — 87077 CULTURE AEROBIC IDENTIFY: CPT | Mod: 59 | Performed by: EMERGENCY MEDICINE

## 2022-06-16 PROCEDURE — 81000 URINALYSIS NONAUTO W/SCOPE: CPT | Mod: 91

## 2022-06-16 PROCEDURE — 87186 SC STD MICRODIL/AGAR DIL: CPT | Performed by: EMERGENCY MEDICINE

## 2022-06-16 PROCEDURE — 63600175 PHARM REV CODE 636 W HCPCS: Performed by: EMERGENCY MEDICINE

## 2022-06-16 PROCEDURE — 84484 ASSAY OF TROPONIN QUANT: CPT | Performed by: EMERGENCY MEDICINE

## 2022-06-16 PROCEDURE — C9399 UNCLASSIFIED DRUGS OR BIOLOG: HCPCS

## 2022-06-16 PROCEDURE — 99285 EMERGENCY DEPT VISIT HI MDM: CPT | Mod: 25

## 2022-06-16 PROCEDURE — 81000 URINALYSIS NONAUTO W/SCOPE: CPT | Performed by: EMERGENCY MEDICINE

## 2022-06-16 PROCEDURE — 63600175 PHARM REV CODE 636 W HCPCS

## 2022-06-16 PROCEDURE — 93010 EKG 12-LEAD: ICD-10-PCS | Mod: ,,, | Performed by: INTERNAL MEDICINE

## 2022-06-16 PROCEDURE — 85379 FIBRIN DEGRADATION QUANT: CPT | Performed by: EMERGENCY MEDICINE

## 2022-06-16 PROCEDURE — 25000003 PHARM REV CODE 250

## 2022-06-16 PROCEDURE — 87086 URINE CULTURE/COLONY COUNT: CPT | Mod: 59

## 2022-06-16 PROCEDURE — 87088 URINE BACTERIA CULTURE: CPT | Performed by: EMERGENCY MEDICINE

## 2022-06-16 PROCEDURE — 99900035 HC TECH TIME PER 15 MIN (STAT)

## 2022-06-16 PROCEDURE — U0002 COVID-19 LAB TEST NON-CDC: HCPCS | Performed by: EMERGENCY MEDICINE

## 2022-06-16 PROCEDURE — 83880 ASSAY OF NATRIURETIC PEPTIDE: CPT | Performed by: EMERGENCY MEDICINE

## 2022-06-16 PROCEDURE — 93010 ELECTROCARDIOGRAM REPORT: CPT | Mod: ,,, | Performed by: INTERNAL MEDICINE

## 2022-06-16 PROCEDURE — 96365 THER/PROPH/DIAG IV INF INIT: CPT

## 2022-06-16 PROCEDURE — 87086 URINE CULTURE/COLONY COUNT: CPT | Performed by: EMERGENCY MEDICINE

## 2022-06-16 PROCEDURE — 87040 BLOOD CULTURE FOR BACTERIA: CPT | Mod: 59 | Performed by: EMERGENCY MEDICINE

## 2022-06-16 PROCEDURE — 96361 HYDRATE IV INFUSION ADD-ON: CPT

## 2022-06-16 PROCEDURE — 85007 BL SMEAR W/DIFF WBC COUNT: CPT | Performed by: EMERGENCY MEDICINE

## 2022-06-16 PROCEDURE — 93005 ELECTROCARDIOGRAM TRACING: CPT

## 2022-06-16 PROCEDURE — 80053 COMPREHEN METABOLIC PANEL: CPT | Performed by: EMERGENCY MEDICINE

## 2022-06-16 PROCEDURE — 82962 GLUCOSE BLOOD TEST: CPT

## 2022-06-16 PROCEDURE — 85027 COMPLETE CBC AUTOMATED: CPT | Performed by: EMERGENCY MEDICINE

## 2022-06-16 PROCEDURE — 11000001 HC ACUTE MED/SURG PRIVATE ROOM

## 2022-06-16 PROCEDURE — 83605 ASSAY OF LACTIC ACID: CPT | Performed by: EMERGENCY MEDICINE

## 2022-06-16 RX ORDER — NIFEDIPINE 30 MG/1
30 TABLET, EXTENDED RELEASE ORAL DAILY
Status: DISCONTINUED | OUTPATIENT
Start: 2022-06-17 | End: 2022-06-17

## 2022-06-16 RX ORDER — SODIUM CHLORIDE, SODIUM LACTATE, POTASSIUM CHLORIDE, CALCIUM CHLORIDE 600; 310; 30; 20 MG/100ML; MG/100ML; MG/100ML; MG/100ML
INJECTION, SOLUTION INTRAVENOUS CONTINUOUS
Status: ACTIVE | OUTPATIENT
Start: 2022-06-16 | End: 2022-06-17

## 2022-06-16 RX ORDER — TAMSULOSIN HYDROCHLORIDE 0.4 MG/1
0.4 CAPSULE ORAL DAILY
Status: DISCONTINUED | OUTPATIENT
Start: 2022-06-17 | End: 2022-06-19 | Stop reason: HOSPADM

## 2022-06-16 RX ORDER — MUPIROCIN 20 MG/G
OINTMENT TOPICAL 2 TIMES DAILY
Status: DISCONTINUED | OUTPATIENT
Start: 2022-06-16 | End: 2022-06-19 | Stop reason: HOSPADM

## 2022-06-16 RX ORDER — ONDANSETRON 2 MG/ML
4 INJECTION INTRAMUSCULAR; INTRAVENOUS EVERY 8 HOURS PRN
Status: DISCONTINUED | OUTPATIENT
Start: 2022-06-16 | End: 2022-06-19 | Stop reason: HOSPADM

## 2022-06-16 RX ORDER — INSULIN ASPART 100 [IU]/ML
0-5 INJECTION, SOLUTION INTRAVENOUS; SUBCUTANEOUS
Status: DISCONTINUED | OUTPATIENT
Start: 2022-06-16 | End: 2022-06-19 | Stop reason: HOSPADM

## 2022-06-16 RX ORDER — MAG HYDROX/ALUMINUM HYD/SIMETH 200-200-20
30 SUSPENSION, ORAL (FINAL DOSE FORM) ORAL 4 TIMES DAILY PRN
Status: DISCONTINUED | OUTPATIENT
Start: 2022-06-16 | End: 2022-06-19 | Stop reason: HOSPADM

## 2022-06-16 RX ORDER — ATORVASTATIN CALCIUM 40 MG/1
40 TABLET, FILM COATED ORAL DAILY
Status: DISCONTINUED | OUTPATIENT
Start: 2022-06-17 | End: 2022-06-19 | Stop reason: HOSPADM

## 2022-06-16 RX ORDER — INSULIN ASPART 100 [IU]/ML
5 INJECTION, SOLUTION INTRAVENOUS; SUBCUTANEOUS
Status: DISCONTINUED | OUTPATIENT
Start: 2022-06-16 | End: 2022-06-19 | Stop reason: HOSPADM

## 2022-06-16 RX ORDER — SIMETHICONE 80 MG
1 TABLET,CHEWABLE ORAL 4 TIMES DAILY PRN
Status: DISCONTINUED | OUTPATIENT
Start: 2022-06-16 | End: 2022-06-19 | Stop reason: HOSPADM

## 2022-06-16 RX ORDER — IBUPROFEN 200 MG
16 TABLET ORAL
Status: DISCONTINUED | OUTPATIENT
Start: 2022-06-16 | End: 2022-06-19 | Stop reason: HOSPADM

## 2022-06-16 RX ORDER — GLUCAGON 1 MG
1 KIT INJECTION
Status: DISCONTINUED | OUTPATIENT
Start: 2022-06-16 | End: 2022-06-19 | Stop reason: HOSPADM

## 2022-06-16 RX ORDER — SODIUM CHLORIDE 0.9 % (FLUSH) 0.9 %
10 SYRINGE (ML) INJECTION EVERY 8 HOURS PRN
Status: DISCONTINUED | OUTPATIENT
Start: 2022-06-16 | End: 2022-06-19 | Stop reason: HOSPADM

## 2022-06-16 RX ORDER — IBUPROFEN 200 MG
24 TABLET ORAL
Status: DISCONTINUED | OUTPATIENT
Start: 2022-06-16 | End: 2022-06-19 | Stop reason: HOSPADM

## 2022-06-16 RX ORDER — METOPROLOL TARTRATE 50 MG/1
50 TABLET ORAL 2 TIMES DAILY
Status: DISCONTINUED | OUTPATIENT
Start: 2022-06-16 | End: 2022-06-17

## 2022-06-16 RX ORDER — ACETAMINOPHEN 325 MG/1
650 TABLET ORAL EVERY 4 HOURS PRN
Status: DISCONTINUED | OUTPATIENT
Start: 2022-06-16 | End: 2022-06-19 | Stop reason: HOSPADM

## 2022-06-16 RX ORDER — TALC
6 POWDER (GRAM) TOPICAL NIGHTLY PRN
Status: DISCONTINUED | OUTPATIENT
Start: 2022-06-16 | End: 2022-06-19 | Stop reason: HOSPADM

## 2022-06-16 RX ADMIN — CEFTRIAXONE 2 G: 2 INJECTION, SOLUTION INTRAVENOUS at 01:06

## 2022-06-16 RX ADMIN — MUPIROCIN: 20 OINTMENT TOPICAL at 10:06

## 2022-06-16 RX ADMIN — SODIUM CHLORIDE 1000 ML: 0.9 INJECTION, SOLUTION INTRAVENOUS at 04:06

## 2022-06-16 RX ADMIN — INSULIN ASPART 5 UNITS: 100 INJECTION, SOLUTION INTRAVENOUS; SUBCUTANEOUS at 06:06

## 2022-06-16 RX ADMIN — INSULIN DETEMIR 10 UNITS: 100 INJECTION, SOLUTION SUBCUTANEOUS at 10:06

## 2022-06-16 RX ADMIN — SODIUM CHLORIDE, SODIUM LACTATE, POTASSIUM CHLORIDE, AND CALCIUM CHLORIDE: .6; .31; .03; .02 INJECTION, SOLUTION INTRAVENOUS at 10:06

## 2022-06-16 RX ADMIN — METOPROLOL TARTRATE 50 MG: 50 TABLET, FILM COATED ORAL at 11:06

## 2022-06-16 RX ADMIN — SODIUM CHLORIDE 1000 ML: 0.9 INJECTION, SOLUTION INTRAVENOUS at 11:06

## 2022-06-16 NOTE — ASSESSMENT & PLAN NOTE
-This patient meets criteria for sepsis; Organ dysfunction indicated by Acute kidney injury  -Source is likely UTI  -Vital signs reviewed and noted   -WBC 35.07  -Patient received 1L NS bolus x1 in ED  -Lactate is 0.7, continue to monitor and trend   -Blood cultures and urine cultures pending  -Will start IV fluid hydration and broad-spectrum antibiotics  -Monitor on telemetry

## 2022-06-16 NOTE — ASSESSMENT & PLAN NOTE
Bilateral hydronephrosis  Urinary retention    -Followed by urology, Dr. Wheeler  -Tavera catheter draining to gravity  -Monitor and trend daily renal panel   -Resume home tamsulosin 0.4mg PO daily

## 2022-06-16 NOTE — ED NOTES
Patient states that glasses were removed for VQ scan and not returned. Attempted to call unit, no answer.

## 2022-06-16 NOTE — ASSESSMENT & PLAN NOTE
-Patient presents with chronic indwelling catheter 2/2 to urinary retention/BPH  -UA positive for WBC clumps, bacteria, WBC >100, Leukocytes 3+  -Received 2g IV Rocephin in ED x1  -Continue 2g IV Rocephin q24hr  -Exchange alvarenga catheter and repeat UA   -Follow cultures

## 2022-06-16 NOTE — PHARMACY MED REC
"[facility]           Pharmacy  Admission Medication History     The home medication history was taken by Minda Jung.      Medication history obtained from Medications listed below were obtained from: Patient/family    Based on information gathered for medication list, you may go to "Admission" then "Reconcile Home Medications" tabs to review and/or act upon those items.      The home medication list has been updated by the Pharmacy department.    Please read ALL comments highlighted in yellow.    Please address this information as you see fit.     Feel free to contact us if you have any questions or require assistance.          No current facility-administered medications on file prior to encounter.     Current Outpatient Medications on File Prior to Encounter   Medication Sig Dispense Refill    atorvastatin (LIPITOR) 40 MG tablet Take 1 tablet (40 mg total) by mouth once daily. 30 tablet 3    insulin aspart U-100 (NOVOLOG) 100 unit/mL (3 mL) InPn pen Inject 4 Units into the skin 3 (three) times daily. 15 mL 3    insulin detemir U-100 (LEVEMIR FLEXTOUCH) 100 unit/mL (3 mL) SubQ InPn pen Inject 15 Units into the skin once daily. (Patient taking differently: Inject 12 Units into the skin once daily.) 15 mL 3    metoprolol tartrate (LOPRESSOR) 50 MG tablet Take 1 tablet (50 mg total) by mouth 2 (two) times daily. 60 tablet 3    NIFEdipine (PROCARDIA-XL) 30 MG (OSM) 24 hr tablet Take 1 tablet (30 mg total) by mouth once daily. 30 tablet 3    tamsulosin (FLOMAX) 0.4 mg Cap Take 1 capsule (0.4 mg total) by mouth once daily. 30 capsule 11    blood sugar diagnostic (TRUE METRIX GLUCOSE TEST STRIP) Strp TEST 4 TIMES A  each 11    blood-glucose meter (TRUE METRIX GLUCOSE METER) Misc 1 Device by Misc.(Non-Drug; Combo Route) route 4 (four) times daily. 1 each 0    blood-glucose sensor (DEXCOM G6 SENSOR) Philomena 3 each by Misc.(Non-Drug; Combo Route) route continuous. 3 each 11    " "blood-glucose transmitter (DEXCOM G6 TRANSMITTER) Philomena 1 each by Misc.(Non-Drug; Combo Route) route continuous. 1 each 3    glucose 4 GM chewable tablet Take 4 tablets (16 g total) by mouth as needed for Low blood sugar (If having symptoms of blurry vision, palpitations, confusion, shakiness.  Please check sugars and if sugar below 70 please take 4 tablets and re-check sugar everry 15 minutes until sugars are above 70 and symptoms resolve.). 50 tablet 12    lancets 33 gauge Misc USE 4 TIMES A  each 11    pen needle, diabetic 31 gauge x 5/16" Ndle use 4 times a day 100 each 11       Please address this information as you see fit.  Feel free to contact us if you have any questions or require assistance.    Minda Jung  [phone]                  .          "

## 2022-06-16 NOTE — ASSESSMENT & PLAN NOTE
Hemoglobin A1C   Date Value Ref Range Status   04/29/2022 >14.0 (H) 4.0 - 5.6 % Final     -POCT glucose on admit 210  - hold home oral medications  - initiate on detemir 10u daily and Aspart 5u tid  - LDSSI with ACCUcheck ACHS  - Diabetic diet  -Hypoglycemia protocol PRN  -Monitor closely and adjust insulin regimen as clinically indicated to achieve levels of 140-180 during hospitalization

## 2022-06-16 NOTE — ASSESSMENT & PLAN NOTE
Chronic, Latest blood pressure and vitals reviewed-   Temp:  [98.2 °F (36.8 °C)]   Pulse:  [115-130]   Resp:  [18-20]   BP: (112-114)/(60-75)   SpO2:  [96 %-100 %] .   Home meds for hypertension were reviewed and noted below.   Hypertension Medications             metoprolol tartrate (LOPRESSOR) 50 MG tablet Take 1 tablet (50 mg total) by mouth 2 (two) times daily.    NIFEdipine (PROCARDIA-XL) 30 MG (OSM) 24 hr tablet Take 1 tablet (30 mg total) by mouth once daily.          While in the hospital, will manage blood pressure as follows; Continue home antihypertensive regimen    Will utilize p.r.n. blood pressure medication only if patient's blood pressure greater than  180/110 and he develops symptoms such as worsening chest pain or shortness of breath.

## 2022-06-16 NOTE — NURSING
Pt arrived to floor via stretcher with transport and transferred to bed. oriented to room, call light placed within reach, bed low and locked, No complaints of pain. Tavera noted draining clear yellow urine to gravity. No acute distress noted at this time. Will continue to monitor.      no

## 2022-06-16 NOTE — ED PROVIDER NOTES
Encounter Date: 6/16/2022       History     Chief Complaint   Patient presents with    Shortness of Breath     Starting 3 days ago, denies fever , chest pain, lack of appetite and sleeping a lot.     The patient is a 61-year-old who presents by personal transportation with his wife.  He complains of two weeks of generalized weakness, exertional shortness of breath, and severely diminished appetite.  He denies fever, chills, headache, dizziness, cough, chest pain, palpitations, abdominal pain, nausea, vomiting, and diarrhea.  He reports being seen here in the ED a few days ago for the symptoms and receiving treatment for dehydration.  He was not admitted.  He does not smoke and denies history of cardiovascular disease and chronic lung disease.    He has a past medical history of Diabetes mellitus, Hyperlipemia, Hypertension, Migraine headache, and PTSD (post-traumatic stress disorder).    The history is provided by the patient. No  was used.     Review of patient's allergies indicates:  No Known Allergies  Past Medical History:   Diagnosis Date    Diabetes mellitus     Hyperlipemia     Hypertension     Migraine headache     PTSD (post-traumatic stress disorder)      History reviewed. No pertinent surgical history.  Family History   Problem Relation Age of Onset    Diabetes Mother      Social History     Tobacco Use    Smoking status: Never Smoker    Smokeless tobacco: Never Used   Substance Use Topics    Alcohol use: No    Drug use: No     Review of Systems   Constitutional: Positive for fatigue. Negative for chills and fever.   Respiratory: Positive for shortness of breath. Negative for cough.    Cardiovascular: Negative for chest pain and palpitations.   Gastrointestinal: Negative for abdominal pain, diarrhea, nausea and vomiting.   Genitourinary: Negative for hematuria.        + Tavera catheter placed two months ago for difficulty urinating/retention   Musculoskeletal: Negative for  arthralgias and myalgias.   Neurological: Negative for dizziness, syncope, weakness, light-headedness, numbness and headaches.       Physical Exam     Initial Vitals [06/16/22 1100]   BP Pulse Resp Temp SpO2   112/64 (!) 130 18 98.2 °F (36.8 °C) 99 %      MAP       --         Physical Exam    Nursing note and vitals reviewed.  Constitutional: He is not diaphoretic. No distress.   HENT:   Head: Normocephalic and atraumatic.   Eyes: Conjunctivae are normal. No scleral icterus.   Cardiovascular: Regular rhythm. Tachycardia present.  Exam reveals no gallop and no friction rub.    No murmur heard.  Pulmonary/Chest: No stridor. No respiratory distress. He has no decreased breath sounds. He has no wheezes. He has no rhonchi. He has no rales.   Abdominal: Abdomen is soft. He exhibits no distension. There is no abdominal tenderness.   Musculoskeletal:         General: No tenderness or edema.     Neurological: He is alert and oriented to person, place, and time. He has normal strength. Coordination normal. GCS score is 15. GCS eye subscore is 4. GCS verbal subscore is 5. GCS motor subscore is 6.   Cranial nerves grossly intact.  Normal finger-to-nose test.   Skin: Skin is warm and dry. No pallor.   Psychiatric: He is not actively hallucinating. He is attentive.         ED Course   Procedures  Labs Reviewed   CBC W/ AUTO DIFFERENTIAL - Abnormal; Notable for the following components:       Result Value    WBC 35.07 (*)     RBC 2.92 (*)     Hemoglobin 8.5 (*)     Hematocrit 24.1 (*)     RDW 14.7 (*)     Gran % 86.0 (*)     Lymph % 10.0 (*)     All other components within normal limits   COMPREHENSIVE METABOLIC PANEL - Abnormal; Notable for the following components:    Sodium 129 (*)     CO2 14 (*)     Glucose 211 (*)     BUN 69 (*)     Creatinine 3.0 (*)     Albumin 2.0 (*)     eGFR if  25 (*)     eGFR if non  21 (*)     All other components within normal limits   D DIMER, QUANTITATIVE - Abnormal;  Notable for the following components:    D-Dimer 8.30 (*)     All other components within normal limits   URINALYSIS, REFLEX TO URINE CULTURE - Abnormal; Notable for the following components:    Appearance, UA Hazy (*)     Protein, UA 1+ (*)     Occult Blood UA 1+ (*)     Leukocytes, UA 3+ (*)     All other components within normal limits    Narrative:     Specimen Source->Urine   URINALYSIS MICROSCOPIC - Abnormal; Notable for the following components:    RBC, UA 9 (*)     WBC, UA >100 (*)     WBC Clumps, UA Many (*)     Bacteria Many (*)     All other components within normal limits    Narrative:     Specimen Source->Urine   POCT GLUCOSE - Abnormal; Notable for the following components:    POCT Glucose 210 (*)     All other components within normal limits   TROPONIN I   B-TYPE NATRIURETIC PEPTIDE   LACTIC ACID, PLASMA   SARS-COV-2 RDRP GENE    Narrative:     This test utilizes isothermal nucleic acid amplification   technology to detect the SARS-CoV-2 RdRp nucleic acid segment.   The analytical sensitivity (limit of detection) is 125 genome   equivalents/mL.   A POSITIVE result implies infection with the SARS-CoV-2 virus;   the patient is presumed to be contagious.     A NEGATIVE result means that SARS-CoV-2 nucleic acids are not   present above the limit of detection. A NEGATIVE result should be   treated as presumptive. It does not rule out the possibility of   COVID-19 and should not be the sole basis for treatment decisions.   If COVID-19 is strongly suspected based on clinical and exposure   history, re-testing using an alternate molecular assay should be   considered.   This test is only for use under the Food and Drug   Administration s Emergency Use Authorization (EUA).   Commercial kits are provided by [employer].   Performance characteristics of the EUA have been independently   verified by [facility] Department of   Pathology and Laboratory Medicine.    _________________________________________________________________   The authorized Fact Sheet for Healthcare Providers and the authorized Fact   Sheet for Patients of the ID NOW COVID-19 are available on the FDA   website:     https://www.fda.gov/media/156388/download  https://www.fda.gov/media/830863/download          POCT GLUCOSE, HAND-HELD DEVICE     EKG Readings: (Independently Interpreted)   06/16/2022 11:09  Sinus tachycardia.  Ventricular rate 120 beats per minute.  Normal axis.  Normal QRS and QT intervals.  No ST segment elevation or depression.  Inferolateral T-wave flattenings and inversions.       Imaging Results              X-Ray Chest AP Portable (Final result)  Result time 06/16/22 11:49:07    Final result by Brandon Saenz MD (06/16/22 11:49:07)                 Impression:      See above      Electronically signed by: Brandon Saenz MD  Date:    06/16/2022  Time:    11:49             Narrative:    EXAMINATION:  XR CHEST AP PORTABLE    CLINICAL HISTORY:  Shortness of breath;    TECHNIQUE:  Single frontal view of the chest was performed.    COMPARISON:  No 04/28/2022 ne    FINDINGS:  Heart size normal.  The lungs are clear.  No pleural effusion                                 Medications   sodium chloride 0.9% flush 10 mL (has no administration in time range)   melatonin tablet 6 mg (has no administration in time range)   ondansetron injection 4 mg (has no administration in time range)   simethicone chewable tablet 80 mg (has no administration in time range)   aluminum-magnesium hydroxide-simethicone 200-200-20 mg/5 mL suspension 30 mL (has no administration in time range)   acetaminophen tablet 650 mg (has no administration in time range)   insulin aspart U-100 pen 0-5 Units (0 Units Subcutaneous Given 6/17/22 0826)   glucose chewable tablet 16 g (has no administration in time range)   glucose chewable tablet 24 g (has no administration in time range)   glucagon (human recombinant) injection 1 mg  (has no administration in time range)   insulin detemir U-100 pen 10 Units (10 Units Subcutaneous Given 6/17/22 2121)   cefTRIAXone (ROCEPHIN) 2 g/50 mL D5W IVPB (0 g Intravenous Stopped 6/17/22 0932)   mupirocin 2 % ointment ( Nasal Given 6/17/22 2121)   insulin aspart U-100 pen 5 Units (5 Units Subcutaneous Given 6/17/22 1646)   sars-cov-2 (covid-19) (Pfizer COVID-19) 30 mcg/0.3 ml injection 0.3 mL (has no administration in time range)   lactated ringers infusion ( Intravenous Rate/Dose Change 6/17/22 0848)   atorvastatin tablet 40 mg (40 mg Oral Given 6/17/22 0826)   tamsulosin 24 hr capsule 0.4 mg (0.4 mg Oral Given 6/17/22 0826)   metoprolol tartrate (LOPRESSOR) tablet 50 mg (has no administration in time range)   sodium bicarbonate tablet 650 mg (650 mg Oral Given 6/17/22 2121)   sodium chloride 0.9% bolus 1,000 mL (0 mLs Intravenous Stopped 6/16/22 1227)   cefTRIAXone (ROCEPHIN) 2 g/50 mL D5W IVPB (0 g Intravenous Stopped 6/16/22 1456)   sodium chloride 0.9% bolus 1,000 mL (0 mLs Intravenous Stopped 6/16/22 1719)                 ED Course as of 06/17/22 2135   Thu Jun 16, 2022 1514 Called and discussed the patient with the on-call [facility] who agrees to admit the patient. [LP]      ED Course User Index  [LP] Jona Orta III, MD             Clinical Impression:   Final diagnoses:  [R06.02] Shortness of breath  [R53.83] Fatigue, unspecified type  [R63.0] Poor appetite  [N17.9] Acute kidney injury  [E87.1] Hyponatremia  [T83.511A, N39.0] Urinary tract infection associated with indwelling urethral catheter, initial encounter          ED Disposition Condition    Admit               Jona Orta III, MD  06/17/22 2135

## 2022-06-16 NOTE — H&P
"[facility]  History & Physical    Patient Name: Angel Bell  MRN: [id number]  Patient Class: IP- Inpatient  Admission Date: 6/16/2022  Attending Physician: Julien Flynn MD   Primary Care Provider: Tiago Ruiz MD         Patient information was obtained from patient, past medical records and ER records.     Subjective:     Principal Problem:Severe sepsis    Chief Complaint:   Chief Complaint   Patient presents with    Shortness of Breath     Starting 3 days ago, denies fever , chest pain, lack of appetite and sleeping a lot.        HPI: Angel Bell is a 60 y/o male with DMII, HLD, HTN, BPH with obstruction, bilateral hydronephrosis, and urinary retention presents to [facility] [place] for evaluation of generalized weakness, DON, and decreased appetite x 2 weeks. Patient reports he has DON with activities such as walking. He denies bilateral leg swelling. He denies abdominal pain. Patient denies fever, chills, headache, upper respiratory symptoms, N/V, diarrhea, chest pain, or palpitations. He denies history of CHF or chronic lung disease. He denies alcohol or tobacco use. He denies recent travel or recent sick contacts. He does have a chronic alvarenga catheter.      Of note: He was last seen in this ED 6/11 for hyperglycemia and dehydration. He was treated with 1L NS x1 and discharged. Patient was admitted on 5/2/22 and was hospitalized for ARF 2/2 to obstructive uropathy with CT abd/pelvis revealing enlarged prostate and perinephric stranding. Patient is followed by Urology,  and was last seen in clinic 5/17/22; and has a chronic indwelling catheter that was placed during his last admission.       In the ED: /60   Pulse (!) 115   Temp 98.2 °F (36.8 °C) (Oral)   Resp 20   Ht 5' 6" (1.676 m)   Wt 61.2 kg (135 lb)   SpO2 100%   BMI 21.79 kg/m² Labs revealedWBC 35.07, H/H 8.5/24.1, D. Dimer 8.3, Na 129, Co2 14, BUN 69, creatinine 3.0, glucose 211, UA +. CXR negative for acute " cardiopulmonary abnormality. Patient received 2g Rocephin IV x1 and 1L NS bolus in ED x1. Will admit to hospital medicine for further evaluation and treatment.         Past Medical History:   Diagnosis Date    Diabetes mellitus     Hyperlipemia     Hypertension     Migraine headache     PTSD (post-traumatic stress disorder)        History reviewed. No pertinent surgical history.    Review of patient's allergies indicates:  No Known Allergies    No current facility-administered medications on file prior to encounter.     Current Outpatient Medications on File Prior to Encounter   Medication Sig    atorvastatin (LIPITOR) 40 MG tablet Take 1 tablet (40 mg total) by mouth once daily.    insulin aspart U-100 (NOVOLOG) 100 unit/mL (3 mL) InPn pen Inject 4 Units into the skin 3 (three) times daily.    insulin detemir U-100 (LEVEMIR FLEXTOUCH) 100 unit/mL (3 mL) SubQ InPn pen Inject 15 Units into the skin once daily. (Patient taking differently: Inject 12 Units into the skin once daily.)    metoprolol tartrate (LOPRESSOR) 50 MG tablet Take 1 tablet (50 mg total) by mouth 2 (two) times daily.    NIFEdipine (PROCARDIA-XL) 30 MG (OSM) 24 hr tablet Take 1 tablet (30 mg total) by mouth once daily.    tamsulosin (FLOMAX) 0.4 mg Cap Take 1 capsule (0.4 mg total) by mouth once daily.    blood sugar diagnostic (TRUE METRIX GLUCOSE TEST STRIP) Strp TEST 4 TIMES A DAY    blood-glucose meter (TRUE METRIX GLUCOSE METER) Misc 1 Device by Misc.(Non-Drug; Combo Route) route 4 (four) times daily.    blood-glucose sensor (DEXCOM G6 SENSOR) Philomena 3 each by Misc.(Non-Drug; Combo Route) route continuous.    blood-glucose transmitter (DEXCOM G6 TRANSMITTER) Philomena 1 each by Misc.(Non-Drug; Combo Route) route continuous.    glucose 4 GM chewable tablet Take 4 tablets (16 g total) by mouth as needed for Low blood sugar (If having symptoms of blurry vision, palpitations, confusion, shakiness.  Please check sugars and if sugar below 70  "please take 4 tablets and re-check sugar everry 15 minutes until sugars are above 70 and symptoms resolve.).    lancets 33 gauge Misc USE 4 TIMES A DAY    pen needle, diabetic 31 gauge x 5/16" Ndle use 4 times a day     Family History       Problem Relation (Age of Onset)    Diabetes Mother          Tobacco Use    Smoking status: Never Smoker    Smokeless tobacco: Never Used   Substance and Sexual Activity    Alcohol use: No    Drug use: No    Sexual activity: Not on file     Review of Systems   Constitutional:  Positive for appetite change and fatigue. Negative for chills, diaphoresis and fever.   HENT:  Negative for hearing loss and sore throat.    Eyes:  Negative for visual disturbance.   Respiratory:  Positive for shortness of breath. Negative for cough.         Dyspnea on exertion   Cardiovascular:  Negative for chest pain and leg swelling.   Gastrointestinal:  Negative for abdominal pain, diarrhea, nausea and vomiting.   Genitourinary:  Negative for decreased urine volume, difficulty urinating, flank pain and urgency.   Musculoskeletal:  Negative for back pain.   Skin:  Negative for rash and wound.   Neurological:  Negative for dizziness, weakness and headaches.   Psychiatric/Behavioral:  Negative for agitation and confusion.    Objective:     Vital Signs (Most Recent):  Temp: 98.9 °F (37.2 °C) (06/16/22 1736)  Pulse: (!) 112 (06/16/22 1800)  Resp: (!) 21 (06/16/22 1800)  BP: 120/70 (06/16/22 1736)  SpO2: 99 % (06/16/22 1800)   Vital Signs (24h Range):  Temp:  [98.2 °F (36.8 °C)-98.9 °F (37.2 °C)] 98.9 °F (37.2 °C)  Pulse:  [107-130] 112  Resp:  [18-22] 21  SpO2:  [96 %-100 %] 99 %  BP: (103-120)/(57-75) 120/70     Weight: 65.5 kg (144 lb 6.4 oz)  Body mass index is 23.31 kg/m².    Physical Exam  Vitals and nursing note reviewed.   Constitutional:       General: He is not in acute distress.     Appearance: Normal appearance. He is not ill-appearing.   HENT:      Head: Normocephalic and atraumatic.      " Mouth/Throat:      Mouth: Mucous membranes are moist.      Dentition: Abnormal dentition.   Eyes:      Extraocular Movements: Extraocular movements intact.      Pupils: Pupils are equal, round, and reactive to light.   Cardiovascular:      Rate and Rhythm: Regular rhythm. Tachycardia present.      Pulses: Normal pulses.      Heart sounds: Normal heart sounds. No murmur heard.    No friction rub. No gallop.   Pulmonary:      Effort: Pulmonary effort is normal. Tachypnea present. No respiratory distress.      Breath sounds: No wheezing or rhonchi.   Abdominal:      General: Bowel sounds are normal. There is no distension.      Palpations: Abdomen is soft.      Tenderness: There is no abdominal tenderness. There is no guarding.   Genitourinary:     Comments: Tavera catheter draining to gravity  Musculoskeletal:         General: No swelling.      Cervical back: Normal range of motion and neck supple.      Right lower leg: No edema.      Left lower leg: No edema.   Skin:     General: Skin is warm and dry.      Capillary Refill: Capillary refill takes less than 2 seconds.      Findings: No bruising, erythema or rash.   Neurological:      General: No focal deficit present.      Mental Status: He is alert and oriented to person, place, and time.      GCS: GCS eye subscore is 4. GCS verbal subscore is 5. GCS motor subscore is 6.   Psychiatric:         Mood and Affect: Mood normal.         Behavior: Behavior normal. Behavior is cooperative.         CRANIAL NERVES     CN III, IV, VI   Pupils are equal, round, and reactive to light.     Significant Labs: All pertinent labs within the past 24 hours have been reviewed.  Recent Results (from the past 24 hour(s))   POCT glucose    Collection Time: 06/16/22 11:13 AM   Result Value Ref Range    POCT Glucose 210 (H) 70 - 110 mg/dL   CBC auto differential    Collection Time: 06/16/22 11:28 AM   Result Value Ref Range    WBC 35.07 (H) 3.90 - 12.70 K/uL    RBC 2.92 (L) 4.60 - 6.20 M/uL     Hemoglobin 8.5 (L) 14.0 - 18.0 g/dL    Hematocrit 24.1 (L) 40.0 - 54.0 %    MCV 83 82 - 98 fL    MCH 29.1 27.0 - 31.0 pg    MCHC 35.3 32.0 - 36.0 g/dL    RDW 14.7 (H) 11.5 - 14.5 %    Platelets 440 150 - 450 K/uL    MPV 9.2 9.2 - 12.9 fL    Immature Granulocytes CANCELED 0.0 - 0.5 %    Immature Grans (Abs) CANCELED 0.00 - 0.04 K/uL    Lymph # CANCELED 1.0 - 4.8 K/uL    Mono # CANCELED 0.3 - 1.0 K/uL    Eos # CANCELED 0.0 - 0.5 K/uL    Baso # CANCELED 0.00 - 0.20 K/uL    nRBC 0 0 /100 WBC    Gran % 86.0 (H) 38.0 - 73.0 %    Lymph % 10.0 (L) 18.0 - 48.0 %    Mono % 4.0 4.0 - 15.0 %    Eosinophil % 0.0 0.0 - 8.0 %    Basophil % 0.0 0.0 - 1.9 %    Aniso Slight     Poik Slight     Hypo Occasional     Ovalocytes Occasional     Target Cells Occasional     Tear Drop Cells Occasional     Cole Cells Occasional     Vacuolated Granulocytes Present     Differential Method Manual    Comprehensive metabolic panel    Collection Time: 06/16/22 11:28 AM   Result Value Ref Range    Sodium 129 (L) 136 - 145 mmol/L    Potassium 4.5 3.5 - 5.1 mmol/L    Chloride 101 95 - 110 mmol/L    CO2 14 (L) 23 - 29 mmol/L    Glucose 211 (H) 70 - 110 mg/dL    BUN 69 (H) 8 - 23 mg/dL    Creatinine 3.0 (H) 0.5 - 1.4 mg/dL    Calcium 9.3 8.7 - 10.5 mg/dL    Total Protein 7.7 6.0 - 8.4 g/dL    Albumin 2.0 (L) 3.5 - 5.2 g/dL    Total Bilirubin 0.7 0.1 - 1.0 mg/dL    Alkaline Phosphatase 108 55 - 135 U/L    AST 35 10 - 40 U/L    ALT 34 10 - 44 U/L    Anion Gap 14 8 - 16 mmol/L    eGFR if African American 25 (A) >60 mL/min/1.73 m^2    eGFR if non African American 21 (A) >60 mL/min/1.73 m^2   Troponin I    Collection Time: 06/16/22 11:28 AM   Result Value Ref Range    Troponin I 0.015 0.000 - 0.026 ng/mL   B-Type natriuretic peptide (BNP)    Collection Time: 06/16/22 11:28 AM   Result Value Ref Range    BNP 62 0 - 99 pg/mL   D dimer, quantitative    Collection Time: 06/16/22 11:28 AM   Result Value Ref Range    D-Dimer 8.30 (H) <0.50 mg/L FEU   Urinalysis,  Reflex to Urine Culture Urine, Clean Catch    Collection Time: 06/16/22 11:54 AM    Specimen: Urine   Result Value Ref Range    Specimen UA Urine, Catheterized     Color, UA Yellow Yellow, Straw, Shireen    Appearance, UA Hazy (A) Clear    pH, UA 6.0 5.0 - 8.0    Specific Gravity, UA 1.010 1.005 - 1.030    Protein, UA 1+ (A) Negative    Glucose, UA Negative Negative    Ketones, UA Negative Negative    Bilirubin (UA) Negative Negative    Occult Blood UA 1+ (A) Negative    Nitrite, UA Negative Negative    Urobilinogen, UA Negative <2.0 EU/dL    Leukocytes, UA 3+ (A) Negative   Urinalysis Microscopic    Collection Time: 06/16/22 11:54 AM   Result Value Ref Range    RBC, UA 9 (H) 0 - 4 /hpf    WBC, UA >100 (H) 0 - 5 /hpf    WBC Clumps, UA Many (A) None-Rare    Bacteria Many (A) None-Occ /hpf    Hyaline Casts, UA 0 0-1/lpf /lpf    Microscopic Comment SEE COMMENT    Lactic acid, plasma    Collection Time: 06/16/22  3:20 PM   Result Value Ref Range    Lactate (Lactic Acid) 0.7 0.5 - 2.2 mmol/L   POCT COVID-19 Rapid Screening    Collection Time: 06/16/22  4:23 PM   Result Value Ref Range    POC Rapid COVID Negative Negative     Acceptable Yes            Significant Imaging: I have reviewed all pertinent imaging results/findings within the past 24 hours.    Assessment/Plan:     * Severe sepsis  -This patient meets criteria for sepsis; Organ dysfunction indicated by Acute kidney injury  -Source is likely UTI  -Vital signs reviewed and noted   -WBC 35.07  -Patient received 1L NS bolus x1 in ED  -Lactate is 0.7, continue to monitor and trend   -Blood cultures and urine cultures pending  -Will start IV fluid hydration and broad-spectrum antibiotics  -Monitor on telemetry              DON (dyspnea on exertion)  -reported DON x days; not in respiratory distress on exam; SpO2 100% on RA  -BNP 62, troponin 0.015   -D. Dimer 8.3; can be also related to UTI  -CXR negative for acute cardiopulmonary abnormality  -Will hold  on CTA chest given patient's ARF on CKD  -VQ perfusion/ventilation scan pending      Anemia due to chronic kidney disease  -H/H is 8.5/24.1, which is stable and consistent with previous laboratory measurements. Patient exhibits no signs or symptoms of acute bleeding  -No indication for blood transfusion  -Continue to monitor serial CBC  -Transfuse for Hgb <7 or if symptomatic        UTI (urinary tract infection) with chronic indwelling catheter  -Patient presents with chronic indwelling catheter 2/2 to urinary retention/BPH  -UA positive for WBC clumps, bacteria, WBC >100, Leukocytes 3+  -Received 2g IV Rocephin in ED x1  -Continue 2g IV Rocephin q24hr  -Exchange alvarenga catheter and repeat UA   -Follow cultures        Acute kidney injury superimposed on chronic kidney disease  Estimated Creatinine Clearance: 22.1 mL/min (A) (based on SCr of 3 mg/dL (H)).  Baseline Cr: 2.0Baseline BUN:25   Recent Labs   Lab 06/16/22  1128   CO2 14*   BUN 69*   CREATININE 3.0*       -Received 1L NS in ED x1; will provide 1L NS bolus x1 now  -Will also provide IV fluid hydration with LR @ 100ml/hr x 12 hours  -Monitor strict urine output  - Continue to monitor renal function with daily labs and trend electrolytes  - renally dose medications  - avoid nephrotoxic agents including NSAIDs, aminoglycosides, IV contrast (unless absolutely necessary), gadolinium, fleets and other phosphorous-based laxatives  - monitor events that may lead to decreased renal perfusion (hypovolemia, hypotension, sepsis)  - monitor urine output to assure that no obstruction precipitates worsening in GFR        Type 2 diabetes mellitus, without long-term current use of insulin  Hemoglobin A1C   Date Value Ref Range Status   04/29/2022 >14.0 (H) 4.0 - 5.6 % Final     -POCT glucose on admit 210  - hold home oral medications  - initiate on detemir 10u daily and Aspart 5u tid  - LDSSI with ACCUcheck ACHS  - Diabetic diet  -Hypoglycemia protocol PRN  -Monitor closely  and adjust insulin regimen as clinically indicated to achieve levels of 140-180 during hospitalization        Benign prostatic hyperplasia with urinary obstruction  Bilateral hydronephrosis  Urinary retention    -Followed by urology, Dr. Wheeler  -Tavera catheter draining to gravity  -Monitor and trend daily renal panel   -Resume home tamsulosin 0.4mg PO daily    Bilateral hydronephrosis  See above      Urinary retention  See above      Hypertension  Chronic, Latest blood pressure and vitals reviewed-   Temp:  [98.2 °F (36.8 °C)]   Pulse:  [115-130]   Resp:  [18-20]   BP: (112-114)/(60-75)   SpO2:  [96 %-100 %] .   Home meds for hypertension were reviewed and noted below.   Hypertension Medications             metoprolol tartrate (LOPRESSOR) 50 MG tablet Take 1 tablet (50 mg total) by mouth 2 (two) times daily.    NIFEdipine (PROCARDIA-XL) 30 MG (OSM) 24 hr tablet Take 1 tablet (30 mg total) by mouth once daily.          While in the hospital, will manage blood pressure as follows; Continue home antihypertensive regimen    Will utilize p.r.n. blood pressure medication only if patient's blood pressure greater than  180/110 and he develops symptoms such as worsening chest pain or shortness of breath.        Hyperlipidemia  Last LDL was   Lab Results   Component Value Date    LDLCALC 88.2 04/29/2022      Patient is chronically on statin.will continue for now.   Monitor clinically.            VTE Risk Mitigation (From admission, onward)         Ordered     IP VTE LOW RISK PATIENT  Once         06/16/22 1519     Place sequential compression device  Until discontinued         06/16/22 1519                   Leidy Farah DNP, ACNPC-AG, CCRN  Hospitalist  Department of Hospital Medicine  [facility]   Pager: [phone]

## 2022-06-16 NOTE — Clinical Note
Is this patient a high probability for COVID-19?: No   Diagnosis: Urinary tract infection [692350]   Admitting Provider:: RAD NICHOLAS [[id number]]   Future Attending Provider: RAD NICHOLAS [[id number]]   Reason for IP Medical Treatment  (Clinical interventions that can only be accomplished in the IP setting? ) :: Management of HERBERT, UTI, hyponatremia and other issues   Estimated Length of Stay:: 2 midnights   I certify that Inpatient services for greater than or equal to 2 midnights are medically necessary:: Yes   Plans for Post-Acute care--if anticipated (pick the single best option):: A. No post acute care anticipated at this time

## 2022-06-16 NOTE — ASSESSMENT & PLAN NOTE
-reported DON x days; not in respiratory distress on exam; SpO2 100% on RA  -BNP 62, troponin 0.015   -D. Dimer 8.3; can be also related to UTI  -CXR negative for acute cardiopulmonary abnormality  -Will hold on CTA chest given patient's ARF on CKD  -VQ perfusion/ventilation scan pending

## 2022-06-16 NOTE — ASSESSMENT & PLAN NOTE
Last LDL was   Lab Results   Component Value Date    LDLCALC 88.2 04/29/2022      Patient is chronically on statin.will continue for now.   Monitor clinically.

## 2022-06-16 NOTE — SUBJECTIVE & OBJECTIVE
Past Medical History:   Diagnosis Date    Diabetes mellitus     Hyperlipemia     Hypertension     Migraine headache     PTSD (post-traumatic stress disorder)        History reviewed. No pertinent surgical history.    Review of patient's allergies indicates:  No Known Allergies    No current facility-administered medications on file prior to encounter.     Current Outpatient Medications on File Prior to Encounter   Medication Sig    atorvastatin (LIPITOR) 40 MG tablet Take 1 tablet (40 mg total) by mouth once daily.    insulin aspart U-100 (NOVOLOG) 100 unit/mL (3 mL) InPn pen Inject 4 Units into the skin 3 (three) times daily.    insulin detemir U-100 (LEVEMIR FLEXTOUCH) 100 unit/mL (3 mL) SubQ InPn pen Inject 15 Units into the skin once daily. (Patient taking differently: Inject 12 Units into the skin once daily.)    metoprolol tartrate (LOPRESSOR) 50 MG tablet Take 1 tablet (50 mg total) by mouth 2 (two) times daily.    NIFEdipine (PROCARDIA-XL) 30 MG (OSM) 24 hr tablet Take 1 tablet (30 mg total) by mouth once daily.    tamsulosin (FLOMAX) 0.4 mg Cap Take 1 capsule (0.4 mg total) by mouth once daily.    blood sugar diagnostic (TRUE METRIX GLUCOSE TEST STRIP) Strp TEST 4 TIMES A DAY    blood-glucose meter (TRUE METRIX GLUCOSE METER) Misc 1 Device by Misc.(Non-Drug; Combo Route) route 4 (four) times daily.    blood-glucose sensor (DEXCOM G6 SENSOR) Philomena 3 each by Misc.(Non-Drug; Combo Route) route continuous.    blood-glucose transmitter (DEXCOM G6 TRANSMITTER) Philomena 1 each by Misc.(Non-Drug; Combo Route) route continuous.    glucose 4 GM chewable tablet Take 4 tablets (16 g total) by mouth as needed for Low blood sugar (If having symptoms of blurry vision, palpitations, confusion, shakiness.  Please check sugars and if sugar below 70 please take 4 tablets and re-check sugar everry 15 minutes until sugars are above 70 and symptoms resolve.).    lancets 33 gauge Misc USE 4 TIMES A DAY    pen needle, diabetic 31 gauge  "x 5/16" Ndle use 4 times a day     Family History       Problem Relation (Age of Onset)    Diabetes Mother          Tobacco Use    Smoking status: Never Smoker    Smokeless tobacco: Never Used   Substance and Sexual Activity    Alcohol use: No    Drug use: No    Sexual activity: Not on file     Review of Systems   Constitutional:  Positive for appetite change and fatigue. Negative for chills, diaphoresis and fever.   HENT:  Negative for hearing loss and sore throat.    Eyes:  Negative for visual disturbance.   Respiratory:  Positive for shortness of breath. Negative for cough.         Dyspnea on exertion   Cardiovascular:  Negative for chest pain and leg swelling.   Gastrointestinal:  Negative for abdominal pain, diarrhea, nausea and vomiting.   Genitourinary:  Negative for decreased urine volume, difficulty urinating, flank pain and urgency.   Musculoskeletal:  Negative for back pain.   Skin:  Negative for rash and wound.   Neurological:  Negative for dizziness, weakness and headaches.   Psychiatric/Behavioral:  Negative for agitation and confusion.    Objective:     Vital Signs (Most Recent):  Temp: 98.9 °F (37.2 °C) (06/16/22 1736)  Pulse: (!) 112 (06/16/22 1800)  Resp: (!) 21 (06/16/22 1800)  BP: 120/70 (06/16/22 1736)  SpO2: 99 % (06/16/22 1800)   Vital Signs (24h Range):  Temp:  [98.2 °F (36.8 °C)-98.9 °F (37.2 °C)] 98.9 °F (37.2 °C)  Pulse:  [107-130] 112  Resp:  [18-22] 21  SpO2:  [96 %-100 %] 99 %  BP: (103-120)/(57-75) 120/70     Weight: 65.5 kg (144 lb 6.4 oz)  Body mass index is 23.31 kg/m².    Physical Exam  Vitals and nursing note reviewed.   Constitutional:       General: He is not in acute distress.     Appearance: Normal appearance. He is not ill-appearing.   HENT:      Head: Normocephalic and atraumatic.      Mouth/Throat:      Mouth: Mucous membranes are moist.      Dentition: Abnormal dentition.   Eyes:      Extraocular Movements: Extraocular movements intact.      Pupils: Pupils are equal, " round, and reactive to light.   Cardiovascular:      Rate and Rhythm: Regular rhythm. Tachycardia present.      Pulses: Normal pulses.      Heart sounds: Normal heart sounds. No murmur heard.    No friction rub. No gallop.   Pulmonary:      Effort: Pulmonary effort is normal. Tachypnea present. No respiratory distress.      Breath sounds: No wheezing or rhonchi.   Abdominal:      General: Bowel sounds are normal. There is no distension.      Palpations: Abdomen is soft.      Tenderness: There is no abdominal tenderness. There is no guarding.   Genitourinary:     Comments: Tavera catheter draining to gravity  Musculoskeletal:         General: No swelling.      Cervical back: Normal range of motion and neck supple.      Right lower leg: No edema.      Left lower leg: No edema.   Skin:     General: Skin is warm and dry.      Capillary Refill: Capillary refill takes less than 2 seconds.      Findings: No bruising, erythema or rash.   Neurological:      General: No focal deficit present.      Mental Status: He is alert and oriented to person, place, and time.      GCS: GCS eye subscore is 4. GCS verbal subscore is 5. GCS motor subscore is 6.   Psychiatric:         Mood and Affect: Mood normal.         Behavior: Behavior normal. Behavior is cooperative.         CRANIAL NERVES     CN III, IV, VI   Pupils are equal, round, and reactive to light.     Significant Labs: All pertinent labs within the past 24 hours have been reviewed.  Recent Results (from the past 24 hour(s))   POCT glucose    Collection Time: 06/16/22 11:13 AM   Result Value Ref Range    POCT Glucose 210 (H) 70 - 110 mg/dL   CBC auto differential    Collection Time: 06/16/22 11:28 AM   Result Value Ref Range    WBC 35.07 (H) 3.90 - 12.70 K/uL    RBC 2.92 (L) 4.60 - 6.20 M/uL    Hemoglobin 8.5 (L) 14.0 - 18.0 g/dL    Hematocrit 24.1 (L) 40.0 - 54.0 %    MCV 83 82 - 98 fL    MCH 29.1 27.0 - 31.0 pg    MCHC 35.3 32.0 - 36.0 g/dL    RDW 14.7 (H) 11.5 - 14.5 %     Platelets 440 150 - 450 K/uL    MPV 9.2 9.2 - 12.9 fL    Immature Granulocytes CANCELED 0.0 - 0.5 %    Immature Grans (Abs) CANCELED 0.00 - 0.04 K/uL    Lymph # CANCELED 1.0 - 4.8 K/uL    Mono # CANCELED 0.3 - 1.0 K/uL    Eos # CANCELED 0.0 - 0.5 K/uL    Baso # CANCELED 0.00 - 0.20 K/uL    nRBC 0 0 /100 WBC    Gran % 86.0 (H) 38.0 - 73.0 %    Lymph % 10.0 (L) 18.0 - 48.0 %    Mono % 4.0 4.0 - 15.0 %    Eosinophil % 0.0 0.0 - 8.0 %    Basophil % 0.0 0.0 - 1.9 %    Aniso Slight     Poik Slight     Hypo Occasional     Ovalocytes Occasional     Target Cells Occasional     Tear Drop Cells Occasional     Cole Cells Occasional     Vacuolated Granulocytes Present     Differential Method Manual    Comprehensive metabolic panel    Collection Time: 06/16/22 11:28 AM   Result Value Ref Range    Sodium 129 (L) 136 - 145 mmol/L    Potassium 4.5 3.5 - 5.1 mmol/L    Chloride 101 95 - 110 mmol/L    CO2 14 (L) 23 - 29 mmol/L    Glucose 211 (H) 70 - 110 mg/dL    BUN 69 (H) 8 - 23 mg/dL    Creatinine 3.0 (H) 0.5 - 1.4 mg/dL    Calcium 9.3 8.7 - 10.5 mg/dL    Total Protein 7.7 6.0 - 8.4 g/dL    Albumin 2.0 (L) 3.5 - 5.2 g/dL    Total Bilirubin 0.7 0.1 - 1.0 mg/dL    Alkaline Phosphatase 108 55 - 135 U/L    AST 35 10 - 40 U/L    ALT 34 10 - 44 U/L    Anion Gap 14 8 - 16 mmol/L    eGFR if African American 25 (A) >60 mL/min/1.73 m^2    eGFR if non African American 21 (A) >60 mL/min/1.73 m^2   Troponin I    Collection Time: 06/16/22 11:28 AM   Result Value Ref Range    Troponin I 0.015 0.000 - 0.026 ng/mL   B-Type natriuretic peptide (BNP)    Collection Time: 06/16/22 11:28 AM   Result Value Ref Range    BNP 62 0 - 99 pg/mL   D dimer, quantitative    Collection Time: 06/16/22 11:28 AM   Result Value Ref Range    D-Dimer 8.30 (H) <0.50 mg/L FEU   Urinalysis, Reflex to Urine Culture Urine, Clean Catch    Collection Time: 06/16/22 11:54 AM    Specimen: Urine   Result Value Ref Range    Specimen UA Urine, Catheterized     Color, UA Yellow  Yellow, Straw, Shireen    Appearance, UA Hazy (A) Clear    pH, UA 6.0 5.0 - 8.0    Specific Gravity, UA 1.010 1.005 - 1.030    Protein, UA 1+ (A) Negative    Glucose, UA Negative Negative    Ketones, UA Negative Negative    Bilirubin (UA) Negative Negative    Occult Blood UA 1+ (A) Negative    Nitrite, UA Negative Negative    Urobilinogen, UA Negative <2.0 EU/dL    Leukocytes, UA 3+ (A) Negative   Urinalysis Microscopic    Collection Time: 06/16/22 11:54 AM   Result Value Ref Range    RBC, UA 9 (H) 0 - 4 /hpf    WBC, UA >100 (H) 0 - 5 /hpf    WBC Clumps, UA Many (A) None-Rare    Bacteria Many (A) None-Occ /hpf    Hyaline Casts, UA 0 0-1/lpf /lpf    Microscopic Comment SEE COMMENT    Lactic acid, plasma    Collection Time: 06/16/22  3:20 PM   Result Value Ref Range    Lactate (Lactic Acid) 0.7 0.5 - 2.2 mmol/L   POCT COVID-19 Rapid Screening    Collection Time: 06/16/22  4:23 PM   Result Value Ref Range    POC Rapid COVID Negative Negative     Acceptable Yes            Significant Imaging: I have reviewed all pertinent imaging results/findings within the past 24 hours.

## 2022-06-16 NOTE — HPI
"Angel Bell is a 60 y/o male with DMII, HLD, HTN, BPH with obstruction, bilateral hydronephrosis, and urinary retention presents to [facility] for evaluation of generalized weakness, DON, and decreased appetite x 2 weeks. Patient reports he has DON with activities such as walking. He denies bilateral leg swelling. He denies abdominal pain. Patient denies fever, chills, headache, upper respiratory symptoms, N/V, diarrhea, chest pain, or palpitations. He denies history of CHF or chronic lung disease. He denies alcohol or tobacco use. He denies recent travel or recent sick contacts. He does have a chronic alvarenga catheter.      Of note: He was last seen in this ED 6/11 for hyperglycemia and dehydration. He was treated with 1L NS x1 and discharged. Patient was admitted on 5/2/22 and was hospitalized for ARF 2/2 to obstructive uropathy with CT abd/pelvis revealing enlarged prostate and perinephric stranding. Patient is followed by Urology,  and was last seen in clinic 5/17/22; and has a chronic indwelling catheter that was placed during his last admission.       In the ED: /60   Pulse (!) 115   Temp 98.2 °F (36.8 °C) (Oral)   Resp 20   Ht 5' 6" (1.676 m)   Wt 61.2 kg (135 lb)   SpO2 100%   BMI 21.79 kg/m² Labs revealedWBC 35.07, H/H 8.5/24.1, D. Dimer 8.3, Na 129, Co2 14, BUN 69, creatinine 3.0, glucose 211, UA +. CXR negative for acute cardiopulmonary abnormality. Patient received 2g Rocephin IV x1 and 1L NS bolus in ED x1. Will admit to hospital medicine for further evaluation and treatment.     "

## 2022-06-16 NOTE — PROGRESS NOTES
06/16/22 1817   Admission   Initial VN Admission Questions Complete   Communication Issues? Technical Issue  (can't see or hear each other on camera)   Shift   Virtual Nurse - Rounding Complete   Pain Management Interventions care clustered;diversional activity provided;pain management plan reviewed with patient/caregiver;quiet environment facilitated;relaxation techniques promoted   Virtual Nurse - Patient Verbalized Approval Of Camera Use;VN Rounding   Type of Frequent Check   Type Other (see comments)  (new admission)   Safety/Activity   Safety Promotion/Fall Prevention Fall Risk reviewed with patient/family;medications reviewed;instructed to call staff for mobility   Safety Precautions emergency equipment at bedside   Activity Management Rolling - L1   Positioning   Body Position position changed independently   VN called into patient's room for introduction with patient's permission.  Vidyo camera not working (unable to see or hear each other). VN role explained and informed patient that VN would be working with bedside nurse and rest of care team.  Fall risk and bed alarm protocol education provided.  Instructed patient to call for assistance.  Patient aware and agreeable.  Patient's chart, labs and vital signs reviewed.  Allowed time for questions. Will continue to be available as needed.

## 2022-06-16 NOTE — ASSESSMENT & PLAN NOTE
-H/H is 8.5/24.1, which is stable and consistent with previous laboratory measurements. Patient exhibits no signs or symptoms of acute bleeding  -No indication for blood transfusion  -Continue to monitor serial CBC  -Transfuse for Hgb <7 or if symptomatic

## 2022-06-16 NOTE — ASSESSMENT & PLAN NOTE
Estimated Creatinine Clearance: 22.1 mL/min (A) (based on SCr of 3 mg/dL (H)).  Baseline Cr: 2.0Baseline BUN:25   Recent Labs   Lab 06/16/22  1128   CO2 14*   BUN 69*   CREATININE 3.0*       -Received 1L NS in ED x1; will provide 1L NS bolus x1 now  -Will also provide IV fluid hydration with LR @ 100ml/hr x 12 hours  -Monitor strict urine output  - Continue to monitor renal function with daily labs and trend electrolytes  - renally dose medications  - avoid nephrotoxic agents including NSAIDs, aminoglycosides, IV contrast (unless absolutely necessary), gadolinium, fleets and other phosphorous-based laxatives  - monitor events that may lead to decreased renal perfusion (hypovolemia, hypotension, sepsis)  - monitor urine output to assure that no obstruction precipitates worsening in GFR

## 2022-06-17 LAB
ALBUMIN SERPL BCP-MCNC: 1.7 G/DL (ref 3.5–5.2)
ALLENS TEST: ABNORMAL
ALP SERPL-CCNC: 91 U/L (ref 55–135)
ALT SERPL W/O P-5'-P-CCNC: 36 U/L (ref 10–44)
ANION GAP SERPL CALC-SCNC: 11 MMOL/L (ref 8–16)
AST SERPL-CCNC: 50 U/L (ref 10–40)
BASOPHILS # BLD AUTO: 0.04 K/UL (ref 0–0.2)
BASOPHILS NFR BLD: 0.2 % (ref 0–1.9)
BILIRUB SERPL-MCNC: 0.4 MG/DL (ref 0.1–1)
BUN SERPL-MCNC: 53 MG/DL (ref 8–23)
CALCIUM SERPL-MCNC: 8.9 MG/DL (ref 8.7–10.5)
CHLORIDE SERPL-SCNC: 112 MMOL/L (ref 95–110)
CO2 SERPL-SCNC: 16 MMOL/L (ref 23–29)
CREAT SERPL-MCNC: 2.3 MG/DL (ref 0.5–1.4)
DELSYS: ABNORMAL
DIFFERENTIAL METHOD: ABNORMAL
EOSINOPHIL # BLD AUTO: 0 K/UL (ref 0–0.5)
EOSINOPHIL NFR BLD: 0.1 % (ref 0–8)
ERYTHROCYTE [DISTWIDTH] IN BLOOD BY AUTOMATED COUNT: 14.6 % (ref 11.5–14.5)
EST. GFR  (AFRICAN AMERICAN): 34 ML/MIN/1.73 M^2
EST. GFR  (NON AFRICAN AMERICAN): 29 ML/MIN/1.73 M^2
FERRITIN SERPL-MCNC: 3507 NG/ML (ref 20–300)
FIO2: 21
FOLATE SERPL-MCNC: 2.9 NG/ML (ref 4–24)
GLUCOSE SERPL-MCNC: 72 MG/DL (ref 70–110)
HCO3 UR-SCNC: 18.2 MMOL/L (ref 24–28)
HCT VFR BLD AUTO: 20 % (ref 40–54)
HGB BLD-MCNC: 7 G/DL (ref 14–18)
IMM GRANULOCYTES # BLD AUTO: 0.7 K/UL (ref 0–0.04)
IMM GRANULOCYTES NFR BLD AUTO: 3.4 % (ref 0–0.5)
IRON SERPL-MCNC: 38 UG/DL (ref 45–160)
LYMPHOCYTES # BLD AUTO: 1.8 K/UL (ref 1–4.8)
LYMPHOCYTES NFR BLD: 8.7 % (ref 18–48)
MAGNESIUM SERPL-MCNC: 1.6 MG/DL (ref 1.6–2.6)
MCH RBC QN AUTO: 29 PG (ref 27–31)
MCHC RBC AUTO-ENTMCNC: 35 G/DL (ref 32–36)
MCV RBC AUTO: 83 FL (ref 82–98)
MODE: ABNORMAL
MONOCYTES # BLD AUTO: 1.2 K/UL (ref 0.3–1)
MONOCYTES NFR BLD: 5.8 % (ref 4–15)
NEUTROPHILS # BLD AUTO: 16.9 K/UL (ref 1.8–7.7)
NEUTROPHILS NFR BLD: 81.8 % (ref 38–73)
NRBC BLD-RTO: 0 /100 WBC
PCO2 BLDA: 33.1 MMHG (ref 35–45)
PH SMN: 7.35 [PH] (ref 7.35–7.45)
PHOSPHATE SERPL-MCNC: 3.5 MG/DL (ref 2.7–4.5)
PLATELET # BLD AUTO: 379 K/UL (ref 150–450)
PMV BLD AUTO: 9 FL (ref 9.2–12.9)
PO2 BLDA: 26 MMHG (ref 40–60)
POC BE: -7 MMOL/L
POC SATURATED O2: 45 % (ref 95–100)
POC TCO2: 19 MMOL/L (ref 24–29)
POCT GLUCOSE: 107 MG/DL (ref 70–110)
POCT GLUCOSE: 118 MG/DL (ref 70–110)
POCT GLUCOSE: 128 MG/DL (ref 70–110)
POTASSIUM SERPL-SCNC: 4.6 MMOL/L (ref 3.5–5.1)
PROCALCITONIN SERPL IA-MCNC: 4.24 NG/ML
PROT SERPL-MCNC: 6.8 G/DL (ref 6–8.4)
RBC # BLD AUTO: 2.41 M/UL (ref 4.6–6.2)
SAMPLE: ABNORMAL
SATURATED IRON: 27 % (ref 20–50)
SITE: ABNORMAL
SODIUM SERPL-SCNC: 139 MMOL/L (ref 136–145)
SP02: 98
TOTAL IRON BINDING CAPACITY: 141 UG/DL (ref 250–450)
TRANSFERRIN SERPL-MCNC: 95 MG/DL (ref 200–375)
VIT B12 SERPL-MCNC: 903 PG/ML (ref 210–950)
WBC # BLD AUTO: 20.68 K/UL (ref 3.9–12.7)

## 2022-06-17 PROCEDURE — 25000003 PHARM REV CODE 250

## 2022-06-17 PROCEDURE — 85025 COMPLETE CBC W/AUTO DIFF WBC: CPT

## 2022-06-17 PROCEDURE — 84466 ASSAY OF TRANSFERRIN: CPT | Performed by: INTERNAL MEDICINE

## 2022-06-17 PROCEDURE — 84100 ASSAY OF PHOSPHORUS: CPT

## 2022-06-17 PROCEDURE — 99900035 HC TECH TIME PER 15 MIN (STAT)

## 2022-06-17 PROCEDURE — 63600175 PHARM REV CODE 636 W HCPCS

## 2022-06-17 PROCEDURE — 82746 ASSAY OF FOLIC ACID SERUM: CPT | Performed by: INTERNAL MEDICINE

## 2022-06-17 PROCEDURE — 25000003 PHARM REV CODE 250: Performed by: EMERGENCY MEDICINE

## 2022-06-17 PROCEDURE — 11000001 HC ACUTE MED/SURG PRIVATE ROOM

## 2022-06-17 PROCEDURE — 36415 COLL VENOUS BLD VENIPUNCTURE: CPT | Performed by: INTERNAL MEDICINE

## 2022-06-17 PROCEDURE — 94761 N-INVAS EAR/PLS OXIMETRY MLT: CPT

## 2022-06-17 PROCEDURE — 84145 PROCALCITONIN (PCT): CPT

## 2022-06-17 PROCEDURE — 80053 COMPREHEN METABOLIC PANEL: CPT

## 2022-06-17 PROCEDURE — 82728 ASSAY OF FERRITIN: CPT | Performed by: INTERNAL MEDICINE

## 2022-06-17 PROCEDURE — 82607 VITAMIN B-12: CPT | Performed by: INTERNAL MEDICINE

## 2022-06-17 PROCEDURE — 83735 ASSAY OF MAGNESIUM: CPT

## 2022-06-17 PROCEDURE — 36415 COLL VENOUS BLD VENIPUNCTURE: CPT

## 2022-06-17 PROCEDURE — 82803 BLOOD GASES ANY COMBINATION: CPT

## 2022-06-17 PROCEDURE — 25000003 PHARM REV CODE 250: Performed by: INTERNAL MEDICINE

## 2022-06-17 RX ORDER — METOPROLOL TARTRATE 50 MG/1
TABLET ORAL
Status: DISPENSED
Start: 2022-06-17 | End: 2022-06-18

## 2022-06-17 RX ORDER — SODIUM BICARBONATE 650 MG/1
650 TABLET ORAL 3 TIMES DAILY
Status: DISCONTINUED | OUTPATIENT
Start: 2022-06-17 | End: 2022-06-19 | Stop reason: HOSPADM

## 2022-06-17 RX ORDER — METOPROLOL TARTRATE 50 MG/1
50 TABLET ORAL ONCE
Status: COMPLETED | OUTPATIENT
Start: 2022-06-17 | End: 2022-06-17

## 2022-06-17 RX ORDER — METOPROLOL TARTRATE 50 MG/1
50 TABLET ORAL 2 TIMES DAILY
Status: DISCONTINUED | OUTPATIENT
Start: 2022-06-18 | End: 2022-06-19 | Stop reason: HOSPADM

## 2022-06-17 RX ADMIN — SODIUM CHLORIDE, SODIUM LACTATE, POTASSIUM CHLORIDE, AND CALCIUM CHLORIDE: .6; .31; .03; .02 INJECTION, SOLUTION INTRAVENOUS at 08:06

## 2022-06-17 RX ADMIN — SODIUM BICARBONATE 650 MG TABLET 650 MG: at 09:06

## 2022-06-17 RX ADMIN — CEFTRIAXONE 2 G: 2 INJECTION, SOLUTION INTRAVENOUS at 08:06

## 2022-06-17 RX ADMIN — METOPROLOL TARTRATE 50 MG: 50 TABLET, FILM COATED ORAL at 11:06

## 2022-06-17 RX ADMIN — INSULIN ASPART 5 UNITS: 100 INJECTION, SOLUTION INTRAVENOUS; SUBCUTANEOUS at 08:06

## 2022-06-17 RX ADMIN — ATORVASTATIN CALCIUM 40 MG: 40 TABLET, FILM COATED ORAL at 08:06

## 2022-06-17 RX ADMIN — TAMSULOSIN HYDROCHLORIDE 0.4 MG: 0.4 CAPSULE ORAL at 08:06

## 2022-06-17 RX ADMIN — INSULIN ASPART 0 UNITS: 100 INJECTION, SOLUTION INTRAVENOUS; SUBCUTANEOUS at 08:06

## 2022-06-17 RX ADMIN — SODIUM BICARBONATE 650 MG TABLET 650 MG: at 04:06

## 2022-06-17 RX ADMIN — SODIUM BICARBONATE 650 MG TABLET 650 MG: at 11:06

## 2022-06-17 RX ADMIN — INSULIN DETEMIR 10 UNITS: 100 INJECTION, SOLUTION SUBCUTANEOUS at 09:06

## 2022-06-17 RX ADMIN — NIFEDIPINE 30 MG: 30 TABLET, FILM COATED, EXTENDED RELEASE ORAL at 08:06

## 2022-06-17 RX ADMIN — MUPIROCIN: 20 OINTMENT TOPICAL at 09:06

## 2022-06-17 RX ADMIN — INSULIN ASPART 5 UNITS: 100 INJECTION, SOLUTION INTRAVENOUS; SUBCUTANEOUS at 04:06

## 2022-06-17 RX ADMIN — INSULIN ASPART 5 UNITS: 100 INJECTION, SOLUTION INTRAVENOUS; SUBCUTANEOUS at 11:06

## 2022-06-17 RX ADMIN — METOPROLOL TARTRATE 50 MG: 50 TABLET, FILM COATED ORAL at 08:06

## 2022-06-17 RX ADMIN — MUPIROCIN: 20 OINTMENT TOPICAL at 08:06

## 2022-06-17 NOTE — PLAN OF CARE
SW received call from [place] [phone] with [facility]. Pt is current with them, sw sent resume HH referral via [facility] to [facility]. SW will follow.     Renzo Gramajo, RADHA  [phone]    Future Appointments   Date Time Provider Department Center   6/20/2022 11:45 AM Eleanor Will MD [facility]   6/24/2022  1:00 PM Tiago Ruiz MD [facility]   7/22/2022 10:00 AM Tiago Ruiz MD [facility]   8/15/2022  9:50 AM LAB, [place] [facility]   8/15/2022 10:10 AM [place], [place] [facility]   8/26/2022  8:00 AM Wilian Byers MD [facility]        06/17/22 1220   Post-Acute Status   Post-Acute Authorization Home Health   Home Health Status Referrals Sent   Coverage [facility]   Hospital Resources/Appts/Education Provided Appointments scheduled and added to AVS   Discharge Plan   Discharge Plan A Home Health

## 2022-06-17 NOTE — PLAN OF CARE
Recommendation:  1. Encourage intake at meals as tolerated.   2. Monitor need for supplements.   3. Monitor weight/labs.   4. RD to follow to monitor po intake    Goals:   Pt will tolerate diet with at least 50-75% intake at meals by RD follow up  Nutrition Goal Status: new

## 2022-06-17 NOTE — PLAN OF CARE
At time of d/c pt will have his Wife Josseline [phone] help transport pt home. Pt will get Hh with [facility]. Pt needs orders. SW requested f/u appts. Sw will follow.     Renzo Gramajo, MSW  [phone]    Future Appointments   Date Time Provider Department Center   6/20/2022 11:45 AM Eleanor Will MD [facility]   6/24/2022  1:00 PM Tiago Ruiz MD [facility] [place] [facility]   7/22/2022 10:00 AM Tiago Ruiz MD [facility] [place] [facility]   8/15/2022  9:50 AM LAB, [place] [facility]   8/15/2022 10:10 AM [place] [facility]   8/26/2022  8:00 AM Wilian Byers MD [facility]        06/17/22 2875   Post-Acute Status   Post-Acute Authorization Home Health   Home Health Status Referrals Sent   Discharge Plan   Discharge Plan A Home Health

## 2022-06-17 NOTE — PROGRESS NOTES
"[facility] Medicine  Progress Note    Patient Name: Angel Bell  MRN: [id number]  Patient Class: IP- Inpatient   Admission Date: 6/16/2022  Length of Stay: 1 days  Attending Physician: Julien Flynn MD  Primary Care Provider: Tiago Ruiz MD        Subjective:     Principal Problem:Severe sepsis        HPI:  Angel Bell is a 60 y/o male with DMII, HLD, HTN, BPH with obstruction, bilateral hydronephrosis, and urinary retention presents to [facility] for evaluation of generalized weakness, DON, and decreased appetite x 2 weeks. Patient reports he has DON with activities such as walking. He denies bilateral leg swelling. He denies abdominal pain. Patient denies fever, chills, headache, upper respiratory symptoms, N/V, diarrhea, chest pain, or palpitations. He denies history of CHF or chronic lung disease. He denies alcohol or tobacco use. He denies recent travel or recent sick contacts. He does have a chronic alvarenga catheter.      Of note: He was last seen in this ED 6/11 for hyperglycemia and dehydration. He was treated with 1L NS x1 and discharged. Patient was admitted on 5/2/22 and was hospitalized for ARF 2/2 to obstructive uropathy with CT abd/pelvis revealing enlarged prostate and perinephric stranding. Patient is followed by Urology,  and was last seen in clinic 5/17/22; and has a chronic indwelling catheter that was placed during his last admission.       In the ED: /60   Pulse (!) 115   Temp 98.2 °F (36.8 °C) (Oral)   Resp 20   Ht 5' 6" (1.676 m)   Wt 61.2 kg (135 lb)   SpO2 100%   BMI 21.79 kg/m² Labs revealedWBC 35.07, H/H 8.5/24.1, D. Dimer 8.3, Na 129, Co2 14, BUN 69, creatinine 3.0, glucose 211, UA +. CXR negative for acute cardiopulmonary abnormality. Patient received 2g Rocephin IV x1 and 1L NS bolus in ED x1. Will admit to hospital medicine for further evaluation and treatment.         Overview/Hospital Course:  No notes on file    Interval History: No overnight " events. BP improving with iv fluid resuscitation. Pt resting comfortably in bed. States he feels much better. Has chronic indwelling alvarenga due to retention. States he has never had UTI before.     Review of Systems   Constitutional:  Positive for appetite change and fatigue. Negative for chills, diaphoresis and fever.   HENT:  Negative for hearing loss and sore throat.    Eyes:  Negative for visual disturbance.   Respiratory:  Negative for cough and shortness of breath.         Dyspnea on exertion   Cardiovascular:  Negative for chest pain and leg swelling.   Gastrointestinal:  Negative for abdominal pain, diarrhea, nausea and vomiting.   Genitourinary:  Negative for decreased urine volume, difficulty urinating, flank pain and urgency.   Musculoskeletal:  Negative for back pain.   Skin:  Negative for rash and wound.   Neurological:  Negative for dizziness, weakness and headaches.   Psychiatric/Behavioral:  Negative for agitation and confusion.    Objective:     Vital Signs (Most Recent):  Temp: 98.5 °F (36.9 °C) (06/17/22 1424)  Pulse: 100 (06/17/22 1424)  Resp: 18 (06/17/22 1424)  BP: (!) 107/57 (06/17/22 1424)  SpO2: 99 % (06/17/22 1424)   Vital Signs (24h Range):  Temp:  [98.3 °F (36.8 °C)-98.9 °F (37.2 °C)] 98.5 °F (36.9 °C)  Pulse:  [] 100  Resp:  [16-22] 18  SpO2:  [97 %-100 %] 99 %  BP: ()/(52-70) 107/57     Weight: 70.6 kg (155 lb 10.3 oz)  Body mass index is 25.12 kg/m².    Intake/Output Summary (Last 24 hours) at 6/17/2022 1443  Last data filed at 6/17/2022 0700  Gross per 24 hour   Intake 890 ml   Output 2700 ml   Net -1810 ml      Physical Exam  Vitals and nursing note reviewed.   Constitutional:       General: He is not in acute distress.     Appearance: Normal appearance. He is not ill-appearing.   HENT:      Head: Normocephalic and atraumatic.      Mouth/Throat:      Mouth: Mucous membranes are moist.      Dentition: Abnormal dentition.   Eyes:      Extraocular Movements: Extraocular  movements intact.      Pupils: Pupils are equal, round, and reactive to light.   Cardiovascular:      Rate and Rhythm: Regular rhythm. Tachycardia present.      Pulses: Normal pulses.      Heart sounds: Normal heart sounds. No murmur heard.    No friction rub. No gallop.   Pulmonary:      Effort: Pulmonary effort is normal. No respiratory distress.      Breath sounds: No wheezing or rhonchi.   Abdominal:      General: Bowel sounds are normal. There is no distension.      Palpations: Abdomen is soft.      Tenderness: There is no abdominal tenderness. There is no guarding.   Genitourinary:     Comments: Tavera catheter draining to gravity  Musculoskeletal:         General: No swelling.      Cervical back: Normal range of motion and neck supple.      Right lower leg: No edema.      Left lower leg: No edema.   Skin:     General: Skin is warm and dry.      Capillary Refill: Capillary refill takes less than 2 seconds.      Findings: No bruising, erythema or rash.   Neurological:      General: No focal deficit present.      Mental Status: He is alert and oriented to person, place, and time.      GCS: GCS eye subscore is 4. GCS verbal subscore is 5. GCS motor subscore is 6.   Psychiatric:         Mood and Affect: Mood normal.         Behavior: Behavior normal. Behavior is cooperative.       Significant Labs: All pertinent labs within the past 24 hours have been reviewed.    Significant Imaging: I have reviewed all pertinent imaging results/findings within the past 24 hours.      Assessment/Plan:      * Severe sepsis  -This patient meets criteria for sepsis; Organ dysfunction indicated by Acute kidney injury  -Source is likely UTI  -Vital signs reviewed and noted   -WBC 35.07  -Patient received 1L NS bolus x1 in ED  -Lactate is 0.7, continue to monitor and trend   -Blood cultures and urine cultures pending  -Monitor on telemetry  -Responded to fluid resuscitation  -Cont ceftriaxone and follow cultures            DON (dyspnea  on exertion)  -reported DON x days; not in respiratory distress on exam; SpO2 100% on RA  -BNP 62, troponin 0.015   -D. Dimer 8.3; can be also related to UTI  -CXR negative for acute cardiopulmonary abnormality  -Will hold on CTA chest given patient's ARF on CKD  -VQ perfusion/ventilation scan negative for PE      Anemia due to chronic kidney disease  -H/H is 8.5/24.1, which is stable and consistent with previous laboratory measurements. Patient exhibits no signs or symptoms of acute bleeding  -No indication for blood transfusion  -Continue to monitor serial CBC  -Transfuse for Hgb <7 or if symptomatic        UTI (urinary tract infection) with chronic indwelling catheter  -Patient presents with chronic indwelling catheter 2/2 to urinary retention/BPH  -UA positive for WBC clumps, bacteria, WBC >100, Leukocytes 3+  -Received 2g IV Rocephin in ED x1  -Continue 2g IV Rocephin q24hr  -Exchange alvarenga catheter and repeat UA   -Follow cultures        Acute kidney injury superimposed on chronic kidney disease  Estimated Creatinine Clearance: 30.1 mL/min (A) (based on SCr of 2.3 mg/dL (H)).  Baseline Cr: 2.0Baseline BUN:25   Recent Labs   Lab 06/16/22  1128 06/17/22  0431   CO2 14* 16*   BUN 69* 53*   CREATININE 3.0* 2.3*   MG  --  1.6   PHOS  --  3.5       -Received 1L NS in ED x1; will provide 1L NS bolus x1 now  -Will also provide IV fluid hydration with LR @ 100ml/hr x 12 hours  -Monitor strict urine output  - Continue to monitor renal function with daily labs and trend electrolytes  - renally dose medications  - avoid nephrotoxic agents including NSAIDs, aminoglycosides, IV contrast (unless absolutely necessary), gadolinium, fleets and other phosphorous-based laxatives  - monitor events that may lead to decreased renal perfusion (hypovolemia, hypotension, sepsis)  - monitor urine output to assure that no obstruction precipitates worsening in GFR  -kidney function improving      Type 2 diabetes mellitus, without  long-term current use of insulin  Hemoglobin A1C   Date Value Ref Range Status   04/29/2022 >14.0 (H) 4.0 - 5.6 % Final     -POCT glucose on admit 210  - hold home oral medications  - initiate on detemir 10u daily and Aspart 5u tid  - LDSSI with ACCUcheck ACHS  - Diabetic diet  -Hypoglycemia protocol PRN  -Monitor closely and adjust insulin regimen as clinically indicated to achieve levels of 140-180 during hospitalization        Benign prostatic hyperplasia with urinary obstruction  Bilateral hydronephrosis  Urinary retention    -Followed by urology, Dr. Wheeler  -Tavera catheter draining to gravity  -Monitor and trend daily renal panel   -Resume home tamsulosin 0.4mg PO daily    Bilateral hydronephrosis  See above      Urinary retention  See above      Hypertension  Chronic, Latest blood pressure and vitals reviewed-   Temp:  [98.3 °F (36.8 °C)-98.9 °F (37.2 °C)]   Pulse:  []   Resp:  [16-22]   BP: ()/(52-70)   SpO2:  [97 %-100 %] .   Home meds for hypertension were reviewed and noted below.   Hypertension Medications             metoprolol tartrate (LOPRESSOR) 50 MG tablet Take 1 tablet (50 mg total) by mouth 2 (two) times daily.    NIFEdipine (PROCARDIA-XL) 30 MG (OSM) 24 hr tablet Take 1 tablet (30 mg total) by mouth once daily.          While in the hospital, will manage blood pressure as follows; Continue home antihypertensive regimen    Will utilize p.r.n. blood pressure medication only if patient's blood pressure greater than  180/110 and he develops symptoms such as worsening chest pain or shortness of breath.        Hyperlipidemia  Last LDL was   Lab Results   Component Value Date    LDLCALC 88.2 04/29/2022      Patient is chronically on statin.will continue for now.   Monitor clinically.          Metabolic acidosis  Likely due to HERBERT on CKD  LA WNL  Check VBG  Bicarb tabs        VTE Risk Mitigation (From admission, onward)         Ordered     IP VTE LOW RISK PATIENT  Once         06/16/22 7239      Place sequential compression device  Until discontinued         06/16/22 1519                Discharge Planning   SAVANAH:      Code Status: Full Code   Is the patient medically ready for discharge?:     Reason for patient still in hospital (select all that apply): Patient trending condition, Laboratory test and Treatment  Discharge Plan A: Home Health                  Julien Flynn MD  Department of Hospital Medicine   [facility]

## 2022-06-17 NOTE — PLAN OF CARE
SW met with pt at bedside to complete DCA. Pt stated that he lives with his Wife Josseline [phone] and daughter at home. Pt reports that he does not use and HME at home, but does have a doxcon to check his blood sugar. Pt reported that his wife will help transport him home at time of d/c.  Pt reported that he does get HH but is not sure through what agency. SW attempted to call pt's wife and she did not answer. SW requested f/u appts. White board updated with CM name and contact information.  Discharge brochure provided.  Pt encouraged to call with any questions or concerns.  Cm will continue to follow pt through transitions of care and assist with any discharge needs.    Renzo Gramajo, MSW  [phone]    Future Appointments   Date Time Provider Department Center   6/20/2022 11:45 AM Eleanor Will MD [facility]   6/24/2022  1:00 PM Tiago Ruiz MD [facility] MED [place] [facility]   7/22/2022 10:00 AM Tiago Ruiz MD [facility]   8/15/2022  9:50 AM LAB, [facility]   8/15/2022 10:10 AM LAB, [facility]   8/26/2022  8:00 AM Wilian Byers MD [facility]        06/17/22 0850   Discharge Assessment   Assessment Type Discharge Planning Assessment   Confirmed/corrected address, phone number and insurance Yes   Confirmed Demographics Correct on Facesheet   Source of Information patient   When was your last doctors appointment?   (per pt it has been a month)   Communicated SAVANAH with patient/caregiver Yes   Reason For Admission Severe sepsis   Lives With spouse;child(yanet), adult   Facility Arrived From: HOME   Do you expect to return to your current living situation? Yes   Do you have help at home or someone to help you manage your care at home? Yes   Who are your caregiver(s) and their phone number(s)? HH AGENCY   Prior to hospitilization cognitive status: Alert/Oriented   Current cognitive status: Alert/Oriented   Walking or Climbing Stairs Difficulty  none   Dressing/Bathing Difficulty none   Home Accessibility wheelchair accessible   Home Layout Able to live on 1st floor   Equipment Currently Used at Home none   Readmission within 30 days? No   Patient currently being followed by outpatient case management? No   Do you take prescription medications? Yes   Do you have prescription coverage? Yes   Coverage BCBS   Do you have any problems affording any of your prescribed medications? No   Is the patient taking medications as prescribed? yes   Who is going to help you get home at discharge? Wife Josseline [phone]   How do you get to doctors appointments? family or friend will provide   Are you on dialysis? No   Do you take coumadin? No   Discharge Plan A Home with family   DME Needed Upon Discharge  none   Discharge Plan discussed with: Patient   Discharge Barriers Identified None   Relationship/Environment   Name(s) of Who Lives With Patient Wife Josseline [phone]

## 2022-06-17 NOTE — ASSESSMENT & PLAN NOTE
-This patient meets criteria for sepsis; Organ dysfunction indicated by Acute kidney injury  -Source is likely UTI  -Vital signs reviewed and noted   -WBC 35.07  -Patient received 1L NS bolus x1 in ED  -Lactate is 0.7, continue to monitor and trend   -Blood cultures and urine cultures pending  -Monitor on telemetry  -Responded to fluid resuscitation  -Cont ceftriaxone and follow cultures

## 2022-06-17 NOTE — ASSESSMENT & PLAN NOTE
Chronic, Latest blood pressure and vitals reviewed-   Temp:  [98.3 °F (36.8 °C)-98.9 °F (37.2 °C)]   Pulse:  []   Resp:  [16-22]   BP: ()/(52-70)   SpO2:  [97 %-100 %] .   Home meds for hypertension were reviewed and noted below.   Hypertension Medications             metoprolol tartrate (LOPRESSOR) 50 MG tablet Take 1 tablet (50 mg total) by mouth 2 (two) times daily.    NIFEdipine (PROCARDIA-XL) 30 MG (OSM) 24 hr tablet Take 1 tablet (30 mg total) by mouth once daily.          While in the hospital, will manage blood pressure as follows; Continue home antihypertensive regimen    Will utilize p.r.n. blood pressure medication only if patient's blood pressure greater than  180/110 and he develops symptoms such as worsening chest pain or shortness of breath.

## 2022-06-17 NOTE — PLAN OF CARE
Problem: Adult Inpatient Plan of Care  Goal: Plan of Care Review  Outcome: Ongoing, Progressing     Plan of care reviewed with patient. Pt verbalized understanding. Pt is AAO x 4, on room air, denies SOB and pain, no distress noted. Pt is Sinus Tachy on Tele, no true red alarms. Pt is receiving LR @ 100 ml/hr. All medications administered as prescribed. Tavera care done. Pt is resting, bed in lowest position, HOB lowered, side rails up x 2, bed alarm activated, call light and bedside table within reach. Pt instructed to call if assistance is needed.

## 2022-06-17 NOTE — SUBJECTIVE & OBJECTIVE
Interval History: No overnight events. BP improving with iv fluid resuscitation. Pt resting comfortably in bed. States he feels much better. Has chronic indwelling alvarenga due to retention. States he has never had UTI before.     Review of Systems   Constitutional:  Positive for appetite change and fatigue. Negative for chills, diaphoresis and fever.   HENT:  Negative for hearing loss and sore throat.    Eyes:  Negative for visual disturbance.   Respiratory:  Negative for cough and shortness of breath.         Dyspnea on exertion   Cardiovascular:  Negative for chest pain and leg swelling.   Gastrointestinal:  Negative for abdominal pain, diarrhea, nausea and vomiting.   Genitourinary:  Negative for decreased urine volume, difficulty urinating, flank pain and urgency.   Musculoskeletal:  Negative for back pain.   Skin:  Negative for rash and wound.   Neurological:  Negative for dizziness, weakness and headaches.   Psychiatric/Behavioral:  Negative for agitation and confusion.    Objective:     Vital Signs (Most Recent):  Temp: 98.5 °F (36.9 °C) (06/17/22 1424)  Pulse: 100 (06/17/22 1424)  Resp: 18 (06/17/22 1424)  BP: (!) 107/57 (06/17/22 1424)  SpO2: 99 % (06/17/22 1424)   Vital Signs (24h Range):  Temp:  [98.3 °F (36.8 °C)-98.9 °F (37.2 °C)] 98.5 °F (36.9 °C)  Pulse:  [] 100  Resp:  [16-22] 18  SpO2:  [97 %-100 %] 99 %  BP: ()/(52-70) 107/57     Weight: 70.6 kg (155 lb 10.3 oz)  Body mass index is 25.12 kg/m².    Intake/Output Summary (Last 24 hours) at 6/17/2022 1443  Last data filed at 6/17/2022 0700  Gross per 24 hour   Intake 890 ml   Output 2700 ml   Net -1810 ml      Physical Exam  Vitals and nursing note reviewed.   Constitutional:       General: He is not in acute distress.     Appearance: Normal appearance. He is not ill-appearing.   HENT:      Head: Normocephalic and atraumatic.      Mouth/Throat:      Mouth: Mucous membranes are moist.      Dentition: Abnormal dentition.   Eyes:       Extraocular Movements: Extraocular movements intact.      Pupils: Pupils are equal, round, and reactive to light.   Cardiovascular:      Rate and Rhythm: Regular rhythm. Tachycardia present.      Pulses: Normal pulses.      Heart sounds: Normal heart sounds. No murmur heard.    No friction rub. No gallop.   Pulmonary:      Effort: Pulmonary effort is normal. No respiratory distress.      Breath sounds: No wheezing or rhonchi.   Abdominal:      General: Bowel sounds are normal. There is no distension.      Palpations: Abdomen is soft.      Tenderness: There is no abdominal tenderness. There is no guarding.   Genitourinary:     Comments: Tavera catheter draining to gravity  Musculoskeletal:         General: No swelling.      Cervical back: Normal range of motion and neck supple.      Right lower leg: No edema.      Left lower leg: No edema.   Skin:     General: Skin is warm and dry.      Capillary Refill: Capillary refill takes less than 2 seconds.      Findings: No bruising, erythema or rash.   Neurological:      General: No focal deficit present.      Mental Status: He is alert and oriented to person, place, and time.      GCS: GCS eye subscore is 4. GCS verbal subscore is 5. GCS motor subscore is 6.   Psychiatric:         Mood and Affect: Mood normal.         Behavior: Behavior normal. Behavior is cooperative.       Significant Labs: All pertinent labs within the past 24 hours have been reviewed.    Significant Imaging: I have reviewed all pertinent imaging results/findings within the past 24 hours.

## 2022-06-17 NOTE — ASSESSMENT & PLAN NOTE
-reported DON x days; not in respiratory distress on exam; SpO2 100% on RA  -BNP 62, troponin 0.015   -D. Dimer 8.3; can be also related to UTI  -CXR negative for acute cardiopulmonary abnormality  -Will hold on CTA chest given patient's ARF on CKD  -VQ perfusion/ventilation scan negative for PE

## 2022-06-17 NOTE — ASSESSMENT & PLAN NOTE
Contributing Nutrition Diagnosis  Inadequate energy intake    Related to (etiology):   Diabetic diet restrictions    Signs and Symptoms (as evidenced by):   Pt reports weight loss 2/2 new adherence to diabetic diet    Interventions:  Collaboration with other providers    Nutrition Diagnosis Status:   New

## 2022-06-17 NOTE — PROGRESS NOTES
"[facility] - Telemetry  Adult Nutrition  Progress Note    SUMMARY       Recommendations    Recommendation:  1. Encourage intake at meals as tolerated.   2. Monitor need for supplements.   3. Monitor weight/labs.   4. RD to follow to monitor po intake    Goals:   Pt will tolerate diet with at least 50-75% intake at meals by RD follow up  Nutrition Goal Status: new  Communication of RD Recs: other (comment) (POC)    Assessment and Plan  Type 2 diabetes mellitus, without long-term current use of insulin  Contributing Nutrition Diagnosis  Inadequate energy intake    Related to (etiology):   Diabetic diet restrictions    Signs and Symptoms (as evidenced by):   Pt reports weight loss 2/2 new adherence to diabetic diet    Interventions:  Collaboration with other providers    Nutrition Diagnosis Status:   New      Malnutrition Assessment  Weight Loss (Malnutrition):  (8% x 2 months)   Thoracic and Lumbar Region: mild depletion   [place] Region (Muscle Loss): mild depletion  Clavicle Bone Region (Muscle Loss): mild depletion       Reason for Assessment  Reason For Assessment: identified at risk by screening criteria (MST)  Diagnosis:  (severe sepsis)  Relevant Medical History: DM, HTN, HLD, BPH, B hydronephrosis, PTSD  General Information Comments: Pt on 2000 ADA Renal low cholesterol/saturated fat diet. Pt reports good appetite. Reports recent weight loss 2/2 following diabetic diet at home. Noted 5lb weight los x 2 months. Roberto 21-skin intact. NFPE completed today 6/17- mild wasting of clavicles, thoracic region, & temples.  Nutrition Discharge Planning: pt to d/c on ADA Renal low fat diet    Nutrition Risk Screen  Nutrition Risk Screen: no indicators present    Nutrition/Diet History  Food Preferences: no [employer]al or cultural food prefs identified  Factors Affecting Nutritional Intake: None identified at this time    Anthropometrics  Temp: 98.5 °F (36.9 °C)  Height Method: Stated  Height: 5' 6" (167.6 cm)  Height (inches): " 66 in  Weight Method: Bed Scale  Weight: 70.6 kg (155 lb 10.3 oz)  Weight (lb): 155.65 lb  Ideal Body Weight (IBW), Male: 142 lb  % Ideal Body Weight, Male (lb): 109.61 %  BMI (Calculated): 25.1  BMI Grade: 25 - 29.9 - overweight  Usual Body Weight (UBW), k.1 kg ()  % Usual Body Weight: 91.76  % Weight Change From Usual Weight: -8.43 %     Lab/Procedures/Meds  Pertinent Labs Reviewed: reviewed  Pertinent Labs Comments: BUM 53H, Crea 2.3H, Alb 1.7L  Pertinent Medications Reviewed: reviewed  Pertinent Medications Comments: rocephin, insulin, lopressor, lactated ringers    Estimated/Assessed Needs  Weight Used For Calorie Calculations: 70.6 kg (155 lb 10.3 oz)  Energy Calorie Requirements (kcal): 2118 (30 kcal/kg)  Energy Need Method: Kcal/kg  Protein Requirements: 70g (1.0g/kg)  Weight Used For Protein Calculations: 70.6 kg (155 lb 10.3 oz)  Estimated Fluid Requirement Method: RDA Method  RDA Method (mL): 8  CHO Requirement: 225g    Nutrition Prescription Ordered  Current Diet Order:  ADA Renal low cholesterol/saturated fat    Evaluation of Received Nutrient/Fluid Intake  I/O: 1890/2700  Energy Calories Required: meeting needs  Protein Required: meeting needs  Fluid Required: meeting needs  Comments: LBM 6/16  % Intake of Estimated Energy Needs: 50 - 75 %  % Meal Intake: 50 - 75 %    Nutrition Risk  Level of Risk/Frequency of Follow-up:  (1xweekly)     Monitor and Evaluation  Food and Nutrient Intake: food and beverage intake  Food and Nutrient Adminstration: diet order  Physical Activity and Function: nutrition-related ADLs and IADLs  Anthropometric Measurements: weight  Biochemical Data, Medical Tests and Procedures: electrolyte and renal panel  Nutrition-Focused Physical Findings: overall appearance     Nutrition Follow-Up    RD Follow-up?: Yes

## 2022-06-17 NOTE — ASSESSMENT & PLAN NOTE
Estimated Creatinine Clearance: 30.1 mL/min (A) (based on SCr of 2.3 mg/dL (H)).  Baseline Cr: 2.0Baseline BUN:25   Recent Labs   Lab 06/16/22  1128 06/17/22  0431   CO2 14* 16*   BUN 69* 53*   CREATININE 3.0* 2.3*   MG  --  1.6   PHOS  --  3.5       -Received 1L NS in ED x1; will provide 1L NS bolus x1 now  -Will also provide IV fluid hydration with LR @ 100ml/hr x 12 hours  -Monitor strict urine output  - Continue to monitor renal function with daily labs and trend electrolytes  - renally dose medications  - avoid nephrotoxic agents including NSAIDs, aminoglycosides, IV contrast (unless absolutely necessary), gadolinium, fleets and other phosphorous-based laxatives  - monitor events that may lead to decreased renal perfusion (hypovolemia, hypotension, sepsis)  - monitor urine output to assure that no obstruction precipitates worsening in GFR  -kidney function improving

## 2022-06-17 NOTE — HOSPITAL COURSE
Admitted for severe sepsis 2/2 catheter associated UTI. Also found to have HERBERT on CKD. Responded well to fluid resuscitation. Started on ceftriaxone. Cultures now finalized to grow pseudomonas and klebsiella. Antibiotics changed to cipro for total 14 day course. HERBERT iproved, Brendan shah. Had alvarenga palced by urology will DC with alvarenga. Will need outpatient urology follow up.

## 2022-06-18 LAB
ANION GAP SERPL CALC-SCNC: 10 MMOL/L (ref 8–16)
BACTERIA UR CULT: NORMAL
BACTERIA UR CULT: NORMAL
BUN SERPL-MCNC: 33 MG/DL (ref 8–23)
CALCIUM SERPL-MCNC: 9.1 MG/DL (ref 8.7–10.5)
CHLORIDE SERPL-SCNC: 107 MMOL/L (ref 95–110)
CO2 SERPL-SCNC: 19 MMOL/L (ref 23–29)
CREAT SERPL-MCNC: 2 MG/DL (ref 0.5–1.4)
EST. GFR  (AFRICAN AMERICAN): 40 ML/MIN/1.73 M^2
EST. GFR  (NON AFRICAN AMERICAN): 35 ML/MIN/1.73 M^2
GLUCOSE SERPL-MCNC: 122 MG/DL (ref 70–110)
POCT GLUCOSE: 114 MG/DL (ref 70–110)
POCT GLUCOSE: 151 MG/DL (ref 70–110)
POCT GLUCOSE: 173 MG/DL (ref 70–110)
POCT GLUCOSE: 93 MG/DL (ref 70–110)
POTASSIUM SERPL-SCNC: 4.7 MMOL/L (ref 3.5–5.1)
SODIUM SERPL-SCNC: 136 MMOL/L (ref 136–145)

## 2022-06-18 PROCEDURE — 63600175 PHARM REV CODE 636 W HCPCS

## 2022-06-18 PROCEDURE — 36415 COLL VENOUS BLD VENIPUNCTURE: CPT | Performed by: HOSPITALIST

## 2022-06-18 PROCEDURE — 80048 BASIC METABOLIC PNL TOTAL CA: CPT | Performed by: HOSPITALIST

## 2022-06-18 PROCEDURE — 25000003 PHARM REV CODE 250

## 2022-06-18 PROCEDURE — 11000001 HC ACUTE MED/SURG PRIVATE ROOM

## 2022-06-18 PROCEDURE — 25000003 PHARM REV CODE 250: Performed by: INTERNAL MEDICINE

## 2022-06-18 RX ADMIN — SODIUM BICARBONATE 650 MG TABLET 650 MG: at 08:06

## 2022-06-18 RX ADMIN — ATORVASTATIN CALCIUM 40 MG: 40 TABLET, FILM COATED ORAL at 08:06

## 2022-06-18 RX ADMIN — MUPIROCIN: 20 OINTMENT TOPICAL at 08:06

## 2022-06-18 RX ADMIN — INSULIN ASPART 5 UNITS: 100 INJECTION, SOLUTION INTRAVENOUS; SUBCUTANEOUS at 04:06

## 2022-06-18 RX ADMIN — METOPROLOL TARTRATE 50 MG: 50 TABLET, FILM COATED ORAL at 08:06

## 2022-06-18 RX ADMIN — INSULIN ASPART 5 UNITS: 100 INJECTION, SOLUTION INTRAVENOUS; SUBCUTANEOUS at 12:06

## 2022-06-18 RX ADMIN — CEFTRIAXONE 2 G: 2 INJECTION, SOLUTION INTRAVENOUS at 08:06

## 2022-06-18 RX ADMIN — INSULIN DETEMIR 10 UNITS: 100 INJECTION, SOLUTION SUBCUTANEOUS at 08:06

## 2022-06-18 RX ADMIN — INSULIN ASPART 5 UNITS: 100 INJECTION, SOLUTION INTRAVENOUS; SUBCUTANEOUS at 08:06

## 2022-06-18 RX ADMIN — TAMSULOSIN HYDROCHLORIDE 0.4 MG: 0.4 CAPSULE ORAL at 08:06

## 2022-06-18 NOTE — CONSULTS
[facility] Hospital Medicine  Telemedicine Consult Note       Angel Bell has been accepted for transfer to [facility] and will be followed through telemedicine services beginning at 7 AM.    Lauryn Roman MD  [facility] Medicine Staff

## 2022-06-19 VITALS
HEIGHT: 66 IN | WEIGHT: 155.63 LBS | TEMPERATURE: 98 F | RESPIRATION RATE: 18 BRPM | SYSTOLIC BLOOD PRESSURE: 127 MMHG | BODY MASS INDEX: 25.01 KG/M2 | OXYGEN SATURATION: 97 % | DIASTOLIC BLOOD PRESSURE: 68 MMHG | HEART RATE: 81 BPM

## 2022-06-19 LAB
ANION GAP SERPL CALC-SCNC: 10 MMOL/L (ref 8–16)
BACTERIA UR CULT: ABNORMAL
BACTERIA UR CULT: ABNORMAL
BASOPHILS # BLD AUTO: 0.04 K/UL (ref 0–0.2)
BASOPHILS NFR BLD: 0.3 % (ref 0–1.9)
BUN SERPL-MCNC: 33 MG/DL (ref 8–23)
CALCIUM SERPL-MCNC: 9 MG/DL (ref 8.7–10.5)
CHLORIDE SERPL-SCNC: 106 MMOL/L (ref 95–110)
CO2 SERPL-SCNC: 20 MMOL/L (ref 23–29)
CREAT SERPL-MCNC: 2.3 MG/DL (ref 0.5–1.4)
DIFFERENTIAL METHOD: ABNORMAL
EOSINOPHIL # BLD AUTO: 0 K/UL (ref 0–0.5)
EOSINOPHIL NFR BLD: 0.3 % (ref 0–8)
ERYTHROCYTE [DISTWIDTH] IN BLOOD BY AUTOMATED COUNT: 15.4 % (ref 11.5–14.5)
EST. GFR  (AFRICAN AMERICAN): 34 ML/MIN/1.73 M^2
EST. GFR  (NON AFRICAN AMERICAN): 29 ML/MIN/1.73 M^2
GLUCOSE SERPL-MCNC: 105 MG/DL (ref 70–110)
HCT VFR BLD AUTO: 20.9 % (ref 40–54)
HGB BLD-MCNC: 7 G/DL (ref 14–18)
IMM GRANULOCYTES # BLD AUTO: 0.32 K/UL (ref 0–0.04)
IMM GRANULOCYTES NFR BLD AUTO: 2.3 % (ref 0–0.5)
LYMPHOCYTES # BLD AUTO: 1.6 K/UL (ref 1–4.8)
LYMPHOCYTES NFR BLD: 11.1 % (ref 18–48)
MAGNESIUM SERPL-MCNC: 1.5 MG/DL (ref 1.6–2.6)
MCH RBC QN AUTO: 28.7 PG (ref 27–31)
MCHC RBC AUTO-ENTMCNC: 33.5 G/DL (ref 32–36)
MCV RBC AUTO: 86 FL (ref 82–98)
MONOCYTES # BLD AUTO: 1.1 K/UL (ref 0.3–1)
MONOCYTES NFR BLD: 7.8 % (ref 4–15)
NEUTROPHILS # BLD AUTO: 11.1 K/UL (ref 1.8–7.7)
NEUTROPHILS NFR BLD: 78.2 % (ref 38–73)
NRBC BLD-RTO: 0 /100 WBC
PHOSPHATE SERPL-MCNC: 3.3 MG/DL (ref 2.7–4.5)
PLATELET # BLD AUTO: 394 K/UL (ref 150–450)
PMV BLD AUTO: 9 FL (ref 9.2–12.9)
POCT GLUCOSE: 107 MG/DL (ref 70–110)
POCT GLUCOSE: 137 MG/DL (ref 70–110)
POTASSIUM SERPL-SCNC: 5.1 MMOL/L (ref 3.5–5.1)
RBC # BLD AUTO: 2.44 M/UL (ref 4.6–6.2)
SODIUM SERPL-SCNC: 136 MMOL/L (ref 136–145)
WBC # BLD AUTO: 14.15 K/UL (ref 3.9–12.7)

## 2022-06-19 PROCEDURE — 85025 COMPLETE CBC W/AUTO DIFF WBC: CPT | Performed by: HOSPITALIST

## 2022-06-19 PROCEDURE — 83735 ASSAY OF MAGNESIUM: CPT | Performed by: HOSPITALIST

## 2022-06-19 PROCEDURE — 63600175 PHARM REV CODE 636 W HCPCS

## 2022-06-19 PROCEDURE — 25000003 PHARM REV CODE 250

## 2022-06-19 PROCEDURE — 36415 COLL VENOUS BLD VENIPUNCTURE: CPT | Performed by: HOSPITALIST

## 2022-06-19 PROCEDURE — 84100 ASSAY OF PHOSPHORUS: CPT | Performed by: HOSPITALIST

## 2022-06-19 PROCEDURE — 25000003 PHARM REV CODE 250: Performed by: INTERNAL MEDICINE

## 2022-06-19 PROCEDURE — 80048 BASIC METABOLIC PNL TOTAL CA: CPT | Performed by: HOSPITALIST

## 2022-06-19 RX ORDER — CIPROFLOXACIN 500 MG/1
500 TABLET ORAL EVERY 12 HOURS
Qty: 24 TABLET | Refills: 0 | Status: SHIPPED | OUTPATIENT
Start: 2022-06-19 | End: 2022-07-01

## 2022-06-19 RX ADMIN — TAMSULOSIN HYDROCHLORIDE 0.4 MG: 0.4 CAPSULE ORAL at 09:06

## 2022-06-19 RX ADMIN — INSULIN ASPART 5 UNITS: 100 INJECTION, SOLUTION INTRAVENOUS; SUBCUTANEOUS at 01:06

## 2022-06-19 RX ADMIN — METOPROLOL TARTRATE 50 MG: 50 TABLET, FILM COATED ORAL at 09:06

## 2022-06-19 RX ADMIN — SODIUM BICARBONATE 650 MG TABLET 650 MG: at 09:06

## 2022-06-19 RX ADMIN — INSULIN ASPART 5 UNITS: 100 INJECTION, SOLUTION INTRAVENOUS; SUBCUTANEOUS at 09:06

## 2022-06-19 RX ADMIN — MUPIROCIN: 20 OINTMENT TOPICAL at 09:06

## 2022-06-19 RX ADMIN — CEFTRIAXONE 2 G: 2 INJECTION, SOLUTION INTRAVENOUS at 09:06

## 2022-06-19 RX ADMIN — ATORVASTATIN CALCIUM 40 MG: 40 TABLET, FILM COATED ORAL at 09:06

## 2022-06-19 NOTE — PROGRESS NOTES
06/19/22 1527   AVS Confirmation   Discharge instructions and AVS given to and reviewed with patient and/or significant other. Yes   AVS printed and handed to patient by bedside nurse. VN reviewed discharge instructions with patient using teachback method.  Allowed time for questions, all questions answered.  Patient verbalized complete understanding of discharge instructions and voices no concerns. Discharge instructions complete. Bedside nurse notified.      Recommended artificial tears to use as directed.

## 2022-06-19 NOTE — PLAN OF CARE
[facility] - Telemetry    HOME HEALTH ORDERS  FACE TO FACE ENCOUNTER    Patient Name: Angel Bell  YOB: 1960    PCP: Tiago Ruiz MD   PCP Address: [address] / [place] [address]  PCP Phone Number: [phone]  PCP Fax: [phone]    Encounter Date: 06/19/2022    Admit to Home Health    Diagnoses:  Active Hospital Problems    Diagnosis  POA    *Severe sepsis [A41.9, R65.20]  Yes    Type 2 diabetes mellitus, without long-term current use of insulin [E11.9]  Yes    Acute kidney injury superimposed on chronic kidney disease [N17.9, N18.9]  Yes    UTI (urinary tract infection) with chronic indwelling catheter [N39.0]  Yes    Anemia due to chronic kidney disease [N18.9, D63.1]  Yes    DON (dyspnea on exertion) [R06.00]  Yes    Bilateral hydronephrosis [N13.30]  Yes     QUOC 5/22: mild right hydronephrosis, greatly improved from CT      Urinary retention [R33.9]  Yes     Distended bladder with bilateral hydroneprosis, ARF  Prostate volume calculated 112 myself (larger on CT measurements, 89gm on QUOC)      Benign prostatic hyperplasia with urinary obstruction [N40.1, N13.8]  Yes    Hypertension [I10]  Yes    Hyperlipidemia [E78.5]  Yes    Metabolic acidosis [E87.2]  Yes      Resolved Hospital Problems   No resolved problems to display.       Future Appointments   Date Time Provider Department Center   6/20/2022 11:45 AM Eleanor Will MD [facility]   6/24/2022  1:00 PM Tiago Ruiz MD [facility] [place] [facility]   7/22/2022 10:00 AM Tiago Ruiz MD [facility] [place] [facility]   8/15/2022  9:50 AM [facility], [facility]   8/15/2022 10:10 AM [facility], [facility]   8/26/2022  8:00 AM Wilian Byers MD [facility]      Follow-up Information     Tiago Ruiz MD. Schedule an appointment as soon as possible for a visit in 1 week(s).    Specialty: Family Medicine  Contact information:  [address]  [address]  [place]  [address]  [phone]             [facility] Follow up.    Specialty: Emergency Medicine  Why: As needed, If symptoms worsen  Contact information:  [address]  [facility] [address] [phone]           [facility] UROLOGY. Schedule an appointment as soon as possible for a visit in 1 week(s).    Specialty: Urology  Contact information:  [place]  [facility] [address]  [phone]                         I have seen and examined this patient face to face today. My clinical findings that support the need for the home health skilled services and home bound status are the following:  Weakness/numbness causing balance and gait disturbance due to Infection making it taxing to leave home.    Allergies:Review of patient's allergies indicates:  No Known Allergies    Diet: cardiac diet and diabetic diet: 2000 calorie    Activities: activity as tolerated    Nursing:   SN to complete comprehensive assessment including routine vital signs. Instruct on disease process and s/s of complications to report to MD. Review/verify medication list sent home with the patient at time of discharge  and instruct patient/caregiver as needed. Frequency may be adjusted depending on start of care date.    Notify MD if SBP > 160 or < 90; DBP > 90 or < 50; HR > 120 or < 50; Temp > 101;       CONSULTS:    Physical Therapy to evaluate and treat. Evaluate for home safety and equipment needs; Establish/upgrade home exercise program. Perform / instruct on therapeutic exercises, gait training, transfer training, and Range of Motion.  Occupational Therapy to evaluate and treat. Evaluate home environment for safety and equipment needs. Perform/Instruct on transfers, ADL training, ROM, and therapeutic exercises.  Aide to provide assistance with personal care, ADLs, and vital signs.    MISCELLANEOUS CARE:  Routine Skin for Bedridden Patients: Instruct patient/caregiver to apply moisture barrier cream to all  skin folds and wet areas in perineal area daily and after baths and all bowel movements.      Medications: Review discharge medications with patient and family and provide education.      Current Discharge Medication List      START taking these medications    Details   ciprofloxacin HCl (CIPRO) 500 MG tablet Take 1 tablet (500 mg total) by mouth every 12 (twelve) hours. for 12 days  Qty: 24 tablet, Refills: 0         CONTINUE these medications which have NOT CHANGED    Details   atorvastatin (LIPITOR) 40 MG tablet Take 1 tablet (40 mg total) by mouth once daily.  Qty: 30 tablet, Refills: 3      insulin aspart U-100 (NOVOLOG) 100 unit/mL (3 mL) InPn pen Inject 4 Units into the skin 3 (three) times daily.  Qty: 15 mL, Refills: 3      insulin detemir U-100 (LEVEMIR FLEXTOUCH) 100 unit/mL (3 mL) SubQ InPn pen Inject 15 Units into the skin once daily.  Qty: 15 mL, Refills: 3      metoprolol tartrate (LOPRESSOR) 50 MG tablet Take 1 tablet (50 mg total) by mouth 2 (two) times daily.  Qty: 60 tablet, Refills: 3    Comments: .      NIFEdipine (PROCARDIA-XL) 30 MG (OSM) 24 hr tablet Take 1 tablet (30 mg total) by mouth once daily.  Qty: 30 tablet, Refills: 3      tamsulosin (FLOMAX) 0.4 mg Cap Take 1 capsule (0.4 mg total) by mouth once daily.  Qty: 30 capsule, Refills: 11      blood sugar diagnostic (TRUE METRIX GLUCOSE TEST STRIP) Strp TEST 4 TIMES A DAY  Qty: 100 each, Refills: 11      blood-glucose meter (TRUE METRIX GLUCOSE METER) Misc 1 Device by Misc.(Non-Drug; Combo Route) route 4 (four) times daily.  Qty: 1 each, Refills: 0      blood-glucose sensor (DEXCOM G6 SENSOR) Philomena 3 each by Misc.(Non-Drug; Combo Route) route continuous.  Qty: 3 each, Refills: 11    Associated Diagnoses: Uncontrolled type 2 diabetes mellitus with hyperglycemia      blood-glucose transmitter (DEXCOM G6 TRANSMITTER) Philomena 1 each by Misc.(Non-Drug; Combo Route) route continuous.  Qty: 1 each, Refills: 3    Associated Diagnoses: Uncontrolled type  "2 diabetes mellitus with hyperglycemia      glucose 4 GM chewable tablet Take 4 tablets (16 g total) by mouth as needed for Low blood sugar (If having symptoms of blurry vision, palpitations, confusion, shakiness.  Please check sugars and if sugar below 70 please take 4 tablets and re-check sugar everry 15 minutes until sugars are above 70 and symptoms resolve.).  Qty: 50 tablet, Refills: 12    Associated Diagnoses: Uncontrolled type 2 diabetes mellitus with hyperglycemia      lancets 33 gauge Misc USE 4 TIMES A DAY  Qty: 100 each, Refills: 11      pen needle, diabetic 31 gauge x 5/16" Ndle use 4 times a day  Qty: 100 each, Refills: 11             I certify that this patient is confined to his home and needs intermittent skilled nursing care, physical therapy and occupational therapy.    Lauryn Roman MD  6/19/2022 12:13 PM  Department of Hospital medicine         "

## 2022-06-19 NOTE — SUBJECTIVE & OBJECTIVE
Interval History:  Patient remained hemodynamically stable, afebrile overnight.  Denies any acute complaints today.  Doing well overall.  Continued on ceftriaxone.  Culture finalization pending.    Review of Systems   Constitutional:  Negative for fever.   Respiratory:  Negative for cough and shortness of breath.    Cardiovascular:  Negative for chest pain.   Gastrointestinal:  Negative for abdominal pain.   Neurological:  Negative for dizziness and headaches.   Psychiatric/Behavioral:  Negative for confusion.    All other systems reviewed and are negative.  Objective:     Vital Signs (Most Recent):  Temp: 97.4 °F (36.3 °C) (06/18/22 2055)  Pulse: 103 (06/18/22 2055)  Resp: 18 (06/18/22 2055)  BP: 124/62 (06/18/22 2055)  SpO2: 99 % (06/18/22 2055) Vital Signs (24h Range):  Temp:  [97.4 °F (36.3 °C)-98.6 °F (37 °C)] 97.4 °F (36.3 °C)  Pulse:  [] 103  Resp:  [16-18] 18  SpO2:  [98 %-99 %] 99 %  BP: ()/(53-70) 124/62     Weight: 70.6 kg (155 lb 10.3 oz)  Body mass index is 25.12 kg/m².    Intake/Output Summary (Last 24 hours) at 6/18/2022 2344  Last data filed at 6/18/2022 1955  Gross per 24 hour   Intake --   Output 1850 ml   Net -1850 ml      Physical Exam  Vitals and nursing note reviewed.   Constitutional:       Appearance: Normal appearance.   HENT:      Head: Normocephalic and atraumatic.   Eyes:      Extraocular Movements: Extraocular movements intact.      Pupils: Pupils are equal, round, and reactive to light.   Cardiovascular:      Rate and Rhythm: Normal rate and regular rhythm.   Pulmonary:      Effort: Pulmonary effort is normal. No respiratory distress.   Abdominal:      General: Abdomen is flat. There is no distension.   Musculoskeletal:         General: Normal range of motion.      Cervical back: Normal range of motion.   Neurological:      General: No focal deficit present.      Mental Status: He is alert and oriented to person, place, and time.   Psychiatric:         Mood and Affect: Mood  normal.         Behavior: Behavior normal.       Significant Labs: All pertinent labs within the past 24 hours have been reviewed.  CBC:   Recent Labs   Lab 06/17/22 0431   WBC 20.68*   HGB 7.0*   HCT 20.0*        CMP:   Recent Labs   Lab 06/17/22 0431 06/18/22  1311    136   K 4.6 4.7   * 107   CO2 16* 19*   GLU 72 122*   BUN 53* 33*   CREATININE 2.3* 2.0*   CALCIUM 8.9 9.1   PROT 6.8  --    ALBUMIN 1.7*  --    BILITOT 0.4  --    ALKPHOS 91  --    AST 50*  --    ALT 36  --    ANIONGAP 11 10   EGFRNONAA 29* 35*       Significant Imaging: I have reviewed all pertinent imaging results/findings within the past 24 hours.

## 2022-06-19 NOTE — DISCHARGE SUMMARY
"[facility] Medicine  Discharge Summary      Patient Name: Angel Bell  MRN: [id number]  Patient Class: IP- Inpatient  Admission Date: 6/16/2022  Hospital Length of Stay: 3 days  Discharge Date and Time:  06/19/2022 2:32 PM  Attending Physician: Lauryn Roman MD   Discharging Provider: Lauryn Roman MD  Primary Care Provider: Tiago Ruiz MD      HPI:   Angel Bell is a 60 y/o male with DMII, HLD, HTN, BPH with obstruction, bilateral hydronephrosis, and urinary retention presents to [facility] for evaluation of generalized weakness, DON, and decreased appetite x 2 weeks. Patient reports he has DON with activities such as walking. He denies bilateral leg swelling. He denies abdominal pain. Patient denies fever, chills, headache, upper respiratory symptoms, N/V, diarrhea, chest pain, or palpitations. He denies history of CHF or chronic lung disease. He denies alcohol or tobacco use. He denies recent travel or recent sick contacts. He does have a chronic alvarenga catheter.      Of note: He was last seen in this ED 6/11 for hyperglycemia and dehydration. He was treated with 1L NS x1 and discharged. Patient was admitted on 5/2/22 and was hospitalized for ARF 2/2 to obstructive uropathy with CT abd/pelvis revealing enlarged prostate and perinephric stranding. Patient is followed by Urology,  and was last seen in clinic 5/17/22; and has a chronic indwelling catheter that was placed during his last admission.       In the ED: /60   Pulse (!) 115   Temp 98.2 °F (36.8 °C) (Oral)   Resp 20   Ht 5' 6" (1.676 m)   Wt 61.2 kg (135 lb)   SpO2 100%   BMI 21.79 kg/m² Labs revealedWBC 35.07, H/H 8.5/24.1, D. Dimer 8.3, Na 129, Co2 14, BUN 69, creatinine 3.0, glucose 211, UA +. CXR negative for acute cardiopulmonary abnormality. Patient received 2g Rocephin IV x1 and 1L NS bolus in ED x1. Will admit to hospital medicine for further evaluation and treatment.         * No surgery found *  "     Hospital Course:   Admitted for severe sepsis 2/2 catheter associated UTI. Also found to have HERBERT on CKD. Responded well to fluid resuscitation. Started on ceftriaxone. Cultures now finalized to grow pseudomonas and klebsiella. Antibiotics changed to cipro for total 14 day course. HERBERT iproved, Cr balbarry. Had alvarenga palced by urology will DC with alvarenga. Will need outpatient urology follow up.        Goals of Care Treatment Preferences:  Code Status: Full Code      Consults:   Consults (From admission, onward)        Status Ordering Provider     Inpatient virtual consult to Hospital Medicine  Once        Provider:  (Not yet assigned)    RAD George          No new Assessment & Plan notes have been filed under this hospital service since the last note was generated.  Service: Hospital Medicine    Final Active Diagnoses:    Diagnosis Date Noted POA    PRINCIPAL PROBLEM:  Severe sepsis [A41.9, R65.20] 06/16/2022 Yes    Type 2 diabetes mellitus, without long-term current use of insulin [E11.9] 06/16/2022 Yes    Acute kidney injury superimposed on chronic kidney disease [N17.9, N18.9] 06/16/2022 Yes    UTI (urinary tract infection) with chronic indwelling catheter [N39.0] 06/16/2022 Yes    Anemia due to chronic kidney disease [N18.9, D63.1] 06/16/2022 Yes    DON (dyspnea on exertion) [R06.00] 06/16/2022 Yes    Bilateral hydronephrosis [N13.30] 04/29/2022 Yes    Urinary retention [R33.9] 04/29/2022 Yes    Benign prostatic hyperplasia with urinary obstruction [N40.1, N13.8] 04/29/2022 Yes    Hypertension [I10] 07/12/2016 Yes    Hyperlipidemia [E78.5] 07/04/2016 Yes    Metabolic acidosis [E87.2] 07/03/2016 Yes      Problems Resolved During this Admission:       Discharged Condition: fair    Disposition: Home-Health Care [facility]    Follow Up:   Follow-up Information     Tiago Ruiz MD. Schedule an appointment as soon as possible for a visit in 1 week(s).    Specialty: Family Medicine  Contact  information:  [address]  [address]  [place] [address]  [phone]             [facility] Follow up.    Specialty: Emergency Medicine  Why: As needed, If symptoms worsen  Contact information:  [address] [place]  [facility] [address] [phone]           [facility] UROLOGY. Schedule an appointment as soon as possible for a visit in 1 week(s).    Specialty: Urology  Contact information:  [address]  [facility] [id number] [phone]                     Patient Instructions:      Ambulatory referral/consult to Urology   Standing Status: Future   Referral Priority: Routine Referral Type: Consultation   Referral Reason: Specialty Services Required   Requested Specialty: Urology   Number of Visits Requested: 1     Diet Cardiac     Notify your health care provider if you experience any of the following:  temperature >100.4     Notify your health care provider if you experience any of the following:  persistent nausea and vomiting or diarrhea     Notify your health care provider if you experience any of the following:  severe uncontrolled pain     Notify your health care provider if you experience any of the following:  redness, tenderness, or signs of infection (pain, swelling, redness, odor or green/yellow discharge around incision site)     Notify your health care provider if you experience any of the following:  difficulty breathing or increased cough     Notify your health care provider if you experience any of the following:  severe persistent headache     Notify your health care provider if you experience any of the following:  worsening rash     Notify your health care provider if you experience any of the following:  persistent dizziness, light-headedness, or visual disturbances     Notify your health care provider if you experience any of the following:  increased confusion or weakness     Send follow up & questions to   Order Comments: Patient's primary care  physician: Tiago Ruiz MD     Activity as tolerated       Significant Diagnostic Studies: Labs:   CMP   Recent Labs   Lab 06/18/22  1311 06/19/22  0452    136   K 4.7 5.1    106   CO2 19* 20*   * 105   BUN 33* 33*   CREATININE 2.0* 2.3*   CALCIUM 9.1 9.0   ANIONGAP 10 10   ESTGFRAFRICA 40* 34*   EGFRNONAA 35* 29*    and CBC   Recent Labs   Lab 06/19/22  0452   WBC 14.15*   HGB 7.0*   HCT 20.9*        Microbiology:   Blood Culture   Lab Results   Component Value Date    LABBLOO No Growth to date 06/16/2022    LABBLOO No Growth to date 06/16/2022    LABBLOO No Growth to date 06/16/2022    LABBLOO No Growth to date 06/16/2022    LABBLOO No Growth to date 06/16/2022    LABBLOO No Growth to date 06/16/2022    and Urine Culture    Lab Results   Component Value Date    LABURIN  06/16/2022     Multiple organisms isolated. None in predominance.  Repeat if    LABURIN clinically necessary. 06/16/2022       Pending Diagnostic Studies:     None         Medications:  Reconciled Home Medications:      Medication List      START taking these medications    ciprofloxacin HCl 500 MG tablet  Commonly known as: CIPRO  Take 1 tablet (500 mg total) by mouth every 12 (twelve) hours. for 12 days        CHANGE how you take these medications    insulin detemir U-100 100 unit/mL (3 mL) Inpn pen  Commonly known as: Levemir FLEXTOUCH  Inject 15 Units into the skin once daily.  What changed: how much to take        CONTINUE taking these medications    atorvastatin 40 MG tablet  Commonly known as: LIPITOR  Take 1 tablet (40 mg total) by mouth once daily.     blood sugar diagnostic Strp  Commonly known as: TRUE METRIX GLUCOSE TEST STRIP  TEST 4 TIMES A DAY     DEXCOM G6 SENSOR Philomena  Generic drug: blood-glucose sensor  3 each by Misc.(Non-Drug; Combo Route) route continuous.     DEXCOM G6 TRANSMITTER Philomena  Generic drug: blood-glucose transmitter  1 each by Misc.(Non-Drug; Combo Route) route continuous.     glucose 4 GM  "chewable tablet  Take 4 tablets (16 g total) by mouth as needed for Low blood sugar (If having symptoms of blurry vision, palpitations, confusion, shakiness.  Please check sugars and if sugar below 70 please take 4 tablets and re-check sugar everry 15 minutes until sugars are above 70 and symptoms resolve.).     insulin aspart U-100 100 unit/mL (3 mL) Inpn pen  Commonly known as: NovoLOG  Inject 4 Units into the skin 3 (three) times daily.     lancets 33 gauge Misc  USE 4 TIMES A DAY     metoprolol tartrate 50 MG tablet  Commonly known as: LOPRESSOR  Take 1 tablet (50 mg total) by mouth 2 (two) times daily.     NIFEdipine 30 MG (OSM) 24 hr tablet  Commonly known as: PROCARDIA-XL  Take 1 tablet (30 mg total) by mouth once daily.     pen needle, diabetic 31 gauge x 5/16" Ndle  use 4 times a day     tamsulosin 0.4 mg Cap  Commonly known as: FLOMAX  Take 1 capsule (0.4 mg total) by mouth once daily.     TRUE METRIX GLUCOSE METER Misc  Generic drug: blood-glucose meter  1 Device by Misc.(Non-Drug; Combo Route) route 4 (four) times daily.            Indwelling Lines/Drains at time of discharge:   Lines/Drains/Airways     Drain  Duration                Urethral Catheter 06/16/22 1620 Coude 20 Fr. 2 days                Time spent on the discharge of patient: 39 minutes         The attending portion of this evaluation, treatment, and documentation was performed per Lauryn Roman MD via Telemedicine AudioVisual using the secure mygall software platform with 2 way audio/video. The provider was located off-site and the patient is located in the hospital. The aforementioned video software was utilized to document the relevant history and physical exam    Lauryn Roman MD  Department of Hospital Medicine  [facility]  "

## 2022-06-19 NOTE — PROGRESS NOTES
"      [facility] Medicine  Telemedicine Progress Note    Patient Name: Angel Bell  MRN: [id number]  Patient Class: IP- Inpatient   Admission Date: 6/16/2022  Length of Stay: 2 days  Attending Physician: Lauryn Roman MD  Primary Care Provider: Tiago Ruiz MD          Subjective:     Principal Problem:Severe sepsis        HPI:  Angel Bell is a 60 y/o male with DMII, HLD, HTN, BPH with obstruction, bilateral hydronephrosis, and urinary retention presents to [facility] for evaluation of generalized weakness, DON, and decreased appetite x 2 weeks. Patient reports he has DON with activities such as walking. He denies bilateral leg swelling. He denies abdominal pain. Patient denies fever, chills, headache, upper respiratory symptoms, N/V, diarrhea, chest pain, or palpitations. He denies history of CHF or chronic lung disease. He denies alcohol or tobacco use. He denies recent travel or recent sick contacts. He does have a chronic alvarenga catheter.      Of note: He was last seen in this ED 6/11 for hyperglycemia and dehydration. He was treated with 1L NS x1 and discharged. Patient was admitted on 5/2/22 and was hospitalized for ARF 2/2 to obstructive uropathy with CT abd/pelvis revealing enlarged prostate and perinephric stranding. Patient is followed by Urology,  and was last seen in clinic 5/17/22; and has a chronic indwelling catheter that was placed during his last admission.       In the ED: /60   Pulse (!) 115   Temp 98.2 °F (36.8 °C) (Oral)   Resp 20   Ht 5' 6" (1.676 m)   Wt 61.2 kg (135 lb)   SpO2 100%   BMI 21.79 kg/m² Labs revealedWBC 35.07, H/H 8.5/24.1, D. Dimer 8.3, Na 129, Co2 14, BUN 69, creatinine 3.0, glucose 211, UA +. CXR negative for acute cardiopulmonary abnormality. Patient received 2g Rocephin IV x1 and 1L NS bolus in ED x1. Will admit to hospital medicine for further evaluation and treatment.         Overview/Hospital Course:  Admitted for severe sepsis " 2/2 catheter associated UTI. Also found to have HERBERT on CKD. Responded well to fluid resuscitation. Started on ceftriaxone. Follow cultures.       Interval History:  Patient remained hemodynamically stable, afebrile overnight.  Denies any acute complaints today.  Doing well overall.  Continued on ceftriaxone.  Culture finalization pending.    Review of Systems   Constitutional:  Negative for fever.   Respiratory:  Negative for cough and shortness of breath.    Cardiovascular:  Negative for chest pain.   Gastrointestinal:  Negative for abdominal pain.   Neurological:  Negative for dizziness and headaches.   Psychiatric/Behavioral:  Negative for confusion.    All other systems reviewed and are negative.  Objective:     Vital Signs (Most Recent):  Temp: 97.4 °F (36.3 °C) (06/18/22 2055)  Pulse: 103 (06/18/22 2055)  Resp: 18 (06/18/22 2055)  BP: 124/62 (06/18/22 2055)  SpO2: 99 % (06/18/22 2055) Vital Signs (24h Range):  Temp:  [97.4 °F (36.3 °C)-98.6 °F (37 °C)] 97.4 °F (36.3 °C)  Pulse:  [] 103  Resp:  [16-18] 18  SpO2:  [98 %-99 %] 99 %  BP: ()/(53-70) 124/62     Weight: 70.6 kg (155 lb 10.3 oz)  Body mass index is 25.12 kg/m².    Intake/Output Summary (Last 24 hours) at 6/18/2022 2344  Last data filed at 6/18/2022 1955  Gross per 24 hour   Intake --   Output 1850 ml   Net -1850 ml      Physical Exam  Vitals and nursing note reviewed.   Constitutional:       Appearance: Normal appearance.   HENT:      Head: Normocephalic and atraumatic.   Eyes:      Extraocular Movements: Extraocular movements intact.      Pupils: Pupils are equal, round, and reactive to light.   Cardiovascular:      Rate and Rhythm: Normal rate and regular rhythm.   Pulmonary:      Effort: Pulmonary effort is normal. No respiratory distress.   Abdominal:      General: Abdomen is flat. There is no distension.   Musculoskeletal:         General: Normal range of motion.      Cervical back: Normal range of motion.   Neurological:       General: No focal deficit present.      Mental Status: He is alert and oriented to person, place, and time.   Psychiatric:         Mood and Affect: Mood normal.         Behavior: Behavior normal.       Significant Labs: All pertinent labs within the past 24 hours have been reviewed.  CBC:   Recent Labs   Lab 06/17/22  0431   WBC 20.68*   HGB 7.0*   HCT 20.0*        CMP:   Recent Labs   Lab 06/17/22  0431 06/18/22  1311    136   K 4.6 4.7   * 107   CO2 16* 19*   GLU 72 122*   BUN 53* 33*   CREATININE 2.3* 2.0*   CALCIUM 8.9 9.1   PROT 6.8  --    ALBUMIN 1.7*  --    BILITOT 0.4  --    ALKPHOS 91  --    AST 50*  --    ALT 36  --    ANIONGAP 11 10   EGFRNONAA 29* 35*       Significant Imaging: I have reviewed all pertinent imaging results/findings within the past 24 hours.      Assessment/Plan:      * Severe sepsis  -This patient meets criteria for sepsis; Organ dysfunction indicated by Acute kidney injury  -Source is likely UTI  -Vital signs reviewed and noted   -WBC 35.07  -Patient received 1L NS bolus x1 in ED  -Lactate is 0.7, continue to monitor and trend   -Blood cultures and urine cultures pending  -Monitor on telemetry  -Responded to fluid resuscitation  -Cont ceftriaxone and follow cultures            DON (dyspnea on exertion)  -reported DON x days; not in respiratory distress on exam; SpO2 100% on RA  -BNP 62, troponin 0.015   -D. Dimer 8.3; can be also related to UTI  -CXR negative for acute cardiopulmonary abnormality  -Will hold on CTA chest given patient's ARF on CKD  -VQ perfusion/ventilation scan negative for PE      Anemia due to chronic kidney disease  -H/H is 8.5/24.1, which is stable and consistent with previous laboratory measurements. Patient exhibits no signs or symptoms of acute bleeding  -No indication for blood transfusion  -Continue to monitor serial CBC  -Transfuse for Hgb <7 or if symptomatic        UTI (urinary tract infection) with chronic indwelling catheter  -Patient  presents with chronic indwelling catheter 2/2 to urinary retention/BPH  -UA positive for WBC clumps, bacteria, WBC >100, Leukocytes 3+  -Received 2g IV Rocephin in ED x1  -Continue 2g IV Rocephin q24hr  -Exchange alvarenga catheter and repeat UA   -Follow cultures        Acute kidney injury superimposed on chronic kidney disease  Estimated Creatinine Clearance: 30.1 mL/min (A) (based on SCr of 2.3 mg/dL (H)).  Baseline Cr: 2.0Baseline BUN:25   Recent Labs   Lab 06/16/22  1128 06/17/22  0431   CO2 14* 16*   BUN 69* 53*   CREATININE 3.0* 2.3*   MG  --  1.6   PHOS  --  3.5       -Received 1L NS in ED x1; will provide 1L NS bolus x1 now  -Will also provide IV fluid hydration with LR @ 100ml/hr x 12 hours  -Monitor strict urine output  - Continue to monitor renal function with daily labs and trend electrolytes  - renally dose medications  - avoid nephrotoxic agents including NSAIDs, aminoglycosides, IV contrast (unless absolutely necessary), gadolinium, fleets and other phosphorous-based laxatives  - monitor events that may lead to decreased renal perfusion (hypovolemia, hypotension, sepsis)  - monitor urine output to assure that no obstruction precipitates worsening in GFR  -kidney function improving      Type 2 diabetes mellitus, without long-term current use of insulin  Hemoglobin A1C   Date Value Ref Range Status   04/29/2022 >14.0 (H) 4.0 - 5.6 % Final     -POCT glucose on admit 210  - hold home oral medications  - initiate on detemir 10u daily and Aspart 5u tid  - LDSSI with ACCUcheck ACHS  - Diabetic diet  -Hypoglycemia protocol PRN  -Monitor closely and adjust insulin regimen as clinically indicated to achieve levels of 140-180 during hospitalization        Benign prostatic hyperplasia with urinary obstruction  Bilateral hydronephrosis  Urinary retention    -Followed by urology, Dr. Wheeler  -Alvarenga catheter draining to gravity  -Monitor and trend daily renal panel   -Resume home tamsulosin 0.4mg PO  daily    Bilateral hydronephrosis  See above      Urinary retention  See above      Hypertension  Chronic, Latest blood pressure and vitals reviewed-   Temp:  [98.3 °F (36.8 °C)-98.9 °F (37.2 °C)]   Pulse:  []   Resp:  [16-22]   BP: ()/(52-70)   SpO2:  [97 %-100 %] .   Home meds for hypertension were reviewed and noted below.   Hypertension Medications             metoprolol tartrate (LOPRESSOR) 50 MG tablet Take 1 tablet (50 mg total) by mouth 2 (two) times daily.    NIFEdipine (PROCARDIA-XL) 30 MG (OSM) 24 hr tablet Take 1 tablet (30 mg total) by mouth once daily.          While in the hospital, will manage blood pressure as follows; Continue home antihypertensive regimen    Will utilize p.r.n. blood pressure medication only if patient's blood pressure greater than  180/110 and he develops symptoms such as worsening chest pain or shortness of breath.        Hyperlipidemia  Last LDL was   Lab Results   Component Value Date    LDLCALC 88.2 04/29/2022      Patient is chronically on statin.will continue for now.   Monitor clinically.          Metabolic acidosis  Likely due to HERBERT on CKD  [place] WNL  Check VBG  Bicarb tabs        VTE Risk Mitigation (From admission, onward)         Ordered     IP VTE LOW RISK PATIENT  Once         06/16/22 1519     Place sequential compression device  Until discontinued         06/16/22 1519                      I have assessed these finding virtually using telemed platform and with assistance of bedside nurse                 The attending portion of this evaluation, treatment, and documentation was performed per Lauryn Roman MD via Telemedicine AudioVisual using the secure [employer] software platform with 2 way audio/video. The provider was located off-site and the patient is located in the hospital. The aforementioned video software was utilized to document the relevant history and physical exam    Lauryn Roman MD  Department of Hospital Medicine   [facility]

## 2022-06-19 NOTE — PLAN OF CARE
06/19/22 1226   Final Note   Assessment Type Final Discharge Note   Anticipated Discharge Disposition Home   What phone number can be called within the next 1-3 days to see how you are doing after discharge? [phone]   Post-Acute Status   Home Health Status Set-up Complete/Auth obtained   Discharge Delays None known at this time      spoke with patient about discharge plans to home. Patient informed SW his spouse , Josseline, would be transporting him home and that her had no additional needs. SW called [facility], [phone], spoke with Madeline, on call nurse, faxed orders and confirmed that pt has been scheduled for a visit for tomorrow.     Future Appointments   Date Time Provider Department Center   6/20/2022 11:45 AM Eleanor Will MD [facility]   6/24/2022  1:00 PM Tiago Ruiz MD [facility]   7/22/2022 10:00 AM Tiago Ruiz MD [facility]   8/15/2022  9:50 AM LAB, [facility]   8/15/2022 10:10 AM LAB, [facility]   8/26/2022  8:00 AM Wilian Byers MD [facility]

## 2022-06-19 NOTE — PROGRESS NOTES
[facility]                    Pharmacy       Discharge Medication Education    Patient ACCEPTED medication education. Pharmacy has provided education on the name, indication, and possible side effects of the medication(s) prescribed, using teach-back method.     The following medications have also been discussed, during this admission.        Medication List        START taking these medications      ciprofloxacin HCl 500 MG tablet  Commonly known as: CIPRO  Take 1 tablet (500 mg total) by mouth every 12 (twelve) hours. for 12 days            CHANGE how you take these medications      insulin detemir U-100 100 unit/mL (3 mL) Inpn pen  Commonly known as: Levemir FLEXTOUCH  Inject 15 Units into the skin once daily.  What changed: how much to take            CONTINUE taking these medications      atorvastatin 40 MG tablet  Commonly known as: LIPITOR  Take 1 tablet (40 mg total) by mouth once daily.     blood sugar diagnostic Strp  Commonly known as: TRUE METRIX GLUCOSE TEST STRIP  TEST 4 TIMES A DAY     DEXCOM G6 SENSOR Philomena  Generic drug: blood-glucose sensor  3 each by Misc.(Non-Drug; Combo Route) route continuous.     DEXCOM G6 TRANSMITTER Philomena  Generic drug: blood-glucose transmitter  1 each by Misc.(Non-Drug; Combo Route) route continuous.     glucose 4 GM chewable tablet  Take 4 tablets (16 g total) by mouth as needed for Low blood sugar (If having symptoms of blurry vision, palpitations, confusion, shakiness.  Please check sugars and if sugar below 70 please take 4 tablets and re-check sugar everry 15 minutes until sugars are above 70 and symptoms resolve.).     insulin aspart U-100 100 unit/mL (3 mL) Inpn pen  Commonly known as: NovoLOG  Inject 4 Units into the skin 3 (three) times daily.     lancets 33 gauge Misc  USE 4 TIMES A DAY     metoprolol tartrate 50 MG tablet  Commonly known as: LOPRESSOR  Take 1 tablet (50 mg total) by mouth 2 (two) times daily.     NIFEdipine 30 MG (OSM) 24 hr  "tablet  Commonly known as: PROCARDIA-XL  Take 1 tablet (30 mg total) by mouth once daily.     pen needle, diabetic 31 gauge x 5/16" Ndle  use 4 times a day     tamsulosin 0.4 mg Cap  Commonly known as: FLOMAX  Take 1 capsule (0.4 mg total) by mouth once daily.     TRUE METRIX GLUCOSE METER Misc  Generic drug: blood-glucose meter  1 Device by Misc.(Non-Drug; Combo Route) route 4 (four) times daily.               Where to Get Your Medications        These medications were sent to [id number] - [place], [place] - [address] [facility] AT [facility] [address]  [address] [facility] [address]      Phone: [phone]   ciprofloxacin HCl 500 MG tablet          Thank you  Coral Soto, PharmD  [phone]    "

## 2022-06-20 ENCOUNTER — PATIENT OUTREACH (OUTPATIENT)
Dept: ADMINISTRATIVE | Facility: CLINIC | Age: 62
End: 2022-06-20
Payer: COMMERCIAL

## 2022-06-20 NOTE — PROGRESS NOTES
C3 nurse spoke with Angel Bell for a TCC post hospital discharge follow up call. The patient has a scheduled HOSFU appointment with Tiago Ruiz MD  on 6/24/22 @ 1 pm.

## 2022-06-21 LAB
BACTERIA BLD CULT: NORMAL
BACTERIA BLD CULT: NORMAL

## 2022-06-21 NOTE — PROCEDURES
Simple urodynamics    Date/Time: 6/9/2022 8:00 AM  Performed by: Tarik Prasad Jr., MD  Authorized by: Ted Garvin MD       Preoperative diagnosis urinary retention with hydronephrosis  Postop diagnosis same most likely due to BPH  Surgeon Dr. Tarik Prasad  Procedure simple urodynamics    This gentleman had urinary retention with bilateral hydronephrosis and elevated creatinine.  This resolved with the use of a Tavera catheter.  A 2 catheter CMG study was performed using subtractive rectal pressure monitoring.  Sterile water was used as a medium at 30 cc/minute patch electrodes were used.  First desire was at 134 cc.  Strong desire at 1:56 a.m..  Capacity was 292 cc.  Patient then voided with a maximum flow rate of 1 cc/second.  His detrusor pressure was 48.6 cm of water and maximum detrusor pressure was 59 cm of water.  There was no bladder sphincter dyssynergia and no uninhibited bladder contractions  Patient's Tavera was replaced  Impression urodynamic study associated with a an elevated detrusor pressure indicative of recent urinary retention with hopefully improving detrusor pressure  Recommendations correlate with cystoscopy and truss.  Patient's bladder is hopefully recovering and is probably in need of some sort of procedure for outlet obstruction depending on the size of the prostate  such as HoLEP versus TURP versus open prostatectomy versus robotic prostatectomy

## 2022-06-24 ENCOUNTER — OFFICE VISIT (OUTPATIENT)
Dept: UROLOGY | Facility: CLINIC | Age: 62
End: 2022-06-24
Payer: COMMERCIAL

## 2022-06-24 ENCOUNTER — OFFICE VISIT (OUTPATIENT)
Dept: FAMILY MEDICINE | Facility: CLINIC | Age: 62
End: 2022-06-24
Payer: COMMERCIAL

## 2022-06-24 VITALS — SYSTOLIC BLOOD PRESSURE: 128 MMHG | DIASTOLIC BLOOD PRESSURE: 71 MMHG | HEART RATE: 111 BPM

## 2022-06-24 VITALS
HEART RATE: 125 BPM | SYSTOLIC BLOOD PRESSURE: 116 MMHG | DIASTOLIC BLOOD PRESSURE: 60 MMHG | WEIGHT: 140.19 LBS | TEMPERATURE: 98 F | BODY MASS INDEX: 22.53 KG/M2 | HEIGHT: 66 IN | OXYGEN SATURATION: 97 %

## 2022-06-24 DIAGNOSIS — R33.9 URINARY RETENTION: ICD-10-CM

## 2022-06-24 DIAGNOSIS — N39.0 URINARY TRACT INFECTION ASSOCIATED WITH INDWELLING URETHRAL CATHETER, INITIAL ENCOUNTER: ICD-10-CM

## 2022-06-24 DIAGNOSIS — I10 PRIMARY HYPERTENSION: ICD-10-CM

## 2022-06-24 DIAGNOSIS — Z12.11 ENCOUNTER FOR SCREENING COLONOSCOPY: ICD-10-CM

## 2022-06-24 DIAGNOSIS — Z09 HOSPITAL DISCHARGE FOLLOW-UP: Primary | ICD-10-CM

## 2022-06-24 DIAGNOSIS — T83.511D URINARY TRACT INFECTION ASSOCIATED WITH CATHETERIZATION OF URINARY TRACT, UNSPECIFIED INDWELLING URINARY CATHETER TYPE, SUBSEQUENT ENCOUNTER: ICD-10-CM

## 2022-06-24 DIAGNOSIS — R00.0 TACHYCARDIA: ICD-10-CM

## 2022-06-24 DIAGNOSIS — E78.5 HYPERLIPIDEMIA, UNSPECIFIED HYPERLIPIDEMIA TYPE: ICD-10-CM

## 2022-06-24 DIAGNOSIS — N13.9 OBSTRUCTIVE UROPATHY: ICD-10-CM

## 2022-06-24 DIAGNOSIS — N39.0 URINARY TRACT INFECTION ASSOCIATED WITH CATHETERIZATION OF URINARY TRACT, UNSPECIFIED INDWELLING URINARY CATHETER TYPE, SUBSEQUENT ENCOUNTER: ICD-10-CM

## 2022-06-24 DIAGNOSIS — N13.30 BILATERAL HYDRONEPHROSIS: ICD-10-CM

## 2022-06-24 DIAGNOSIS — T83.511A URINARY TRACT INFECTION ASSOCIATED WITH INDWELLING URETHRAL CATHETER, INITIAL ENCOUNTER: ICD-10-CM

## 2022-06-24 PROCEDURE — 99204 PR OFFICE/OUTPT VISIT, NEW, LEVL IV, 45-59 MIN: ICD-10-PCS | Mod: S$GLB,,, | Performed by: FAMILY MEDICINE

## 2022-06-24 PROCEDURE — 3046F PR MOST RECENT HEMOGLOBIN A1C LEVEL > 9.0%: ICD-10-PCS | Mod: CPTII,S$GLB,, | Performed by: FAMILY MEDICINE

## 2022-06-24 PROCEDURE — 3046F PR MOST RECENT HEMOGLOBIN A1C LEVEL > 9.0%: ICD-10-PCS | Mod: CPTII,S$GLB,, | Performed by: UROLOGY

## 2022-06-24 PROCEDURE — 1160F RVW MEDS BY RX/DR IN RCRD: CPT | Mod: CPTII,S$GLB,, | Performed by: UROLOGY

## 2022-06-24 PROCEDURE — 3008F BODY MASS INDEX DOCD: CPT | Mod: CPTII,S$GLB,, | Performed by: FAMILY MEDICINE

## 2022-06-24 PROCEDURE — 3008F PR BODY MASS INDEX (BMI) DOCUMENTED: ICD-10-PCS | Mod: CPTII,S$GLB,, | Performed by: FAMILY MEDICINE

## 2022-06-24 PROCEDURE — 1111F DSCHRG MED/CURRENT MED MERGE: CPT | Mod: CPTII,S$GLB,, | Performed by: UROLOGY

## 2022-06-24 PROCEDURE — 3078F PR MOST RECENT DIASTOLIC BLOOD PRESSURE < 80 MM HG: ICD-10-PCS | Mod: CPTII,S$GLB,, | Performed by: FAMILY MEDICINE

## 2022-06-24 PROCEDURE — 99999 PR PBB SHADOW E&M-EST. PATIENT-LVL II: ICD-10-PCS | Mod: PBBFAC,,, | Performed by: UROLOGY

## 2022-06-24 PROCEDURE — 99214 OFFICE O/P EST MOD 30 MIN: CPT | Mod: S$GLB,,, | Performed by: UROLOGY

## 2022-06-24 PROCEDURE — 3066F NEPHROPATHY DOC TX: CPT | Mod: CPTII,S$GLB,, | Performed by: FAMILY MEDICINE

## 2022-06-24 PROCEDURE — 99214 PR OFFICE/OUTPT VISIT, EST, LEVL IV, 30-39 MIN: ICD-10-PCS | Mod: S$GLB,,, | Performed by: UROLOGY

## 2022-06-24 PROCEDURE — 1159F MED LIST DOCD IN RCRD: CPT | Mod: CPTII,S$GLB,, | Performed by: FAMILY MEDICINE

## 2022-06-24 PROCEDURE — 1111F PR DISCHARGE MEDS RECONCILED W/ CURRENT OUTPATIENT MED LIST: ICD-10-PCS | Mod: CPTII,S$GLB,, | Performed by: FAMILY MEDICINE

## 2022-06-24 PROCEDURE — 1159F PR MEDICATION LIST DOCUMENTED IN MEDICAL RECORD: ICD-10-PCS | Mod: CPTII,S$GLB,, | Performed by: FAMILY MEDICINE

## 2022-06-24 PROCEDURE — 99999 PR PBB SHADOW E&M-EST. PATIENT-LVL II: CPT | Mod: PBBFAC,,, | Performed by: UROLOGY

## 2022-06-24 PROCEDURE — 3074F PR MOST RECENT SYSTOLIC BLOOD PRESSURE < 130 MM HG: ICD-10-PCS | Mod: CPTII,S$GLB,, | Performed by: FAMILY MEDICINE

## 2022-06-24 PROCEDURE — 3078F DIAST BP <80 MM HG: CPT | Mod: CPTII,S$GLB,, | Performed by: FAMILY MEDICINE

## 2022-06-24 PROCEDURE — 1111F PR DISCHARGE MEDS RECONCILED W/ CURRENT OUTPATIENT MED LIST: ICD-10-PCS | Mod: CPTII,S$GLB,, | Performed by: UROLOGY

## 2022-06-24 PROCEDURE — 3066F NEPHROPATHY DOC TX: CPT | Mod: CPTII,S$GLB,, | Performed by: UROLOGY

## 2022-06-24 PROCEDURE — 3046F HEMOGLOBIN A1C LEVEL >9.0%: CPT | Mod: CPTII,S$GLB,, | Performed by: UROLOGY

## 2022-06-24 PROCEDURE — 3066F PR DOCUMENTATION OF TREATMENT FOR NEPHROPATHY: ICD-10-PCS | Mod: CPTII,S$GLB,, | Performed by: UROLOGY

## 2022-06-24 PROCEDURE — 1111F DSCHRG MED/CURRENT MED MERGE: CPT | Mod: CPTII,S$GLB,, | Performed by: FAMILY MEDICINE

## 2022-06-24 PROCEDURE — 99999 PR PBB SHADOW E&M-EST. PATIENT-LVL III: CPT | Mod: PBBFAC,,, | Performed by: FAMILY MEDICINE

## 2022-06-24 PROCEDURE — 3066F PR DOCUMENTATION OF TREATMENT FOR NEPHROPATHY: ICD-10-PCS | Mod: CPTII,S$GLB,, | Performed by: FAMILY MEDICINE

## 2022-06-24 PROCEDURE — 1159F MED LIST DOCD IN RCRD: CPT | Mod: CPTII,S$GLB,, | Performed by: UROLOGY

## 2022-06-24 PROCEDURE — 3046F HEMOGLOBIN A1C LEVEL >9.0%: CPT | Mod: CPTII,S$GLB,, | Performed by: FAMILY MEDICINE

## 2022-06-24 PROCEDURE — 1160F PR REVIEW ALL MEDS BY PRESCRIBER/CLIN PHARMACIST DOCUMENTED: ICD-10-PCS | Mod: CPTII,S$GLB,, | Performed by: UROLOGY

## 2022-06-24 PROCEDURE — 3074F SYST BP LT 130 MM HG: CPT | Mod: CPTII,S$GLB,, | Performed by: FAMILY MEDICINE

## 2022-06-24 PROCEDURE — 99999 PR PBB SHADOW E&M-EST. PATIENT-LVL III: ICD-10-PCS | Mod: PBBFAC,,, | Performed by: FAMILY MEDICINE

## 2022-06-24 PROCEDURE — 1159F PR MEDICATION LIST DOCUMENTED IN MEDICAL RECORD: ICD-10-PCS | Mod: CPTII,S$GLB,, | Performed by: UROLOGY

## 2022-06-24 PROCEDURE — 99204 OFFICE O/P NEW MOD 45 MIN: CPT | Mod: S$GLB,,, | Performed by: FAMILY MEDICINE

## 2022-06-24 NOTE — PROGRESS NOTES
(Portions of this note were dictated using voice recognition software and may contain dictation related errors in spelling/grammar/syntax not found on text review)    CC:   Chief Complaint   Patient presents with    Hospital Follow Up       HPI: 61 y.o. male presented for hospital discharge follow-up visit accompanied with wife. He is new to me.  He has medical history significant for type 2 diabetes mellitus, hypertension, hyperlipidemia, migraine headaches, BPH with obstruction, bilateral hydronephrosis, urine retension. He was recently admitted in the hospital with generalized weakness, dyspnea on exertion and decreased appetite for 2 weeks, he was seen in the ED on 06/11 before this admission for ARF sec to obstructive uropathy, CT abd pelvis revealed enlarged prostate and perinephric stranding.  Patient is followed by Urology, Dr. Garvin, last seen in clinic on 05/17, and Tavera catheter was placed during that admission.    In the ED workup revealed leukocytosis with WBC count of 35, tachycardia, UA positive, creatinine elevated to 3, chest x-ray negative for any acute cardiopulmonary process, he was started on Rocephin and given IV fluids, was admitted for severe sepsis secondary to catheter associated UTI, also found to have HERBERT on CKD, responded well to IV fluid resuscitation. Urine cultures finalized to grow Pseudomonas and Klebsiella, antibiotics were changed to Cipro for 14 days, was discharged with Tavera catheter in with the plan to follow up with Urology as an outpatient.  Since discharge patient has been doing okay, he has appointment with Urology, Dr. Wheeler, today.  He reports adherence with his medications.  He takes Levemir 15 units nightly and NovoLog 4 units t.i.d. with meals, does not need any refills.  He has Dexcom and reports blood sugars in the range of 110-120 on sensor.  He denies having any cough, shortness of breath, chest pain, nausea, vomiting abdominal pain, changes in bowel habits,  urine problems including burning and dysuria.    Past Medical History:   Diagnosis Date    Diabetes mellitus     Disorder of kidney and ureter     Hyperlipemia     Hypertension     Migraine headache     PTSD (post-traumatic stress disorder)        No past surgical history on file.    Family History   Problem Relation Age of Onset    Diabetes Mother        Social History     Tobacco Use    Smoking status: Never Smoker    Smokeless tobacco: Never Used   Substance Use Topics    Alcohol use: No    Drug use: No       Lab Results   Component Value Date    WBC 14.15 (H) 06/19/2022    HGB 7.0 (L) 06/19/2022    HCT 20.9 (L) 06/19/2022    MCV 86 06/19/2022     06/19/2022    CHOL 143 04/29/2022    TRIG 114 04/29/2022    HDL 32 (L) 04/29/2022    ALT 36 06/17/2022    AST 50 (H) 06/17/2022    BILITOT 0.4 06/17/2022    ALKPHOS 91 06/17/2022     06/19/2022    K 5.1 06/19/2022     06/19/2022    CREATININE 2.3 (H) 06/19/2022    ESTGFRAFRICA 34 (A) 06/19/2022    EGFRNONAA 29 (A) 06/19/2022    CALCIUM 9.0 06/19/2022    ALBUMIN 1.7 (L) 06/17/2022    BUN 33 (H) 06/19/2022    CO2 20 (L) 06/19/2022    TSH 0.536 04/29/2022    INR 1.1 07/11/2016    HGBA1C >14.0 (H) 04/29/2022    LDLCALC 88.2 04/29/2022     06/19/2022             Vital signs reviewed  PE:   APPEARANCE: Well nourished, well developed, in no acute distress.    HEAD: Normocephalic, atraumatic.  EYES: EOMI.  Conjunctivae noninjected.  NOSE: Mucosa pink. Airway clear.  MOUTH & THROAT: No tonsillar enlargement. No pharyngeal erythema or exudate.   NECK: Supple with no cervical lymphadenopathy.    CHEST: Good inspiratory effort. Lungs clear to auscultation with no wheezes or crackles.  CARDIOVASCULAR: Normal S1, S2. No rubs, murmurs, or gallops.  ABDOMEN: Bowel sounds normal. Not distended. Soft. No tenderness or masses. No organomegaly.  EXTREMITIES: No edema, cyanosis, or clubbing.    Review of Systems   Constitutional: Negative for chills,  fatigue and fever.   HENT: Negative.    Respiratory: Negative for cough, shortness of breath and wheezing.    Cardiovascular: Negative for chest pain, palpitations and leg swelling.   Gastrointestinal: Negative for abdominal pain, change in bowel habit, constipation, diarrhea, nausea, vomiting and change in bowel habit.   Genitourinary: Negative.    Musculoskeletal: Negative.    Neurological: Negative.    Psychiatric/Behavioral: Negative.    All other systems reviewed and are negative.      IMPRESSION  1. Hospital discharge follow-up    2. Primary hypertension    3. Hyperlipidemia, unspecified hyperlipidemia type    4. Tachycardia    5. Obstructive uropathy    6. Bilateral hydronephrosis    7. Urinary tract infection associated with catheterization of urinary tract, unspecified indwelling urinary catheter type, subsequent encounter    8. Type 2 diabetes, uncontrolled, with renal manifestation          PLAN      1. Hospital discharge follow-up      2. Primary hypertension  Stable  Continue lopressor and procardia  Low salt diet, regular exercise      3. Hyperlipidemia, unspecified hyperlipidemia type  Continue Lipitor 40 mg      4. Tachycardia  He has tachycardia, EKG from hospital reviewed- had sinus tachycardia on multiple EKG's  No palpitations, chest pain, return precautions to ED given  Continue lopressor 50 mg      5. Obstructive uropathy  6. Bilateral hydronephrosis  Follows up with urology, appointment today      7. Urinary tract infection associated with catheterization of urinary tract, unspecified indwelling urinary catheter type, subsequent encounter  - Tavera in place- follow up with urology  Complete course of ciprofloxacin for 12 days      8. Type 2 diabetes, uncontrolled, with renal manifestation  Continue current insulin regimen  He has dexcom         SCREENINGS      Immunizations:   UTD with Covid vaccine  Needs tdap      Age/demographic appropriate health maintenance:    Health Maintenance Due    Topic Date Due    Hepatitis C Screening  Never done    Diabetes Urine Screening  Never done    HIV Screening  Never done    Colorectal Cancer Screening  Never done    Shingles Vaccine (1 of 2) Never done    TETANUS VACCINE  07/31/2016    COVID-19 Vaccine (4 - Booster for Pfizer series) 04/22/2022           Tiago Ruiz   6/24/2022

## 2022-06-24 NOTE — PROGRESS NOTES
Subjective:       Patient ID: Angel Bell is a 61 y.o. male.    Chief Complaint: No chief complaint on file.     This is a 61 y.o.  male patient with urinary retention, BPH, bilateral hydronephrosis.  In late April 2022. CT scan at that time showed massive bilateral hydronephrosis and enlarged prostate with urinary retention.  Tavera catheter was placed after creatinine was noted to be 18.  His creatinine continued to improve.  Renal ultrasound showed improvement in hydronephrosis.  He presents today for follow-up.  Tavera catheter remains in place.  Denies any significant lower urinary tract symptoms prior to recent hospitalization.  He has an AUA symptom score of 8 with a bother of 3. Main issues with urination are intermittency urgency and weak stream.      Hemoglobin A1c was recently greater than 14. He does have a history of prostate cancer in his father.  He denies ever having a PSA test.      6/24/22: follow up after UDS.       I personally reviewed the images: CT 4/22, QUOC 5/22 as above      Lab Results   Component Value Date    CREATININE 2.3 (H) 06/19/2022       ---  Past Medical History:   Diagnosis Date    Diabetes mellitus     Disorder of kidney and ureter     Hyperlipemia     Hypertension     Migraine headache     PTSD (post-traumatic stress disorder)        History reviewed. No pertinent surgical history.    Family History   Problem Relation Age of Onset    Diabetes Mother        Social History     Tobacco Use    Smoking status: Never Smoker    Smokeless tobacco: Never Used   Substance Use Topics    Alcohol use: No    Drug use: No       Current Outpatient Medications on File Prior to Visit   Medication Sig Dispense Refill    atorvastatin (LIPITOR) 40 MG tablet Take 1 tablet (40 mg total) by mouth once daily. 30 tablet 3    blood sugar diagnostic (TRUE METRIX GLUCOSE TEST STRIP) Strp TEST 4 TIMES A  each 11    blood-glucose meter (TRUE METRIX GLUCOSE METER) Misc 1 Device by  "Misc.(Non-Drug; Combo Route) route 4 (four) times daily. 1 each 0    blood-glucose sensor (DEXCOM G6 SENSOR) Philomena 3 each by Misc.(Non-Drug; Combo Route) route continuous. 3 each 11    blood-glucose transmitter (DEXCOM G6 TRANSMITTER) Philomena 1 each by Misc.(Non-Drug; Combo Route) route continuous. 1 each 3    ciprofloxacin HCl (CIPRO) 500 MG tablet Take 1 tablet (500 mg total) by mouth every 12 (twelve) hours. for 12 days 24 tablet 0    glucose 4 GM chewable tablet Take 4 tablets (16 g total) by mouth as needed for Low blood sugar (If having symptoms of blurry vision, palpitations, confusion, shakiness.  Please check sugars and if sugar below 70 please take 4 tablets and re-check sugar everry 15 minutes until sugars are above 70 and symptoms resolve.). 50 tablet 12    insulin aspart U-100 (NOVOLOG) 100 unit/mL (3 mL) InPn pen Inject 4 Units into the skin 3 (three) times daily. 15 mL 3    insulin detemir U-100 (LEVEMIR FLEXTOUCH) 100 unit/mL (3 mL) SubQ InPn pen Inject 15 Units into the skin once daily. (Patient taking differently: Inject 12 Units into the skin once daily.) 15 mL 3    lancets 33 gauge Misc USE 4 TIMES A  each 11    metoprolol tartrate (LOPRESSOR) 50 MG tablet Take 1 tablet (50 mg total) by mouth 2 (two) times daily. 60 tablet 3    NIFEdipine (PROCARDIA-XL) 30 MG (OSM) 24 hr tablet Take 1 tablet (30 mg total) by mouth once daily. 30 tablet 3    pen needle, diabetic 31 gauge x 5/16" Ndle use 4 times a day 100 each 11    sodium bicarbonate 650 MG tablet Take 1 tablet (650 mg total) by mouth 3 (three) times daily. 360 tablet 3    tamsulosin (FLOMAX) 0.4 mg Cap Take 1 capsule (0.4 mg total) by mouth once daily. 30 capsule 11     No current facility-administered medications on file prior to visit.       Review of patient's allergies indicates:  No Known Allergies    Review of Systems   Constitutional: Negative for activity change, chills and fever.   HENT: Negative for congestion.  "   Respiratory: Negative for cough, chest tightness and shortness of breath.    Cardiovascular: Negative for chest pain and palpitations.   Gastrointestinal: Negative for abdominal distention, abdominal pain, nausea and vomiting.   Genitourinary: Positive for difficulty urinating and frequency. Negative for flank pain, hematuria, penile pain, scrotal swelling and testicular pain.   Musculoskeletal: Negative for gait problem.       Objective:      Physical Exam  Constitutional:       Appearance: Normal appearance.   HENT:      Head: Normocephalic.   Pulmonary:      Effort: Pulmonary effort is normal.      Breath sounds: Normal breath sounds.   Abdominal:      General: Abdomen is flat. Bowel sounds are normal.      Palpations: Abdomen is soft.      Tenderness: There is no abdominal tenderness. There is no right CVA tenderness, left CVA tenderness or guarding.   Genitourinary:     Penis: Normal.    Musculoskeletal:         General: Normal range of motion.      Cervical back: Normal range of motion.   Skin:     General: Skin is warm.   Neurological:      Mental Status: He is alert.           UDS 6/22 (Dr. Prasad):     voided with a maximum flow rate of 1 cc/second.  His detrusor pressure was 48.6 cm of water and maximum detrusor pressure was 59 cm of water.  There was no bladder sphincter dyssynergia and no uninhibited bladder contractions  Patient's Tavera was replaced  Impression urodynamic study associated with a an elevated detrusor pressure indicative of recent urinary retention with hopefully improving detrusor pressure  Recommendations correlate with cystoscopy and truss.  Patient's bladder is hopefully recovering and is probably in need of some sort of procedure for outlet obstruction depending on the size of the prostate  such as HoLEP versus TURP versus open prostatectomy versus robotic prostatectomy    Assessment:     Problem Noted   Urinary Retention 4/29/2022    Distended bladder with bilateral hydroneprosis,  ARF  Prostate volume calculated 112 myself (larger on CT measurements, 89gm on QUOC)  --UDS 6/22: shows some bladder contraction, low flow, HERRERA     Bilateral Hydronephrosis 4/29/2022    QUOC 5/22: mild right hydronephrosis, greatly improved from CT         Plan:     1.  PSA 15 likely from multiple catheterizations, further workup after retention resolved  2.  Discussed robotic simple prostatectomy (RASP) vs Holmium laser enucleation of prostate vs TURP... I believe that HoLEP or RASP are better given large prostate and severe obstruction.  Given HgbA1c 14 recently hesitant for abdominal surgery with open bladder after having cath in place.  HoLEP delayed due to shortage of morcellator blades.   3.  Will repeat BMP, hgbA1 c, PSA prior to follow up in 3 weeks for cath change    Ted Garvin MD

## 2022-07-02 PROCEDURE — G0179 PR HOME HEALTH MD RECERTIFICATION: ICD-10-PCS | Mod: ,,, | Performed by: FAMILY MEDICINE

## 2022-07-02 PROCEDURE — G0179 MD RECERTIFICATION HHA PT: HCPCS | Mod: ,,, | Performed by: FAMILY MEDICINE

## 2022-07-05 ENCOUNTER — TELEPHONE (OUTPATIENT)
Dept: FAMILY MEDICINE | Facility: CLINIC | Age: 62
End: 2022-07-05
Payer: COMMERCIAL

## 2022-07-05 NOTE — TELEPHONE ENCOUNTER
----- Message from Brigitte San sent at 7/5/2022  3:55 PM CDT -----  Contact: [phone]  Who Called:  mikey   Regarding:  home health concerns   Would the patient rather a call back or a response via [facility]? Call back  Best Call Back Number:  [phone]  Additional Information: n/a

## 2022-07-05 NOTE — TELEPHONE ENCOUNTER
Spoke w/ mikey. She wanted to verified if  is claiming patient as her patient and est care for him. I notified her yes he has a appt on the 22nd to est care w/ her.

## 2022-07-06 ENCOUNTER — LAB VISIT (OUTPATIENT)
Dept: LAB | Facility: HOSPITAL | Age: 62
End: 2022-07-06
Attending: UROLOGY
Payer: COMMERCIAL

## 2022-07-06 DIAGNOSIS — R33.9 URINARY RETENTION: ICD-10-CM

## 2022-07-06 LAB
ANION GAP SERPL CALC-SCNC: 8 MMOL/L (ref 8–16)
CALCIUM SERPL-MCNC: 9.1 MG/DL (ref 8.7–10.5)
CHLORIDE SERPL-SCNC: 111 MMOL/L (ref 95–110)
CO2 SERPL-SCNC: 27 MMOL/L (ref 23–29)
CREAT SERPL-MCNC: 1.55 MG/DL (ref 0.5–1.4)
EST. GFR  (AFRICAN AMERICAN): 54.7 ML/MIN/1.73 M^2
EST. GFR  (NON AFRICAN AMERICAN): 47.3 ML/MIN/1.73 M^2
GLUCOSE SERPL-MCNC: 103 MG/DL (ref 70–110)
POTASSIUM SERPL-SCNC: 4.4 MMOL/L (ref 3.5–5.1)
PROSTATE SPECIFIC ANTIGEN, TOTAL: 12 NG/ML (ref 0–4)
PSA FREE MFR SERPL: 18.58 %
PSA FREE SERPL-MCNC: 2.23 NG/ML (ref 0–1.5)
SODIUM SERPL-SCNC: 146 MMOL/L (ref 136–145)
UUN UR-MCNC: 16 MG/DL (ref 2–20)

## 2022-07-06 PROCEDURE — 84153 ASSAY OF PSA TOTAL: CPT | Mod: PO | Performed by: UROLOGY

## 2022-07-06 PROCEDURE — 84154 ASSAY OF PSA FREE: CPT | Mod: PO | Performed by: UROLOGY

## 2022-07-06 PROCEDURE — 80048 BASIC METABOLIC PNL TOTAL CA: CPT | Mod: PO | Performed by: UROLOGY

## 2022-07-08 ENCOUNTER — PATIENT OUTREACH (OUTPATIENT)
Dept: ADMINISTRATIVE | Facility: HOSPITAL | Age: 62
End: 2022-07-08
Payer: COMMERCIAL

## 2022-07-08 NOTE — PROGRESS NOTES
Care Everywhere updates requested and reviewed.  Immunizations reconciled. Media reports reviewed.  Duplicate HM overrides and  orders removed.  Overdue HM topic chart audit and/or requested.  Overdue lab testing linked to upcoming lab appointments if applies.          Health Maintenance Due   Topic Date Due    Hepatitis C Screening  Never done    Diabetes Urine Screening  Never done    Pneumococcal Vaccines (Age 0-64) (1 - PCV) Never done    HIV Screening  Never done    Colorectal Cancer Screening  Never done    TETANUS VACCINE  2016    COVID-19 Vaccine (4 - Booster for Pfizer series) 2022

## 2022-07-11 ENCOUNTER — LAB VISIT (OUTPATIENT)
Dept: LAB | Facility: HOSPITAL | Age: 62
End: 2022-07-11
Attending: INTERNAL MEDICINE
Payer: COMMERCIAL

## 2022-07-11 DIAGNOSIS — E11.22 TYPE 2 DIABETES MELLITUS WITH STAGE 3B CHRONIC KIDNEY DISEASE, WITHOUT LONG-TERM CURRENT USE OF INSULIN: ICD-10-CM

## 2022-07-11 DIAGNOSIS — N25.81 SECONDARY HYPERPARATHYROIDISM OF RENAL ORIGIN: ICD-10-CM

## 2022-07-11 DIAGNOSIS — N04.9 NEPHROTIC SYNDROME: ICD-10-CM

## 2022-07-11 DIAGNOSIS — N18.32 TYPE 2 DIABETES MELLITUS WITH STAGE 3B CHRONIC KIDNEY DISEASE, WITHOUT LONG-TERM CURRENT USE OF INSULIN: ICD-10-CM

## 2022-07-11 DIAGNOSIS — D63.1 ANEMIA IN STAGE 3B CHRONIC KIDNEY DISEASE: ICD-10-CM

## 2022-07-11 DIAGNOSIS — N18.32 ANEMIA IN STAGE 3B CHRONIC KIDNEY DISEASE: ICD-10-CM

## 2022-07-11 LAB
ALBUMIN SERPL BCP-MCNC: 3.9 G/DL (ref 3.5–5.2)
ANION GAP SERPL CALC-SCNC: 5 MMOL/L (ref 8–16)
BASOPHILS # BLD AUTO: 0.03 K/UL (ref 0–0.2)
BASOPHILS NFR BLD: 0.4 % (ref 0–1.9)
C3 SERPL-MCNC: 98 MG/DL (ref 50–180)
C4 SERPL-MCNC: 36 MG/DL (ref 11–44)
CALCIUM SERPL-MCNC: 9 MG/DL (ref 8.7–10.5)
CHLORIDE SERPL-SCNC: 107 MMOL/L (ref 95–110)
CO2 SERPL-SCNC: 28 MMOL/L (ref 23–29)
CREAT SERPL-MCNC: 1.55 MG/DL (ref 0.5–1.4)
DIFFERENTIAL METHOD: ABNORMAL
EOSINOPHIL # BLD AUTO: 0.1 K/UL (ref 0–0.5)
EOSINOPHIL NFR BLD: 0.8 % (ref 0–8)
ERYTHROCYTE [DISTWIDTH] IN BLOOD BY AUTOMATED COUNT: 17.2 % (ref 11.5–14.5)
EST. GFR  (AFRICAN AMERICAN): 54.7 ML/MIN/1.73 M^2
EST. GFR  (NON AFRICAN AMERICAN): 47.3 ML/MIN/1.73 M^2
ESTIMATED AVG GLUCOSE: 148 MG/DL (ref 68–131)
FERRITIN SERPL-MCNC: 438 NG/ML (ref 20–300)
GLUCOSE SERPL-MCNC: 119 MG/DL (ref 70–110)
HBA1C MFR BLD: 6.8 % (ref 4–5.6)
HCT VFR BLD AUTO: 26 % (ref 40–54)
HGB BLD-MCNC: 8.1 G/DL (ref 14–18)
IMM GRANULOCYTES # BLD AUTO: 0.06 K/UL (ref 0–0.04)
IMM GRANULOCYTES NFR BLD AUTO: 0.8 % (ref 0–0.5)
IRON SERPL-MCNC: 41 UG/DL (ref 45–160)
LYMPHOCYTES # BLD AUTO: 1.9 K/UL (ref 1–4.8)
LYMPHOCYTES NFR BLD: 25.6 % (ref 18–48)
MCH RBC QN AUTO: 28.4 PG (ref 27–31)
MCHC RBC AUTO-ENTMCNC: 31.2 G/DL (ref 32–36)
MCV RBC AUTO: 91 FL (ref 82–98)
MONOCYTES # BLD AUTO: 0.7 K/UL (ref 0.3–1)
MONOCYTES NFR BLD: 9.9 % (ref 4–15)
NEUTROPHILS # BLD AUTO: 4.7 K/UL (ref 1.8–7.7)
NEUTROPHILS NFR BLD: 62.5 % (ref 38–73)
NRBC BLD-RTO: 0 /100 WBC
PHOSPHATE SERPL-MCNC: 3.1 MG/DL (ref 2.7–4.5)
PLATELET # BLD AUTO: 306 K/UL (ref 150–450)
PMV BLD AUTO: 9.3 FL (ref 9.2–12.9)
POTASSIUM SERPL-SCNC: 4.6 MMOL/L (ref 3.5–5.1)
PTH-INTACT SERPL-MCNC: 161.1 PG/ML (ref 9–77)
RBC # BLD AUTO: 2.85 M/UL (ref 4.6–6.2)
RHEUMATOID FACT SERPL-ACNC: 23 IU/ML (ref 0–15)
SATURATED IRON: 13 % (ref 20–50)
SODIUM SERPL-SCNC: 140 MMOL/L (ref 136–145)
TOTAL IRON BINDING CAPACITY: 305 UG/DL (ref 250–450)
TRANSFERRIN SERPL-MCNC: 206 MG/DL (ref 200–375)
URATE SERPL-MCNC: 6.5 MG/DL (ref 3.4–7)
UUN UR-MCNC: 20 MG/DL (ref 2–20)
WBC # BLD AUTO: 7.49 K/UL (ref 3.9–12.7)

## 2022-07-11 PROCEDURE — 82728 ASSAY OF FERRITIN: CPT | Performed by: INTERNAL MEDICINE

## 2022-07-11 PROCEDURE — 86160 COMPLEMENT ANTIGEN: CPT | Mod: PO | Performed by: INTERNAL MEDICINE

## 2022-07-11 PROCEDURE — 80069 RENAL FUNCTION PANEL: CPT | Mod: PO | Performed by: INTERNAL MEDICINE

## 2022-07-11 PROCEDURE — 36415 COLL VENOUS BLD VENIPUNCTURE: CPT | Mod: PO | Performed by: INTERNAL MEDICINE

## 2022-07-11 PROCEDURE — 86803 HEPATITIS C AB TEST: CPT | Mod: PO | Performed by: INTERNAL MEDICINE

## 2022-07-11 PROCEDURE — 86706 HEP B SURFACE ANTIBODY: CPT | Mod: PO | Performed by: INTERNAL MEDICINE

## 2022-07-11 PROCEDURE — 86592 SYPHILIS TEST NON-TREP QUAL: CPT | Mod: PO | Performed by: INTERNAL MEDICINE

## 2022-07-11 PROCEDURE — 83036 HEMOGLOBIN GLYCOSYLATED A1C: CPT | Performed by: INTERNAL MEDICINE

## 2022-07-11 PROCEDURE — 83520 IMMUNOASSAY QUANT NOS NONAB: CPT | Mod: PO | Performed by: INTERNAL MEDICINE

## 2022-07-11 PROCEDURE — 86431 RHEUMATOID FACTOR QUANT: CPT | Mod: PO | Performed by: INTERNAL MEDICINE

## 2022-07-11 PROCEDURE — 85025 COMPLETE CBC W/AUTO DIFF WBC: CPT | Mod: PO | Performed by: INTERNAL MEDICINE

## 2022-07-11 PROCEDURE — 84165 PROTEIN E-PHORESIS SERUM: CPT | Mod: PO | Performed by: INTERNAL MEDICINE

## 2022-07-11 PROCEDURE — 86038 ANTINUCLEAR ANTIBODIES: CPT | Mod: PO | Performed by: INTERNAL MEDICINE

## 2022-07-11 PROCEDURE — 84466 ASSAY OF TRANSFERRIN: CPT | Mod: PO | Performed by: INTERNAL MEDICINE

## 2022-07-11 PROCEDURE — 83970 ASSAY OF PARATHORMONE: CPT | Mod: PO | Performed by: INTERNAL MEDICINE

## 2022-07-11 PROCEDURE — 86704 HEP B CORE ANTIBODY TOTAL: CPT | Mod: PO | Performed by: INTERNAL MEDICINE

## 2022-07-11 PROCEDURE — 86160 COMPLEMENT ANTIGEN: CPT | Mod: 59,PO | Performed by: INTERNAL MEDICINE

## 2022-07-11 PROCEDURE — 84165 PATHOLOGIST INTERPRETATION SPE: ICD-10-PCS | Mod: 26,,, | Performed by: PATHOLOGY

## 2022-07-11 PROCEDURE — 84165 PROTEIN E-PHORESIS SERUM: CPT | Mod: 26,,, | Performed by: PATHOLOGY

## 2022-07-11 PROCEDURE — 84550 ASSAY OF BLOOD/URIC ACID: CPT | Performed by: INTERNAL MEDICINE

## 2022-07-11 PROCEDURE — 87340 HEPATITIS B SURFACE AG IA: CPT | Mod: PO | Performed by: INTERNAL MEDICINE

## 2022-07-11 PROCEDURE — 87389 HIV-1 AG W/HIV-1&-2 AB AG IA: CPT | Mod: PO | Performed by: INTERNAL MEDICINE

## 2022-07-12 LAB
ALBUMIN SERPL ELPH-MCNC: 3.21 G/DL (ref 3.35–5.55)
ALPHA1 GLOB SERPL ELPH-MCNC: 0.43 G/DL (ref 0.17–0.41)
ALPHA2 GLOB SERPL ELPH-MCNC: 0.78 G/DL (ref 0.43–0.99)
ANA SER QL IF: NORMAL
B-GLOBULIN SERPL ELPH-MCNC: 0.76 G/DL (ref 0.5–1.1)
GAMMA GLOB SERPL ELPH-MCNC: 1.22 G/DL (ref 0.67–1.58)
HBV CORE AB SERPL QL IA: NEGATIVE
HBV SURFACE AB SER-ACNC: NEGATIVE M[IU]/ML
HBV SURFACE AG SERPL QL IA: NEGATIVE
HCV AB SERPL QL IA: NEGATIVE
HIV 1+2 AB+HIV1 P24 AG SERPL QL IA: NEGATIVE
KAPPA LC SER QL IA: 4.2 MG/DL (ref 0.33–1.94)
KAPPA LC/LAMBDA SER IA: 2.37 (ref 0.26–1.65)
LAMBDA LC SER QL IA: 1.77 MG/DL (ref 0.57–2.63)
PATHOLOGIST INTERPRETATION SPE: NORMAL
PROT SERPL-MCNC: 6.4 G/DL (ref 6–8.4)
RPR SER QL: NORMAL

## 2022-07-15 ENCOUNTER — OFFICE VISIT (OUTPATIENT)
Dept: UROLOGY | Facility: CLINIC | Age: 62
End: 2022-07-15
Payer: COMMERCIAL

## 2022-07-15 VITALS — BODY MASS INDEX: 24.66 KG/M2 | WEIGHT: 153.44 LBS | HEIGHT: 66 IN

## 2022-07-15 DIAGNOSIS — N13.30 BILATERAL HYDRONEPHROSIS: ICD-10-CM

## 2022-07-15 DIAGNOSIS — R33.9 URINARY RETENTION: Primary | ICD-10-CM

## 2022-07-15 PROCEDURE — 1111F PR DISCHARGE MEDS RECONCILED W/ CURRENT OUTPATIENT MED LIST: ICD-10-PCS | Mod: CPTII,S$GLB,, | Performed by: UROLOGY

## 2022-07-15 PROCEDURE — 1160F RVW MEDS BY RX/DR IN RCRD: CPT | Mod: CPTII,S$GLB,, | Performed by: UROLOGY

## 2022-07-15 PROCEDURE — 99214 OFFICE O/P EST MOD 30 MIN: CPT | Mod: S$GLB,,, | Performed by: UROLOGY

## 2022-07-15 PROCEDURE — 3044F PR MOST RECENT HEMOGLOBIN A1C LEVEL <7.0%: ICD-10-PCS | Mod: CPTII,S$GLB,, | Performed by: UROLOGY

## 2022-07-15 PROCEDURE — 99999 PR PBB SHADOW E&M-EST. PATIENT-LVL V: CPT | Mod: PBBFAC,,, | Performed by: UROLOGY

## 2022-07-15 PROCEDURE — 3066F NEPHROPATHY DOC TX: CPT | Mod: CPTII,S$GLB,, | Performed by: UROLOGY

## 2022-07-15 PROCEDURE — 1159F PR MEDICATION LIST DOCUMENTED IN MEDICAL RECORD: ICD-10-PCS | Mod: CPTII,S$GLB,, | Performed by: UROLOGY

## 2022-07-15 PROCEDURE — 99999 PR PBB SHADOW E&M-EST. PATIENT-LVL V: ICD-10-PCS | Mod: PBBFAC,,, | Performed by: UROLOGY

## 2022-07-15 PROCEDURE — 1160F PR REVIEW ALL MEDS BY PRESCRIBER/CLIN PHARMACIST DOCUMENTED: ICD-10-PCS | Mod: CPTII,S$GLB,, | Performed by: UROLOGY

## 2022-07-15 PROCEDURE — 99214 PR OFFICE/OUTPT VISIT, EST, LEVL IV, 30-39 MIN: ICD-10-PCS | Mod: S$GLB,,, | Performed by: UROLOGY

## 2022-07-15 PROCEDURE — 3044F HG A1C LEVEL LT 7.0%: CPT | Mod: CPTII,S$GLB,, | Performed by: UROLOGY

## 2022-07-15 PROCEDURE — 3008F PR BODY MASS INDEX (BMI) DOCUMENTED: ICD-10-PCS | Mod: CPTII,S$GLB,, | Performed by: UROLOGY

## 2022-07-15 PROCEDURE — 1159F MED LIST DOCD IN RCRD: CPT | Mod: CPTII,S$GLB,, | Performed by: UROLOGY

## 2022-07-15 PROCEDURE — 3066F PR DOCUMENTATION OF TREATMENT FOR NEPHROPATHY: ICD-10-PCS | Mod: CPTII,S$GLB,, | Performed by: UROLOGY

## 2022-07-15 PROCEDURE — 1111F DSCHRG MED/CURRENT MED MERGE: CPT | Mod: CPTII,S$GLB,, | Performed by: UROLOGY

## 2022-07-15 PROCEDURE — 3008F BODY MASS INDEX DOCD: CPT | Mod: CPTII,S$GLB,, | Performed by: UROLOGY

## 2022-07-15 RX ORDER — LIDOCAINE HYDROCHLORIDE 10 MG/ML
1 INJECTION, SOLUTION EPIDURAL; INFILTRATION; INTRACAUDAL; PERINEURAL ONCE
Status: CANCELLED | OUTPATIENT
Start: 2022-07-15 | End: 2022-07-15

## 2022-07-15 RX ORDER — ACETAMINOPHEN 500 MG
1000 TABLET ORAL
Status: CANCELLED | OUTPATIENT
Start: 2022-07-15

## 2022-07-15 RX ORDER — CLOTRIMAZOLE 1 %
CREAM (GRAM) TOPICAL
Status: ON HOLD | COMMUNITY
Start: 2022-07-14 | End: 2022-08-30 | Stop reason: HOSPADM

## 2022-07-15 RX ORDER — CEFAZOLIN SODIUM 2 G/50ML
2 SOLUTION INTRAVENOUS
Status: CANCELLED | OUTPATIENT
Start: 2022-07-15

## 2022-07-15 RX ORDER — HEPARIN SODIUM 5000 [USP'U]/ML
5000 INJECTION, SOLUTION INTRAVENOUS; SUBCUTANEOUS
Status: CANCELLED | OUTPATIENT
Start: 2022-07-15

## 2022-07-15 NOTE — PROGRESS NOTES
Subjective:       Patient ID: Angel Bell is a 61 y.o. male.    Chief Complaint: No chief complaint on file.     This is a 61 y.o.  male patient with urinary retention, BPH, bilateral hydronephrosis.  In late April 2022. CT scan at that time showed massive bilateral hydronephrosis and enlarged prostate with urinary retention.  Tavera catheter was placed after creatinine was noted to be 18.  His creatinine continued to improve.  Renal ultrasound showed improvement in hydronephrosis.  He presents today for follow-up.  Tavera catheter remains in place.  Denies any significant lower urinary tract symptoms prior to recent hospitalization.  He has an AUA symptom score of 8 with a bother of 3. Main issues with urination are intermittency urgency and weak stream.      Hemoglobin A1c was recently greater than 14. He does have a history of prostate cancer in his father.  He denies ever having a PSA test.      6/24/22: follow up after UDS.    7/15/22: Cr stable, PSA 12 down from 15, HgA1c down     I personally reviewed the images: CT 4/22, QUOC 5/22 as above      Lab Results   Component Value Date    CREATININE 1.55 (H) 07/11/2022       ---  Past Medical History:   Diagnosis Date    Diabetes mellitus     Disorder of kidney and ureter     Hyperlipemia     Hypertension     Iron deficiency anemia     Migraine headache     PTSD (post-traumatic stress disorder)     Stage 3a chronic kidney disease        No past surgical history on file.    Family History   Problem Relation Age of Onset    Diabetes Mother        Social History     Tobacco Use    Smoking status: Never Smoker    Smokeless tobacco: Never Used   Substance Use Topics    Alcohol use: No    Drug use: No       Current Outpatient Medications on File Prior to Visit   Medication Sig Dispense Refill    atorvastatin (LIPITOR) 40 MG tablet Take 1 tablet (40 mg total) by mouth once daily. 30 tablet 3    blood sugar diagnostic (TRUE METRIX GLUCOSE TEST STRIP)  "Strp TEST 4 TIMES A  each 11    blood-glucose meter (TRUE METRIX GLUCOSE METER) Misc 1 Device by Misc.(Non-Drug; Combo Route) route 4 (four) times daily. 1 each 0    blood-glucose sensor (DEXCOM G6 SENSOR) Philomena 3 each by Misc.(Non-Drug; Combo Route) route continuous. 3 each 11    blood-glucose transmitter (DEXCOM G6 TRANSMITTER) Philomena 1 each by Misc.(Non-Drug; Combo Route) route continuous. 1 each 3    furosemide (LASIX) 40 MG tablet Take 1 tablet (40 mg total) by mouth daily as needed (swelling). 30 tablet 11    glucose 4 GM chewable tablet Take 4 tablets (16 g total) by mouth as needed for Low blood sugar (If having symptoms of blurry vision, palpitations, confusion, shakiness.  Please check sugars and if sugar below 70 please take 4 tablets and re-check sugar everry 15 minutes until sugars are above 70 and symptoms resolve.). 50 tablet 12    insulin aspart U-100 (NOVOLOG) 100 unit/mL (3 mL) InPn pen Inject 4 Units into the skin 3 (three) times daily. 15 mL 3    insulin detemir U-100 (LEVEMIR FLEXTOUCH) 100 unit/mL (3 mL) SubQ InPn pen Inject 15 Units into the skin once daily. (Patient taking differently: Inject 12 Units into the skin once daily.) 15 mL 3    lancets 33 gauge Misc USE 4 TIMES A  each 11    metoprolol tartrate (LOPRESSOR) 50 MG tablet Take 50 mg by mouth 2 (two) times daily.      NIFEdipine (PROCARDIA-XL) 60 MG (OSM) 24 hr tablet Take 1 tablet (60 mg total) by mouth once daily. 90 tablet 3    pen needle, diabetic 31 gauge x 5/16" Ndle use 4 times a day 100 each 11    sodium bicarbonate 650 MG tablet Take 1 tablet (650 mg total) by mouth 2 (two) times daily.      tamsulosin (FLOMAX) 0.4 mg Cap Take 1 capsule (0.4 mg total) by mouth once daily. 30 capsule 11     No current facility-administered medications on file prior to visit.       Review of patient's allergies indicates:  No Known Allergies    Review of Systems   Constitutional: Negative for activity change, chills and " fever.   HENT: Negative for congestion.    Respiratory: Negative for cough, chest tightness and shortness of breath.    Cardiovascular: Negative for chest pain and palpitations.   Gastrointestinal: Negative for abdominal distention, abdominal pain, nausea and vomiting.   Genitourinary: Positive for difficulty urinating and frequency. Negative for flank pain, hematuria, penile pain, scrotal swelling and testicular pain.   Musculoskeletal: Negative for gait problem.       Objective:      Physical Exam  Constitutional:       Appearance: Normal appearance.   HENT:      Head: Normocephalic.   Pulmonary:      Effort: Pulmonary effort is normal.      Breath sounds: Normal breath sounds.   Abdominal:      General: Abdomen is flat. Bowel sounds are normal.      Palpations: Abdomen is soft.      Tenderness: There is no abdominal tenderness. There is no right CVA tenderness, left CVA tenderness or guarding.   Genitourinary:     Penis: Normal.    Musculoskeletal:         General: Normal range of motion.      Cervical back: Normal range of motion.   Skin:     General: Skin is warm.   Neurological:      Mental Status: He is alert.           UDS 6/22 (Dr. Prasad):     voided with a maximum flow rate of 1 cc/second.  His detrusor pressure was 48.6 cm of water and maximum detrusor pressure was 59 cm of water.  There was no bladder sphincter dyssynergia and no uninhibited bladder contractions  Patient's Tavera was replaced  Impression urodynamic study associated with a an elevated detrusor pressure indicative of recent urinary retention with hopefully improving detrusor pressure  Recommendations correlate with cystoscopy and truss.  Patient's bladder is hopefully recovering and is probably in need of some sort of procedure for outlet obstruction depending on the size of the prostate  such as HoLEP versus TURP versus open prostatectomy versus robotic prostatectomy    Assessment:     Problem Noted   Urinary Retention 4/29/2022     Distended bladder with bilateral hydroneprosis, ARF  Prostate volume calculated 112 myself (larger on CT measurements, 89gm on QUOC)  --UDS 6/22: shows some bladder contraction, low flow, HERRERA     Bilateral Hydronephrosis 4/29/2022    QUOC 5/22: mild right hydronephrosis, greatly improved from CT         Plan:     1.  PSA 12 likely from multiple catheterizations, further workup after retention resolved  2.  Discussed robotic simple prostatectomy (RASP) vs Holmium laser enucleation of prostate vs TURP... I believe that HoLEP or RASP are better given large prostate and severe obstruction.  HgA1c improved, feel we can proceed with RASP   3.  Will schedule RASP in 8/2022  4.  Catheter changed today by ROSEMARY Prasad  5.  Follow up preop for urine culture    We discussed robotic assisted simple/suprapubic prostatectomy in depth including preoperative preparation, surgical technique , postoperative recuperation and recovery, and short and long term complications. We discussed the risks of reoperation, incontinence and impotence. We discussed the chance of rectal injury, possible concomitant prostate cancer, continued retention, nerve injury, and occult injury to the bowel during verress needle access.  We specifically addressed retrograde/anejaculation, incontinence, bleeding, bladder leak as possible risks. Expected hospital stay and need for large catheter were reviewed. He wishes to proceed.     Ted Garvin MD

## 2022-07-18 ENCOUNTER — LAB VISIT (OUTPATIENT)
Dept: LAB | Facility: HOSPITAL | Age: 62
End: 2022-07-18
Attending: INTERNAL MEDICINE
Payer: COMMERCIAL

## 2022-07-18 DIAGNOSIS — N18.9 CHRONIC KIDNEY DISEASE-MINERAL AND BONE DISORDER: ICD-10-CM

## 2022-07-18 DIAGNOSIS — R76.8 RHEUMATOID FACTOR POSITIVE: ICD-10-CM

## 2022-07-18 DIAGNOSIS — E83.9 CHRONIC KIDNEY DISEASE-MINERAL AND BONE DISORDER: ICD-10-CM

## 2022-07-18 DIAGNOSIS — D63.1 ANEMIA IN STAGE 3B CHRONIC KIDNEY DISEASE: ICD-10-CM

## 2022-07-18 DIAGNOSIS — N18.32 ANEMIA IN STAGE 3B CHRONIC KIDNEY DISEASE: ICD-10-CM

## 2022-07-18 DIAGNOSIS — M89.9 CHRONIC KIDNEY DISEASE-MINERAL AND BONE DISORDER: ICD-10-CM

## 2022-07-18 LAB
ALBUMIN SERPL BCP-MCNC: 4.3 G/DL (ref 3.5–5.2)
ANION GAP SERPL CALC-SCNC: 5 MMOL/L (ref 8–16)
CALCIUM SERPL-MCNC: 9.5 MG/DL (ref 8.7–10.5)
CCP AB SER IA-ACNC: 1.4 U/ML
CHLORIDE SERPL-SCNC: 103 MMOL/L (ref 95–110)
CO2 SERPL-SCNC: 31 MMOL/L (ref 23–29)
CREAT SERPL-MCNC: 1.66 MG/DL (ref 0.5–1.4)
EST. GFR  (AFRICAN AMERICAN): 50.3 ML/MIN/1.73 M^2
EST. GFR  (NON AFRICAN AMERICAN): 43.5 ML/MIN/1.73 M^2
GLUCOSE SERPL-MCNC: 118 MG/DL (ref 70–110)
PHOSPHATE SERPL-MCNC: 3.3 MG/DL (ref 2.7–4.5)
POTASSIUM SERPL-SCNC: 4.6 MMOL/L (ref 3.5–5.1)
RETICS/RBC NFR AUTO: 2.9 % (ref 0.4–2)
SODIUM SERPL-SCNC: 139 MMOL/L (ref 136–145)
UUN UR-MCNC: 25 MG/DL (ref 2–20)

## 2022-07-18 PROCEDURE — 80069 RENAL FUNCTION PANEL: CPT | Mod: PO | Performed by: INTERNAL MEDICINE

## 2022-07-18 PROCEDURE — 36415 COLL VENOUS BLD VENIPUNCTURE: CPT | Mod: PO | Performed by: INTERNAL MEDICINE

## 2022-07-18 PROCEDURE — 85045 AUTOMATED RETICULOCYTE COUNT: CPT | Mod: PO | Performed by: INTERNAL MEDICINE

## 2022-07-18 PROCEDURE — 86200 CCP ANTIBODY: CPT | Mod: PO | Performed by: INTERNAL MEDICINE

## 2022-07-19 ENCOUNTER — PATIENT MESSAGE (OUTPATIENT)
Dept: UROLOGY | Facility: CLINIC | Age: 62
End: 2022-07-19
Payer: COMMERCIAL

## 2022-07-22 ENCOUNTER — OFFICE VISIT (OUTPATIENT)
Dept: FAMILY MEDICINE | Facility: CLINIC | Age: 62
End: 2022-07-22
Payer: COMMERCIAL

## 2022-07-22 ENCOUNTER — TELEPHONE (OUTPATIENT)
Dept: INTERNAL MEDICINE | Facility: CLINIC | Age: 62
End: 2022-07-22
Payer: COMMERCIAL

## 2022-07-22 VITALS
SYSTOLIC BLOOD PRESSURE: 138 MMHG | BODY MASS INDEX: 24.17 KG/M2 | OXYGEN SATURATION: 98 % | DIASTOLIC BLOOD PRESSURE: 76 MMHG | HEART RATE: 98 BPM | HEIGHT: 66 IN | WEIGHT: 150.38 LBS

## 2022-07-22 DIAGNOSIS — Z76.89 ENCOUNTER TO ESTABLISH CARE WITH NEW DOCTOR: Primary | ICD-10-CM

## 2022-07-22 DIAGNOSIS — E78.5 HYPERLIPIDEMIA, UNSPECIFIED HYPERLIPIDEMIA TYPE: ICD-10-CM

## 2022-07-22 DIAGNOSIS — N18.31 TYPE 2 DIABETES MELLITUS WITH STAGE 3A CHRONIC KIDNEY DISEASE, WITHOUT LONG-TERM CURRENT USE OF INSULIN: ICD-10-CM

## 2022-07-22 DIAGNOSIS — E11.22 TYPE 2 DIABETES MELLITUS WITH STAGE 3A CHRONIC KIDNEY DISEASE, WITHOUT LONG-TERM CURRENT USE OF INSULIN: ICD-10-CM

## 2022-07-22 DIAGNOSIS — D63.1 ANEMIA DUE TO STAGE 3A CHRONIC KIDNEY DISEASE: ICD-10-CM

## 2022-07-22 DIAGNOSIS — N40.1 BENIGN PROSTATIC HYPERPLASIA WITH URINARY OBSTRUCTION: ICD-10-CM

## 2022-07-22 DIAGNOSIS — N18.31 ANEMIA DUE TO STAGE 3A CHRONIC KIDNEY DISEASE: ICD-10-CM

## 2022-07-22 DIAGNOSIS — N13.8 BENIGN PROSTATIC HYPERPLASIA WITH URINARY OBSTRUCTION: ICD-10-CM

## 2022-07-22 DIAGNOSIS — N13.9 OBSTRUCTIVE UROPATHY: ICD-10-CM

## 2022-07-22 DIAGNOSIS — D64.9 NORMOCYTIC ANEMIA: ICD-10-CM

## 2022-07-22 DIAGNOSIS — I10 PRIMARY HYPERTENSION: ICD-10-CM

## 2022-07-22 PROCEDURE — 3078F PR MOST RECENT DIASTOLIC BLOOD PRESSURE < 80 MM HG: ICD-10-PCS | Mod: CPTII,S$GLB,, | Performed by: FAMILY MEDICINE

## 2022-07-22 PROCEDURE — 3066F PR DOCUMENTATION OF TREATMENT FOR NEPHROPATHY: ICD-10-PCS | Mod: CPTII,S$GLB,, | Performed by: FAMILY MEDICINE

## 2022-07-22 PROCEDURE — 3066F NEPHROPATHY DOC TX: CPT | Mod: CPTII,S$GLB,, | Performed by: FAMILY MEDICINE

## 2022-07-22 PROCEDURE — 99999 PR PBB SHADOW E&M-EST. PATIENT-LVL IV: CPT | Mod: PBBFAC,,, | Performed by: FAMILY MEDICINE

## 2022-07-22 PROCEDURE — 1159F PR MEDICATION LIST DOCUMENTED IN MEDICAL RECORD: ICD-10-PCS | Mod: CPTII,S$GLB,, | Performed by: FAMILY MEDICINE

## 2022-07-22 PROCEDURE — 3008F BODY MASS INDEX DOCD: CPT | Mod: CPTII,S$GLB,, | Performed by: FAMILY MEDICINE

## 2022-07-22 PROCEDURE — 3075F PR MOST RECENT SYSTOLIC BLOOD PRESS GE 130-139MM HG: ICD-10-PCS | Mod: CPTII,S$GLB,, | Performed by: FAMILY MEDICINE

## 2022-07-22 PROCEDURE — 3044F PR MOST RECENT HEMOGLOBIN A1C LEVEL <7.0%: ICD-10-PCS | Mod: CPTII,S$GLB,, | Performed by: FAMILY MEDICINE

## 2022-07-22 PROCEDURE — 99214 PR OFFICE/OUTPT VISIT, EST, LEVL IV, 30-39 MIN: ICD-10-PCS | Mod: S$GLB,,, | Performed by: FAMILY MEDICINE

## 2022-07-22 PROCEDURE — 3078F DIAST BP <80 MM HG: CPT | Mod: CPTII,S$GLB,, | Performed by: FAMILY MEDICINE

## 2022-07-22 PROCEDURE — 99214 OFFICE O/P EST MOD 30 MIN: CPT | Mod: S$GLB,,, | Performed by: FAMILY MEDICINE

## 2022-07-22 PROCEDURE — 3044F HG A1C LEVEL LT 7.0%: CPT | Mod: CPTII,S$GLB,, | Performed by: FAMILY MEDICINE

## 2022-07-22 PROCEDURE — 99999 PR PBB SHADOW E&M-EST. PATIENT-LVL IV: ICD-10-PCS | Mod: PBBFAC,,, | Performed by: FAMILY MEDICINE

## 2022-07-22 PROCEDURE — 1159F MED LIST DOCD IN RCRD: CPT | Mod: CPTII,S$GLB,, | Performed by: FAMILY MEDICINE

## 2022-07-22 PROCEDURE — 3008F PR BODY MASS INDEX (BMI) DOCUMENTED: ICD-10-PCS | Mod: CPTII,S$GLB,, | Performed by: FAMILY MEDICINE

## 2022-07-22 PROCEDURE — 3075F SYST BP GE 130 - 139MM HG: CPT | Mod: CPTII,S$GLB,, | Performed by: FAMILY MEDICINE

## 2022-07-22 NOTE — TELEPHONE ENCOUNTER
Spoke with [facility] Mike. [facility] was requesting for patient last clinical notes. [facility] Phone number [phone] Fax [phone]. Notes was faxed twice

## 2022-07-22 NOTE — TELEPHONE ENCOUNTER
----- Message from Akilah Kerr sent at 7/22/2022  1:01 PM CDT -----  Type:  Needs Medical Advice    Who Called: [facility]   Symptoms (please be specific): THEY ARE REQUESTING THE PT LAST OFFICE NOTES      Would the patient rather a call back or a response via [employer]? CALL  Best Call Back Number: [phone] KIRSTEN  Additional Information:

## 2022-07-22 NOTE — PROGRESS NOTES
(Portions of this note were dictated using voice recognition software and may contain dictation related errors in spelling/grammar/syntax not found on text review)    CC:   Chief Complaint   Patient presents with    [facility]     Would like to discuss getting home health?       HPI: 61 y.o. male presented to [facility] as new pt, last seen by me in 6/2022 for hospital discharge follow up visit.  He has medical history significant for type 2 diabetes mellitus, hypertension, hyperlipidemia, migraine headaches, BPH with obstruction, bilateral hydronephrosis, urine retension. He was recently admitted in the hospital with obstructive uropathy, CT abd pelvis revealed enlarged prostate and perinephric stranding.  Patient is followed by Urology, Dr. Garvin, last seen in clinic on 7/15, has Tavera catheter in place, scheduled for RASP 8/2022.    He takes Levemir 15 units nightly and NovoLog 4 units t.i.d. with meals, does not need any refills. He has Dexcom and reports blood sugars in the range on sensor, this morning it was 96 before breakfast, HbA1C is down to 6.8 ( was > 14, 2 months ago).     He is scheduled for IV iron weekly x 4 for anemia of chronic disease.   He reports adherence with bp medication, dose of procardia increased to 60 mg by nephrology, bp stable.   He follows up with nephrology for CKD, not taking sodium bicarb.      He denies having any cough, shortness of breath, chest pain, nausea, vomiting abdominal pain, changes in bowel habits, urine problems including burning and dysuria. He does not smoke, has no toxic habits.        Past Medical History:   Diagnosis Date    Diabetes mellitus     Disorder of kidney and ureter     Hyperlipemia     Hypertension     Iron deficiency anemia     Migraine headache     PTSD (post-traumatic stress disorder)     Stage 3a chronic kidney disease        No past surgical history on file.    Family History   Problem Relation Age of Onset    Diabetes Mother         Social History     Tobacco Use    Smoking status: Never Smoker    Smokeless tobacco: Never Used   Substance Use Topics    Alcohol use: No    Drug use: No       Lab Results   Component Value Date    WBC 7.49 07/11/2022    HGB 8.1 (L) 07/11/2022    HCT 26.0 (L) 07/11/2022    MCV 91 07/11/2022     07/11/2022    CHOL 143 04/29/2022    TRIG 114 04/29/2022    HDL 32 (L) 04/29/2022    ALT 36 06/17/2022    AST 50 (H) 06/17/2022    BILITOT 0.4 06/17/2022    ALKPHOS 91 06/17/2022     07/18/2022    K 4.6 07/18/2022     07/18/2022    CREATININE 1.66 (H) 07/18/2022    ESTGFRAFRICA 50.3 (A) 07/18/2022    EGFRNONAA 43.5 (A) 07/18/2022    CALCIUM 9.5 07/18/2022    ALBUMIN 4.3 07/18/2022    BUN 25 (H) 07/18/2022    CO2 31 (H) 07/18/2022    TSH 0.536 04/29/2022    INR 1.1 07/11/2016    HGBA1C 6.8 (H) 07/11/2022    LDLCALC 88.2 04/29/2022     (H) 07/18/2022             Vital signs reviewed  PE:   APPEARANCE: Well nourished, well developed, in no acute distress.    HEAD: Normocephalic, atraumatic.  EYES: EOMI.  Conjunctivae noninjected.  NECK: Supple with no cervical lymphadenopathy.    CHEST: Good inspiratory effort. Lungs clear to auscultation with no wheezes or crackles.  CARDIOVASCULAR: Normal S1, S2. No rubs, murmurs, or gallops.  ABDOMEN: Bowel sounds normal. Not distended. Soft. No tenderness or masses. No organomegaly.  EXTREMITIES: No edema, cyanosis, or clubbing.    Review of Systems   Constitutional: Negative for chills, fatigue and fever.   HENT: Negative.    Respiratory: Negative.    Cardiovascular: Negative.    Gastrointestinal: Negative.    Genitourinary: Negative.    Musculoskeletal: Negative.    Neurological: Negative.    Psychiatric/Behavioral: Negative.    All other systems reviewed and are negative.      IMPRESSION  1. Encounter to establish care with new doctor    2. Primary hypertension    3. Hyperlipidemia, unspecified hyperlipidemia type    4. Benign prostatic hyperplasia with  urinary obstruction    5. Normocytic anemia    6. Obstructive uropathy    7. Anemia due to stage 3a chronic kidney disease    8. Type 2 diabetes mellitus with stage 3a chronic kidney disease, without long-term current use of insulin          PLAN      1. Encounter to establish care with new doctor      2. Primary hypertension  Stable  Continue procardia 60 mg  Continue Lopressor 50 mg b.i.d.      3. Hyperlipidemia, unspecified hyperlipidemia type  Continue atorvastatin 40 mg daily      4. Benign prostatic hyperplasia with urinary obstruction  5. Obstructive uropathy  Follows up with Urology  (Dr Boykin)  Scheduled for RASP 8/2022  Tavera catheter in place      6. Normocytic anemia  Hb 8.1, MVC 91 (7/11/2022)  Iron infusion weekly x 4      7. Anemia due to stage 3a chronic kidney disease  Iron infusion weekly x 4      8. Type 2 diabetes with stage 3a CKD  HbA1C 6.8 (7/11)  Continue current insulin regimen  He has dex com  Diet and exercise    Follows up with nephrology for CKD      SCREENINGS      Immunizations:   UTD with vaccination      Age/demographic appropriate health maintenance:  wants to schedule colonoscopy after prostate procedure    Health Maintenance Due   Topic Date Due    Diabetes Urine Screening  Never done    Colorectal Cancer Screening  Never done         Tiago Ruiz  7/22/222

## 2022-08-04 ENCOUNTER — DOCUMENT SCAN (OUTPATIENT)
Dept: HOME HEALTH SERVICES | Facility: HOSPITAL | Age: 62
End: 2022-08-04
Payer: COMMERCIAL

## 2022-08-15 ENCOUNTER — EXTERNAL HOME HEALTH (OUTPATIENT)
Dept: HOME HEALTH SERVICES | Facility: HOSPITAL | Age: 62
End: 2022-08-15
Payer: COMMERCIAL

## 2022-08-15 ENCOUNTER — LAB VISIT (OUTPATIENT)
Dept: LAB | Facility: HOSPITAL | Age: 62
End: 2022-08-15
Attending: GENERAL ACUTE CARE HOSPITAL
Payer: COMMERCIAL

## 2022-08-15 DIAGNOSIS — E11.65 UNCONTROLLED TYPE 2 DIABETES MELLITUS WITH HYPERGLYCEMIA: ICD-10-CM

## 2022-08-15 LAB
ESTIMATED AVG GLUCOSE: 94 MG/DL (ref 68–131)
HBA1C MFR BLD: 4.9 % (ref 4–5.6)

## 2022-08-15 PROCEDURE — 83036 HEMOGLOBIN GLYCOSYLATED A1C: CPT | Mod: PO | Performed by: GENERAL ACUTE CARE HOSPITAL

## 2022-08-15 PROCEDURE — 36415 COLL VENOUS BLD VENIPUNCTURE: CPT | Mod: PO | Performed by: GENERAL ACUTE CARE HOSPITAL

## 2022-08-16 ENCOUNTER — OFFICE VISIT (OUTPATIENT)
Dept: UROLOGY | Facility: CLINIC | Age: 62
End: 2022-08-16
Payer: COMMERCIAL

## 2022-08-16 ENCOUNTER — HOSPITAL ENCOUNTER (OUTPATIENT)
Dept: PREADMISSION TESTING | Facility: HOSPITAL | Age: 62
Discharge: HOME | End: 2022-08-16
Attending: UROLOGY
Payer: COMMERCIAL

## 2022-08-16 VITALS
HEIGHT: 66 IN | WEIGHT: 156.63 LBS | RESPIRATION RATE: 16 BRPM | DIASTOLIC BLOOD PRESSURE: 86 MMHG | SYSTOLIC BLOOD PRESSURE: 156 MMHG | BODY MASS INDEX: 25.17 KG/M2 | HEART RATE: 95 BPM

## 2022-08-16 VITALS
DIASTOLIC BLOOD PRESSURE: 87 MMHG | SYSTOLIC BLOOD PRESSURE: 153 MMHG | BODY MASS INDEX: 24.27 KG/M2 | HEIGHT: 66 IN | HEART RATE: 100 BPM

## 2022-08-16 DIAGNOSIS — R33.9 URINARY RETENTION: Primary | ICD-10-CM

## 2022-08-16 PROCEDURE — 3066F PR DOCUMENTATION OF TREATMENT FOR NEPHROPATHY: ICD-10-PCS | Mod: CPTII,S$GLB,, | Performed by: UROLOGY

## 2022-08-16 PROCEDURE — 3060F PR POS MICROALBUMINURIA RESULT DOCUMENTED/REVIEW: ICD-10-PCS | Mod: CPTII,S$GLB,, | Performed by: UROLOGY

## 2022-08-16 PROCEDURE — 3079F PR MOST RECENT DIASTOLIC BLOOD PRESSURE 80-89 MM HG: ICD-10-PCS | Mod: CPTII,S$GLB,, | Performed by: UROLOGY

## 2022-08-16 PROCEDURE — 99999 PR PBB SHADOW E&M-EST. PATIENT-LVL IV: CPT | Mod: PBBFAC,,, | Performed by: UROLOGY

## 2022-08-16 PROCEDURE — 99213 OFFICE O/P EST LOW 20 MIN: CPT | Mod: S$GLB,,, | Performed by: UROLOGY

## 2022-08-16 PROCEDURE — 87086 URINE CULTURE/COLONY COUNT: CPT | Performed by: UROLOGY

## 2022-08-16 PROCEDURE — 3066F NEPHROPATHY DOC TX: CPT | Mod: CPTII,S$GLB,, | Performed by: UROLOGY

## 2022-08-16 PROCEDURE — 99999 PR PBB SHADOW E&M-EST. PATIENT-LVL IV: ICD-10-PCS | Mod: PBBFAC,,, | Performed by: UROLOGY

## 2022-08-16 PROCEDURE — 93010 ELECTROCARDIOGRAM REPORT: CPT | Mod: S$GLB,,, | Performed by: INTERNAL MEDICINE

## 2022-08-16 PROCEDURE — 1160F PR REVIEW ALL MEDS BY PRESCRIBER/CLIN PHARMACIST DOCUMENTED: ICD-10-PCS | Mod: CPTII,S$GLB,, | Performed by: UROLOGY

## 2022-08-16 PROCEDURE — 3008F BODY MASS INDEX DOCD: CPT | Mod: CPTII,S$GLB,, | Performed by: UROLOGY

## 2022-08-16 PROCEDURE — 3060F POS MICROALBUMINURIA REV: CPT | Mod: CPTII,S$GLB,, | Performed by: UROLOGY

## 2022-08-16 PROCEDURE — 3077F PR MOST RECENT SYSTOLIC BLOOD PRESSURE >= 140 MM HG: ICD-10-PCS | Mod: CPTII,S$GLB,, | Performed by: UROLOGY

## 2022-08-16 PROCEDURE — 3077F SYST BP >= 140 MM HG: CPT | Mod: CPTII,S$GLB,, | Performed by: UROLOGY

## 2022-08-16 PROCEDURE — 3008F PR BODY MASS INDEX (BMI) DOCUMENTED: ICD-10-PCS | Mod: CPTII,S$GLB,, | Performed by: UROLOGY

## 2022-08-16 PROCEDURE — 1159F PR MEDICATION LIST DOCUMENTED IN MEDICAL RECORD: ICD-10-PCS | Mod: CPTII,S$GLB,, | Performed by: UROLOGY

## 2022-08-16 PROCEDURE — 3044F PR MOST RECENT HEMOGLOBIN A1C LEVEL <7.0%: ICD-10-PCS | Mod: CPTII,S$GLB,, | Performed by: UROLOGY

## 2022-08-16 PROCEDURE — 3079F DIAST BP 80-89 MM HG: CPT | Mod: CPTII,S$GLB,, | Performed by: UROLOGY

## 2022-08-16 PROCEDURE — 93010 EKG 12-LEAD: ICD-10-PCS | Mod: S$GLB,,, | Performed by: INTERNAL MEDICINE

## 2022-08-16 PROCEDURE — 99213 PR OFFICE/OUTPT VISIT, EST, LEVL III, 20-29 MIN: ICD-10-PCS | Mod: S$GLB,,, | Performed by: UROLOGY

## 2022-08-16 PROCEDURE — 1159F MED LIST DOCD IN RCRD: CPT | Mod: CPTII,S$GLB,, | Performed by: UROLOGY

## 2022-08-16 PROCEDURE — 3044F HG A1C LEVEL LT 7.0%: CPT | Mod: CPTII,S$GLB,, | Performed by: UROLOGY

## 2022-08-16 PROCEDURE — 1160F RVW MEDS BY RX/DR IN RCRD: CPT | Mod: CPTII,S$GLB,, | Performed by: UROLOGY

## 2022-08-16 RX ORDER — NIFEDIPINE 30 MG/1
60 TABLET, EXTENDED RELEASE ORAL DAILY
COMMUNITY
Start: 2022-07-28 | End: 2023-03-08 | Stop reason: SDUPTHER

## 2022-08-16 RX ORDER — LIDOCAINE HYDROCHLORIDE 10 MG/ML
1 INJECTION, SOLUTION EPIDURAL; INFILTRATION; INTRACAUDAL; PERINEURAL ONCE
Status: CANCELLED | OUTPATIENT
Start: 2022-08-16 | End: 2022-08-16

## 2022-08-16 RX ORDER — SODIUM CHLORIDE, SODIUM LACTATE, POTASSIUM CHLORIDE, CALCIUM CHLORIDE 600; 310; 30; 20 MG/100ML; MG/100ML; MG/100ML; MG/100ML
INJECTION, SOLUTION INTRAVENOUS CONTINUOUS
Status: CANCELLED | OUTPATIENT
Start: 2022-08-16

## 2022-08-16 NOTE — PRE-PROCEDURE INSTRUCTIONS
Josseline Bell [phone]    Allergies, medical, surgical, family and psychosocial histories reviewed with patient. Periop plan of care reviewed. Preop instructions given, including medications to take and to hold. Hibiclens soap and instructions on use given. Time allotted for questions to be addressed.  Patient verbalized understanding.

## 2022-08-16 NOTE — PROGRESS NOTES
Tavera exchange.  Under sterile technique 20 Fr coude inserted into urinary bladder without difficulty.  Urine yellow with sediment collected for sample.  Balloon inflated with 10 cc of sterile water.  Catheter connected to leg bag with stat-lock and placed to left leg.  Patient tolerated well.

## 2022-08-16 NOTE — H&P (VIEW-ONLY)
Subjective:       Patient ID: Angel Bell is a 61 y.o. male.    Chief Complaint: No chief complaint on file.     This is a 61 y.o.  male patient with urinary retention, BPH, bilateral hydronephrosis.  In late April 2022. CT scan at that time showed massive bilateral hydronephrosis and enlarged prostate with urinary retention.  Tavera catheter was placed after creatinine was noted to be 18.  His creatinine continued to improve.  Renal ultrasound showed improvement in hydronephrosis.  He presents today for follow-up.  Tavera catheter remains in place.  Denies any significant lower urinary tract symptoms prior to recent hospitalization.  He has an AUA symptom score of 8 with a bother of 3. Main issues with urination are intermittency urgency and weak stream.      Hemoglobin A1c was recently greater than 14. He does have a history of prostate cancer in his father.  He denies ever having a PSA test.      6/24/22: follow up after UDS.    7/15/22: Cr stable, PSA 12 down from 15, HgA1c down  8/16/22: follow up for cath change and Urine culture prior to surgery.   Doing well, questions answered re surgery      I personally reviewed the images: CT 4/22, QUOC 5/22 as above      Lab Results   Component Value Date    CREATININE 1.66 (H) 07/18/2022       ---  Past Medical History:   Diagnosis Date    Diabetes mellitus     Disorder of kidney and ureter     Hyperlipemia     Hypertension     Iron deficiency anemia     Migraine headache     PTSD (post-traumatic stress disorder)     Stage 3a chronic kidney disease        No past surgical history on file.    Family History   Problem Relation Age of Onset    Diabetes Mother        Social History     Tobacco Use    Smoking status: Never Smoker    Smokeless tobacco: Never Used   Substance Use Topics    Alcohol use: No    Drug use: No       Current Outpatient Medications on File Prior to Visit   Medication Sig Dispense Refill    atorvastatin (LIPITOR) 40 MG tablet Take  "1 tablet (40 mg total) by mouth once daily. 30 tablet 3    blood sugar diagnostic (TRUE METRIX GLUCOSE TEST STRIP) Strp TEST 4 TIMES A  each 11    blood-glucose meter (TRUE METRIX GLUCOSE METER) Misc 1 Device by Misc.(Non-Drug; Combo Route) route 4 (four) times daily. 1 each 0    blood-glucose sensor (DEXCOM G6 SENSOR) Philomena 3 each by Misc.(Non-Drug; Combo Route) route continuous. 3 each 11    blood-glucose transmitter (DEXCOM G6 TRANSMITTER) Philomena 1 each by Misc.(Non-Drug; Combo Route) route continuous. 1 each 3    clotrimazole (LOTRIMIN) 1 % cream Apply topically.      furosemide (LASIX) 40 MG tablet Take 1 tablet (40 mg total) by mouth daily as needed (swelling). 30 tablet 11    glucose 4 GM chewable tablet Take 4 tablets (16 g total) by mouth as needed for Low blood sugar (If having symptoms of blurry vision, palpitations, confusion, shakiness.  Please check sugars and if sugar below 70 please take 4 tablets and re-check sugar everry 15 minutes until sugars are above 70 and symptoms resolve.). 50 tablet 12    insulin aspart U-100 (NOVOLOG) 100 unit/mL (3 mL) InPn pen Inject 4 Units into the skin 3 (three) times daily. 15 mL 3    insulin detemir U-100 (LEVEMIR FLEXTOUCH) 100 unit/mL (3 mL) SubQ InPn pen Inject 15 Units into the skin once daily. (Patient taking differently: Inject 12 Units into the skin once daily.) 15 mL 3    lancets 33 gauge Misc USE 4 TIMES A  each 11    metoprolol tartrate (LOPRESSOR) 50 MG tablet Take 50 mg by mouth 2 (two) times daily.      NIFEdipine (PROCARDIA-XL) 60 MG (OSM) 24 hr tablet Take 1 tablet (60 mg total) by mouth once daily. 90 tablet 3    pen needle, diabetic 31 gauge x 5/16" Ndle use 4 times a day 100 each 11    sodium bicarbonate 650 MG tablet Take 1 tablet (650 mg total) by mouth 2 (two) times daily. (Patient not taking: Reported on 7/22/2022)      tamsulosin (FLOMAX) 0.4 mg Cap Take 1 capsule (0.4 mg total) by mouth once daily. 30 capsule 11 "     No current facility-administered medications on file prior to visit.       Review of patient's allergies indicates:  No Known Allergies    Review of Systems   Constitutional: Negative for activity change, chills and fever.   HENT: Negative for congestion.    Respiratory: Negative for cough, chest tightness and shortness of breath.    Cardiovascular: Negative for chest pain and palpitations.   Gastrointestinal: Negative for abdominal distention, abdominal pain, nausea and vomiting.   Genitourinary: Positive for difficulty urinating and frequency. Negative for flank pain, hematuria, penile pain, scrotal swelling and testicular pain.   Musculoskeletal: Negative for gait problem.       Objective:      Physical Exam  Constitutional:       Appearance: Normal appearance.   HENT:      Head: Normocephalic.   Pulmonary:      Effort: Pulmonary effort is normal.      Breath sounds: Normal breath sounds.   Abdominal:      General: Abdomen is flat. Bowel sounds are normal.      Palpations: Abdomen is soft.      Tenderness: There is no abdominal tenderness. There is no right CVA tenderness, left CVA tenderness or guarding.   Genitourinary:     Penis: Normal.    Musculoskeletal:         General: Normal range of motion.      Cervical back: Normal range of motion.   Skin:     General: Skin is warm.   Neurological:      Mental Status: He is alert.           UDS 6/22 (Dr. Prasad):     voided with a maximum flow rate of 1 cc/second.  His detrusor pressure was 48.6 cm of water and maximum detrusor pressure was 59 cm of water.  There was no bladder sphincter dyssynergia and no uninhibited bladder contractions  Patient's Tavera was replaced  Impression urodynamic study associated with a an elevated detrusor pressure indicative of recent urinary retention with hopefully improving detrusor pressure  Recommendations correlate with cystoscopy and truss.  Patient's bladder is hopefully recovering and is probably in need of some sort of  procedure for outlet obstruction depending on the size of the prostate  such as HoLEP versus TURP versus open prostatectomy versus robotic prostatectomy    Assessment:     Problem Noted   Urinary Retention 4/29/2022    Distended bladder with bilateral hydroneprosis, ARF  Prostate volume calculated 112 myself (larger on CT measurements, 89gm on QUOC)  --UDS 6/22: shows some bladder contraction, low flow, HERRERA     Bilateral Hydronephrosis 4/29/2022    QUOC 5/22: mild right hydronephrosis, greatly improved from CT         Plan:     1.  PSA 12 likely from multiple catheterizations, further workup after retention resolved  2.  Discussed robotic simple prostatectomy (RASP) vs Holmium laser enucleation of prostate vs TURP... I believe that HoLEP or RASP are better given large prostate and severe obstruction.  HgA1c improved  3.  Will schedule RASP in 8/29/22  4.  Catheter changed today by ROSEMARY Prasad, 16F urine culture sent will start antibiotic prior to surgery    We discussed robotic assisted simple/suprapubic prostatectomy in depth including preoperative preparation, surgical technique , postoperative recuperation and recovery, and short and long term complications. We discussed the risks of reoperation, incontinence and impotence. We discussed the chance of rectal injury, possible concomitant prostate cancer, continued retention, nerve injury, and occult injury to the bowel during verress needle access.  We specifically addressed retrograde/anejaculation, incontinence, bleeding, bladder leak as possible risks. Expected hospital stay and need for large catheter were reviewed. He wishes to proceed.     08/26/2022  9:55 AM    No further shortage of morcellator blades.  D/w patient, I recalculated volume of prostate around 98 gm (QUOC had 89 gm).  Given this size and anatomy, availability of morcellator/HoLEP now would recommend proceed with HoLEP as less invasive compared to RASP.      I have explained the indication, risks,  benefits, and alternatives of the procedure in detail.  The patient voices understanding and all questions have been answered.  The patient agrees to proceed as planned with HoLEP.  Risks including but not limited to bleeding infection, injury to urethra, bladder (from enucleation or morcellation), ureters, and rectum, possible bladder neck contracture, clot retention, need for additional procedures, erectile dysfunction, urethral stricture, temporary stress incontinence, long term stress incontinence, overactive bladder symptoms, and retrograde ejaculation.     He wishes to proceed on Monday with HoLEP.      Ted Garvin MD

## 2022-08-16 NOTE — DISCHARGE INSTRUCTIONS
Your surgery is scheduled for 8/29/22.    Please report to [facility] on the 1st Floor at 0530 a.m.    THIS TIME IS SUBJECT TO CHANGE.  YOU WILL RECEIVE A PHONE CALL THE DAY BEFORE SURGERY BY 3:30 PM TO CONFIRM YOUR TIME OF ARRIVAL.  IF YOU HAVE NOT RECEIVED A PHONE CALL BY 3:30 PM THE DAY BEFORE YOUR SURGERY PLEASE CALL [phone]     INSTRUCTIONS IMPORTANT!!!  ¨ Do not eat or drink after 12 midnight-including water, candy, gum, & mints. OK to brush teeth.      ____  Proceed to [facility] on *** for additional testing.        ____  Do not wear makeup, including mascara.  ____  No powder, lotions or creams to surgical area.  ____  Please remove all jewelry, including piercings and leave at home.  ____  No money or valuables needed. Please leave at home.  ____  Please bring any documents given by your doctor.  ____  If going home the same day, arrange for a ride home. You will not be able to             drive if Anesthesia was used.  ____  Children under 18 years require a parent / guardian present the entire time             they are in surgery / recovery.  ____  Wear loose fitting clothing. Allow for dressings, bandages.  ____  Stop Aspirin, Ibuprofen, Motrin, Aleve, Goody's/BC powders, Excedrine and Naproxen (NSAIDS) at least 3-5 days before surgery, unless otherwise instructed by your doctor, or the nurse.   You MAY use Tylenol/acetaminophen until day of surgery.  ____  Wash the surgical area with Hibiclens the night before surgery, and again the             morning of surgery.  Be sure to rinse hibiclens off completely (if instructed by   nurse).  ____  If you take diabetic medication, do not take am of surgery unless instructed by Doctor.  ____  Call MD for temperature above 101 degrees or any other signs of infection such as Urinary (bladder) infection, Upper respiratory infection, skin boils, etc.  ____ Stop taking any Fish Oil supplement or any Vitamins that contain Vitamin E at  least 5 days prior to surgery.  ____ Do Not wear your contact lenses the day of your procedure.  You may wear your glasses.      ____Do not shave surgical site for 3 days prior to surgery.  ____ Practice Good hand washing before, during, and after procedure.      I have read or had read and explained to me, and understand the above information.  Additional comments or instructions:  For additional questions call [phone]      ANESTHESIA SIDE EFFECTS  -For the first 24 hours after surgery:  Do not drive, use heavy equipment, make important decisions, or drink alcohol  -It is normal to feel sleepy for several hours.  Rest until you are more awake.  -Have someone stay with you, if needed.  They can watch for problems and help keep you safe.  -Some possible post anesthesia side effects include: nausea and vomiting, sore throat and hoarseness, sleepiness, and dizziness.        Pre-Op Bathing Instructions    Before surgery, you can play an important role in your own health.    Because skin is not sterile, we need to be sure that your skin is as free of germs as possible. By following the instructions below, you can reduce the number of germs on your skin before surgery.    IMPORTANT: You will need to shower with a special soap called Hibiclens*, available at any pharmacy.  If you are allergic to Chlorhexidine (the antiseptic in Hibiclens), use an antibacterial soap such as Dial Soap for your preoperative shower.  You will shower with Hibiclens both the night before your surgery and the morning of your surgery.  Do not use Hibiclens on the head, face or genitals to avoid injury to those areas.    STEP #1: THE NIGHT BEFORE YOUR SURGERY     Do not shave the area of your body where your surgery will be performed.  Shower and wash your hair and body as usual with your normal soap and shampoo.  Rinse your hair and body thoroughly after you shower to remove all soap residue.  With your hand, apply one packet of Hibiclens soap to  the surgical site.   Wash the site gently for five (5) minutes. Do not scrub your skin too hard.   Do not wash with your regular soap after Hibiclens is used.  Rinse your body thoroughly.  Pat yourself dry with a clean, soft towel.  Do not use lotion, cream, or powder.  Wear clean clothes.    STEP #2: THE MORNING OF YOUR SURGERY     Repeat Step #1.    * Not to be used by people allergic to Chlorhexidine.

## 2022-08-17 ENCOUNTER — PATIENT MESSAGE (OUTPATIENT)
Dept: FAMILY MEDICINE | Facility: CLINIC | Age: 62
End: 2022-08-17
Payer: COMMERCIAL

## 2022-08-17 ENCOUNTER — PATIENT MESSAGE (OUTPATIENT)
Dept: SURGERY | Facility: HOSPITAL | Age: 62
End: 2022-08-17
Payer: COMMERCIAL

## 2022-08-17 LAB
BACTERIA UR CULT: NORMAL
BACTERIA UR CULT: NORMAL

## 2022-08-17 RX ORDER — ATORVASTATIN CALCIUM 40 MG/1
40 TABLET, FILM COATED ORAL DAILY
Qty: 30 TABLET | Refills: 3 | Status: SHIPPED | OUTPATIENT
Start: 2022-08-17 | End: 2022-11-21

## 2022-08-18 DIAGNOSIS — R33.9 URINARY RETENTION: Primary | ICD-10-CM

## 2022-08-18 RX ORDER — AMOXICILLIN AND CLAVULANATE POTASSIUM 875; 125 MG/1; MG/1
1 TABLET, FILM COATED ORAL EVERY 12 HOURS
Qty: 14 TABLET | Refills: 0 | Status: SHIPPED | OUTPATIENT
Start: 2022-08-18 | End: 2022-08-25

## 2022-08-26 ENCOUNTER — OFFICE VISIT (OUTPATIENT)
Dept: ENDOCRINOLOGY | Facility: CLINIC | Age: 62
End: 2022-08-26
Payer: COMMERCIAL

## 2022-08-26 VITALS
DIASTOLIC BLOOD PRESSURE: 82 MMHG | WEIGHT: 164.5 LBS | HEIGHT: 66 IN | BODY MASS INDEX: 26.44 KG/M2 | SYSTOLIC BLOOD PRESSURE: 143 MMHG | HEART RATE: 85 BPM

## 2022-08-26 DIAGNOSIS — E78.5 HYPERLIPIDEMIA, UNSPECIFIED HYPERLIPIDEMIA TYPE: ICD-10-CM

## 2022-08-26 DIAGNOSIS — R33.9 URINARY RETENTION: Primary | ICD-10-CM

## 2022-08-26 DIAGNOSIS — I10 HYPERTENSION, ESSENTIAL: ICD-10-CM

## 2022-08-26 PROCEDURE — 3077F PR MOST RECENT SYSTOLIC BLOOD PRESSURE >= 140 MM HG: ICD-10-PCS | Mod: CPTII,S$GLB,, | Performed by: GENERAL ACUTE CARE HOSPITAL

## 2022-08-26 PROCEDURE — 99999 PR PBB SHADOW E&M-EST. PATIENT-LVL V: ICD-10-PCS | Mod: PBBFAC,,, | Performed by: GENERAL ACUTE CARE HOSPITAL

## 2022-08-26 PROCEDURE — 3044F PR MOST RECENT HEMOGLOBIN A1C LEVEL <7.0%: ICD-10-PCS | Mod: CPTII,S$GLB,, | Performed by: GENERAL ACUTE CARE HOSPITAL

## 2022-08-26 PROCEDURE — 99214 PR OFFICE/OUTPT VISIT, EST, LEVL IV, 30-39 MIN: ICD-10-PCS | Mod: S$GLB,,, | Performed by: GENERAL ACUTE CARE HOSPITAL

## 2022-08-26 PROCEDURE — 99214 OFFICE O/P EST MOD 30 MIN: CPT | Mod: S$GLB,,, | Performed by: GENERAL ACUTE CARE HOSPITAL

## 2022-08-26 PROCEDURE — 3060F PR POS MICROALBUMINURIA RESULT DOCUMENTED/REVIEW: ICD-10-PCS | Mod: CPTII,S$GLB,, | Performed by: GENERAL ACUTE CARE HOSPITAL

## 2022-08-26 PROCEDURE — 1160F RVW MEDS BY RX/DR IN RCRD: CPT | Mod: CPTII,S$GLB,, | Performed by: GENERAL ACUTE CARE HOSPITAL

## 2022-08-26 PROCEDURE — 3079F PR MOST RECENT DIASTOLIC BLOOD PRESSURE 80-89 MM HG: ICD-10-PCS | Mod: CPTII,S$GLB,, | Performed by: GENERAL ACUTE CARE HOSPITAL

## 2022-08-26 PROCEDURE — 3066F PR DOCUMENTATION OF TREATMENT FOR NEPHROPATHY: ICD-10-PCS | Mod: CPTII,S$GLB,, | Performed by: GENERAL ACUTE CARE HOSPITAL

## 2022-08-26 PROCEDURE — 3008F BODY MASS INDEX DOCD: CPT | Mod: CPTII,S$GLB,, | Performed by: GENERAL ACUTE CARE HOSPITAL

## 2022-08-26 PROCEDURE — 3066F NEPHROPATHY DOC TX: CPT | Mod: CPTII,S$GLB,, | Performed by: GENERAL ACUTE CARE HOSPITAL

## 2022-08-26 PROCEDURE — 3060F POS MICROALBUMINURIA REV: CPT | Mod: CPTII,S$GLB,, | Performed by: GENERAL ACUTE CARE HOSPITAL

## 2022-08-26 PROCEDURE — 3079F DIAST BP 80-89 MM HG: CPT | Mod: CPTII,S$GLB,, | Performed by: GENERAL ACUTE CARE HOSPITAL

## 2022-08-26 PROCEDURE — 1159F PR MEDICATION LIST DOCUMENTED IN MEDICAL RECORD: ICD-10-PCS | Mod: CPTII,S$GLB,, | Performed by: GENERAL ACUTE CARE HOSPITAL

## 2022-08-26 PROCEDURE — 1159F MED LIST DOCD IN RCRD: CPT | Mod: CPTII,S$GLB,, | Performed by: GENERAL ACUTE CARE HOSPITAL

## 2022-08-26 PROCEDURE — 3044F HG A1C LEVEL LT 7.0%: CPT | Mod: CPTII,S$GLB,, | Performed by: GENERAL ACUTE CARE HOSPITAL

## 2022-08-26 PROCEDURE — 1160F PR REVIEW ALL MEDS BY PRESCRIBER/CLIN PHARMACIST DOCUMENTED: ICD-10-PCS | Mod: CPTII,S$GLB,, | Performed by: GENERAL ACUTE CARE HOSPITAL

## 2022-08-26 PROCEDURE — 3008F PR BODY MASS INDEX (BMI) DOCUMENTED: ICD-10-PCS | Mod: CPTII,S$GLB,, | Performed by: GENERAL ACUTE CARE HOSPITAL

## 2022-08-26 PROCEDURE — 3077F SYST BP >= 140 MM HG: CPT | Mod: CPTII,S$GLB,, | Performed by: GENERAL ACUTE CARE HOSPITAL

## 2022-08-26 PROCEDURE — 99999 PR PBB SHADOW E&M-EST. PATIENT-LVL V: CPT | Mod: PBBFAC,,, | Performed by: GENERAL ACUTE CARE HOSPITAL

## 2022-08-26 RX ORDER — DULAGLUTIDE 0.75 MG/.5ML
0.75 INJECTION, SOLUTION SUBCUTANEOUS
Qty: 4 PEN | Refills: 11 | Status: SHIPPED | OUTPATIENT
Start: 2022-08-26 | End: 2022-09-25

## 2022-08-26 NOTE — PATIENT INSTRUCTIONS
Your diabetes and A1C has significantly improved which is great news.     KEEP UP THE GOOD WORK.     Decrease Levemir to 12 units once a day at bed time     Decrease The novolog to 3 units 3 times a day before your meals.  Only if you eat.  If you dont eat do not use.        We will Start Trulicity 0.75mg injection once a week.      Once you start Trulicity we will decrease the Novolog to 3 units but only once a day with your largest meal of the day.       Benefits of Trulicity and other GLP-1 agonists:  Trulicity is a GLP-1 agonist which is a medication that works by stimulating your pancreas to release insulin after meals.  It also slows your gastric track helping you fill nolasco faster, assists with weight loss and helps reduce the risk of major adverse cardiovascular events in patients with type 2 diabetes and history of cardiovascular disease.  Overall I consider it to be one of the best drugs available for type 2 diabetic patients and there is opportunity to increase the dose if glucose lowering effects are not achieved with starting doses.      Side effects as discussed in clinic include nausea and vomit within the first 24 - 48hrs of the injection and is usually related to eating large portions during your meals.  Usually this side effect goes away within the first 24 - 48 hours and should resolve after with no problems for the following doses.  You might want to do smaller meals during the first hours of the injection to avoid this side effect.            Blood work prior to next visit in 4 months.      If any questions feel free to contact me.     Have a nice day.     Sincerely,       Wilian Rogers MD   Endocrinology   8/26/2022 8:19 AM

## 2022-08-26 NOTE — PROGRESS NOTES
Subjective:      Patient ID: Angel Bell is a 61 y.o. male.    Chief Complaint:  DM2; Initial visit     Patient Active Problem List   Diagnosis    Type 2 diabetes, uncontrolled, with renal manifestation    Metabolic acidosis    Hyperglycemia    Hyperlipidemia    Hypertension    Family history of prostate cancer in father    Urinary retention    Bilateral hydronephrosis    Obstructive uropathy    Normocytic anemia    Benign prostatic hyperplasia with urinary obstruction    Tachycardia    Severe sepsis    Type 2 diabetes mellitus, without long-term current use of insulin    Acute kidney injury superimposed on chronic kidney disease    UTI (urinary tract infection) with chronic indwelling catheter    Anemia due to chronic kidney disease    DON (dyspnea on exertion)       History of Present Illness  62 YO Male w/ PMH as above that presents to the endocrine clinic for follow up of DM2.  Pt today reports feels well and endorses significant improvement of hyperglycemia and symptoms.  Denies hypoglycemias.  Denies hyperglycemias.   Reports has decreased all sweets and soft drinks.               Interval history:     Pt was last seen on 5/2022 after hospital DC and plan was:    Decrease Levemir to 12 units daily      C/w Novolog 4 units TID     Send DEXCOM           With regards to Diabetes Mellitus:   -Type 2    -Duration:  Dx 2016  -FH of DM? Mother DM2    -Microvascular complications: (Nephropathy/CKD)  -Macrovascular complications:  DENIES   -DKA?  Yes   -HHS? Yes   -DM Medications:  Novolog 4 units TID,   Levemir is 15 units in the morning.    -Previously tried medications:  Metformin (CKD)   -Compliance w/ Meds:  YES   -Hyperglycemia symptoms: DENIES Since DC from hospital   -Hypoglycemia symptoms:  DENIES   -Know how to correct hypoglycemias: Yes      -Glucose monitoring:  Checking 4 times (  90 - 110)    AT GOAL     -Diet:     Been doing < 150g of carbs during the day.    Stopped all sweets and  soft drinks     -Activity:  Sedentary        -Pancreatitis? DENIES      -Thyroid CA?  DENIES     -ETOH Abuse?  DENIES     Diabetes Management Status    Statin: Taking  ACE/ARB: Not taking    Screening or Prevention Patient's value Goal Complete/Controlled?   HgA1C Testing and Control   Lab Results   Component Value Date    HGBA1C 4.9 08/15/2022      Annually/Less than 8% Yes   Lipid profile : 04/29/2022 Annually Yes   LDL control Lab Results   Component Value Date    LDLCALC 88.2 04/29/2022    Annually/Less than 100 mg/dl  Yes   Nephropathy screening Lab Results   Component Value Date    LABMICR 122.0 08/15/2022     Lab Results   Component Value Date    PROTEINUA Negative 07/11/2022    Annually Yes   Blood pressure BP Readings from Last 1 Encounters:   08/16/22 (!) 156/86    Less than 140/90 No   Dilated retinal exam : 05/17/2022 Annually Yes   Foot exam   : 05/23/2022 Annually No        ROS:   As above    Objective:     There were no vitals taken for this visit.  BP Readings from Last 3 Encounters:   08/16/22 (!) 156/86   08/16/22 (!) 153/87   07/22/22 138/76     Wt Readings from Last 1 Encounters:   08/16/22 1123 71.1 kg (156 lb 10.2 oz)     There is no height or weight on file to calculate BMI.      Physical Exam  Vitals reviewed.   Constitutional:       General: He is not in acute distress.     Appearance: Normal appearance. He is well-developed. He is not ill-appearing.   HENT:      Nose: Nose normal. No rhinorrhea.      Mouth/Throat:      Mouth: Mucous membranes are moist.   Eyes:      Extraocular Movements: Extraocular movements intact.      Pupils: Pupils are equal, round, and reactive to light.      Comments: No proptosis, No lid lag, No conjunctival erythema    Neck:      Thyroid: No thyromegaly.      Trachea: No tracheal deviation.      Comments: No thyromegaly or Thyroid nodules palpated on exam   Cardiovascular:      Rate and Rhythm: Normal rate and regular rhythm.      Pulses: Normal pulses.    Pulmonary:      Effort: Pulmonary effort is normal.      Breath sounds: Normal breath sounds.   Abdominal:      Palpations: Abdomen is soft. There is no mass.      Tenderness: There is no abdominal tenderness.      Hernia: No hernia is present.      Comments: No scar tissue, No Violaceous striae    Musculoskeletal:         General: No swelling.      Cervical back: Neck supple. No tenderness.      Right lower leg: No edema.      Left lower leg: No edema.   Skin:     General: Skin is warm.      Findings: No rash.   Neurological:      General: No focal deficit present.      Mental Status: He is alert and oriented to person, place, and time.   Psychiatric:         Mood and Affect: Mood normal.         Judgment: Judgment normal.                Lab Review:   Lab Results   Component Value Date    HGBA1C 4.9 08/15/2022     Lab Results   Component Value Date    CHOL 143 04/29/2022    HDL 32 (L) 04/29/2022    LDLCALC 88.2 04/29/2022    TRIG 114 04/29/2022    CHOLHDL 22.4 04/29/2022     Lab Results   Component Value Date     08/16/2022    K 4.3 08/16/2022     08/16/2022    CO2 26 08/16/2022    GLU 82 08/16/2022    BUN 24 (H) 08/16/2022    CREATININE 1.5 (H) 08/16/2022    CALCIUM 10.1 08/16/2022    PROT 6.8 06/17/2022    ALBUMIN 4.3 07/18/2022    BILITOT 0.4 06/17/2022    ALKPHOS 91 06/17/2022    AST 50 (H) 06/17/2022    ALT 36 06/17/2022    ANIONGAP 11 08/16/2022    ESTGFRAFRICA 50.3 (A) 07/18/2022    EGFRNONAA 43.5 (A) 07/18/2022    TSH 0.536 04/29/2022     No results found for: [id number]    Assessment and Plan     Uncontrolled type 2 diabetes mellitus with hyperglycemia  Lab Results   Component Value Date    HGBA1C 4.9 08/15/2022      -FS log  AT GOAL   -Diet, exercise, lifestyle modifications and A1c Goals discussed   -Hypoglycemia symptoms and plan of action discussed   -Low carb diet   -Seen DM Education?  NO, Will give referral today     PLAN:     Due to A1C tightly controlled and glucose log at goal. I  will recommend the following.      Decrease Levemir to 12 units daily     Decrease Novolog to 3 units TID     Start Trulicity 0.75mg SC weekly     Once Trulicity is started decrease Novolog to once a day with largest meal       Will try to stop Pre meal insulin in the future and up titrate Trulicity       Due to patient showing commitment w/ DM management, being on MDI , checking glucose 4 times a day and due to the COVID pandemic I believe he will benefit from DEXCOM continuous glucose monitor for better control of DM, hypoglycemia prevention and remote monitoring of glucose in between visit.      Labs prior to next visit     Hyperlipidemia, unspecified hyperlipidemia type  LDL at goal On Statin   Low Fat diet     Hypertension, essential  BP at goal     Albuminuria     Undergoing treatment BPH w/ bilateral hydronephrosis and fluctuating kidney function.  Once prostate surgery is completed will start low dose ARB.

## 2022-08-29 ENCOUNTER — HOSPITAL ENCOUNTER (OUTPATIENT)
Facility: HOSPITAL | Age: 62
Discharge: HOME | End: 2022-08-30
Attending: UROLOGY | Admitting: UROLOGY
Payer: COMMERCIAL

## 2022-08-29 ENCOUNTER — ANESTHESIA (OUTPATIENT)
Dept: SURGERY | Facility: HOSPITAL | Age: 62
End: 2022-08-29
Payer: COMMERCIAL

## 2022-08-29 ENCOUNTER — ANESTHESIA EVENT (OUTPATIENT)
Dept: SURGERY | Facility: HOSPITAL | Age: 62
End: 2022-08-29
Payer: COMMERCIAL

## 2022-08-29 DIAGNOSIS — N13.8 BENIGN PROSTATIC HYPERPLASIA WITH URINARY OBSTRUCTION: ICD-10-CM

## 2022-08-29 DIAGNOSIS — R33.9 URINARY RETENTION: ICD-10-CM

## 2022-08-29 DIAGNOSIS — N40.1 BENIGN PROSTATIC HYPERPLASIA WITH URINARY OBSTRUCTION: ICD-10-CM

## 2022-08-29 DIAGNOSIS — N13.30 BILATERAL HYDRONEPHROSIS: Primary | ICD-10-CM

## 2022-08-29 PROBLEM — Z79.4 TYPE 2 DIABETES MELLITUS, WITH LONG-TERM CURRENT USE OF INSULIN: Status: ACTIVE | Noted: 2022-06-16

## 2022-08-29 LAB
ABO + RH BLD: NORMAL
ALBUMIN SERPL BCP-MCNC: 3.5 G/DL (ref 3.5–5.2)
ALP SERPL-CCNC: 64 U/L (ref 55–135)
ALT SERPL W/O P-5'-P-CCNC: 13 U/L (ref 10–44)
ANION GAP SERPL CALC-SCNC: 11 MMOL/L (ref 8–16)
AST SERPL-CCNC: 15 U/L (ref 10–40)
BASOPHILS # BLD AUTO: 0.03 K/UL (ref 0–0.2)
BASOPHILS NFR BLD: 0.3 % (ref 0–1.9)
BILIRUB SERPL-MCNC: 0.3 MG/DL (ref 0.1–1)
BLD GP AB SCN CELLS X3 SERPL QL: NORMAL
BUN SERPL-MCNC: 24 MG/DL (ref 8–23)
CALCIUM SERPL-MCNC: 8.9 MG/DL (ref 8.7–10.5)
CHLORIDE SERPL-SCNC: 108 MMOL/L (ref 95–110)
CO2 SERPL-SCNC: 23 MMOL/L (ref 23–29)
CREAT SERPL-MCNC: 1.7 MG/DL (ref 0.5–1.4)
DIFFERENTIAL METHOD: ABNORMAL
EOSINOPHIL # BLD AUTO: 0 K/UL (ref 0–0.5)
EOSINOPHIL NFR BLD: 0.2 % (ref 0–8)
ERYTHROCYTE [DISTWIDTH] IN BLOOD BY AUTOMATED COUNT: 15.8 % (ref 11.5–14.5)
EST. GFR  (NO RACE VARIABLE): 45 ML/MIN/1.73 M^2
GLUCOSE SERPL-MCNC: 106 MG/DL (ref 70–110)
HCT VFR BLD AUTO: 34.4 % (ref 40–54)
HGB BLD-MCNC: 11.6 G/DL (ref 14–18)
IMM GRANULOCYTES # BLD AUTO: 0.08 K/UL (ref 0–0.04)
IMM GRANULOCYTES NFR BLD AUTO: 0.7 % (ref 0–0.5)
LYMPHOCYTES # BLD AUTO: 1.9 K/UL (ref 1–4.8)
LYMPHOCYTES NFR BLD: 17.5 % (ref 18–48)
MAGNESIUM SERPL-MCNC: 2.1 MG/DL (ref 1.6–2.6)
MCH RBC QN AUTO: 30.1 PG (ref 27–31)
MCHC RBC AUTO-ENTMCNC: 33.7 G/DL (ref 32–36)
MCV RBC AUTO: 89 FL (ref 82–98)
MONOCYTES # BLD AUTO: 1 K/UL (ref 0.3–1)
MONOCYTES NFR BLD: 9.1 % (ref 4–15)
NEUTROPHILS # BLD AUTO: 7.9 K/UL (ref 1.8–7.7)
NEUTROPHILS NFR BLD: 72.2 % (ref 38–73)
NRBC BLD-RTO: 0 /100 WBC
PLATELET # BLD AUTO: 205 K/UL (ref 150–450)
PMV BLD AUTO: 9 FL (ref 9.2–12.9)
POCT GLUCOSE: 115 MG/DL (ref 70–110)
POCT GLUCOSE: 117 MG/DL (ref 70–110)
POCT GLUCOSE: 131 MG/DL (ref 70–110)
POCT GLUCOSE: 89 MG/DL (ref 70–110)
POTASSIUM SERPL-SCNC: 4.5 MMOL/L (ref 3.5–5.1)
PROT SERPL-MCNC: 6.2 G/DL (ref 6–8.4)
RBC # BLD AUTO: 3.85 M/UL (ref 4.6–6.2)
SODIUM SERPL-SCNC: 142 MMOL/L (ref 136–145)
WBC # BLD AUTO: 10.88 K/UL (ref 3.9–12.7)

## 2022-08-29 PROCEDURE — 37000009 HC ANESTHESIA EA ADD 15 MINS: Performed by: UROLOGY

## 2022-08-29 PROCEDURE — 63600175 PHARM REV CODE 636 W HCPCS: Performed by: UROLOGY

## 2022-08-29 PROCEDURE — 25000003 PHARM REV CODE 250: Performed by: UROLOGY

## 2022-08-29 PROCEDURE — 85025 COMPLETE CBC W/AUTO DIFF WBC: CPT

## 2022-08-29 PROCEDURE — 88341 IMHCHEM/IMCYTCHM EA ADD ANTB: CPT | Performed by: PATHOLOGY

## 2022-08-29 PROCEDURE — 25000003 PHARM REV CODE 250

## 2022-08-29 PROCEDURE — 36000707: Performed by: UROLOGY

## 2022-08-29 PROCEDURE — 52649 PR LASER ENUCLEATION PROSTATE W MORCELLATION: ICD-10-PCS | Mod: ,,, | Performed by: UROLOGY

## 2022-08-29 PROCEDURE — 71000039 HC RECOVERY, EACH ADD'L HOUR: Performed by: UROLOGY

## 2022-08-29 PROCEDURE — 63600175 PHARM REV CODE 636 W HCPCS: Performed by: NURSE ANESTHETIST, CERTIFIED REGISTERED

## 2022-08-29 PROCEDURE — 94761 N-INVAS EAR/PLS OXIMETRY MLT: CPT

## 2022-08-29 PROCEDURE — 88305 TISSUE EXAM BY PATHOLOGIST: CPT | Performed by: PATHOLOGY

## 2022-08-29 PROCEDURE — 80053 COMPREHEN METABOLIC PANEL: CPT

## 2022-08-29 PROCEDURE — 36415 COLL VENOUS BLD VENIPUNCTURE: CPT

## 2022-08-29 PROCEDURE — 99900035 HC TECH TIME PER 15 MIN (STAT)

## 2022-08-29 PROCEDURE — 71000033 HC RECOVERY, INTIAL HOUR: Performed by: UROLOGY

## 2022-08-29 PROCEDURE — 37000008 HC ANESTHESIA 1ST 15 MINUTES: Performed by: UROLOGY

## 2022-08-29 PROCEDURE — 25000003 PHARM REV CODE 250: Performed by: NURSE ANESTHETIST, CERTIFIED REGISTERED

## 2022-08-29 PROCEDURE — 52649 PROSTATE LASER ENUCLEATION: CPT | Mod: ,,, | Performed by: UROLOGY

## 2022-08-29 PROCEDURE — C1758 CATHETER, URETERAL: HCPCS | Performed by: UROLOGY

## 2022-08-29 PROCEDURE — 94799 UNLISTED PULMONARY SVC/PX: CPT

## 2022-08-29 PROCEDURE — 88342 IMHCHEM/IMCYTCHM 1ST ANTB: CPT | Performed by: PATHOLOGY

## 2022-08-29 PROCEDURE — 83735 ASSAY OF MAGNESIUM: CPT

## 2022-08-29 PROCEDURE — 36415 COLL VENOUS BLD VENIPUNCTURE: CPT | Performed by: UROLOGY

## 2022-08-29 PROCEDURE — 36000706: Performed by: UROLOGY

## 2022-08-29 PROCEDURE — 86850 RBC ANTIBODY SCREEN: CPT | Performed by: UROLOGY

## 2022-08-29 RX ORDER — INSULIN ASPART 100 [IU]/ML
0-5 INJECTION, SOLUTION INTRAVENOUS; SUBCUTANEOUS
Status: DISCONTINUED | OUTPATIENT
Start: 2022-08-29 | End: 2022-08-30 | Stop reason: HOSPADM

## 2022-08-29 RX ORDER — IBUPROFEN 200 MG
24 TABLET ORAL
Status: DISCONTINUED | OUTPATIENT
Start: 2022-08-29 | End: 2022-08-30 | Stop reason: HOSPADM

## 2022-08-29 RX ORDER — NIFEDIPINE 30 MG/1
60 TABLET, EXTENDED RELEASE ORAL DAILY
Status: DISCONTINUED | OUTPATIENT
Start: 2022-08-29 | End: 2022-08-29

## 2022-08-29 RX ORDER — PROPOFOL 10 MG/ML
VIAL (ML) INTRAVENOUS
Status: DISCONTINUED | OUTPATIENT
Start: 2022-08-29 | End: 2022-08-29

## 2022-08-29 RX ORDER — METOPROLOL TARTRATE 50 MG/1
50 TABLET ORAL 2 TIMES DAILY
Status: DISCONTINUED | OUTPATIENT
Start: 2022-08-29 | End: 2022-08-30 | Stop reason: HOSPADM

## 2022-08-29 RX ORDER — HYDROMORPHONE HYDROCHLORIDE 2 MG/ML
0.2 INJECTION, SOLUTION INTRAMUSCULAR; INTRAVENOUS; SUBCUTANEOUS EVERY 5 MIN PRN
Status: DISCONTINUED | OUTPATIENT
Start: 2022-08-29 | End: 2022-08-29 | Stop reason: HOSPADM

## 2022-08-29 RX ORDER — ATORVASTATIN CALCIUM 40 MG/1
40 TABLET, FILM COATED ORAL DAILY
Status: DISCONTINUED | OUTPATIENT
Start: 2022-08-30 | End: 2022-08-30 | Stop reason: HOSPADM

## 2022-08-29 RX ORDER — ACETAMINOPHEN 325 MG/1
650 TABLET ORAL EVERY 4 HOURS PRN
Status: DISCONTINUED | OUTPATIENT
Start: 2022-08-29 | End: 2022-08-30 | Stop reason: HOSPADM

## 2022-08-29 RX ORDER — NIFEDIPINE 30 MG/1
60 TABLET, EXTENDED RELEASE ORAL NIGHTLY
Status: DISCONTINUED | OUTPATIENT
Start: 2022-08-29 | End: 2022-08-30 | Stop reason: HOSPADM

## 2022-08-29 RX ORDER — CEFAZOLIN SODIUM 2 G/50ML
2 SOLUTION INTRAVENOUS
Status: COMPLETED | OUTPATIENT
Start: 2022-08-29 | End: 2022-08-29

## 2022-08-29 RX ORDER — HYDROCODONE BITARTRATE AND ACETAMINOPHEN 5; 325 MG/1; MG/1
1 TABLET ORAL EVERY 4 HOURS PRN
Status: DISCONTINUED | OUTPATIENT
Start: 2022-08-29 | End: 2022-08-30 | Stop reason: HOSPADM

## 2022-08-29 RX ORDER — NIFEDIPINE 30 MG/1
60 TABLET, EXTENDED RELEASE ORAL DAILY
Status: DISCONTINUED | OUTPATIENT
Start: 2022-08-29 | End: 2022-08-29 | Stop reason: SDUPTHER

## 2022-08-29 RX ORDER — LIDOCAINE HCL/PF 100 MG/5ML
SYRINGE (ML) INTRAVENOUS
Status: DISCONTINUED | OUTPATIENT
Start: 2022-08-29 | End: 2022-08-29

## 2022-08-29 RX ORDER — HYDROMORPHONE HYDROCHLORIDE 2 MG/ML
0.5 INJECTION, SOLUTION INTRAMUSCULAR; INTRAVENOUS; SUBCUTANEOUS EVERY 5 MIN PRN
Status: DISCONTINUED | OUTPATIENT
Start: 2022-08-29 | End: 2022-08-29 | Stop reason: HOSPADM

## 2022-08-29 RX ORDER — VECURONIUM BROMIDE FOR INJECTION 1 MG/ML
INJECTION, POWDER, LYOPHILIZED, FOR SOLUTION INTRAVENOUS
Status: DISCONTINUED | OUTPATIENT
Start: 2022-08-29 | End: 2022-08-29

## 2022-08-29 RX ORDER — GLUCAGON 1 MG
1 KIT INJECTION
Status: DISCONTINUED | OUTPATIENT
Start: 2022-08-29 | End: 2022-08-30 | Stop reason: HOSPADM

## 2022-08-29 RX ORDER — SODIUM CHLORIDE, SODIUM LACTATE, POTASSIUM CHLORIDE, CALCIUM CHLORIDE 600; 310; 30; 20 MG/100ML; MG/100ML; MG/100ML; MG/100ML
INJECTION, SOLUTION INTRAVENOUS CONTINUOUS PRN
Status: DISCONTINUED | OUTPATIENT
Start: 2022-08-29 | End: 2022-08-29

## 2022-08-29 RX ORDER — NEOSTIGMINE METHYLSULFATE 1 MG/ML
INJECTION, SOLUTION INTRAVENOUS
Status: DISCONTINUED | OUTPATIENT
Start: 2022-08-29 | End: 2022-08-29

## 2022-08-29 RX ORDER — ONDANSETRON 2 MG/ML
4 INJECTION INTRAMUSCULAR; INTRAVENOUS DAILY PRN
Status: DISCONTINUED | OUTPATIENT
Start: 2022-08-29 | End: 2022-08-29 | Stop reason: HOSPADM

## 2022-08-29 RX ORDER — METOPROLOL TARTRATE 1 MG/ML
5 INJECTION, SOLUTION INTRAVENOUS EVERY 6 HOURS PRN
Status: DISCONTINUED | OUTPATIENT
Start: 2022-08-29 | End: 2022-08-30 | Stop reason: HOSPADM

## 2022-08-29 RX ORDER — FENTANYL CITRATE 50 UG/ML
INJECTION, SOLUTION INTRAMUSCULAR; INTRAVENOUS
Status: DISCONTINUED | OUTPATIENT
Start: 2022-08-29 | End: 2022-08-29

## 2022-08-29 RX ORDER — SODIUM BICARBONATE 650 MG/1
650 TABLET ORAL 2 TIMES DAILY
Status: DISCONTINUED | OUTPATIENT
Start: 2022-08-29 | End: 2022-08-30 | Stop reason: HOSPADM

## 2022-08-29 RX ORDER — SODIUM CHLORIDE, SODIUM LACTATE, POTASSIUM CHLORIDE, CALCIUM CHLORIDE 600; 310; 30; 20 MG/100ML; MG/100ML; MG/100ML; MG/100ML
INJECTION, SOLUTION INTRAVENOUS CONTINUOUS
Status: DISCONTINUED | OUTPATIENT
Start: 2022-08-29 | End: 2022-08-30 | Stop reason: HOSPADM

## 2022-08-29 RX ORDER — ACETAMINOPHEN 10 MG/ML
INJECTION, SOLUTION INTRAVENOUS
Status: DISCONTINUED | OUTPATIENT
Start: 2022-08-29 | End: 2022-08-29

## 2022-08-29 RX ORDER — PROCHLORPERAZINE EDISYLATE 5 MG/ML
5 INJECTION INTRAMUSCULAR; INTRAVENOUS EVERY 30 MIN PRN
Status: DISCONTINUED | OUTPATIENT
Start: 2022-08-29 | End: 2022-08-29 | Stop reason: HOSPADM

## 2022-08-29 RX ORDER — ONDANSETRON 2 MG/ML
4 INJECTION INTRAMUSCULAR; INTRAVENOUS EVERY 12 HOURS PRN
Status: DISCONTINUED | OUTPATIENT
Start: 2022-08-29 | End: 2022-08-30 | Stop reason: HOSPADM

## 2022-08-29 RX ORDER — IBUPROFEN 200 MG
16 TABLET ORAL
Status: DISCONTINUED | OUTPATIENT
Start: 2022-08-29 | End: 2022-08-30 | Stop reason: HOSPADM

## 2022-08-29 RX ORDER — TAMSULOSIN HYDROCHLORIDE 0.4 MG/1
0.4 CAPSULE ORAL DAILY
Status: DISCONTINUED | OUTPATIENT
Start: 2022-08-29 | End: 2022-08-30 | Stop reason: HOSPADM

## 2022-08-29 RX ORDER — HYDRALAZINE HYDROCHLORIDE 20 MG/ML
10 INJECTION INTRAMUSCULAR; INTRAVENOUS EVERY 6 HOURS PRN
Status: DISCONTINUED | OUTPATIENT
Start: 2022-08-29 | End: 2022-08-30 | Stop reason: HOSPADM

## 2022-08-29 RX ORDER — PHENYLEPHRINE HYDROCHLORIDE 10 MG/ML
INJECTION INTRAVENOUS
Status: DISCONTINUED | OUTPATIENT
Start: 2022-08-29 | End: 2022-08-29

## 2022-08-29 RX ORDER — SUCCINYLCHOLINE CHLORIDE 20 MG/ML
INJECTION INTRAMUSCULAR; INTRAVENOUS
Status: DISCONTINUED | OUTPATIENT
Start: 2022-08-29 | End: 2022-08-29

## 2022-08-29 RX ORDER — ONDANSETRON 2 MG/ML
INJECTION INTRAMUSCULAR; INTRAVENOUS
Status: DISCONTINUED | OUTPATIENT
Start: 2022-08-29 | End: 2022-08-29

## 2022-08-29 RX ADMIN — LIDOCAINE HYDROCHLORIDE 75 MG: 20 INJECTION, SOLUTION INTRAVENOUS at 07:08

## 2022-08-29 RX ADMIN — NIFEDIPINE 60 MG: 30 TABLET, FILM COATED, EXTENDED RELEASE ORAL at 09:08

## 2022-08-29 RX ADMIN — PROPOFOL 30 MG: 10 INJECTION, EMULSION INTRAVENOUS at 08:08

## 2022-08-29 RX ADMIN — FENTANYL CITRATE 25 MCG: 50 INJECTION, SOLUTION INTRAMUSCULAR; INTRAVENOUS at 09:08

## 2022-08-29 RX ADMIN — CEFAZOLIN SODIUM 2 G: 2 SOLUTION INTRAVENOUS at 02:08

## 2022-08-29 RX ADMIN — SODIUM BICARBONATE 650 MG TABLET 650 MG: at 09:08

## 2022-08-29 RX ADMIN — PHENYLEPHRINE HYDROCHLORIDE 100 MCG: 10 INJECTION INTRAVENOUS at 09:08

## 2022-08-29 RX ADMIN — FENTANYL CITRATE 100 MCG: 50 INJECTION, SOLUTION INTRAMUSCULAR; INTRAVENOUS at 07:08

## 2022-08-29 RX ADMIN — CEFTRIAXONE 1 G: 1 INJECTION, SOLUTION INTRAVENOUS at 07:08

## 2022-08-29 RX ADMIN — PROPOFOL 50 MG: 10 INJECTION, EMULSION INTRAVENOUS at 07:08

## 2022-08-29 RX ADMIN — PHENYLEPHRINE HYDROCHLORIDE 100 MCG: 10 INJECTION INTRAVENOUS at 08:08

## 2022-08-29 RX ADMIN — SODIUM BICARBONATE 650 MG TABLET 650 MG: at 01:08

## 2022-08-29 RX ADMIN — VECURONIUM BROMIDE 3 MG: 10 INJECTION, POWDER, LYOPHILIZED, FOR SOLUTION INTRAVENOUS at 07:08

## 2022-08-29 RX ADMIN — PROPOFOL 120 MG: 10 INJECTION, EMULSION INTRAVENOUS at 07:08

## 2022-08-29 RX ADMIN — ONDANSETRON 8 MG: 2 INJECTION, SOLUTION INTRAMUSCULAR; INTRAVENOUS at 10:08

## 2022-08-29 RX ADMIN — PHENYLEPHRINE HYDROCHLORIDE 100 MCG: 10 INJECTION INTRAVENOUS at 07:08

## 2022-08-29 RX ADMIN — FENTANYL CITRATE 25 MCG: 50 INJECTION, SOLUTION INTRAMUSCULAR; INTRAVENOUS at 10:08

## 2022-08-29 RX ADMIN — VECURONIUM BROMIDE 1 MG: 10 INJECTION, POWDER, LYOPHILIZED, FOR SOLUTION INTRAVENOUS at 09:08

## 2022-08-29 RX ADMIN — INSULIN DETEMIR 10 UNITS: 100 INJECTION, SOLUTION SUBCUTANEOUS at 09:08

## 2022-08-29 RX ADMIN — FENTANYL CITRATE 25 MCG: 50 INJECTION, SOLUTION INTRAMUSCULAR; INTRAVENOUS at 08:08

## 2022-08-29 RX ADMIN — NEOSTIGMINE METHYLSULFATE 5 MG: 1 INJECTION INTRAVENOUS at 10:08

## 2022-08-29 RX ADMIN — METOPROLOL TARTRATE 50 MG: 50 TABLET, FILM COATED ORAL at 09:08

## 2022-08-29 RX ADMIN — SODIUM CHLORIDE, SODIUM LACTATE, POTASSIUM CHLORIDE, AND CALCIUM CHLORIDE: .6; .31; .03; .02 INJECTION, SOLUTION INTRAVENOUS at 06:08

## 2022-08-29 RX ADMIN — ACETAMINOPHEN 1000 MG: 10 INJECTION, SOLUTION INTRAVENOUS at 07:08

## 2022-08-29 RX ADMIN — SUCCINYLCHOLINE CHLORIDE 120 MG: 20 INJECTION, SOLUTION INTRAMUSCULAR; INTRAVENOUS at 07:08

## 2022-08-29 RX ADMIN — SODIUM CHLORIDE, SODIUM LACTATE, POTASSIUM CHLORIDE, AND CALCIUM CHLORIDE: .6; .31; .03; .02 INJECTION, SOLUTION INTRAVENOUS at 02:08

## 2022-08-29 RX ADMIN — TAMSULOSIN HYDROCHLORIDE 0.4 MG: 0.4 CAPSULE ORAL at 12:08

## 2022-08-29 RX ADMIN — CEFAZOLIN SODIUM 2 G: 2 SOLUTION INTRAVENOUS at 10:08

## 2022-08-29 RX ADMIN — GLYCOPYRROLATE 0.6 MG: 0.2 INJECTION, SOLUTION INTRAMUSCULAR; INTRAVITREAL at 10:08

## 2022-08-29 NOTE — ASSESSMENT & PLAN NOTE
-H/H is 11.6/34.4, which is stable and consistent with previous laboratory measurements.   -Patient currently exhibits no signs or symptoms of acute bleeding  -patient is s/p cystoscopy and HoLEP, will monitor patient closely   -No indication for blood transfusion  -Continue to monitor serial CBC  -Transfuse for Hgb <7 or if symptomatic

## 2022-08-29 NOTE — HPI
Mr. Angel Bell is a 62 y/o male with PMHx of HTN, HLD, DMII, CKD, urinary retention, and bilateral hydronephrosis admitted to urology and is s/p cystoscopy/ HoLEP 8/29/22. [facility] consulted for medical management/DM management.

## 2022-08-29 NOTE — ANESTHESIA PREPROCEDURE EVALUATION
08/29/2022  Angel Bell is a 61 y.o., male scheduled for enucleation of prostate    Past Medical History:   Diagnosis Date    Diabetes mellitus     Disorder of kidney and ureter     Hyperlipemia     Hypertension     Iron deficiency anemia     Migraine headache     PTSD (post-traumatic stress disorder)     Stage 3a chronic kidney disease      No past surgical history on file.    Pre-op Assessment    I have reviewed the Patient Summary Reports.     I have reviewed the Nursing Notes.    I have reviewed the Medications.     Review of Systems  Anesthesia Hx:  No previous Anesthesia Denies Hx of Anesthetic complications  Denies Family Hx of Anesthesia complications.   Denies Personal Hx of Anesthesia complications.   Social:  No Alcohol Use, Non-Smoker    Cardiovascular:   Exercise tolerance: good Denies Pacemaker. Hypertension, well controlled   Denies Angina.  Denies DON.    Pulmonary:  Pulmonary Normal  Denies Shortness of breath.    Renal/:   Chronic Renal Disease    Hepatic/GI:  Hepatic/GI Normal    Musculoskeletal:  Musculoskeletal Normal    Neurological:   Headaches    Endocrine:   Diabetes, type 2    Psych:   Psychiatric History          Physical Exam  General: Well nourished and Cooperative    Airway:  Mallampati: II / I  Mouth Opening: Normal  TM Distance: Normal  Tongue: Normal  Neck ROM: Normal ROM    Dental:  Intact    Chest/Lungs:  Clear to auscultation, Normal Respiratory Rate    Heart:  Rate: Normal  Rhythm: Regular Rhythm  Sounds: Normal        Anesthesia Plan  Type of Anesthesia, risks & benefits discussed:    Anesthesia Type: Gen ETT  Intra-op Monitoring Plan: Standard ASA Monitors  Post Op Pain Control Plan: multimodal analgesia  Induction:  IV  Informed Consent: Informed consent signed with the Patient and all parties understand the risks and agree with anesthesia plan.  All  questions answered.   ASA Score: 3  Anesthesia Plan Notes:       Ready For Surgery From Anesthesia Perspective.     .

## 2022-08-29 NOTE — ANESTHESIA POSTPROCEDURE EVALUATION
Anesthesia Post Evaluation    Patient: Angel Bell    Procedure(s) Performed: Procedure(s) (LRB):  ENUCLEATION, PROSTATE, USING LASER  Time: 180 minutes Equipment: high powered 100W laser with virtual basket, morcellator, scopes for HoLEP from [email or web address] (N/A)    Final Anesthesia Type: general      Patient location during evaluation: PACU  Patient participation: Yes- Able to Participate  Level of consciousness: awake and alert, oriented and awake  Post-procedure vital signs: reviewed and stable  Pain management: adequate  Airway patency: patent    PONV status at discharge: No PONV  Anesthetic complications: no      Cardiovascular status: blood pressure returned to baseline  Respiratory status: unassisted and room air  Hydration status: euvolemic  Follow-up not needed.          Vitals Value Taken Time   /80 08/29/22 1541   Temp 37.4 °C (99.3 °F) 08/29/22 1541   Pulse 100 08/29/22 1541   Resp 18 08/29/22 1541   SpO2 98 % 08/29/22 1635         Event Time   Out of Recovery 15:25:00         Pain/Lobo Score: Lobo Score: 10 (8/29/2022  3:25 PM)

## 2022-08-29 NOTE — ASSESSMENT & PLAN NOTE
Chronic, Latest blood pressure and vitals reviewed-   Temp:  [97.7 °F (36.5 °C)-99.3 °F (37.4 °C)]   Pulse:  []   Resp:  [16-20]   BP: (100-185)/(55-86)   SpO2:  [97 %-100 %] .   Home meds for hypertension were reviewed and noted below.   Hypertension Medications             metoprolol tartrate (LOPRESSOR) 50 MG tablet Take 50 mg by mouth 2 (two) times daily.    NIFEdipine (PROCARDIA-XL) 30 MG (OSM) 24 hr tablet Take 60 mg by mouth once daily.            -Resume home BP medications  -Monitor and trend vital signs q4hr  -Will utilize p.r.n. blood pressure medication only if patient's blood pressure greater than  180/110 and he develops symptoms such as worsening chest pain or shortness of breath.

## 2022-08-29 NOTE — INTERVAL H&P NOTE
The patient has been examined and the H&P has been reviewed:    I concur with the findings and no changes have occurred since H&P was written.    Anesthesia/Surgery risks, benefits and alternative options discussed and understood by patient/family.      Problem Noted   Urinary Retention 4/29/2022    Distended bladder with bilateral hydroneprosis, ARF  Prostate volume calculated 112 myself (larger on CT measurements, 89gm on QUOC)  --UDS 6/22: shows some bladder contraction, low flow, HERRERA     Bilateral Hydronephrosis 4/29/2022    QUOC 5/22: mild right hydronephrosis, greatly improved from CT       OR for cystoscopy, HoLEP  The risks, benefits, alteratives of the procedure were discussed with the patient.  The patient was given time for questions, all questions were answered.  Written, informed consent was obtained.      Ted Garvin MD

## 2022-08-29 NOTE — TRANSFER OF CARE
"Anesthesia Transfer of Care Note    Patient: Angel Bell    Procedure(s) Performed: Procedure(s) (LRB):  ENUCLEATION, PROSTATE, USING LASER  Time: 180 minutes Equipment: high powered 100W laser with virtual basket, morcellator, scopes for HoLEP from [employer] (N/A)    Patient location: PACU    Anesthesia Type: general    Transport from OR: Transported from OR on 6-10 L/min O2 by face mask with adequate spontaneous ventilation    Post pain: adequate analgesia    Post assessment: no apparent anesthetic complications and tolerated procedure well    Post vital signs: stable    Level of consciousness: awake    Nausea/Vomiting: no nausea/vomiting    Complications: none    Transfer of care protocol was followed      Last vitals:   Visit Vitals  BP (!) 185/86 (BP Location: Right arm, Patient Position: Lying)   Pulse 89   Temp 36.8 °C (98.2 °F) (Skin)   Resp 18   Ht 5' 7" (1.702 m)   Wt 76.7 kg (169 lb)   SpO2 100%   BMI 26.47 kg/m²     "

## 2022-08-29 NOTE — OP NOTE
DATE OF PROCEDURE:  08/29/2022  LOCATION: [facility]    PREOPERATIVE DIAGNOSIS:  BPH with obstruction. Lower urinary tract symptoms     POSTOPERATIVE DIAGNOSIS:  as above     PROCEDURE PERFORMED: Holmium laser enucleation of the prostate with morcellation     PRIMARY SURGEON: CARLIN@     ANESTHESIA:  General.     ESTIMATED BLOOD LOSS: 25     DRAINS:  A 22-Bur[place] Alvarenga catheter.     SPECIMENS REMOVED:  morcellated prostate      COMPLICATIONS:  None.     INDICATIONS:  62 yo man with urinary retention, bilateral hydronephrosis, ARF improved after alvarenga.  UDS showed HERRERA. PSA elevated but wished to proceed with outlet procedure prior to further workup.  Volume of prostate approximately 100 gm. After review of options, benefits, risks, alternatives, patient elected to proceed with aforementioned procedure.      PROCEDURE DETAILS:   The patient was taken to the Operating Room where he was positively identified by damian.  He was placed supine on the operating room table.  Following induction of adequate general anesthesia, he was placed in the dorsal lithotomy position and his external genitalia were prepped and draped in usual sterile fashion. A preoperative timeout was performed as well as confirmation of preoperative   Antibiotics, rocephin. A 24-Burundian resectoscope sheath was then passed per urethra into the bladder using the visual obturator. The obturator was withdrawn and the  resectoscope with laser bridge was then inserted.  The bladder was inspected, both ureteral orifices were  in their orthotopic position away from the bladder neck.   The prostate was inspected, hypertrophy of the lateral lobes of the prostate was noted and well as prominence of the median lobe, projecting into the bladder lumen.  On settings of 2 J 40 Hz I used a 550 micron holmium laser on  Virtual basket settings to isadora the mucosa proximal to the urethral sphincter to facilitate early apical release to minimize stress incontinence.  I then  began my resection using an three lobe technique.  Incision at 5 and 7 o'clock were made from the bladder neck to the veru.  The median lobe was then enucleated.  The left lobe was enucleated and the mucosal strip cut.  The right lobe was enucleated in a similar fashion. Care was take to vaporize blood vessels at the level of the bladder neck. Once the prostate was freed circumferentially, I turned my attention to the prostatic fossa, I used laser energy to coagulate any active bleeders. I used the laser to vaporize any residual adenomatous fragments.    The bladder was inspected systematically, no injury to the bladder was noted.  I then switched to a nephroscope to begin the morcellation. A [employer] morcellator was used to morcellate the prostate adenoma while the urinary bladder remained distended. At the end of morcellation no residual pieces of prostate tissue were seen. The irrigant from the bladder remained clear, no injury to either ureteral orifice was seen.  Hemostasis was satisfactory. The verumontanum and mucosa overlying the external urinary sphincter remained intact without injury.   The scope was then withdrawn. A 20-Kinyarwanda Tavera catheter was inserted without difficulty into the bladder with 30 mL was placed into the balloon.  The catheter was irrigated three times and the irrigant remained clear without debris. The catheter was left to gravity drainage.  He was awoken from anesthesia and transferred to recovery in stable condition.     Dispo: extended recovery, plan void trial in AM    Ted Garvin MD

## 2022-08-29 NOTE — PLAN OF CARE
Signed out from pacu per Dr Mendes. Report called to [place]/5A. Transported to room 533 via stretcher,sr upx2.

## 2022-08-29 NOTE — PLAN OF CARE
VN cued into pt's room for introduction with pt's permission.  VN role explained and informed pt that VN would be working with bedside nurse and the rest of the care team.  Fall risk and bed alarm protocol education provided.  Instructed pt to call for assistance and agreeable.  Allowed time for questions. NAD noted.  Will cont to be available as needed.      08/29/22 1613   Admission   Initial VN Admission Questions Complete   Communication Issues? None   Shift   Virtual Nurse - Patient Verbalized Approval Of Camera Use   Safety/Activity   Patient Rounds call light in patient/parent reach;visualized patient   Safety Promotion/Fall Prevention Fall Risk reviewed with patient/family;instructed to call staff for mobility   Pain/Comfort/Sleep   Preferred Pain Scale number (Numeric Rating Pain Scale)   Pain Rating (0-10): Rest 0

## 2022-08-29 NOTE — ASSESSMENT & PLAN NOTE
Patient's FSGs are controlled on current medication regimen.  Last A1c reviewed-   Lab Results   Component Value Date    HGBA1C 4.9 08/15/2022     Most recent fingerstick glucose reviewed-   Recent Labs   Lab 08/29/22  0625 08/29/22  1344 08/29/22  1540   POCTGLUCOSE 131* 117* 115*     Current correctional scale  Low  Maintain anti-hyperglycemic dose as follows-   Antihyperglycemics (From admission, onward)    Start     Stop Route Frequency Ordered    08/29/22 2100  insulin detemir U-100 pen 10 Units         -- SubQ Nightly 08/29/22 1605    08/29/22 1705  insulin aspart U-100 pen 0-5 Units         -- SubQ Before meals & nightly PRN 08/29/22 1605        -Hold home hypoglycemics while patient is in the hospital.  -Accu check ACHS  -Hypoglycemia protocol PRN  -can advance to diabetic diet when tolerated

## 2022-08-29 NOTE — ANESTHESIA PROCEDURE NOTES
Intubation    Date/Time: 8/29/2022 7:38 AM  Performed by: William Latham Jr., CRNA  Authorized by: Desire Oneil MD     Intubation:     Induction:  Intravenous    Intubated:  Postinduction    Mask Ventilation:  Easy mask    Attempts:  1    Attempted By:  CRNA    Method of Intubation:  Video laryngoscopy    Blade:  Gonzalez 3    Laryngeal View Grade: Grade I - full view of cords      Difficult Airway Encountered?: No      Complications:  None    Airway Device:  Oral endotracheal tube    Airway Device Size:  7.5    Style/Cuff Inflation:  Cuffed (inflated to minimal occlusive pressure)    Tube secured:  22    Secured at:  The lips    Placement Verified By:  Capnometry    Complicating Factors:  None    Findings Post-Intubation:  BS equal bilateral and atraumatic/condition of teeth unchanged

## 2022-08-30 VITALS
RESPIRATION RATE: 17 BRPM | TEMPERATURE: 98 F | HEART RATE: 77 BPM | HEIGHT: 67 IN | BODY MASS INDEX: 25.08 KG/M2 | WEIGHT: 159.81 LBS | SYSTOLIC BLOOD PRESSURE: 111 MMHG | OXYGEN SATURATION: 99 % | DIASTOLIC BLOOD PRESSURE: 67 MMHG

## 2022-08-30 LAB
ANION GAP SERPL CALC-SCNC: 8 MMOL/L (ref 8–16)
BASOPHILS # BLD AUTO: 0.02 K/UL (ref 0–0.2)
BASOPHILS NFR BLD: 0.2 % (ref 0–1.9)
BUN SERPL-MCNC: 18 MG/DL (ref 8–23)
CALCIUM SERPL-MCNC: 9.2 MG/DL (ref 8.7–10.5)
CHLORIDE SERPL-SCNC: 109 MMOL/L (ref 95–110)
CO2 SERPL-SCNC: 24 MMOL/L (ref 23–29)
CREAT SERPL-MCNC: 1.6 MG/DL (ref 0.5–1.4)
DIFFERENTIAL METHOD: ABNORMAL
EOSINOPHIL # BLD AUTO: 0 K/UL (ref 0–0.5)
EOSINOPHIL NFR BLD: 0.3 % (ref 0–8)
ERYTHROCYTE [DISTWIDTH] IN BLOOD BY AUTOMATED COUNT: 15.5 % (ref 11.5–14.5)
EST. GFR  (NO RACE VARIABLE): 48 ML/MIN/1.73 M^2
GLUCOSE SERPL-MCNC: 103 MG/DL (ref 70–110)
HCT VFR BLD AUTO: 32.6 % (ref 40–54)
HGB BLD-MCNC: 10.6 G/DL (ref 14–18)
IMM GRANULOCYTES # BLD AUTO: 0.06 K/UL (ref 0–0.04)
IMM GRANULOCYTES NFR BLD AUTO: 0.6 % (ref 0–0.5)
LYMPHOCYTES # BLD AUTO: 1.7 K/UL (ref 1–4.8)
LYMPHOCYTES NFR BLD: 16.3 % (ref 18–48)
MCH RBC QN AUTO: 29.7 PG (ref 27–31)
MCHC RBC AUTO-ENTMCNC: 32.5 G/DL (ref 32–36)
MCV RBC AUTO: 91 FL (ref 82–98)
MONOCYTES # BLD AUTO: 0.9 K/UL (ref 0.3–1)
MONOCYTES NFR BLD: 8.7 % (ref 4–15)
NEUTROPHILS # BLD AUTO: 7.8 K/UL (ref 1.8–7.7)
NEUTROPHILS NFR BLD: 73.9 % (ref 38–73)
NRBC BLD-RTO: 0 /100 WBC
PLATELET # BLD AUTO: 190 K/UL (ref 150–450)
PMV BLD AUTO: 9.1 FL (ref 9.2–12.9)
POCT GLUCOSE: 101 MG/DL (ref 70–110)
POCT GLUCOSE: 125 MG/DL (ref 70–110)
POTASSIUM SERPL-SCNC: 5.1 MMOL/L (ref 3.5–5.1)
RBC # BLD AUTO: 3.57 M/UL (ref 4.6–6.2)
SODIUM SERPL-SCNC: 141 MMOL/L (ref 136–145)
WBC # BLD AUTO: 10.53 K/UL (ref 3.9–12.7)

## 2022-08-30 PROCEDURE — 99900035 HC TECH TIME PER 15 MIN (STAT)

## 2022-08-30 PROCEDURE — 94799 UNLISTED PULMONARY SVC/PX: CPT

## 2022-08-30 PROCEDURE — 63600175 PHARM REV CODE 636 W HCPCS: Performed by: UROLOGY

## 2022-08-30 PROCEDURE — 94761 N-INVAS EAR/PLS OXIMETRY MLT: CPT

## 2022-08-30 PROCEDURE — 25000003 PHARM REV CODE 250: Performed by: UROLOGY

## 2022-08-30 PROCEDURE — 36415 COLL VENOUS BLD VENIPUNCTURE: CPT | Performed by: UROLOGY

## 2022-08-30 PROCEDURE — 80048 BASIC METABOLIC PNL TOTAL CA: CPT | Performed by: UROLOGY

## 2022-08-30 PROCEDURE — 85025 COMPLETE CBC W/AUTO DIFF WBC: CPT | Performed by: UROLOGY

## 2022-08-30 PROCEDURE — 25000003 PHARM REV CODE 250

## 2022-08-30 RX ORDER — DOCUSATE SODIUM 100 MG/1
100 CAPSULE, LIQUID FILLED ORAL 2 TIMES DAILY
Qty: 14 CAPSULE | Refills: 0 | Status: SHIPPED | OUTPATIENT
Start: 2022-08-30 | End: 2022-09-06

## 2022-08-30 RX ORDER — AMOXICILLIN AND CLAVULANATE POTASSIUM 875; 125 MG/1; MG/1
1 TABLET, FILM COATED ORAL 2 TIMES DAILY
Qty: 10 TABLET | Refills: 0 | Status: SHIPPED | OUTPATIENT
Start: 2022-08-30 | End: 2022-09-04

## 2022-08-30 RX ORDER — TRAMADOL HYDROCHLORIDE 50 MG/1
50 TABLET ORAL EVERY 6 HOURS PRN
Qty: 12 TABLET | Refills: 0 | Status: SHIPPED | OUTPATIENT
Start: 2022-08-30 | End: 2022-09-02

## 2022-08-30 RX ADMIN — SODIUM BICARBONATE 650 MG TABLET 650 MG: at 08:08

## 2022-08-30 RX ADMIN — TAMSULOSIN HYDROCHLORIDE 0.4 MG: 0.4 CAPSULE ORAL at 08:08

## 2022-08-30 RX ADMIN — SODIUM CHLORIDE, SODIUM LACTATE, POTASSIUM CHLORIDE, AND CALCIUM CHLORIDE: .6; .31; .03; .02 INJECTION, SOLUTION INTRAVENOUS at 12:08

## 2022-08-30 RX ADMIN — ATORVASTATIN CALCIUM 40 MG: 40 TABLET, FILM COATED ORAL at 08:08

## 2022-08-30 RX ADMIN — METOPROLOL TARTRATE 50 MG: 50 TABLET, FILM COATED ORAL at 08:08

## 2022-08-30 RX ADMIN — SODIUM CHLORIDE, SODIUM LACTATE, POTASSIUM CHLORIDE, AND CALCIUM CHLORIDE: .6; .31; .03; .02 INJECTION, SOLUTION INTRAVENOUS at 08:08

## 2022-08-30 NOTE — DISCHARGE INSTRUCTIONS
-Drink at least 4 bottles of water daily for the next 4-6 weeks  -Some blood in the urine is normal and expected  -Avoid constipation for at least 1 week with prescribed miralax  -Urinary urgency and possible incontinence are an anticipated part of the recovery, some patients opt to wear a protective pad to guard against accidents  -Avoid known bladder irritants while your urinary tract is recovering for the next 4-6 weeks  Coffee - Regular & Decaf  Tea - caffeinated  Carbonated beverages - cola, non-raheel, diet & caffeine-free  Alcohols - Beer, Red Wine, White Wine, Champagne  Fruits - Grapefruit, Lemon, Orange, Pineapple  Fruit Juices - Cranberry, Grapefruit, Orange, Pineapple  Vegetables - Tomato & Tomato Products  Flavor Enhancers - Hot peppers, Spicy foods, Chili, Horseradish, Vinegar, Monosodium glutamate (MSG)  Artificial Sweeteners - NutraSweet, Sweet 'N Low, Equal (sweetener), Saccharin

## 2022-08-30 NOTE — HOSPITAL COURSE
He is S/P Prostate Enucleation  Doing well today on POD # 1  CBI has been discontinued  Pain is controlled  He is passing gas although he has not yet had a BM  Glucoses are well controlled  No medical issues  Dc planning ongoing  No medical needs or refills required per patient verbalization    He has follow up appointments scheduled  Cleared medically for discharge home

## 2022-08-30 NOTE — PLAN OF CARE
D/c orders noted.     Sw met with pt to discuss d/c planning. Pt is agreeable to discharge home and continue services with [facility]. Sw informed pt of upcoming follow up appointments. Pt verbalized understanding of all. Pt's wife Josseline will transport pt home at the time of d/c.     Sw spoke with Savanah with [facility]. Per Savanah, set up is completed for pt to resumes HH services with [facility].     Pt is cleared to go from CM standpoint.     Future Appointments   Date Time Provider Department Center   9/14/2022 10:00 AM Yolanda Weaver MD [facility] [place] [facility]   10/5/2022  4:00 PM Eleanor Will MD [facility]   10/24/2022  8:40 AM Tiago Ruiz MD [facility]   12/2/2022  9:00 AM [facility] [place]   12/9/2022 10:30 AM Wilian Byers MD [facility]         08/30/22 1032   Final Note   Assessment Type Final Discharge Note   Anticipated Discharge Disposition Home-Health  ([facility])   What phone number can be called within the next 1-3 days to see how you are doing after discharge? [phone]   Hospital Resources/Appts/Education Provided Appointments scheduled by Navigator/Coordinator   Post-Acute Status   Post-Acute Authorization Home Health   Home Health Status Set-up Complete/Auth obtained   Coverage medicare   Discharge Delays None known at this time

## 2022-08-30 NOTE — ASSESSMENT & PLAN NOTE
Patient's FSGs are controlled on current medication regimen.  Last A1c reviewed-   Lab Results   Component Value Date    HGBA1C 4.9 08/15/2022     Most recent fingerstick glucose reviewed-   Recent Labs   Lab 08/29/22  1540 08/29/22  1926 08/30/22  0433 08/30/22  1122   POCTGLUCOSE 115* 89 101 125*     Current correctional scale  Low  Maintain anti-hyperglycemic dose as follows-   Antihyperglycemics (From admission, onward)    Start     Stop Route Frequency Ordered    08/29/22 2100  insulin detemir U-100 pen 10 Units         -- SubQ Nightly 08/29/22 1605    08/29/22 1705  insulin aspart U-100 pen 0-5 Units         -- SubQ Before meals & nightly PRN 08/29/22 1605        Well controlled  Resume home regimen  Dexcom in place  OP PCP follow up

## 2022-08-30 NOTE — PROGRESS NOTES
[facility] - Telemetry  Urology  Progress Note    Patient Name: Angel Bell  MRN: [id number]  Admission Date: 8/29/2022  Hospital Length of Stay: 0 days    POD 1    Subjective:     Interval History: TONI.  Urine clear on medium CBI.  No pain.      Objective:     Temp:  [96.8 °F (36 °C)-99.3 °F (37.4 °C)] 98.7 °F (37.1 °C)  Pulse:  [] 69  Resp:  [16-20] 18  SpO2:  [96 %-100 %] 99 %  BP: (100-184)/(55-80) 123/58       NAD  RRR  CTAB  ABD S/ND/NTTP       Penis normal        Alvarenga clear on medium CBI       Ext: no edema    Significant Labs:  BMP:  Recent Labs   Lab 08/29/22  1610 08/30/22  0441    141   K 4.5 5.1    109   CO2 23 24   BUN 24* 18   CREATININE 1.7* 1.6*   CALCIUM 8.9 9.2       CBC:  Recent Labs   Lab 08/29/22  1610 08/30/22  0441   WBC 10.88 10.53   HGB 11.6* 10.6*   HCT 34.4* 32.6*    190         Urology Specific Assessment:     Problem Noted   Urinary Retention 4/29/2022    Distended bladder with bilateral hydroneprosis, ARF  Prostate volume calculated 112 myself (larger on CT measurements, 89gm on QUOC)  --UDS 6/22: shows some bladder contraction, low flow, HERRERA  --HoLEP 8/29/22     Bilateral Hydronephrosis 4/29/2022    QUOC 5/22: mild right hydronephrosis, greatly improved from CT       Plan:   Doing well, clamp CBI at 930 AM, home this PM if urine clear or light pink with regular and leg alvarenga bags.  Plan for follow up Thursday AM at 0830 for void trial.    Appreciate IM help      Ted Garvin MD  Urology

## 2022-08-30 NOTE — SUBJECTIVE & OBJECTIVE
Interval History:   No fevers  Pain controlled  CBI discontinued  Tavera in place  Dc planning  Medically stable for discharge home    Review of Systems   Constitutional: Negative.    HENT: Negative.     Eyes: Negative.    Respiratory: Negative.     Cardiovascular: Negative.    Gastrointestinal: Negative.    Endocrine: Negative.    Genitourinary: Negative.    Musculoskeletal: Negative.    Skin: Negative.    Allergic/Immunologic: Negative.    Neurological: Negative.    Hematological: Negative.    Psychiatric/Behavioral: Negative.     Objective:     Vital Signs (Most Recent):  Temp: 98.2 °F (36.8 °C) (08/30/22 1256)  Pulse: 70 (08/30/22 1256)  Resp: 17 (08/30/22 1256)  BP: (!) 102/54 (08/30/22 1256)  SpO2: 100 % (08/30/22 1256)   Vital Signs (24h Range):  Temp:  [96.8 °F (36 °C)-99.3 °F (37.4 °C)] 98.2 °F (36.8 °C)  Pulse:  [] 70  Resp:  [16-20] 17  SpO2:  [96 %-100 %] 100 %  BP: (102-169)/(54-80) 102/54     Weight: 72.5 kg (159 lb 13.3 oz)  Body mass index is 25.03 kg/m².    Intake/Output Summary (Last 24 hours) at 8/30/2022 1341  Last data filed at 8/30/2022 0930  Gross per 24 hour   Intake 675.73 ml   Output 75767 ml   Net -33111.27 ml      Physical Exam  Constitutional:       Appearance: Normal appearance.   HENT:      Head: Normocephalic and atraumatic.      Nose: Nose normal.      Mouth/Throat:      Mouth: Mucous membranes are moist.   Eyes:      Extraocular Movements: Extraocular movements intact.      Pupils: Pupils are equal, round, and reactive to light.   Cardiovascular:      Rate and Rhythm: Normal rate and regular rhythm.      Pulses: Normal pulses.      Heart sounds: Normal heart sounds.   Pulmonary:      Effort: Pulmonary effort is normal.      Breath sounds: Normal breath sounds.   Abdominal:      General: Bowel sounds are normal.      Palpations: Abdomen is soft.      Tenderness: There is no abdominal tenderness. There is no guarding or rebound.      Hernia: No hernia is present.    Genitourinary:     Comments: Tavera in place and he will discharge with this  Urine is straw colored  Musculoskeletal:         General: Normal range of motion.      Cervical back: Normal range of motion and neck supple.   Skin:     General: Skin is warm and dry.      Capillary Refill: Capillary refill takes less than 2 seconds.   Neurological:      Mental Status: He is alert and oriented to person, place, and time.   Psychiatric:         Mood and Affect: Mood normal.       Significant Labs:   .[facility]  Recent Labs   Lab 08/29/22  1610 08/30/22  0441   WBC 10.88 10.53   HGB 11.6* 10.6*   HCT 34.4* 32.6*    190   MONO 9.1  1.0 8.7  0.9     Recent Labs   Lab 08/29/22  1610 08/30/22  0441    141   K 4.5 5.1    109   CO2 23 24   BUN 24* 18   CREATININE 1.7* 1.6*   CALCIUM 8.9 9.2   PROT 6.2  --    BILITOT 0.3  --    ALKPHOS 64  --    ALT 13  --    AST 15  --      Microbiology Results (last 7 days)       ** No results found for the last 168 hours. **              Significant Imaging: I have reviewed all pertinent imaging results/findings within the past 24 hours.

## 2022-08-30 NOTE — PLAN OF CARE
Pt is AAOX4. Scheduled meds were given per MAR. No C/O pain or N/V. Bed in lowest position, bed alarm is set. Call light within reach. Nurse will continue to monitor.   Problem: Adjustment to Illness (Sepsis/Septic Shock)  Goal: Optimal Coping  Outcome: Ongoing, Progressing     Problem: Bleeding (Sepsis/Septic Shock)  Goal: Absence of Bleeding  Outcome: Ongoing, Progressing     Problem: Adult Inpatient Plan of Care  Goal: Plan of Care Review  Outcome: Ongoing, Progressing  Goal: Patient-Specific Goal (Individualized)  Outcome: Ongoing, Progressing  Goal: Absence of Hospital-Acquired Illness or Injury  Outcome: Ongoing, Progressing

## 2022-08-30 NOTE — CONSULTS
[facility] Medicine  Consult Note    Patient Name: Angel Bell  MRN: [id number]  Admission Date: 8/29/2022  Hospital Length of Stay: 0 days  Attending Physician: Ted Garvin MD   Primary Care Provider: Tiago Ruiz MD           Patient information was obtained from patient, past medical records and ER records.     Consults  Subjective:     Principal Problem: Urinary retention    Chief Complaint: No chief complaint on file.       HPI: Mr. Angel Bell is a 60 y/o male with PMHx of HTN, HLD, DMII, CKD, urinary retention, and bilateral hydronephrosis admitted to urology and is s/p cystoscopy/ HoLEP 8/29/22. [facility] hospital medicine consulted for medical management/DM management.       Past Medical History:   Diagnosis Date    Diabetes mellitus     Disorder of kidney and ureter     Hyperlipemia     Hypertension     Iron deficiency anemia     Migraine headache     PTSD (post-traumatic stress disorder)     Stage 3a chronic kidney disease        History reviewed. No pertinent surgical history.    Review of patient's allergies indicates:  No Known Allergies    No current facility-administered medications on file prior to encounter.     Current Outpatient Medications on File Prior to Encounter   Medication Sig    blood sugar diagnostic (TRUE METRIX GLUCOSE TEST STRIP) Strp TEST 4 TIMES A DAY    blood-glucose meter (TRUE METRIX GLUCOSE METER) Misc 1 Device by Misc.(Non-Drug; Combo Route) route 4 (four) times daily.    blood-glucose sensor (DEXCOM G6 SENSOR) Philomena 3 each by Misc.(Non-Drug; Combo Route) route continuous.    blood-glucose transmitter (DEXCOM G6 TRANSMITTER) Philomena 1 each by Misc.(Non-Drug; Combo Route) route continuous.    clotrimazole (LOTRIMIN) 1 % cream Apply topically.    furosemide (LASIX) 40 MG tablet Take 1 tablet (40 mg total) by mouth daily as needed (swelling).    insulin aspart U-100 (NOVOLOG) 100 unit/mL (3 mL) InPn pen Inject 4 Units into the skin 3 (three) times  "daily.    insulin detemir U-100 (LEVEMIR FLEXTOUCH) 100 unit/mL (3 mL) SubQ InPn pen Inject 15 Units into the skin once daily.    lancets 33 gauge Misc USE 4 TIMES A DAY    metoprolol tartrate (LOPRESSOR) 50 MG tablet Take 50 mg by mouth 2 (two) times daily.    pen needle, diabetic 31 gauge x 5/16" Ndle use 4 times a day    tamsulosin (FLOMAX) 0.4 mg Cap Take 1 capsule (0.4 mg total) by mouth once daily.    glucose 4 GM chewable tablet Take 4 tablets (16 g total) by mouth as needed for Low blood sugar (If having symptoms of blurry vision, palpitations, confusion, shakiness.  Please check sugars and if sugar below 70 please take 4 tablets and re-check sugar everry 15 minutes until sugars are above 70 and symptoms resolve.).    sodium bicarbonate 650 MG tablet Take 1 tablet (650 mg total) by mouth 2 (two) times daily.     Family History       Problem Relation (Age of Onset)    Diabetes Mother          Tobacco Use    Smoking status: Never    Smokeless tobacco: Never   Substance and Sexual Activity    Alcohol use: No    Drug use: No    Sexual activity: Not on file     Review of Systems   Constitutional:  Negative for chills, diaphoresis and fever.   HENT:  Negative for hearing loss and sore throat.    Eyes:  Negative for visual disturbance.   Respiratory:  Negative for cough and shortness of breath.    Cardiovascular:  Negative for chest pain and leg swelling.   Gastrointestinal:  Negative for abdominal pain, diarrhea, nausea and vomiting.   Genitourinary:  Negative for difficulty urinating and flank pain.   Musculoskeletal:  Negative for back pain.   Skin:  Negative for rash and wound.   Neurological:  Negative for dizziness, weakness and headaches.   Psychiatric/Behavioral:  Negative for agitation and confusion.    Objective:     Vital Signs (Most Recent):  Temp: 97.7 °F (36.5 °C) (08/29/22 1055)  Pulse: (!) 42 (08/29/22 1110)  Resp: 18 (08/29/22 1110)  BP: (!) 137/59 (08/29/22 1110)  SpO2: 100 % (08/29/22 " 1110)   Vital Signs (24h Range):  Temp:  [97.7 °F (36.5 °C)-98.2 °F (36.8 °C)] 97.7 °F (36.5 °C)  Pulse:  [42-89] 42  Resp:  [17-18] 18  SpO2:  [100 %] 100 %  BP: (100-185)/(55-86) 137/59     Weight: 76.7 kg (169 lb)  Body mass index is 26.47 kg/m².    Physical Exam  Vitals and nursing note reviewed.   Constitutional:       General: He is not in acute distress.     Appearance: Normal appearance. He is not ill-appearing.   HENT:      Head: Normocephalic and atraumatic.      Mouth/Throat:      Mouth: Mucous membranes are moist.   Eyes:      Extraocular Movements: Extraocular movements intact.      Pupils: Pupils are equal, round, and reactive to light.   Cardiovascular:      Rate and Rhythm: Normal rate and regular rhythm.      Pulses: Normal pulses.      Heart sounds: Normal heart sounds. No murmur heard.    No friction rub. No gallop.   Pulmonary:      Effort: Pulmonary effort is normal. No respiratory distress.      Breath sounds: No wheezing or rhonchi.   Abdominal:      General: Bowel sounds are normal. There is no distension.      Palpations: Abdomen is soft.      Tenderness: There is no abdominal tenderness. There is no guarding.   Genitourinary:     Comments: Tavera catheter to gravity  Musculoskeletal:         General: No swelling.      Cervical back: Normal range of motion and neck supple.      Right lower leg: No edema.      Left lower leg: No edema.   Skin:     General: Skin is warm and dry.      Capillary Refill: Capillary refill takes less than 2 seconds.      Findings: No bruising, erythema or rash.   Neurological:      General: No focal deficit present.      Mental Status: He is alert and oriented to person, place, and time.      GCS: GCS eye subscore is 4. GCS verbal subscore is 5. GCS motor subscore is 6.   Psychiatric:         Mood and Affect: Mood normal.         Speech: Speech normal.         Behavior: Behavior normal. Behavior is cooperative.       Significant Labs: A1C:   Recent Labs   Lab  07/06/22  0838 07/11/22  0808 08/15/22  1006   HGBA1C 7.1* 6.8* 4.9       TSH:   Recent Labs   Lab 04/29/22  0209   TSH 0.536     Recent Results (from the past 24 hour(s))   POCT glucose    Collection Time: 08/29/22  6:25 AM   Result Value Ref Range    POCT Glucose 131 (H) 70 - 110 mg/dL   Type & Screen    Collection Time: 08/29/22  6:34 AM   Result Value Ref Range    Group & Rh O POS     Indirect Lexy NEG    POCT glucose    Collection Time: 08/29/22  1:44 PM   Result Value Ref Range    POCT Glucose 117 (H) 70 - 110 mg/dL   POCT glucose    Collection Time: 08/29/22  3:40 PM   Result Value Ref Range    POCT Glucose 115 (H) 70 - 110 mg/dL   CBC Auto Differential    Collection Time: 08/29/22  4:10 PM   Result Value Ref Range    WBC 10.88 3.90 - 12.70 K/uL    RBC 3.85 (L) 4.60 - 6.20 M/uL    Hemoglobin 11.6 (L) 14.0 - 18.0 g/dL    Hematocrit 34.4 (L) 40.0 - 54.0 %    MCV 89 82 - 98 fL    MCH 30.1 27.0 - 31.0 pg    MCHC 33.7 32.0 - 36.0 g/dL    RDW 15.8 (H) 11.5 - 14.5 %    Platelets 205 150 - 450 K/uL    MPV 9.0 (L) 9.2 - 12.9 fL    Immature Granulocytes 0.7 (H) 0.0 - 0.5 %    Gran # (ANC) 7.9 (H) 1.8 - 7.7 K/uL    Immature Grans (Abs) 0.08 (H) 0.00 - 0.04 K/uL    Lymph # 1.9 1.0 - 4.8 K/uL    Mono # 1.0 0.3 - 1.0 K/uL    Eos # 0.0 0.0 - 0.5 K/uL    Baso # 0.03 0.00 - 0.20 K/uL    nRBC 0 0 /100 WBC    Gran % 72.2 38.0 - 73.0 %    Lymph % 17.5 (L) 18.0 - 48.0 %    Mono % 9.1 4.0 - 15.0 %    Eosinophil % 0.2 0.0 - 8.0 %    Basophil % 0.3 0.0 - 1.9 %    Differential Method Automated    Comprehensive metabolic panel    Collection Time: 08/29/22  4:10 PM   Result Value Ref Range    Sodium 142 136 - 145 mmol/L    Potassium 4.5 3.5 - 5.1 mmol/L    Chloride 108 95 - 110 mmol/L    CO2 23 23 - 29 mmol/L    Glucose 106 70 - 110 mg/dL    BUN 24 (H) 8 - 23 mg/dL    Creatinine 1.7 (H) 0.5 - 1.4 mg/dL    Calcium 8.9 8.7 - 10.5 mg/dL    Total Protein 6.2 6.0 - 8.4 g/dL    Albumin 3.5 3.5 - 5.2 g/dL    Total Bilirubin 0.3 0.1 - 1.0  mg/dL    Alkaline Phosphatase 64 55 - 135 U/L    AST 15 10 - 40 U/L    ALT 13 10 - 44 U/L    Anion Gap 11 8 - 16 mmol/L    eGFR 45 (A) >60 mL/min/1.73 m^2   Magnesium    Collection Time: 08/29/22  4:10 PM   Result Value Ref Range    Magnesium 2.1 1.6 - 2.6 mg/dL   POCT glucose    Collection Time: 08/29/22  7:26 PM   Result Value Ref Range    POCT Glucose 89 70 - 110 mg/dL         Significant Imaging: I have reviewed all pertinent imaging results/findings within the past 24 hours.    Assessment/Plan:     * Urinary retention  -History of urinary retention  -s/p cystoscopy and HoLEP   -management per primary team      Anemia due to chronic kidney disease  -H/H is 11.6/34.4, which is stable and consistent with previous laboratory measurements.   -Patient currently exhibits no signs or symptoms of acute bleeding  -patient is s/p cystoscopy and HoLEP, will monitor patient closely   -No indication for blood transfusion  -Continue to monitor serial CBC  -Transfuse for Hgb <7 or if symptomatic        Type 2 diabetes mellitus, with long-term current use of insulin  Patient's FSGs are controlled on current medication regimen.  Last A1c reviewed-   Lab Results   Component Value Date    HGBA1C 4.9 08/15/2022     Most recent fingerstick glucose reviewed-   Recent Labs   Lab 08/29/22  0625 08/29/22  1344 08/29/22  1540   POCTGLUCOSE 131* 117* 115*     Current correctional scale  Low  Maintain anti-hyperglycemic dose as follows-   Antihyperglycemics (From admission, onward)    Start     Stop Route Frequency Ordered    08/29/22 2100  insulin detemir U-100 pen 10 Units         -- SubQ Nightly 08/29/22 1605    08/29/22 1705  insulin aspart U-100 pen 0-5 Units         -- SubQ Before meals & nightly PRN 08/29/22 1605        -Hold home hypoglycemics while patient is in the hospital.  -Accu check ACHS  -Hypoglycemia protocol PRN  -can advance to diabetic diet when tolerated    Benign prostatic hyperplasia with urinary obstruction  -resume  home flomax      Family history of prostate cancer in father  -noted      Hypertension  Chronic, Latest blood pressure and vitals reviewed-   Temp:  [97.7 °F (36.5 °C)-99.3 °F (37.4 °C)]   Pulse:  []   Resp:  [16-20]   BP: (100-185)/(55-86)   SpO2:  [97 %-100 %] .   Home meds for hypertension were reviewed and noted below.   Hypertension Medications             metoprolol tartrate (LOPRESSOR) 50 MG tablet Take 50 mg by mouth 2 (two) times daily.    NIFEdipine (PROCARDIA-XL) 30 MG (OSM) 24 hr tablet Take 60 mg by mouth once daily.            -Resume home BP medications  -Monitor and trend vital signs q4hr  -Will utilize p.r.n. blood pressure medication only if patient's blood pressure greater than  180/110 and he develops symptoms such as worsening chest pain or shortness of breath.        Hyperlipidemia  Last LDL was   Lab Results   Component Value Date    LDLCALC 88.2 04/29/2022      Patient is chronically on statin.will continue for now.   Monitor clinically.            VTE Risk Mitigation (From admission, onward)         Ordered     IP VTE LOW RISK PATIENT  Once         08/29/22 1604     Place sequential compression device  Until discontinued         08/29/22 1604                    Thank you for your consult.  We will follow-up with patient. Please contact us if you have any additional questions.      Leidy Farah DNP, ACNPC-AG, CCRN  Hospitalist  Department of Hospital Medicine  [facility]   Pager: [phone]

## 2022-08-30 NOTE — PLAN OF CARE
Sw met with pt to discuss d/c planning. Pt lives with pt's wife Josseline [phone]. Pt has no DME. Pt is current with [facility] and pt wishes to resume services with them upon d/c. Pt drives himself to doctor appointments. Josseline will transport pt home at the time of d/c. Jorge A encouraged pt to call with any questions or concerns.       Jorge A sent referral to [facility] via careport; so therefore pt can resume HH services.     Jorge A will continue to follow pt for d/c planning.     Future Appointments   Date Time Provider Department Center   10/5/2022  4:00 PM Eleanor Will MD [facility]   10/24/2022  8:40 AM Tiago Ruiz MD [facility]   12/2/2022  9:00 AM LAB, [facility] [place]   12/9/2022 10:30 AM Wilian Byers MD [facility]         08/30/22 0910   Discharge Assessment   Assessment Type Discharge Planning Assessment   Confirmed/corrected address, phone number and insurance Yes   Confirmed Demographics Correct on Facesheet   Source of Information patient   Lives With spouse   Facility Arrived From: home   Do you expect to return to your current living situation? Yes   Do you have help at home or someone to help you manage your care at home? Yes   Who are your caregiver(s) and their phone number(s)? pt's wife Josseline [phone]   Prior to hospitilization cognitive status: Alert/Oriented   Current cognitive status: Alert/Oriented   Walking or Climbing Stairs Difficulty none   Dressing/Bathing Difficulty none   Equipment Currently Used at Home none   Readmission within 30 days? No   Patient currently being followed by outpatient case management? No   Do you currently have service(s) that help you manage your care at home? Yes   Name and Contact number of agency [facility]   Is the pt/caregiver preference to resume services with current agency Yes   Do you take prescription medications? Yes   Do you have prescription coverage? Yes   Coverage Medicare   Do you have any problems affording  any of your prescribed medications? No   Is the patient taking medications as prescribed? yes   Who is going to help you get home at discharge? pt's wife Josseline [phone]   How do you get to doctors appointments? car, drives self   Are you on dialysis? No   Discharge Plan A Home Health   DME Needed Upon Discharge  none   Discharge Plan discussed with: Patient   Discharge Barriers Identified None   Physical Activity   On average, how many days per week do you engage in moderate to strenuous exercise (like a brisk walk)? Patient refu   On average, how many minutes do you engage in exercise at this level? Patient refu   Financial Resource Strain   How hard is it for you to pay for the very basics like food, housing, medical care, and heating? Not hard   Housing Stability   In the last 12 months, was there a time when you were not able to pay the mortgage or rent on time? N   In the last 12 months, was there a time when you did not have a steady place to sleep or slept in a shelter (including now)? N   Transportation Needs   In the past 12 months, has lack of transportation kept you from medical appointments or from getting medications? no   In the past 12 months, has lack of transportation kept you from meetings, work, or from getting things needed for daily living? No   Food Insecurity   Within the past 12 months, you worried that your food would run out before you got the money to buy more. Never true   Within the past 12 months, the food you bought just didn't last and you didn't have money to get more. Never true   Stress   Do you feel stress - tense, restless, nervous, or anxious, or unable to sleep at night because your mind is troubled all the time - these days? Patient refu   Social Connections   In a typical week, how many times do you talk on the phone with family, friends, or neighbors? More than 3   How often do you get together with friends or relatives? More than 3   How often do you attend Methodist or  Faith services? Patient refu   Do you belong to any clubs or organizations such as Baptism groups, unions, fraternal or athletic groups, or school groups? Patient refu   How often do you attend meetings of the clubs or organizations you belong to? Patient refu   Are you , , , , never , or living with a partner?    Alcohol Use   Q1: How often do you have a drink containing alcohol? Patient refu   Q2: How many drinks containing alcohol do you have on a typical day when you are drinking? Patient refu   Q3: How often do you have six or more drinks on one occasion? Patient refu   Relationship/Environment   Name(s) of Who Lives With Patient pt's wife Josseline [phone]

## 2022-08-30 NOTE — PROGRESS NOTES
[facility] Medicine  Progress Note    Patient Name: Angel Bell  MRN: [id number]  Patient Class: OP- Outpatient Recovery   Admission Date: 8/29/2022  Length of Stay: 0 days  Attending Physician: Ted Garvin MD  Primary Care Provider: Tiago Ruiz MD        Subjective:     Principal Problem:Urinary retention        HPI:  Mr. Angel Bell is a 62 y/o male with PMHx of HTN, HLD, DMII, CKD, urinary retention, and bilateral hydronephrosis admitted to urology and is s/p cystoscopy/ HoLEP 8/29/22. [facility] hospital medicine consulted for medical management/DM management.       Overview/Hospital Course:  He is S/P Prostate Enucleation  Doing well today on POD # 1  CBI has been discontinued  Pain is controlled  He is passing gas although he has not yet had a BM  Glucoses are well controlled  No medical issues  Dc planning ongoing  No medical needs or refills required per patient verbalization    He has follow up appointments scheduled  Cleared medically for discharge home      Interval History:   No fevers  Pain controlled  CBI discontinued  Tavera in place  Dc planning  Medically stable for discharge home    Review of Systems   Constitutional: Negative.    HENT: Negative.     Eyes: Negative.    Respiratory: Negative.     Cardiovascular: Negative.    Gastrointestinal: Negative.    Endocrine: Negative.    Genitourinary: Negative.    Musculoskeletal: Negative.    Skin: Negative.    Allergic/Immunologic: Negative.    Neurological: Negative.    Hematological: Negative.    Psychiatric/Behavioral: Negative.     Objective:     Vital Signs (Most Recent):  Temp: 98.2 °F (36.8 °C) (08/30/22 1256)  Pulse: 70 (08/30/22 1256)  Resp: 17 (08/30/22 1256)  BP: (!) 102/54 (08/30/22 1256)  SpO2: 100 % (08/30/22 1256)   Vital Signs (24h Range):  Temp:  [96.8 °F (36 °C)-99.3 °F (37.4 °C)] 98.2 °F (36.8 °C)  Pulse:  [] 70  Resp:  [16-20] 17  SpO2:  [96 %-100 %] 100 %  BP: (102-169)/(54-80) 102/54     Weight: 72.5 kg (159  lb 13.3 oz)  Body mass index is 25.03 kg/m².    Intake/Output Summary (Last 24 hours) at 8/30/2022 1341  Last data filed at 8/30/2022 0930  Gross per 24 hour   Intake 675.73 ml   Output 20227 ml   Net -51310.27 ml      Physical Exam  Constitutional:       Appearance: Normal appearance.   HENT:      Head: Normocephalic and atraumatic.      Nose: Nose normal.      Mouth/Throat:      Mouth: Mucous membranes are moist.   Eyes:      Extraocular Movements: Extraocular movements intact.      Pupils: Pupils are equal, round, and reactive to light.   Cardiovascular:      Rate and Rhythm: Normal rate and regular rhythm.      Pulses: Normal pulses.      Heart sounds: Normal heart sounds.   Pulmonary:      Effort: Pulmonary effort is normal.      Breath sounds: Normal breath sounds.   Abdominal:      General: Bowel sounds are normal.      Palpations: Abdomen is soft.      Tenderness: There is no abdominal tenderness. There is no guarding or rebound.      Hernia: No hernia is present.   Genitourinary:     Comments: Tavera in place and he will discharge with this  Urine is straw colored  Musculoskeletal:         General: Normal range of motion.      Cervical back: Normal range of motion and neck supple.   Skin:     General: Skin is warm and dry.      Capillary Refill: Capillary refill takes less than 2 seconds.   Neurological:      Mental Status: He is alert and oriented to person, place, and time.   Psychiatric:         Mood and Affect: Mood normal.       Significant Labs:   .[facility]  Recent Labs   Lab 08/29/22  1610 08/30/22  0441   WBC 10.88 10.53   HGB 11.6* 10.6*   HCT 34.4* 32.6*    190   MONO 9.1  1.0 8.7  0.9     Recent Labs   Lab 08/29/22  1610 08/30/22  0441    141   K 4.5 5.1    109   CO2 23 24   BUN 24* 18   CREATININE 1.7* 1.6*   CALCIUM 8.9 9.2   PROT 6.2  --    BILITOT 0.3  --    ALKPHOS 64  --    ALT 13  --    AST 15  --      Microbiology Results (last 7 days)       ** No results found for the  last 168 hours. **              Significant Imaging: I have reviewed all pertinent imaging results/findings within the past 24 hours.      Assessment/Plan:      * Urinary retention  -History of urinary retention  -s/p cystoscopy and HoLEP   -management per primary team      Anemia due to chronic kidney disease  -H/H is 11.6/34.4, which is stable and consistent with previous laboratory measurements.   -Patient currently exhibits no signs or symptoms of acute bleeding  -patient is s/p cystoscopy and HoLEP, will monitor patient closely   -No indication for blood transfusion  -Continue to monitor serial CBC  -Transfuse for Hgb <7 or if symptomatic        Type 2 diabetes mellitus, with long-term current use of insulin  Patient's FSGs are controlled on current medication regimen.  Last A1c reviewed-   Lab Results   Component Value Date    HGBA1C 4.9 08/15/2022     Most recent fingerstick glucose reviewed-   Recent Labs   Lab 08/29/22  1540 08/29/22  1926 08/30/22  0433 08/30/22  1122   POCTGLUCOSE 115* 89 101 125*     Current correctional scale  Low  Maintain anti-hyperglycemic dose as follows-   Antihyperglycemics (From admission, onward)    Start     Stop Route Frequency Ordered    08/29/22 2100  insulin detemir U-100 pen 10 Units         -- SubQ Nightly 08/29/22 1605    08/29/22 1705  insulin aspart U-100 pen 0-5 Units         -- SubQ Before meals & nightly PRN 08/29/22 1605        Well controlled  Resume home regimen  Dexcom in place  OP PCP follow up    Benign prostatic hyperplasia with urinary obstruction  -resume home flomax    S/P prostate enucleation  Tavera in place  OP follow up with urology      Family history of prostate cancer in father  -noted      Hypertension  Chronic, Latest blood pressure and vitals reviewed-   Temp:  [97.7 °F (36.5 °C)-99.3 °F (37.4 °C)]   Pulse:  []   Resp:  [16-20]   BP: (100-185)/(55-86)   SpO2:  [97 %-100 %] .   Home meds for hypertension were reviewed and noted below.    Hypertension Medications             metoprolol tartrate (LOPRESSOR) 50 MG tablet Take 50 mg by mouth 2 (two) times daily.    NIFEdipine (PROCARDIA-XL) 30 MG (OSM) 24 hr tablet Take 60 mg by mouth once daily.            -Resume home BP medications  -Monitor and trend vital signs q4hr  -Will utilize p.r.n. blood pressure medication only if patient's blood pressure greater than  180/110 and he develops symptoms such as worsening chest pain or shortness of breath.        Hyperlipidemia  Last LDL was   Lab Results   Component Value Date    LDLCALC 88.2 04/29/2022      Patient is chronically on statin.will continue for now.   Monitor clinically.            VTE Risk Mitigation (From admission, onward)         Ordered     IP VTE LOW RISK PATIENT  Once         08/29/22 1604     Place sequential compression device  Until discontinued         08/29/22 1604                Discharge Planning   SAVANAH: 8/30/2022     Code Status: Prior   Is the patient medically ready for discharge?:     Reason for patient still in hospital (select all that apply): Pending disposition  Discharge Plan A: Home Health   Discharge Delays: None known at this time              Saskia Armenta NP  Department of Hospital Medicine   [facility] [facility]

## 2022-08-30 NOTE — PLAN OF CARE
Patient AAOX4, status post prostate enucleation with continuous bladder irrigation via 3 way 22FR alvarenga. No c/o pain or discomfort. Determir held HS d/t glucose level. Clear liquid diet. Continuous LR. Medications administered per MAR. No acute distress noted. Safety measures in place.

## 2022-08-30 NOTE — PROGRESS NOTES
[facility]                    Pharmacy       Discharge Medication Education    Patient ACCEPTED medication education. Pharmacy has provided education on the name, indication, and possible side effects of the medication(s) prescribed, using teach-back method.     The following medications have also been discussed, during this admission.        Medication List        START taking these medications      amoxicillin-clavulanate 875-125mg 875-125 mg per tablet  Commonly known as: AUGMENTIN  Take 1 tablet by mouth 2 (two) times daily. for 5 days     docusate sodium 100 MG capsule  Commonly known as: COLACE  Take 1 capsule (100 mg total) by mouth 2 (two) times daily. for 7 days     traMADoL 50 mg tablet  Commonly known as: ULTRAM  Take 1 tablet (50 mg total) by mouth every 6 (six) hours as needed for Pain.            CONTINUE taking these medications      atorvastatin 40 MG tablet  Commonly known as: LIPITOR  Take 1 tablet (40 mg total) by mouth once daily.     blood sugar diagnostic Strp  Commonly known as: TRUE METRIX GLUCOSE TEST STRIP  TEST 4 TIMES A DAY     DEXCOM G6 SENSOR Philomena  Generic drug: blood-glucose sensor  3 each by Misc.(Non-Drug; Combo Route) route continuous.     DEXCOM G6 TRANSMITTER Philomena  Generic drug: blood-glucose transmitter  1 each by Misc.(Non-Drug; Combo Route) route continuous.     furosemide 40 MG tablet  Commonly known as: LASIX  Take 1 tablet (40 mg total) by mouth daily as needed (swelling).     glucose 4 GM chewable tablet  Take 4 tablets (16 g total) by mouth as needed for Low blood sugar (If having symptoms of blurry vision, palpitations, confusion, shakiness.  Please check sugars and if sugar below 70 please take 4 tablets and re-check sugar everry 15 minutes until sugars are above 70 and symptoms resolve.).     insulin aspart U-100 100 unit/mL (3 mL) Inpn pen  Commonly known as: NovoLOG  Inject 4 Units into the skin 3 (three) times daily.     insulin detemir U-100 100  "unit/mL (3 mL) Inpn pen  Commonly known as: Levemir FLEXTOUCH  Inject 15 Units into the skin once daily.     lancets 33 gauge Misc  USE 4 TIMES A DAY     metoprolol tartrate 50 MG tablet  Commonly known as: LOPRESSOR     NIFEdipine 30 MG (OSM) 24 hr tablet  Commonly known as: PROCARDIA-XL     pen needle, diabetic 31 gauge x 5/16" Ndle  use 4 times a day     tamsulosin 0.4 mg Cap  Commonly known as: FLOMAX  Take 1 capsule (0.4 mg total) by mouth once daily.     TRUE METRIX GLUCOSE METER Misc  Generic drug: blood-glucose meter  1 Device by Misc.(Non-Drug; Combo Route) route 4 (four) times daily.     TRULICITY 0.75 mg/0.5 mL pen injector  Generic drug: dulaglutide  Inject 0.75 mg into the skin every 7 days.            STOP taking these medications      clotrimazole 1 % cream  Commonly known as: LOTRIMIN     sodium bicarbonate 650 MG tablet               Where to Get Your Medications        These medications were sent to [facility] [address], [place] [address]      Hours: Mon-Fri, 8a-5:30p Phone: [phone]   amoxicillin-clavulanate 875-125mg 875-125 mg per tablet  docusate sodium 100 MG capsule  traMADoL 50 mg tablet          Thank you  Samantha Camejo, PharmD  [phone]    "

## 2022-08-31 ENCOUNTER — TELEPHONE (OUTPATIENT)
Dept: UROLOGY | Facility: CLINIC | Age: 62
End: 2022-08-31
Payer: COMMERCIAL

## 2022-08-31 PROCEDURE — G0180 PR HOME HEALTH MD CERTIFICATION: ICD-10-PCS | Mod: ,,, | Performed by: UROLOGY

## 2022-08-31 PROCEDURE — G0180 MD CERTIFICATION HHA PATIENT: HCPCS | Mod: ,,, | Performed by: UROLOGY

## 2022-08-31 NOTE — DISCHARGE SUMMARY
[facility]  Urology  Discharge Summary      Patient Name: Angel Bell  MRN: [id number]  Admission Date: 8/29/2022  Hospital Length of Stay: 0 days  Discharge Date: 8/30/22  Attending Physician: No att. providers found   Discharging Provider: Ted Garvin MD  Primary Care Physician: Tiago Ruiz MD    HPI: The patient is a 61 y.o. male with past medical history (listed below) here for HoLEP    The patient elected to proceed with the procedure(s) below. Please see H&P and/or clinic progress note(s) for full details.     Procedure(s) (LRB):  ENUCLEATION, PROSTATE, USING LASER  Time: 180 minutes Equipment: high powered 100W laser with virtual basket, morcellator, scopes for HoLEP from [employer] (N/A)     Past Medical History:   Diagnosis Date    Diabetes mellitus     Disorder of kidney and ureter     Hyperlipemia     Hypertension     Iron deficiency anemia     Migraine headache     PTSD (post-traumatic stress disorder)     Stage 3a chronic kidney disease        Hospital Course (synopsis of major diagnoses, care, treatment, and services provided during the course of the hospital stay): Admitted post op, POD 1 CBI stopped urine slight pink w/o clots.  Discharged with alvarenga.        Consults:     Significant Diagnostic Studies: none    Pending Diagnostic Studies:       Procedure Component Value Units Date/Time    Specimen to Pathology, Surgery Urology [[id number]] Collected: 08/29/22 1101    Order Status: Sent Lab Status: In process Updated: 08/29/22 1453    Specimen: Tissue             Final Active Diagnoses:    Diagnosis Date Noted POA    PRINCIPAL PROBLEM:  Urinary retention [R33.9] 04/29/2022 Yes    Type 2 diabetes mellitus, with long-term current use of insulin [E11.9, Z79.4] 06/16/2022 Not Applicable    Anemia due to chronic kidney disease [N18.9, D63.1] 06/16/2022 Yes    Benign prostatic hyperplasia with urinary obstruction [N40.1, N13.8] 04/29/2022 Yes    Family history of prostate cancer in father [Z80.42]  08/16/2016 Not Applicable    Hypertension [I10] 07/12/2016 Yes    Hyperlipidemia [E78.5] 07/04/2016 Yes      Problems Resolved During this Admission:       Discharged Condition: good    Disposition: Home or Self Care    Follow Up:   Follow-up Information       Ted Garvin MD Follow up in 2 day(s).    Specialty: Urology  Contact information:  [address]  [address]  [place] [address]  [phone]                             Patient Instructions:      Diet Adult Regular     Notify your health care provider if you experience any of the following:  temperature >100.4     Notify your health care provider if you experience any of the following:  persistent nausea and vomiting or diarrhea     Notify your health care provider if you experience any of the following:  severe uncontrolled pain     Notify your health care provider if you experience any of the following:   Order Comments: -Drink at least 4 bottles of water daily for the next 4-6 weeks  -Some blood in the urine is normal and expected  -Avoid constipation for at least 1 week with prescribed miralax  -Urinary urgency and possible incontinence are an anticipated part of the recovery, some patients opt to wear a protective pad to guard against accidents  -Avoid known bladder irritants while your urinary tract is recovering for the next 4-6 weeks  Coffee - Regular & Decaf  Tea - caffeinated  Carbonated beverages - cola, non-raheel, diet & caffeine-free  Alcohols - Beer, Red Wine, White Wine, Champagne  Fruits - Grapefruit, Lemon, Orange, Pineapple  Fruit Juices - Cranberry, Grapefruit, Orange, Pineapple  Vegetables - Tomato & Tomato Products  Flavor Enhancers - Hot peppers, Spicy foods, Chili, Horseradish, Vinegar, Monosodium glutamate (MSG)  Artificial Sweeteners - NutraSweet, Sweet 'N Low, Equal (sweetener), Saccharin       Medications:  Reconciled Home Medications:      Medication List        START taking these medications      amoxicillin-clavulanate  "875-125mg 875-125 mg per tablet  Commonly known as: AUGMENTIN  Take 1 tablet by mouth 2 (two) times daily. for 5 days     docusate sodium 100 MG capsule  Commonly known as: COLACE  Take 1 capsule (100 mg total) by mouth 2 (two) times daily. for 7 days     traMADoL 50 mg tablet  Commonly known as: ULTRAM  Take 1 tablet (50 mg total) by mouth every 6 (six) hours as needed for Pain.            CONTINUE taking these medications      atorvastatin 40 MG tablet  Commonly known as: LIPITOR  Take 1 tablet (40 mg total) by mouth once daily.     blood sugar diagnostic Strp  Commonly known as: TRUE METRIX GLUCOSE TEST STRIP  TEST 4 TIMES A DAY     DEXCOM G6 SENSOR Philomena  Generic drug: blood-glucose sensor  3 each by Misc.(Non-Drug; Combo Route) route continuous.     DEXCOM G6 TRANSMITTER Philomena  Generic drug: blood-glucose transmitter  1 each by Misc.(Non-Drug; Combo Route) route continuous.     furosemide 40 MG tablet  Commonly known as: LASIX  Take 1 tablet (40 mg total) by mouth daily as needed (swelling).     glucose 4 GM chewable tablet  Take 4 tablets (16 g total) by mouth as needed for Low blood sugar (If having symptoms of blurry vision, palpitations, confusion, shakiness.  Please check sugars and if sugar below 70 please take 4 tablets and re-check sugar everry 15 minutes until sugars are above 70 and symptoms resolve.).     insulin aspart U-100 100 unit/mL (3 mL) Inpn pen  Commonly known as: NovoLOG  Inject 4 Units into the skin 3 (three) times daily.     insulin detemir U-100 100 unit/mL (3 mL) Inpn pen  Commonly known as: Levemir FLEXTOUCH  Inject 15 Units into the skin once daily.     lancets 33 gauge Misc  USE 4 TIMES A DAY     metoprolol tartrate 50 MG tablet  Commonly known as: LOPRESSOR  Take 50 mg by mouth 2 (two) times daily.     NIFEdipine 30 MG (OSM) 24 hr tablet  Commonly known as: PROCARDIA-XL  Take 60 mg by mouth once daily.     pen needle, diabetic 31 gauge x 5/16" Ndle  use 4 times a day     tamsulosin " 0.4 mg Cap  Commonly known as: FLOMAX  Take 1 capsule (0.4 mg total) by mouth once daily.     TRUE METRIX GLUCOSE METER Misc  Generic drug: blood-glucose meter  1 Device by Misc.(Non-Drug; Combo Route) route 4 (four) times daily.     TRULICITY 0.75 mg/0.5 mL pen injector  Generic drug: dulaglutide  Inject 0.75 mg into the skin every 7 days.            STOP taking these medications      clotrimazole 1 % cream  Commonly known as: LOTRIMIN     sodium bicarbonate 650 MG tablet              Time spent on the discharge of patient: 5 minutes    Ted Garvin MD  Urology  [facility]

## 2022-08-31 NOTE — TELEPHONE ENCOUNTER
----- Message from Akilah Kerr sent at 8/31/2022 12:01 PM CDT -----  Type:  Needs Medical Advice    Who Called: [facility]  Symptoms (please be specific): they are trying to determine if the pt was in pt /are observation status when discharged from the hospital     Would the patient rather a call back or a response via [facility]? call  Best Call Back Number [phone]:   Additional Information:

## 2022-08-31 NOTE — TELEPHONE ENCOUNTER
I spoke with a worker named Orlin who works for [facility] and his concern was knowing if Angel Bell (patient) was considered a in patient/outpatient observation when he was discharged. I voiced that he was considered a outpatient due to he didn't stay in the hospital no longer than 48 hours. He voiced his understanding and will be charting outpatient in his chart.

## 2022-09-01 ENCOUNTER — OFFICE VISIT (OUTPATIENT)
Dept: UROLOGY | Facility: CLINIC | Age: 62
End: 2022-09-01
Payer: COMMERCIAL

## 2022-09-01 ENCOUNTER — PATIENT MESSAGE (OUTPATIENT)
Dept: UROLOGY | Facility: CLINIC | Age: 62
End: 2022-09-01

## 2022-09-01 ENCOUNTER — TELEPHONE (OUTPATIENT)
Dept: UROLOGY | Facility: CLINIC | Age: 62
End: 2022-09-01

## 2022-09-01 VITALS
DIASTOLIC BLOOD PRESSURE: 91 MMHG | WEIGHT: 159.81 LBS | HEART RATE: 113 BPM | HEIGHT: 67 IN | BODY MASS INDEX: 25.08 KG/M2 | SYSTOLIC BLOOD PRESSURE: 184 MMHG

## 2022-09-01 DIAGNOSIS — R33.9 URINARY RETENTION: Primary | ICD-10-CM

## 2022-09-01 DIAGNOSIS — N13.30 BILATERAL HYDRONEPHROSIS: ICD-10-CM

## 2022-09-01 PROCEDURE — 3077F PR MOST RECENT SYSTOLIC BLOOD PRESSURE >= 140 MM HG: ICD-10-PCS | Mod: CPTII,S$GLB,, | Performed by: UROLOGY

## 2022-09-01 PROCEDURE — 3066F PR DOCUMENTATION OF TREATMENT FOR NEPHROPATHY: ICD-10-PCS | Mod: CPTII,S$GLB,, | Performed by: UROLOGY

## 2022-09-01 PROCEDURE — 3080F PR MOST RECENT DIASTOLIC BLOOD PRESSURE >= 90 MM HG: ICD-10-PCS | Mod: CPTII,S$GLB,, | Performed by: UROLOGY

## 2022-09-01 PROCEDURE — 3080F DIAST BP >= 90 MM HG: CPT | Mod: CPTII,S$GLB,, | Performed by: UROLOGY

## 2022-09-01 PROCEDURE — 3044F HG A1C LEVEL LT 7.0%: CPT | Mod: CPTII,S$GLB,, | Performed by: UROLOGY

## 2022-09-01 PROCEDURE — 99024 POSTOP FOLLOW-UP VISIT: CPT | Mod: S$GLB,,, | Performed by: UROLOGY

## 2022-09-01 PROCEDURE — 1160F RVW MEDS BY RX/DR IN RCRD: CPT | Mod: CPTII,S$GLB,, | Performed by: UROLOGY

## 2022-09-01 PROCEDURE — 3008F PR BODY MASS INDEX (BMI) DOCUMENTED: ICD-10-PCS | Mod: CPTII,S$GLB,, | Performed by: UROLOGY

## 2022-09-01 PROCEDURE — 3060F POS MICROALBUMINURIA REV: CPT | Mod: CPTII,S$GLB,, | Performed by: UROLOGY

## 2022-09-01 PROCEDURE — 3077F SYST BP >= 140 MM HG: CPT | Mod: CPTII,S$GLB,, | Performed by: UROLOGY

## 2022-09-01 PROCEDURE — 1159F MED LIST DOCD IN RCRD: CPT | Mod: CPTII,S$GLB,, | Performed by: UROLOGY

## 2022-09-01 PROCEDURE — 3008F BODY MASS INDEX DOCD: CPT | Mod: CPTII,S$GLB,, | Performed by: UROLOGY

## 2022-09-01 PROCEDURE — 1159F PR MEDICATION LIST DOCUMENTED IN MEDICAL RECORD: ICD-10-PCS | Mod: CPTII,S$GLB,, | Performed by: UROLOGY

## 2022-09-01 PROCEDURE — 99999 PR PBB SHADOW E&M-EST. PATIENT-LVL IV: ICD-10-PCS | Mod: PBBFAC,,, | Performed by: UROLOGY

## 2022-09-01 PROCEDURE — 99999 PR PBB SHADOW E&M-EST. PATIENT-LVL IV: CPT | Mod: PBBFAC,,, | Performed by: UROLOGY

## 2022-09-01 PROCEDURE — 3066F NEPHROPATHY DOC TX: CPT | Mod: CPTII,S$GLB,, | Performed by: UROLOGY

## 2022-09-01 PROCEDURE — 3044F PR MOST RECENT HEMOGLOBIN A1C LEVEL <7.0%: ICD-10-PCS | Mod: CPTII,S$GLB,, | Performed by: UROLOGY

## 2022-09-01 PROCEDURE — 99024 PR POST-OP FOLLOW-UP VISIT: ICD-10-PCS | Mod: S$GLB,,, | Performed by: UROLOGY

## 2022-09-01 PROCEDURE — 1160F PR REVIEW ALL MEDS BY PRESCRIBER/CLIN PHARMACIST DOCUMENTED: ICD-10-PCS | Mod: CPTII,S$GLB,, | Performed by: UROLOGY

## 2022-09-01 PROCEDURE — 3060F PR POS MICROALBUMINURIA RESULT DOCUMENTED/REVIEW: ICD-10-PCS | Mod: CPTII,S$GLB,, | Performed by: UROLOGY

## 2022-09-01 NOTE — PROGRESS NOTES
Subjective:       Patient ID: Angel Bell is a 61 y.o. male.    Chief Complaint: void trial     This is a 61 y.o.  male patient with urinary retention, BPH, bilateral hydronephrosis.  In late April 2022. CT scan at that time showed massive bilateral hydronephrosis and enlarged prostate with urinary retention.  Tavera catheter was placed after creatinine was noted to be 18.  His creatinine continued to improve.  Renal ultrasound showed improvement in hydronephrosis.      6/24/22: follow up after UDS.    7/15/22: Cr stable, PSA 12 down from 15, HgA1c down  8/16/22: follow up for cath change and Urine culture prior to surgery.   Doing well, questions answered re surgery   HoLEP 8/29/22     I personally reviewed the images: CT 4/22, QUOC 5/22 as above      Lab Results   Component Value Date    CREATININE 1.6 (H) 08/30/2022       ---  Past Medical History:   Diagnosis Date    Diabetes mellitus     Disorder of kidney and ureter     Hyperlipemia     Hypertension     Iron deficiency anemia     Migraine headache     PTSD (post-traumatic stress disorder)     Stage 3a chronic kidney disease        Past Surgical History:   Procedure Laterality Date    LASER ENUCLEATION OF PROSTATE N/A 8/29/2022    Procedure: ENUCLEATION, PROSTATE, USING LASER  Time: 180 minutes Equipment: high powered 100W laser with virtual basket, morcellator, scopes for HoLEP from [facility];  Surgeon: Ted Garvin MD;  Location: [facility] [place];  Service: Urology;  Laterality: N/A;  [facility] confirmed CW 8/26  confirmation # [id number]       Family History   Problem Relation Age of Onset    Diabetes Mother        Social History     Tobacco Use    Smoking status: Never    Smokeless tobacco: Never   Substance Use Topics    Alcohol use: No    Drug use: No       Current Outpatient Medications on File Prior to Visit   Medication Sig Dispense Refill    amoxicillin-clavulanate 875-125mg (AUGMENTIN) 875-125 mg per tablet Take 1 tablet by mouth 2 (two) times daily. for 5  "days 10 tablet 0    atorvastatin (LIPITOR) 40 MG tablet Take 1 tablet (40 mg total) by mouth once daily. 30 tablet 3    blood sugar diagnostic (TRUE METRIX GLUCOSE TEST STRIP) Strp TEST 4 TIMES A  each 11    blood-glucose meter (TRUE METRIX GLUCOSE METER) Misc 1 Device by Misc.(Non-Drug; Combo Route) route 4 (four) times daily. 1 each 0    blood-glucose sensor (DEXCOM G6 SENSOR) Philomena 3 each by Misc.(Non-Drug; Combo Route) route continuous. 3 each 11    blood-glucose transmitter (DEXCOM G6 TRANSMITTER) Philomena 1 each by Misc.(Non-Drug; Combo Route) route continuous. 1 each 3    docusate sodium (COLACE) 100 MG capsule Take 1 capsule (100 mg total) by mouth 2 (two) times daily. for 7 days 14 capsule 0    dulaglutide (TRULICITY) 0.75 mg/0.5 mL pen injector Inject 0.75 mg into the skin every 7 days. 4 pen 11    furosemide (LASIX) 40 MG tablet Take 1 tablet (40 mg total) by mouth daily as needed (swelling). 30 tablet 11    glucose 4 GM chewable tablet Take 4 tablets (16 g total) by mouth as needed for Low blood sugar (If having symptoms of blurry vision, palpitations, confusion, shakiness.  Please check sugars and if sugar below 70 please take 4 tablets and re-check sugar everry 15 minutes until sugars are above 70 and symptoms resolve.). 50 tablet 12    lancets 33 gauge Misc USE 4 TIMES A  each 11    metoprolol tartrate (LOPRESSOR) 50 MG tablet Take 50 mg by mouth 2 (two) times daily.      NIFEdipine (PROCARDIA-XL) 30 MG (OSM) 24 hr tablet Take 60 mg by mouth once daily.      pen needle, diabetic 31 gauge x 5/16" Ndle use 4 times a day 100 each 11    tamsulosin (FLOMAX) 0.4 mg Cap Take 1 capsule (0.4 mg total) by mouth once daily. 30 capsule 11    traMADoL (ULTRAM) 50 mg tablet Take 1 tablet (50 mg total) by mouth every 6 (six) hours as needed for Pain. 12 tablet 0    insulin aspart U-100 (NOVOLOG) 100 unit/mL (3 mL) InPn pen Inject 4 Units into the skin 3 (three) times daily. 15 mL 3    insulin detemir U-100 " (LEVEMIR FLEXTOUCH) 100 unit/mL (3 mL) SubQ InPn pen Inject 15 Units into the skin once daily. 15 mL 3     No current facility-administered medications on file prior to visit.       Review of patient's allergies indicates:  No Known Allergies    Review of Systems   Constitutional:  Negative for activity change, chills and fever.   HENT:  Negative for congestion.    Respiratory:  Negative for cough, chest tightness and shortness of breath.    Cardiovascular:  Negative for chest pain and palpitations.   Gastrointestinal:  Negative for abdominal distention, abdominal pain, nausea and vomiting.   Genitourinary:  Positive for difficulty urinating and frequency. Negative for flank pain, hematuria, penile pain, scrotal swelling and testicular pain.   Musculoskeletal:  Negative for gait problem.     Objective:      Physical Exam  Constitutional:       Appearance: Normal appearance.   HENT:      Head: Normocephalic.   Pulmonary:      Effort: Pulmonary effort is normal.      Breath sounds: Normal breath sounds.   Abdominal:      General: Abdomen is flat. Bowel sounds are normal.      Palpations: Abdomen is soft.      Tenderness: There is no abdominal tenderness. There is no right CVA tenderness, left CVA tenderness or guarding.   Genitourinary:     Penis: Normal.    Musculoskeletal:         General: Normal range of motion.      Cervical back: Normal range of motion.   Skin:     General: Skin is warm.   Neurological:      Mental Status: He is alert.         UDS 6/22 (Dr. Prasad):     voided with a maximum flow rate of 1 cc/second.  His detrusor pressure was 48.6 cm of water and maximum detrusor pressure was 59 cm of water.  There was no bladder sphincter dyssynergia and no uninhibited bladder contractions  Patient's Tavera was replaced  Impression urodynamic study associated with a an elevated detrusor pressure indicative of recent urinary retention with hopefully improving detrusor pressure  Recommendations correlate with  cystoscopy and truss.  Patient's bladder is hopefully recovering and is probably in need of some sort of procedure for outlet obstruction depending on the size of the prostate  such as HoLEP versus TURP versus open prostatectomy versus robotic prostatectomy    Assessment:     Problem Noted   Urinary Retention 4/29/2022    Distended bladder with bilateral hydroneprosis, ARF  Prostate volume calculated 112 myself (larger on CT measurements, 89gm on QUOC)  --UDS 6/22: shows some bladder contraction, low flow, HERRERA  --HoLEP 8/29/22     Bilateral Hydronephrosis 4/29/2022    QUOC 5/22: mild right hydronephrosis, greatly improved from CT         Plan:      Cath removed for void trial, return this PM if unable to void  If unable to void will place 18F coude alvarenga  Follow up in 2 weeks if voiding well    Ted Garvin MD

## 2022-09-01 NOTE — PATIENT INSTRUCTIONS
-Urinary urgency and possible incontinence are an anticipated part of the recovery, some patients opt to wear a protective pad to guard against accidents    -Avoid known bladder irritants while your urinary tract is recovering for the next 4-6 weeks:     Coffee - Regular & Decaf  Tea - caffeinated  Carbonated beverages - cola, non-raheel, diet & caffeine-free  Alcohols - Beer, Red Wine, White Wine, Champagne  Fruits - Grapefruit, Lemon, Orange, Pineapple  Fruit Juices - Cranberry, Grapefruit, Orange, Pineapple  Vegetables - Tomato & Tomato Products  Flavor Enhancers - Hot peppers, Spicy foods, Chili, Horseradish, Vinegar, Monosodium glutamate (MSG)  Artificial Sweeteners - NutraSweet, Sweet 'N Low, Equal (sweetener), Saccharin    After the procedure  You will have a catheter in your bladder after the procedure. If your urine color is  light and there is not much blood, the catheter may be removed before you leave the  hospital. If you need to go home with the catheter, it will be removed in the clinic.    Care at home after surgery  What to expect:  Expect to see blood in your urine for 1 to 2 weeks. This may last longer if you take  anticoagulant medication.  Some men may notice these symptoms after surgery:  ? Burning when you urinate  ? A small amount of pain or discomfort  ? Urine leakage (incontinence)    Activity  It is important to limit your activity at first to allow for healing.  ? First week - Rest and do as little activity as possible. Do not lift anything heavier than  10 pounds for 1 week.  ? Week 2 - Limit your activity, including exercise, to half of what you normally do.  ? Week 3 - You can return to your regular activities as you are able.  Limit strenuous activities such as biking, horseback riding and motorcycle riding for  1 month.    Retrograde ejaculation  Retrograde ejaculation means that ejaculate (semen) does not come out of the penis  during orgasm. You will have retrograde ejaculation  after your surgery. Your erections  and orgasms will feel the same. Some men say the experience of the orgasm is somehow changed without the ejaculate. Unfortunately, this is an irreversible effect of HoLEP.  After the prostate has been enucleated or hollowed out, there is not enough pressure to make the semen come out.    Caring for your bladder  You may have incontinence after your surgery. This is because the muscles that support  your bladder and stop the flow of urine are weak. You also may have an overactive  bladder. Incontinence does not usually last. It can improve if you do pelvic floor muscle  exercises. These tips can also help:  ? Wear an absorbent brief or pad to stay dry.  ? Stay hydrated. It is easy to think that if you drink less water, you will have less  urine leakage. This can actually make it worse because dehydration can irritate the  bladder.  ? Try to drink at least 2 liters (or 8 full 8-ounce glasses) of fluid a day unless your  urologist tells you otherwise. Fluids will also help to flush blood from your urine.    Pelvic floor exercises  Pelvic floor muscles run from the pubic bone to the tailbone. They support your bladder  and rectum. These muscles help with bowel control, bladder control and sexual function.If you have BPH, the muscles of your pelvic floor do not have to work as hard to prevent urine leakage. If you do not use these muscles, they will get weaker. This also may force your bladder muscle to overwork. After HoLEP surgery, your pelvic floor muscles can be too weak to support your overworking bladder. Pelvic floor exercises can help with urinary control and function. Kegel exercises are simple squeeze-and-relax exercises that can strengthen your pelvic floor muscles. Many men find that routinely doing their pelvic floor exercises helps with incontinence. Improving the function of the pelvic floor may also help with sexual function.    To practice your Kegel exercises, use  one of these cues:  ? Kegel's are an exercise trying to simulate the feeling of trying to stop your urine stream by squeezing your pelvic floor muscles.  ? When sitting in a chair, squeeze your muscles to lift your scrotum in toward your  belly button and off the chair.  ? Squeeze your pelvic floor muscles as if you are trying to stop from passing gas.  Once you have learned how to tighten your pelvic floor muscles, you are ready to start  doing pelvic floor exercises.    To start, lie on your back with your legs supported and relaxed. Squeeze and relax the  muscles of your pelvic floor. Once you are comfortable doing the exercises lying down,  you are ready to start doing them while sitting or standing.  When doing the exercises, remember these tips:  ? Do not hold your breath.  ? Try not to squeeze the muscles of your buttocks, thighs and abdomen.  ? Relax completely between contractions.    Apply the rule of 5s to your pelvic floor exercise routine:  ? Perform a set of 5 Kegel exercises 5 times a day.  ? Hold each contraction for 5 seconds.    The quality of your exercises is more important than the number of sets you do. Try to think about your pelvic floor muscles as you are moving them. It could take time for you to see a difference in your bladder control. Begin your Kegel exercises as soon as possible after surgery to strengthen your pelvic floor properly and get back your bladder control.    When to call your urologist  Call your urologist if you have any of these symptoms:  ? A temperature of more than 101 degrees F  ? Nausea and vomiting  ? Inability to urinate

## 2022-09-01 NOTE — TELEPHONE ENCOUNTER
I reached back out to the patient to make sure of that he's urinating on his own due to he'd gotten his catheter removed this morning. He voiced that he had been urinating all day on his own and is now ready to schedule his next visit. I went ahead and scheduled his two week follow up appointment with  09/20 at 8:20am.

## 2022-09-01 NOTE — TELEPHONE ENCOUNTER
----- Message from Brigitte San sent at 9/1/2022 10:20 AM CDT -----  Contact: [phone]  Who Called: mychal  Regarding: pt is refusing physical therapy , only wants nursing services   Would the patient rather a call back or a response via [facility]? Call back  Best Call Back Number: [phone]  Additional Information: n/a

## 2022-09-02 NOTE — TELEPHONE ENCOUNTER
Initial order was for PT/OT and Skilled.  Spoke with patient to confirm, he does not want PT/OT.  Is it ok to contact HH to discontinue per patient's request?  Please advise.

## 2022-09-07 LAB
FINAL PATHOLOGIC DIAGNOSIS: NORMAL
GROSS: NORMAL
Lab: NORMAL
MICROSCOPIC EXAM: NORMAL

## 2022-09-14 ENCOUNTER — OFFICE VISIT (OUTPATIENT)
Dept: PRIMARY CARE CLINIC | Facility: CLINIC | Age: 62
End: 2022-09-14
Payer: COMMERCIAL

## 2022-09-14 VITALS
BODY MASS INDEX: 26.19 KG/M2 | OXYGEN SATURATION: 97 % | HEART RATE: 96 BPM | DIASTOLIC BLOOD PRESSURE: 79 MMHG | SYSTOLIC BLOOD PRESSURE: 159 MMHG | WEIGHT: 167.25 LBS

## 2022-09-14 DIAGNOSIS — N40.1 BENIGN PROSTATIC HYPERPLASIA WITH URINARY OBSTRUCTION: Primary | ICD-10-CM

## 2022-09-14 DIAGNOSIS — N13.30 BILATERAL HYDRONEPHROSIS: ICD-10-CM

## 2022-09-14 DIAGNOSIS — N13.8 BENIGN PROSTATIC HYPERPLASIA WITH URINARY OBSTRUCTION: Primary | ICD-10-CM

## 2022-09-14 PROBLEM — N17.9 ACUTE KIDNEY INJURY SUPERIMPOSED ON CHRONIC KIDNEY DISEASE: Status: RESOLVED | Noted: 2022-06-16 | Resolved: 2022-09-14

## 2022-09-14 PROBLEM — A41.9 SEVERE SEPSIS: Status: RESOLVED | Noted: 2022-06-16 | Resolved: 2022-09-14

## 2022-09-14 PROBLEM — N39.0 UTI (URINARY TRACT INFECTION): Status: RESOLVED | Noted: 2022-06-16 | Resolved: 2022-09-14

## 2022-09-14 PROBLEM — R65.20 SEVERE SEPSIS: Status: RESOLVED | Noted: 2022-06-16 | Resolved: 2022-09-14

## 2022-09-14 PROBLEM — R06.09 DOE (DYSPNEA ON EXERTION): Status: RESOLVED | Noted: 2022-06-16 | Resolved: 2022-09-14

## 2022-09-14 PROBLEM — N13.9 OBSTRUCTIVE UROPATHY: Status: RESOLVED | Noted: 2022-04-29 | Resolved: 2022-09-14

## 2022-09-14 PROBLEM — N18.9 ACUTE KIDNEY INJURY SUPERIMPOSED ON CHRONIC KIDNEY DISEASE: Status: RESOLVED | Noted: 2022-06-16 | Resolved: 2022-09-14

## 2022-09-14 PROBLEM — R33.9 URINARY RETENTION: Status: RESOLVED | Noted: 2022-04-29 | Resolved: 2022-09-14

## 2022-09-14 PROCEDURE — 3077F PR MOST RECENT SYSTOLIC BLOOD PRESSURE >= 140 MM HG: ICD-10-PCS | Mod: CPTII,S$GLB,, | Performed by: INTERNAL MEDICINE

## 2022-09-14 PROCEDURE — 3077F SYST BP >= 140 MM HG: CPT | Mod: CPTII,S$GLB,, | Performed by: INTERNAL MEDICINE

## 2022-09-14 PROCEDURE — 3060F PR POS MICROALBUMINURIA RESULT DOCUMENTED/REVIEW: ICD-10-PCS | Mod: CPTII,S$GLB,, | Performed by: INTERNAL MEDICINE

## 2022-09-14 PROCEDURE — 3044F HG A1C LEVEL LT 7.0%: CPT | Mod: CPTII,S$GLB,, | Performed by: INTERNAL MEDICINE

## 2022-09-14 PROCEDURE — 3008F BODY MASS INDEX DOCD: CPT | Mod: CPTII,S$GLB,, | Performed by: INTERNAL MEDICINE

## 2022-09-14 PROCEDURE — 99999 PR PBB SHADOW E&M-EST. PATIENT-LVL III: ICD-10-PCS | Mod: PBBFAC,,, | Performed by: INTERNAL MEDICINE

## 2022-09-14 PROCEDURE — 99213 PR OFFICE/OUTPT VISIT, EST, LEVL III, 20-29 MIN: ICD-10-PCS | Mod: S$GLB,,, | Performed by: INTERNAL MEDICINE

## 2022-09-14 PROCEDURE — 3066F PR DOCUMENTATION OF TREATMENT FOR NEPHROPATHY: ICD-10-PCS | Mod: CPTII,S$GLB,, | Performed by: INTERNAL MEDICINE

## 2022-09-14 PROCEDURE — 3066F NEPHROPATHY DOC TX: CPT | Mod: CPTII,S$GLB,, | Performed by: INTERNAL MEDICINE

## 2022-09-14 PROCEDURE — 99213 OFFICE O/P EST LOW 20 MIN: CPT | Mod: S$GLB,,, | Performed by: INTERNAL MEDICINE

## 2022-09-14 PROCEDURE — 99999 PR PBB SHADOW E&M-EST. PATIENT-LVL III: CPT | Mod: PBBFAC,,, | Performed by: INTERNAL MEDICINE

## 2022-09-14 PROCEDURE — 3078F PR MOST RECENT DIASTOLIC BLOOD PRESSURE < 80 MM HG: ICD-10-PCS | Mod: CPTII,S$GLB,, | Performed by: INTERNAL MEDICINE

## 2022-09-14 PROCEDURE — 3078F DIAST BP <80 MM HG: CPT | Mod: CPTII,S$GLB,, | Performed by: INTERNAL MEDICINE

## 2022-09-14 PROCEDURE — 1159F MED LIST DOCD IN RCRD: CPT | Mod: CPTII,S$GLB,, | Performed by: INTERNAL MEDICINE

## 2022-09-14 PROCEDURE — 1159F PR MEDICATION LIST DOCUMENTED IN MEDICAL RECORD: ICD-10-PCS | Mod: CPTII,S$GLB,, | Performed by: INTERNAL MEDICINE

## 2022-09-14 PROCEDURE — 3008F PR BODY MASS INDEX (BMI) DOCUMENTED: ICD-10-PCS | Mod: CPTII,S$GLB,, | Performed by: INTERNAL MEDICINE

## 2022-09-14 PROCEDURE — 3044F PR MOST RECENT HEMOGLOBIN A1C LEVEL <7.0%: ICD-10-PCS | Mod: CPTII,S$GLB,, | Performed by: INTERNAL MEDICINE

## 2022-09-14 PROCEDURE — 3060F POS MICROALBUMINURIA REV: CPT | Mod: CPTII,S$GLB,, | Performed by: INTERNAL MEDICINE

## 2022-09-14 RX ORDER — INSULIN ASPART 100 [IU]/ML
3 INJECTION, SOLUTION INTRAVENOUS; SUBCUTANEOUS DAILY PRN
Qty: 15 ML | Refills: 3 | Status: SHIPPED | OUTPATIENT
Start: 2022-09-14 | End: 2022-12-15 | Stop reason: ALTCHOICE

## 2022-09-14 NOTE — PROGRESS NOTES
"[facility]   New Visit Progress Note   Recent Hospital Discharge     PRESENTING HISTORY     Chief Complaint/Reason for Admission:  Follow up Hospital Discharge   PCP: Tiago Ruiz MD    History of Present Illness:  Mr. Angel Bell is a 61 y.o. male who was recently admitted to the hospital.    [facility]  Urology  Discharge Summary        Patient Name: Angel Bell  MRN: [id number]  Admission Date: 8/29/2022  Hospital Length of Stay: 0 days  Discharge Date: 8/30/22  Attending Physician: No att. providers found   Discharging Provider: Ted Garvin MD  Primary Care Physician: Tiago Ruiz MD  ___________________________________________________________________    Today:  [facility] for [facility] follow up.  Patient hospitalized for planned laser enucleation of prostate for management of BPH with urinary retention and hydronephrosis.   Admitted post op for continuous bladder irrigation.   Uncomplicated recovery.  Discharged to home with Tavera in place, which was successfully removed at Urology follow up on 9/1/22.  [facility].     Unaccompanied today.  Ambulatory and independent with ADL's.  Reports compliance with all medication.  Blood glucoses under good control.  Voiding without difficulty.   Surgical pathology with benign prostatic tissue and no evidence of malignancy.     Review of Systems  General ROS: negative for chills, fever or weight loss  Psychological ROS: negative for hallucination, depression or suicidal ideation  Ophthalmic ROS: negative for blurry vision, photophobia or eye pain  ENT ROS: negative for epistaxis, sore throat or rhinorrhea  Respiratory ROS: no cough, shortness of breath, or wheezing  Cardiovascular ROS: no chest pain or dyspnea on exertion  Gastrointestinal ROS: no abdominal pain, change in bowel habits, or black/ bloody stools  Genito-Urinary ROS: no dysuria, trouble voiding, or hematuria  Musculoskeletal ROS: " negative for gait disturbance or muscular weakness  Neurological ROS: no syncope or seizures; no ataxia  Dermatological ROS: negative for pruritis, rash and jaundice    PAST HISTORY:     Past Medical History:   Diagnosis Date    Diabetes mellitus     Disorder of kidney and ureter     Hyperlipemia     Hypertension     Iron deficiency anemia     Migraine headache     PTSD (post-traumatic stress disorder)     Stage 3a chronic kidney disease        Past Surgical History:   Procedure Laterality Date    LASER ENUCLEATION OF PROSTATE N/A 8/29/2022    Procedure: ENUCLEATION, PROSTATE, USING LASER  Time: 180 minutes Equipment: high powered 100W laser with virtual basket, morcellator, scopes for [facility]P from [facility];  Surgeon: Ted Garvin MD;  Location: [facility] OR;  Service: Urology;  Laterality: N/A;  [facility] confirmed CW 8/26  confirmation # [id number]       Family History   Problem Relation Age of Onset    Diabetes Mother          MEDICATIONS & ALLERGIES:     Current Outpatient Medications on File Prior to Visit   Medication Sig Dispense Refill    atorvastatin (LIPITOR) 40 MG tablet Take 1 tablet (40 mg total) by mouth once daily. 30 tablet 3    blood sugar diagnostic (TRUE METRIX GLUCOSE TEST STRIP) Strp TEST 4 TIMES A  each 11    blood-glucose meter (TRUE METRIX GLUCOSE METER) Misc 1 Device by Misc.(Non-Drug; Combo Route) route 4 (four) times daily. 1 each 0    blood-glucose sensor (DEXCOM G6 SENSOR) Philomena 3 each by Misc.(Non-Drug; Combo Route) route continuous. 3 each 11    blood-glucose transmitter (DEXCOM G6 TRANSMITTER) Philomena 1 each by Misc.(Non-Drug; Combo Route) route continuous. 1 each 3    dulaglutide (TRULICITY) 0.75 mg/0.5 mL pen injector Inject 0.75 mg into the skin every 7 days. 4 pen 11    furosemide (LASIX) 40 MG tablet Take 1 tablet (40 mg total) by mouth daily as needed (swelling). 30 tablet 11    insulin aspart U-100 (NOVOLOG) 100 unit/mL (3 mL) InPn pen Inject 4 Units into the skin 3 (three) times  "daily. (Patient taking differently: Inject 3 Units into the skin 3 (three) times daily.) 15 mL 3    insulin detemir U-100 (LEVEMIR FLEXTOUCH) 100 unit/mL (3 mL) SubQ InPn pen Inject 15 Units into the skin once daily. (Patient taking differently: Inject 12 Units into the skin once daily.) 15 mL 3    lancets 33 gauge Misc USE 4 TIMES A  each 11    metoprolol tartrate (LOPRESSOR) 50 MG tablet Take 50 mg by mouth 2 (two) times daily.      NIFEdipine (PROCARDIA-XL) 30 MG (OSM) 24 hr tablet Take 60 mg by mouth once daily.      pen needle, diabetic 31 gauge x 5/16" Ndle use 4 times a day 100 each 11    tamsulosin (FLOMAX) 0.4 mg Cap Take 1 capsule (0.4 mg total) by mouth once daily. 30 capsule 11    glucose 4 GM chewable tablet Take 4 tablets (16 g total) by mouth as needed for Low blood sugar (If having symptoms of blurry vision, palpitations, confusion, shakiness.  Please check sugars and if sugar below 70 please take 4 tablets and re-check sugar everry 15 minutes until sugars are above 70 and symptoms resolve.). (Patient not taking: Reported on 9/14/2022) 50 tablet 12     No current facility-administered medications on file prior to visit.        Review of patient's allergies indicates:  No Known Allergies    OBJECTIVE:     Vital Signs:  BP (!) 159/79 (BP Location: Right arm, Patient Position: Sitting, BP Method: Small (Automatic))   Pulse 96   Wt 75.8 kg (167 lb 3.5 oz)   SpO2 97%   BMI 26.19 kg/m²   Wt Readings from Last 3 Encounters:   09/14/22 1017 75.8 kg (167 lb 3.5 oz)   09/01/22 0820 72.5 kg (159 lb 13.3 oz)   08/30/22 0434 72.5 kg (159 lb 13.3 oz)   08/29/22 0650 76.7 kg (169 lb)     Body mass index is 26.19 kg/m².   97%    Physical Exam:  BP (!) 159/79 (BP Location: Right arm, Patient Position: Sitting, BP Method: Small (Automatic))   Pulse 96   Wt 75.8 kg (167 lb 3.5 oz)   SpO2 97%   BMI 26.19 kg/m²   General appearance: alert, cooperative, no distress  Constitutional:Oriented to person, place, " and time  + appears well-developed and well-nourished.   HEENT: Normocephalic, atraumatic, neck symmetrical, no nasal discharge   Eyes: conjunctivae/corneas clear, PERRL, EOM's intact  Lungs: clear to auscultation bilaterally, no dullness to percussion bilaterally  Heart: regular rate and rhythm without rub; no displacement of the PMI   Abdomen: soft, non-tender; bowel sounds normoactive; no organomegaly  Extremities: extremities symmetric; no clubbing, cyanosis, or edema  Integument: Skin color, texture, turgor normal; no rashes; hair distrubution normal  Neurologic: Alert and oriented X 3, normal strength, normal coordination and gait  Psychiatric: no pressured speech; normal affect; no evidence of impaired cognition     Laboratory  Lab Results   Component Value Date    WBC 10.53 08/30/2022    HGB 10.6 (L) 08/30/2022    HCT 32.6 (L) 08/30/2022    MCV 91 08/30/2022     08/30/2022     BMP  Lab Results   Component Value Date     08/30/2022    K 5.1 08/30/2022     08/30/2022    CO2 24 08/30/2022    BUN 18 08/30/2022    CREATININE 1.6 (H) 08/30/2022    CALCIUM 9.2 08/30/2022    ANIONGAP 8 08/30/2022    ESTGFRAFRICA 50.3 (A) 07/18/2022    EGFRNONAA 43.5 (A) 07/18/2022     Lab Results   Component Value Date    ALT 13 08/29/2022    AST 15 08/29/2022    ALKPHOS 64 08/29/2022    BILITOT 0.3 08/29/2022     Lab Results   Component Value Date    INR 1.0 08/16/2022    INR 1.1 07/11/2016    INR 1.0 07/02/2016     Lab Results   Component Value Date    HGBA1C 4.9 08/15/2022       ASSESSMENT & PLAN:     Benign prostatic hyperplasia with urinary obstruction  Bilateral hydronephrosis  - s/p laser enucleation of prostate as above, with resolution of urinary obstruction   - further management per Urology team    Other orders   Epic medcard updated to reflect patient reported medication changes-   -     insulin detemir U-100 (LEVEMIR FLEXTOUCH) 100 unit/mL (3 mL) SubQ InPn pen; Inject 12 Units into the skin once  daily.  -     insulin aspart U-100 (NOVOLOG) 100 unit/mL (3 mL) InPn pen; Inject 3 Units into the skin daily as needed. Use with largest meal of the day.  Dispense: 15 mL; Refill: 3    [facility].  He will see his PCP, Dr Ruiz, 10/24/22.     Instructions for the patient:      Scheduled Follow-up :  Future Appointments   Date Time Provider Department Center   9/20/2022  8:20 AM Ted Garvin MD [facility] UROLOGY [facility]   10/5/2022  4:00 PM Eleanor Will MD [facility]   10/24/2022  8:40 AM Tiago Ruiz MD [facility] MED [facility]   12/2/2022  9:00 AM LAB, [facility]   12/9/2022 10:30 AM Wilian Byers MD [facility]       Post Visit Medication List:     Medication List            Accurate as of September 14, 2022 11:48 AM. If you have any questions, ask your nurse or doctor.                CHANGE how you take these medications      insulin aspart U-100 100 unit/mL (3 mL) Inpn pen  Commonly known as: NovoLOG  Inject 3 Units into the skin daily as needed. Use with largest meal of the day.  What changed:   how much to take  when to take this  reasons to take this  additional instructions  Changed by: Yolanda Weaver MD            CONTINUE taking these medications      atorvastatin 40 MG tablet  Commonly known as: LIPITOR  Take 1 tablet (40 mg total) by mouth once daily.     blood sugar diagnostic Strp  Commonly known as: TRUE METRIX GLUCOSE TEST STRIP  TEST 4 TIMES A DAY     DEXCOM G6 SENSOR Philomena  Generic drug: blood-glucose sensor  3 each by Misc.(Non-Drug; Combo Route) route continuous.     DEXCOM G6 TRANSMITTER Philomena  Generic drug: blood-glucose transmitter  1 each by Misc.(Non-Drug; Combo Route) route continuous.     furosemide 40 MG tablet  Commonly known as: LASIX  Take 1 tablet (40 mg total) by mouth daily as needed (swelling).     glucose 4 GM chewable tablet  Take 4 tablets (16 g total) by mouth as needed for Low blood sugar (If  "having symptoms of blurry vision, palpitations, confusion, shakiness.  Please check sugars and if sugar below 70 please take 4 tablets and re-check sugar everry 15 minutes until sugars are above 70 and symptoms resolve.).     insulin detemir U-100 100 unit/mL (3 mL) Inpn pen  Commonly known as: Levemir FLEXTOUCH  Inject 12 Units into the skin once daily.     lancets 33 gauge Misc  USE 4 TIMES A DAY     metoprolol tartrate 50 MG tablet  Commonly known as: LOPRESSOR     NIFEdipine 30 MG (OSM) 24 hr tablet  Commonly known as: PROCARDIA-XL     pen needle, diabetic 31 gauge x 5/16" Ndle  use 4 times a day     tamsulosin 0.4 mg Cap  Commonly known as: FLOMAX  Take 1 capsule (0.4 mg total) by mouth once daily.     TRUE METRIX GLUCOSE METER Misc  Generic drug: blood-glucose meter  1 Device by Misc.(Non-Drug; Combo Route) route 4 (four) times daily.     TRULICITY 0.75 mg/0.5 mL pen injector  Generic drug: dulaglutide  Inject 0.75 mg into the skin every 7 days.               Where to Get Your Medications        These medications were sent to [id number] - [place], [place] - [address] [facility] AT [facility] [address]  [address] [facility] [address]      Phone: [phone]   insulin aspart U-100 100 unit/mL (3 mL) Inpn pen       Information about where to get these medications is not yet available    Ask your nurse or doctor about these medications  insulin detemir U-100 100 unit/mL (3 mL) Inpn pen         Signing Physician:  Yolanda Weaver MD      "

## 2022-09-20 ENCOUNTER — OFFICE VISIT (OUTPATIENT)
Dept: UROLOGY | Facility: CLINIC | Age: 62
End: 2022-09-20
Payer: COMMERCIAL

## 2022-09-20 VITALS
DIASTOLIC BLOOD PRESSURE: 87 MMHG | BODY MASS INDEX: 26.23 KG/M2 | WEIGHT: 167.13 LBS | HEART RATE: 102 BPM | SYSTOLIC BLOOD PRESSURE: 156 MMHG | HEIGHT: 67 IN

## 2022-09-20 DIAGNOSIS — R33.9 URINARY RETENTION: Primary | ICD-10-CM

## 2022-09-20 DIAGNOSIS — N40.1 BENIGN PROSTATIC HYPERPLASIA WITH URINARY OBSTRUCTION: ICD-10-CM

## 2022-09-20 DIAGNOSIS — N13.30 BILATERAL HYDRONEPHROSIS: ICD-10-CM

## 2022-09-20 DIAGNOSIS — N13.8 BENIGN PROSTATIC HYPERPLASIA WITH URINARY OBSTRUCTION: ICD-10-CM

## 2022-09-20 LAB
BILIRUB SERPL-MCNC: NEGATIVE MG/DL
BLOOD URINE, POC: 50
CLARITY, POC UA: CLEAR
COLOR, POC UA: COLORLESS
GLUCOSE UR QL STRIP: NORMAL
KETONES UR QL STRIP: NEGATIVE
LEUKOCYTE ESTERASE URINE, POC: ABNORMAL
NITRITE, POC UA: NEGATIVE
PH, POC UA: 5
POC RESIDUAL URINE VOLUME: 82 ML (ref 0–100)
PROTEIN, POC: ABNORMAL
SPECIFIC GRAVITY, POC UA: 1.01
UROBILINOGEN, POC UA: NORMAL

## 2022-09-20 PROCEDURE — 1160F RVW MEDS BY RX/DR IN RCRD: CPT | Mod: CPTII,S$GLB,, | Performed by: UROLOGY

## 2022-09-20 PROCEDURE — 1159F MED LIST DOCD IN RCRD: CPT | Mod: CPTII,S$GLB,, | Performed by: UROLOGY

## 2022-09-20 PROCEDURE — 3079F DIAST BP 80-89 MM HG: CPT | Mod: CPTII,S$GLB,, | Performed by: UROLOGY

## 2022-09-20 PROCEDURE — 99999 PR PBB SHADOW E&M-EST. PATIENT-LVL IV: ICD-10-PCS | Mod: PBBFAC,,, | Performed by: UROLOGY

## 2022-09-20 PROCEDURE — 3044F HG A1C LEVEL LT 7.0%: CPT | Mod: CPTII,S$GLB,, | Performed by: UROLOGY

## 2022-09-20 PROCEDURE — 3060F PR POS MICROALBUMINURIA RESULT DOCUMENTED/REVIEW: ICD-10-PCS | Mod: CPTII,S$GLB,, | Performed by: UROLOGY

## 2022-09-20 PROCEDURE — 99024 PR POST-OP FOLLOW-UP VISIT: ICD-10-PCS | Mod: S$GLB,,, | Performed by: UROLOGY

## 2022-09-20 PROCEDURE — 99999 PR PBB SHADOW E&M-EST. PATIENT-LVL IV: CPT | Mod: PBBFAC,,, | Performed by: UROLOGY

## 2022-09-20 PROCEDURE — 3066F PR DOCUMENTATION OF TREATMENT FOR NEPHROPATHY: ICD-10-PCS | Mod: CPTII,S$GLB,, | Performed by: UROLOGY

## 2022-09-20 PROCEDURE — 3008F BODY MASS INDEX DOCD: CPT | Mod: CPTII,S$GLB,, | Performed by: UROLOGY

## 2022-09-20 PROCEDURE — 1159F PR MEDICATION LIST DOCUMENTED IN MEDICAL RECORD: ICD-10-PCS | Mod: CPTII,S$GLB,, | Performed by: UROLOGY

## 2022-09-20 PROCEDURE — 81002 URINALYSIS NONAUTO W/O SCOPE: CPT | Mod: S$GLB,,, | Performed by: UROLOGY

## 2022-09-20 PROCEDURE — 3077F PR MOST RECENT SYSTOLIC BLOOD PRESSURE >= 140 MM HG: ICD-10-PCS | Mod: CPTII,S$GLB,, | Performed by: UROLOGY

## 2022-09-20 PROCEDURE — 1160F PR REVIEW ALL MEDS BY PRESCRIBER/CLIN PHARMACIST DOCUMENTED: ICD-10-PCS | Mod: CPTII,S$GLB,, | Performed by: UROLOGY

## 2022-09-20 PROCEDURE — 51798 POCT BLADDER SCAN: ICD-10-PCS | Mod: S$GLB,,, | Performed by: UROLOGY

## 2022-09-20 PROCEDURE — 3077F SYST BP >= 140 MM HG: CPT | Mod: CPTII,S$GLB,, | Performed by: UROLOGY

## 2022-09-20 PROCEDURE — 81002 POCT URINE DIPSTICK WITHOUT MICROSCOPE: ICD-10-PCS | Mod: S$GLB,,, | Performed by: UROLOGY

## 2022-09-20 PROCEDURE — 3060F POS MICROALBUMINURIA REV: CPT | Mod: CPTII,S$GLB,, | Performed by: UROLOGY

## 2022-09-20 PROCEDURE — 51798 US URINE CAPACITY MEASURE: CPT | Mod: S$GLB,,, | Performed by: UROLOGY

## 2022-09-20 PROCEDURE — 3079F PR MOST RECENT DIASTOLIC BLOOD PRESSURE 80-89 MM HG: ICD-10-PCS | Mod: CPTII,S$GLB,, | Performed by: UROLOGY

## 2022-09-20 PROCEDURE — 99024 POSTOP FOLLOW-UP VISIT: CPT | Mod: S$GLB,,, | Performed by: UROLOGY

## 2022-09-20 PROCEDURE — 3044F PR MOST RECENT HEMOGLOBIN A1C LEVEL <7.0%: ICD-10-PCS | Mod: CPTII,S$GLB,, | Performed by: UROLOGY

## 2022-09-20 PROCEDURE — 3008F PR BODY MASS INDEX (BMI) DOCUMENTED: ICD-10-PCS | Mod: CPTII,S$GLB,, | Performed by: UROLOGY

## 2022-09-20 PROCEDURE — 3066F NEPHROPATHY DOC TX: CPT | Mod: CPTII,S$GLB,, | Performed by: UROLOGY

## 2022-09-20 NOTE — PROGRESS NOTES
Subjective:       Patient ID: Angel Bell is a 61 y.o. male.    Chief Complaint: Follow-up (2 week)     This is a 61 y.o.  male patient with urinary retention, BPH, bilateral hydronephrosis.  In late April 2022. CT scan at that time showed massive bilateral hydronephrosis and enlarged prostate with urinary retention.  Tavera catheter was placed after creatinine was noted to be 18.  His creatinine continued to improve.  Renal ultrasound showed improvement in hydronephrosis.      6/24/22: follow up after UDS.    7/15/22: Cr stable, PSA 12 down from 15, HgA1c down  8/16/22: follow up for cath change and Urine culture prior to surgery.   Doing well, questions answered re surgery   HoLEP 8/29/22    Doing well post op, AUA SS 4/2, PVR 82; no symptoms     I personally reviewed the images: CT 4/22, QUOC 5/22 as above      IPSS Questionnaire (AUA-SS):  Over the past month    1)  Incomplete Emptying - How often have you had a sensation of not emptying your bladder completely after you finish urinating?  0 - Not at all   2)  Frequency - How often have you had to urinate again less than two hours after you finished urinating? 2 - Less than half the time   3)  Intermittency - How often have you found you stopped and started again several times when you urinated?  0 - Not at all   4) Urgency - How difficult have you found it to postpone urination?  0 - Not at all   5) Weak Stream - How often have you had a weak urinary stream?  0 - Not at all   6) Straining  - How often have you had to push or strain to begin urination?  0 - Not at all   7) Nocturia - How many times did you most typically get up to urinate from the time you went to bed until the time you got up in the morning?  2 - 2 times   Total score:  0-7 mild, 8-19 moderate, 20-35 severe 4   Quality of Life:  1 - Pleased          Lab Results   Component Value Date    CREATININE 1.6 (H) 08/30/2022       ---  Past Medical History:   Diagnosis Date    Diabetes mellitus      Disorder of kidney and ureter     Hyperlipemia     Hypertension     Iron deficiency anemia     Migraine headache     PTSD (post-traumatic stress disorder)     Stage 3a chronic kidney disease        Past Surgical History:   Procedure Laterality Date    LASER ENUCLEATION OF PROSTATE N/A 8/29/2022    Procedure: ENUCLEATION, PROSTATE, USING LASER  Time: 180 minutes Equipment: high powered 100W laser with virtual basket, morcellator, scopes for HoLEP from [facility];  Surgeon: Ted Garvin MD;  Location: [facility];  Service: Urology;  Laterality: N/A;  [facility] confirmed CW 8/26  confirmation # [id number]       Family History   Problem Relation Age of Onset    Diabetes Mother        Social History     Tobacco Use    Smoking status: Never    Smokeless tobacco: Never   Substance Use Topics    Alcohol use: No    Drug use: No       Current Outpatient Medications on File Prior to Visit   Medication Sig Dispense Refill    atorvastatin (LIPITOR) 40 MG tablet Take 1 tablet (40 mg total) by mouth once daily. 30 tablet 3    blood sugar diagnostic (TRUE METRIX GLUCOSE TEST STRIP) Strp TEST 4 TIMES A  each 11    blood-glucose meter (TRUE METRIX GLUCOSE METER) Misc 1 Device by Misc.(Non-Drug; Combo Route) route 4 (four) times daily. 1 each 0    blood-glucose sensor (DEXCOM G6 SENSOR) Philomena 3 each by Misc.(Non-Drug; Combo Route) route continuous. 3 each 11    blood-glucose transmitter (DEXCOM G6 TRANSMITTER) Philomena 1 each by Misc.(Non-Drug; Combo Route) route continuous. 1 each 3    dulaglutide (TRULICITY) 0.75 mg/0.5 mL pen injector Inject 0.75 mg into the skin every 7 days. 4 pen 11    furosemide (LASIX) 40 MG tablet Take 1 tablet (40 mg total) by mouth daily as needed (swelling). 30 tablet 11    glucose 4 GM chewable tablet Take 4 tablets (16 g total) by mouth as needed for Low blood sugar (If having symptoms of blurry vision, palpitations, confusion, shakiness.  Please check sugars and if sugar below 70 please take 4 tablets and  "re-check sugar everry 15 minutes until sugars are above 70 and symptoms resolve.). 50 tablet 12    insulin aspart U-100 (NOVOLOG) 100 unit/mL (3 mL) InPn pen Inject 3 Units into the skin daily as needed. Use with largest meal of the day. 15 mL 3    insulin detemir U-100 (LEVEMIR FLEXTOUCH) 100 unit/mL (3 mL) SubQ InPn pen Inject 12 Units into the skin once daily.      lancets 33 gauge Misc USE 4 TIMES A  each 11    metoprolol tartrate (LOPRESSOR) 50 MG tablet Take 50 mg by mouth 2 (two) times daily.      NIFEdipine (PROCARDIA-XL) 30 MG (OSM) 24 hr tablet Take 60 mg by mouth once daily.      pen needle, diabetic 31 gauge x 5/16" Ndle use 4 times a day 100 each 11    tamsulosin (FLOMAX) 0.4 mg Cap Take 1 capsule (0.4 mg total) by mouth once daily. 30 capsule 11     No current facility-administered medications on file prior to visit.       Review of patient's allergies indicates:  No Known Allergies    Review of Systems   Constitutional:  Negative for activity change, chills and fever.   HENT:  Negative for congestion.    Respiratory:  Negative for cough, chest tightness and shortness of breath.    Cardiovascular:  Negative for chest pain and palpitations.   Gastrointestinal:  Negative for abdominal distention, abdominal pain, nausea and vomiting.   Genitourinary:  Negative for difficulty urinating, flank pain, frequency, hematuria, penile pain, scrotal swelling and testicular pain.   Musculoskeletal:  Negative for gait problem.     Objective:      Physical Exam  Constitutional:       Appearance: Normal appearance.   HENT:      Head: Normocephalic.   Pulmonary:      Effort: Pulmonary effort is normal.      Breath sounds: Normal breath sounds.   Abdominal:      General: Abdomen is flat. Bowel sounds are normal.      Palpations: Abdomen is soft.      Tenderness: There is no abdominal tenderness. There is no right CVA tenderness, left CVA tenderness or guarding.   Genitourinary:     Penis: Normal.  "   Musculoskeletal:         General: Normal range of motion.      Cervical back: Normal range of motion.   Skin:     General: Skin is warm.   Neurological:      Mental Status: He is alert.         UDS 6/22 (Dr. Prasad):     voided with a maximum flow rate of 1 cc/second.  His detrusor pressure was 48.6 cm of water and maximum detrusor pressure was 59 cm of water.  There was no bladder sphincter dyssynergia and no uninhibited bladder contractions  Patient's Tavera was replaced  Impression urodynamic study associated with a an elevated detrusor pressure indicative of recent urinary retention with hopefully improving detrusor pressure  Recommendations correlate with cystoscopy and truss.  Patient's bladder is hopefully recovering and is probably in need of some sort of procedure for outlet obstruction depending on the size of the prostate  such as HoLEP versus TURP versus open prostatectomy versus robotic prostatectomy    Assessment:     Problem Noted   Bilateral Hydronephrosis 4/29/2022    QUOC 5/22: mild right hydronephrosis, greatly improved from CT     Benign Prostatic Hyperplasia With Urinary Obstruction 4/29/2022    AUA SS 4/2 post HoLEP, PVR 82     Urinary Retention (Resolved) 4/29/2022    Distended bladder with bilateral hydroneprosis, ARF  Prostate volume calculated 112 myself (larger on CT measurements, 89gm on QUOC)  --UDS 6/22: shows some bladder contraction, low flow, HERRERA  --HoLEP 8/29/22, path negative volume 75         Plan:      Doing well  Follow up in 6 weeks with QUOC prior     Ted Garvin MD

## 2022-09-28 ENCOUNTER — PATIENT MESSAGE (OUTPATIENT)
Dept: FAMILY MEDICINE | Facility: CLINIC | Age: 62
End: 2022-09-28
Payer: COMMERCIAL

## 2022-09-28 ENCOUNTER — TELEPHONE (OUTPATIENT)
Dept: FAMILY MEDICINE | Facility: CLINIC | Age: 62
End: 2022-09-28
Payer: COMMERCIAL

## 2022-09-28 DIAGNOSIS — I10 HYPERTENSION, UNSPECIFIED TYPE: Primary | ICD-10-CM

## 2022-09-28 RX ORDER — METOPROLOL TARTRATE 50 MG/1
50 TABLET ORAL 2 TIMES DAILY
Qty: 180 TABLET | Refills: 1 | Status: SHIPPED | OUTPATIENT
Start: 2022-09-28 | End: 2022-10-05

## 2022-10-04 ENCOUNTER — LAB VISIT (OUTPATIENT)
Dept: LAB | Facility: HOSPITAL | Age: 62
End: 2022-10-04
Attending: INTERNAL MEDICINE
Payer: COMMERCIAL

## 2022-10-04 DIAGNOSIS — N18.32 ANEMIA IN STAGE 3B CHRONIC KIDNEY DISEASE: ICD-10-CM

## 2022-10-04 DIAGNOSIS — N18.32 STAGE 3B CHRONIC KIDNEY DISEASE: ICD-10-CM

## 2022-10-04 DIAGNOSIS — D63.1 ANEMIA IN STAGE 3B CHRONIC KIDNEY DISEASE: ICD-10-CM

## 2022-10-04 LAB
ALBUMIN SERPL BCP-MCNC: 4.6 G/DL (ref 3.5–5.2)
ANION GAP SERPL CALC-SCNC: 11 MMOL/L (ref 8–16)
BASOPHILS # BLD AUTO: 0.03 K/UL (ref 0–0.2)
BASOPHILS NFR BLD: 0.4 % (ref 0–1.9)
CALCIUM SERPL-MCNC: 9.4 MG/DL (ref 8.7–10.5)
CHLORIDE SERPL-SCNC: 106 MMOL/L (ref 95–110)
CO2 SERPL-SCNC: 26 MMOL/L (ref 23–29)
CREAT SERPL-MCNC: 1.78 MG/DL (ref 0.5–1.4)
DIFFERENTIAL METHOD: ABNORMAL
EOSINOPHIL # BLD AUTO: 0 K/UL (ref 0–0.5)
EOSINOPHIL NFR BLD: 0.4 % (ref 0–8)
ERYTHROCYTE [DISTWIDTH] IN BLOOD BY AUTOMATED COUNT: 14.6 % (ref 11.5–14.5)
EST. GFR  (NO RACE VARIABLE): 42.6 ML/MIN/1.73 M^2
GLUCOSE SERPL-MCNC: 120 MG/DL (ref 70–110)
HCT VFR BLD AUTO: 39.8 % (ref 40–54)
HGB BLD-MCNC: 13.3 G/DL (ref 14–18)
IMM GRANULOCYTES # BLD AUTO: 0.02 K/UL (ref 0–0.04)
IMM GRANULOCYTES NFR BLD AUTO: 0.2 % (ref 0–0.5)
LYMPHOCYTES # BLD AUTO: 2.7 K/UL (ref 1–4.8)
LYMPHOCYTES NFR BLD: 32.1 % (ref 18–48)
MCH RBC QN AUTO: 29.9 PG (ref 27–31)
MCHC RBC AUTO-ENTMCNC: 33.4 G/DL (ref 32–36)
MCV RBC AUTO: 89 FL (ref 82–98)
MONOCYTES # BLD AUTO: 0.7 K/UL (ref 0.3–1)
MONOCYTES NFR BLD: 8.3 % (ref 4–15)
NEUTROPHILS # BLD AUTO: 4.9 K/UL (ref 1.8–7.7)
NEUTROPHILS NFR BLD: 58.6 % (ref 38–73)
NRBC BLD-RTO: 0 /100 WBC
PHOSPHATE SERPL-MCNC: 3.3 MG/DL (ref 2.7–4.5)
PLATELET # BLD AUTO: 228 K/UL (ref 150–450)
PMV BLD AUTO: 9.7 FL (ref 9.2–12.9)
POTASSIUM SERPL-SCNC: 4.3 MMOL/L (ref 3.5–5.1)
RBC # BLD AUTO: 4.45 M/UL (ref 4.6–6.2)
SODIUM SERPL-SCNC: 143 MMOL/L (ref 136–145)
UUN UR-MCNC: 28 MG/DL (ref 2–20)
WBC # BLD AUTO: 8.33 K/UL (ref 3.9–12.7)

## 2022-10-04 PROCEDURE — 85025 COMPLETE CBC W/AUTO DIFF WBC: CPT | Mod: PO | Performed by: INTERNAL MEDICINE

## 2022-10-04 PROCEDURE — 80069 RENAL FUNCTION PANEL: CPT | Mod: PO | Performed by: INTERNAL MEDICINE

## 2022-10-04 PROCEDURE — 36415 COLL VENOUS BLD VENIPUNCTURE: CPT | Mod: PO | Performed by: INTERNAL MEDICINE

## 2022-10-19 ENCOUNTER — EXTERNAL HOME HEALTH (OUTPATIENT)
Dept: HOME HEALTH SERVICES | Facility: HOSPITAL | Age: 62
End: 2022-10-19
Payer: COMMERCIAL

## 2022-10-28 ENCOUNTER — OFFICE VISIT (OUTPATIENT)
Dept: FAMILY MEDICINE | Facility: CLINIC | Age: 62
End: 2022-10-28
Payer: COMMERCIAL

## 2022-10-28 VITALS
SYSTOLIC BLOOD PRESSURE: 142 MMHG | WEIGHT: 175.5 LBS | OXYGEN SATURATION: 98 % | HEIGHT: 67 IN | HEART RATE: 117 BPM | BODY MASS INDEX: 27.55 KG/M2 | DIASTOLIC BLOOD PRESSURE: 80 MMHG

## 2022-10-28 DIAGNOSIS — N18.32 STAGE 3B CHRONIC KIDNEY DISEASE: ICD-10-CM

## 2022-10-28 DIAGNOSIS — Z09 FOLLOW-UP EXAM: Primary | ICD-10-CM

## 2022-10-28 DIAGNOSIS — I10 PRIMARY HYPERTENSION: ICD-10-CM

## 2022-10-28 DIAGNOSIS — Z12.11 COLON CANCER SCREENING: ICD-10-CM

## 2022-10-28 DIAGNOSIS — Z79.4 TYPE 2 DIABETES MELLITUS WITH STAGE 3B CHRONIC KIDNEY DISEASE, WITH LONG-TERM CURRENT USE OF INSULIN: ICD-10-CM

## 2022-10-28 DIAGNOSIS — N40.0 BENIGN PROSTATIC HYPERPLASIA, UNSPECIFIED WHETHER LOWER URINARY TRACT SYMPTOMS PRESENT: ICD-10-CM

## 2022-10-28 DIAGNOSIS — R00.0 TACHYCARDIA: ICD-10-CM

## 2022-10-28 DIAGNOSIS — N18.32 TYPE 2 DIABETES MELLITUS WITH STAGE 3B CHRONIC KIDNEY DISEASE, WITH LONG-TERM CURRENT USE OF INSULIN: ICD-10-CM

## 2022-10-28 DIAGNOSIS — E11.22 TYPE 2 DIABETES MELLITUS WITH STAGE 3B CHRONIC KIDNEY DISEASE, WITH LONG-TERM CURRENT USE OF INSULIN: ICD-10-CM

## 2022-10-28 PROCEDURE — 3060F POS MICROALBUMINURIA REV: CPT | Mod: CPTII,S$GLB,, | Performed by: FAMILY MEDICINE

## 2022-10-28 PROCEDURE — 3079F DIAST BP 80-89 MM HG: CPT | Mod: CPTII,S$GLB,, | Performed by: FAMILY MEDICINE

## 2022-10-28 PROCEDURE — 3066F PR DOCUMENTATION OF TREATMENT FOR NEPHROPATHY: ICD-10-PCS | Mod: CPTII,S$GLB,, | Performed by: FAMILY MEDICINE

## 2022-10-28 PROCEDURE — 3066F NEPHROPATHY DOC TX: CPT | Mod: CPTII,S$GLB,, | Performed by: FAMILY MEDICINE

## 2022-10-28 PROCEDURE — 3044F HG A1C LEVEL LT 7.0%: CPT | Mod: CPTII,S$GLB,, | Performed by: FAMILY MEDICINE

## 2022-10-28 PROCEDURE — 99999 PR PBB SHADOW E&M-EST. PATIENT-LVL V: CPT | Mod: PBBFAC,,, | Performed by: FAMILY MEDICINE

## 2022-10-28 PROCEDURE — 3060F PR POS MICROALBUMINURIA RESULT DOCUMENTED/REVIEW: ICD-10-PCS | Mod: CPTII,S$GLB,, | Performed by: FAMILY MEDICINE

## 2022-10-28 PROCEDURE — 3077F PR MOST RECENT SYSTOLIC BLOOD PRESSURE >= 140 MM HG: ICD-10-PCS | Mod: CPTII,S$GLB,, | Performed by: FAMILY MEDICINE

## 2022-10-28 PROCEDURE — 3044F PR MOST RECENT HEMOGLOBIN A1C LEVEL <7.0%: ICD-10-PCS | Mod: CPTII,S$GLB,, | Performed by: FAMILY MEDICINE

## 2022-10-28 PROCEDURE — 1159F PR MEDICATION LIST DOCUMENTED IN MEDICAL RECORD: ICD-10-PCS | Mod: CPTII,S$GLB,, | Performed by: FAMILY MEDICINE

## 2022-10-28 PROCEDURE — 99214 PR OFFICE/OUTPT VISIT, EST, LEVL IV, 30-39 MIN: ICD-10-PCS | Mod: S$GLB,,, | Performed by: FAMILY MEDICINE

## 2022-10-28 PROCEDURE — 3077F SYST BP >= 140 MM HG: CPT | Mod: CPTII,S$GLB,, | Performed by: FAMILY MEDICINE

## 2022-10-28 PROCEDURE — 99214 OFFICE O/P EST MOD 30 MIN: CPT | Mod: S$GLB,,, | Performed by: FAMILY MEDICINE

## 2022-10-28 PROCEDURE — 1159F MED LIST DOCD IN RCRD: CPT | Mod: CPTII,S$GLB,, | Performed by: FAMILY MEDICINE

## 2022-10-28 PROCEDURE — 99999 PR PBB SHADOW E&M-EST. PATIENT-LVL V: ICD-10-PCS | Mod: PBBFAC,,, | Performed by: FAMILY MEDICINE

## 2022-10-28 PROCEDURE — 3079F PR MOST RECENT DIASTOLIC BLOOD PRESSURE 80-89 MM HG: ICD-10-PCS | Mod: CPTII,S$GLB,, | Performed by: FAMILY MEDICINE

## 2022-10-28 RX ORDER — METOPROLOL SUCCINATE 50 MG/1
50 TABLET, EXTENDED RELEASE ORAL 2 TIMES DAILY
Qty: 180 TABLET | Refills: 3 | Status: SHIPPED | OUTPATIENT
Start: 2022-10-28 | End: 2022-10-28

## 2022-10-28 RX ORDER — METOPROLOL SUCCINATE 100 MG/1
100 TABLET, EXTENDED RELEASE ORAL DAILY
Qty: 30 TABLET | Refills: 2 | Status: SHIPPED | OUTPATIENT
Start: 2022-10-28 | End: 2023-01-25 | Stop reason: SDUPTHER

## 2022-11-01 ENCOUNTER — HOSPITAL ENCOUNTER (OUTPATIENT)
Dept: RADIOLOGY | Facility: HOSPITAL | Age: 62
Discharge: HOME | End: 2022-11-01
Attending: UROLOGY
Payer: COMMERCIAL

## 2022-11-01 DIAGNOSIS — N13.8 BENIGN PROSTATIC HYPERPLASIA WITH URINARY OBSTRUCTION: ICD-10-CM

## 2022-11-01 DIAGNOSIS — R33.9 URINARY RETENTION: ICD-10-CM

## 2022-11-01 DIAGNOSIS — N13.30 BILATERAL HYDRONEPHROSIS: ICD-10-CM

## 2022-11-01 DIAGNOSIS — N40.1 BENIGN PROSTATIC HYPERPLASIA WITH URINARY OBSTRUCTION: ICD-10-CM

## 2022-11-01 PROCEDURE — 76770 US EXAM ABDO BACK WALL COMP: CPT | Mod: TC,PO

## 2022-11-03 ENCOUNTER — OFFICE VISIT (OUTPATIENT)
Dept: UROLOGY | Facility: CLINIC | Age: 62
End: 2022-11-03
Payer: COMMERCIAL

## 2022-11-03 VITALS
BODY MASS INDEX: 26.99 KG/M2 | HEIGHT: 67 IN | DIASTOLIC BLOOD PRESSURE: 93 MMHG | WEIGHT: 171.94 LBS | SYSTOLIC BLOOD PRESSURE: 128 MMHG | HEART RATE: 128 BPM

## 2022-11-03 DIAGNOSIS — N13.30 BILATERAL HYDRONEPHROSIS: ICD-10-CM

## 2022-11-03 DIAGNOSIS — N13.8 BENIGN PROSTATIC HYPERPLASIA WITH URINARY OBSTRUCTION: ICD-10-CM

## 2022-11-03 DIAGNOSIS — N40.1 BENIGN PROSTATIC HYPERPLASIA WITH URINARY OBSTRUCTION: ICD-10-CM

## 2022-11-03 DIAGNOSIS — N52.9 ERECTILE DYSFUNCTION, UNSPECIFIED ERECTILE DYSFUNCTION TYPE: Primary | ICD-10-CM

## 2022-11-03 PROCEDURE — 3060F POS MICROALBUMINURIA REV: CPT | Mod: CPTII,S$GLB,, | Performed by: UROLOGY

## 2022-11-03 PROCEDURE — 3008F PR BODY MASS INDEX (BMI) DOCUMENTED: ICD-10-PCS | Mod: CPTII,S$GLB,, | Performed by: UROLOGY

## 2022-11-03 PROCEDURE — 99999 PR PBB SHADOW E&M-EST. PATIENT-LVL IV: ICD-10-PCS | Mod: PBBFAC,,, | Performed by: UROLOGY

## 2022-11-03 PROCEDURE — 3066F NEPHROPATHY DOC TX: CPT | Mod: CPTII,S$GLB,, | Performed by: UROLOGY

## 2022-11-03 PROCEDURE — 99213 PR OFFICE/OUTPT VISIT, EST, LEVL III, 20-29 MIN: ICD-10-PCS | Mod: 24,S$GLB,, | Performed by: UROLOGY

## 2022-11-03 PROCEDURE — 1159F MED LIST DOCD IN RCRD: CPT | Mod: CPTII,S$GLB,, | Performed by: UROLOGY

## 2022-11-03 PROCEDURE — 99999 PR PBB SHADOW E&M-EST. PATIENT-LVL IV: CPT | Mod: PBBFAC,,, | Performed by: UROLOGY

## 2022-11-03 PROCEDURE — 3080F DIAST BP >= 90 MM HG: CPT | Mod: CPTII,S$GLB,, | Performed by: UROLOGY

## 2022-11-03 PROCEDURE — 1159F PR MEDICATION LIST DOCUMENTED IN MEDICAL RECORD: ICD-10-PCS | Mod: CPTII,S$GLB,, | Performed by: UROLOGY

## 2022-11-03 PROCEDURE — 1160F PR REVIEW ALL MEDS BY PRESCRIBER/CLIN PHARMACIST DOCUMENTED: ICD-10-PCS | Mod: CPTII,S$GLB,, | Performed by: UROLOGY

## 2022-11-03 PROCEDURE — 3008F BODY MASS INDEX DOCD: CPT | Mod: CPTII,S$GLB,, | Performed by: UROLOGY

## 2022-11-03 PROCEDURE — 3074F SYST BP LT 130 MM HG: CPT | Mod: CPTII,S$GLB,, | Performed by: UROLOGY

## 2022-11-03 PROCEDURE — 3044F HG A1C LEVEL LT 7.0%: CPT | Mod: CPTII,S$GLB,, | Performed by: UROLOGY

## 2022-11-03 PROCEDURE — 99213 OFFICE O/P EST LOW 20 MIN: CPT | Mod: 24,S$GLB,, | Performed by: UROLOGY

## 2022-11-03 PROCEDURE — 3044F PR MOST RECENT HEMOGLOBIN A1C LEVEL <7.0%: ICD-10-PCS | Mod: CPTII,S$GLB,, | Performed by: UROLOGY

## 2022-11-03 PROCEDURE — 3074F PR MOST RECENT SYSTOLIC BLOOD PRESSURE < 130 MM HG: ICD-10-PCS | Mod: CPTII,S$GLB,, | Performed by: UROLOGY

## 2022-11-03 PROCEDURE — 3066F PR DOCUMENTATION OF TREATMENT FOR NEPHROPATHY: ICD-10-PCS | Mod: CPTII,S$GLB,, | Performed by: UROLOGY

## 2022-11-03 PROCEDURE — 3080F PR MOST RECENT DIASTOLIC BLOOD PRESSURE >= 90 MM HG: ICD-10-PCS | Mod: CPTII,S$GLB,, | Performed by: UROLOGY

## 2022-11-03 PROCEDURE — 1160F RVW MEDS BY RX/DR IN RCRD: CPT | Mod: CPTII,S$GLB,, | Performed by: UROLOGY

## 2022-11-03 PROCEDURE — 3060F PR POS MICROALBUMINURIA RESULT DOCUMENTED/REVIEW: ICD-10-PCS | Mod: CPTII,S$GLB,, | Performed by: UROLOGY

## 2022-11-03 RX ORDER — SILDENAFIL CITRATE 20 MG/1
20 TABLET ORAL DAILY PRN
Qty: 30 TABLET | Refills: 5 | Status: SHIPPED | OUTPATIENT
Start: 2022-11-03 | End: 2024-02-06 | Stop reason: SDUPTHER

## 2022-11-03 NOTE — PROGRESS NOTES
Subjective:       Patient ID: Angel Bell is a 61 y.o. male.    Chief Complaint: Follow-up (6 week)     This is a 61 y.o.  male patient with urinary retention, BPH, bilateral hydronephrosis.  In late April 2022. CT scan at that time showed massive bilateral hydronephrosis and enlarged prostate with urinary retention.  Tavera catheter was placed after creatinine was noted to be 18.  His creatinine continued to improve.  Renal ultrasound showed improvement in hydronephrosis.      6/24/22: follow up after UDS.    7/15/22: Cr stable, PSA 12 down from 15, HgA1c down  8/16/22: follow up for cath change and Urine culture prior to surgery.   Doing well, questions answered re surgery   HoLEP 8/29/22  QUOC after w/o hydronephrosis  Having some ED, had prior to catheter.      Doing well post op, AUA SS 4/2, PVR 82         IPSS Questionnaire (AUA-SS):  Over the past month    1)  Incomplete Emptying - How often have you had a sensation of not emptying your bladder completely after you finish urinating?  0 - Not at all   2)  Frequency - How often have you had to urinate again less than two hours after you finished urinating? 2 - Less than half the time   3)  Intermittency - How often have you found you stopped and started again several times when you urinated?  0 - Not at all   4) Urgency - How difficult have you found it to postpone urination?  0 - Not at all   5) Weak Stream - How often have you had a weak urinary stream?  0 - Not at all   6) Straining  - How often have you had to push or strain to begin urination?  0 - Not at all   7) Nocturia - How many times did you most typically get up to urinate from the time you went to bed until the time you got up in the morning?  2 - 2 times   Total score:  0-7 mild, 8-19 moderate, 20-35 severe 4   Quality of Life:  1 - Pleased          Lab Results   Component Value Date    CREATININE 1.78 (H) 10/04/2022       ---  Past Medical History:   Diagnosis Date    Diabetes mellitus      Disorder of kidney and ureter     Hyperlipemia     Hypertension     Iron deficiency anemia     Migraine headache     PTSD (post-traumatic stress disorder)     Stage 3a chronic kidney disease        Past Surgical History:   Procedure Laterality Date    LASER ENUCLEATION OF PROSTATE N/A 8/29/2022    Procedure: ENUCLEATION, PROSTATE, USING LASER  Time: 180 minutes Equipment: high powered 100W laser with virtual basket, morcellator, scopes for HoLEP from [facility];  Surgeon: Ted Garvin MD;  Location: [facility] OR;  Service: Urology;  Laterality: N/A;  [facility] confirmed CW 8/26  confirmation # [id number]       Family History   Problem Relation Age of Onset    Diabetes Mother        Social History     Tobacco Use    Smoking status: Never    Smokeless tobacco: Never   Substance Use Topics    Alcohol use: No    Drug use: No       Current Outpatient Medications on File Prior to Visit   Medication Sig Dispense Refill    atorvastatin (LIPITOR) 40 MG tablet Take 1 tablet (40 mg total) by mouth once daily. 30 tablet 3    blood sugar diagnostic (TRUE METRIX GLUCOSE TEST STRIP) Strp TEST 4 TIMES A  each 11    blood-glucose meter (TRUE METRIX GLUCOSE METER) Misc 1 Device by Misc.(Non-Drug; Combo Route) route 4 (four) times daily. 1 each 0    dulaglutide (TRULICITY) 0.75 mg/0.5 mL pen injector Inject into the skin every 7 days.      finerenone (KERENDIA) 10 mg Tab Take 10 mg by mouth once daily. 30 tablet 11    furosemide (LASIX) 40 MG tablet Take 1 tablet (40 mg total) by mouth daily as needed (swelling). 30 tablet 11    glucose 4 GM chewable tablet Take 4 tablets (16 g total) by mouth as needed for Low blood sugar (If having symptoms of blurry vision, palpitations, confusion, shakiness.  Please check sugars and if sugar below 70 please take 4 tablets and re-check sugar everry 15 minutes until sugars are above 70 and symptoms resolve.). 50 tablet 12    insulin aspart U-100 (NOVOLOG) 100 unit/mL (3 mL) InPn pen Inject 3 Units  "into the skin daily as needed. Use with largest meal of the day. 15 mL 3    insulin detemir U-100 (LEVEMIR FLEXTOUCH) 100 unit/mL (3 mL) SubQ InPn pen Inject 10 Units into the skin once daily. 3.6 mL 3    lancets 33 gauge Misc USE 4 TIMES A  each 11    metoprolol succinate (TOPROL-XL) 100 MG 24 hr tablet Take 1 tablet (100 mg total) by mouth once daily. 30 tablet 2    NIFEdipine (PROCARDIA-XL) 30 MG (OSM) 24 hr tablet Take 60 mg by mouth once daily.      pen needle, diabetic 31 gauge x 5/16" Ndle use 4 times a day 100 each 11    tamsulosin (FLOMAX) 0.4 mg Cap Take 1 capsule (0.4 mg total) by mouth once daily. 30 capsule 11    blood-glucose sensor (DEXCOM G6 SENSOR) Philomena 3 each by Misc.(Non-Drug; Combo Route) route continuous. 3 each 11    blood-glucose transmitter (DEXCOM G6 TRANSMITTER) Philomena 1 each by Misc.(Non-Drug; Combo Route) route continuous. 1 each 3     No current facility-administered medications on file prior to visit.       Review of patient's allergies indicates:  No Known Allergies    Review of Systems   Constitutional:  Negative for activity change, chills and fever.   HENT:  Negative for congestion.    Respiratory:  Negative for cough, chest tightness and shortness of breath.    Cardiovascular:  Negative for chest pain and palpitations.   Gastrointestinal:  Negative for abdominal distention, abdominal pain, nausea and vomiting.   Genitourinary:  Negative for difficulty urinating, flank pain, frequency, hematuria, penile pain, scrotal swelling and testicular pain.   Musculoskeletal:  Negative for gait problem.     Objective:      Physical Exam  Constitutional:       Appearance: Normal appearance.   HENT:      Head: Normocephalic.   Pulmonary:      Effort: Pulmonary effort is normal.      Breath sounds: Normal breath sounds.   Abdominal:      General: Abdomen is flat. Bowel sounds are normal.      Palpations: Abdomen is soft.      Tenderness: There is no abdominal tenderness. There is no right " CVA tenderness, left CVA tenderness or guarding.   Genitourinary:     Penis: Normal.    Musculoskeletal:         General: Normal range of motion.      Cervical back: Normal range of motion.   Skin:     General: Skin is warm.   Neurological:      Mental Status: He is alert.         UDS 6/22 (Dr. Prasad):     voided with a maximum flow rate of 1 cc/second.  His detrusor pressure was 48.6 cm of water and maximum detrusor pressure was 59 cm of water.  There was no bladder sphincter dyssynergia and no uninhibited bladder contractions  Patient's Tavera was replaced  Impression urodynamic study associated with a an elevated detrusor pressure indicative of recent urinary retention with hopefully improving detrusor pressure  Recommendations correlate with cystoscopy and truss.  Patient's bladder is hopefully recovering and is probably in need of some sort of procedure for outlet obstruction depending on the size of the prostate  such as HoLEP versus TURP versus open prostatectomy versus robotic prostatectomy    Assessment:     Problem Noted   Bilateral Hydronephrosis 4/29/2022    QUOC 5/22: mild right hydronephrosis, greatly improved from CT  QUOC 10/22: no hydronephrosis post HoLEP     Erectile Dysfunction 11/3/2022    Prior to catheter  Sildenafil started 11/2022     Benign Prostatic Hyperplasia With Urinary Obstruction 4/29/2022    AUA SS 4/2 post HoLEP, PVR 82     Urinary Retention (Resolved) 4/29/2022    Distended bladder with bilateral hydroneprosis, ARF  Prostate volume calculated 112 myself (larger on CT measurements, 89gm on QUOC)  --UDS 6/22: shows some bladder contraction, low flow, HERRERA  --HoLEP 8/29/22, path negative volume 75         Plan:      Start sildenafil for ED  Follow up in 6 months  QUOC reviewed, no hydronephrosis, voiding well    Ted Garvin MD

## 2022-11-17 ENCOUNTER — PATIENT OUTREACH (OUTPATIENT)
Dept: ADMINISTRATIVE | Facility: HOSPITAL | Age: 62
End: 2022-11-17
Payer: COMMERCIAL

## 2022-12-02 ENCOUNTER — LAB VISIT (OUTPATIENT)
Dept: LAB | Facility: HOSPITAL | Age: 62
End: 2022-12-02
Attending: GENERAL ACUTE CARE HOSPITAL
Payer: COMMERCIAL

## 2022-12-02 LAB
ESTIMATED AVG GLUCOSE: 143 MG/DL (ref 68–131)
HBA1C MFR BLD: 6.6 % (ref 4–5.6)

## 2022-12-02 PROCEDURE — 83036 HEMOGLOBIN GLYCOSYLATED A1C: CPT | Performed by: GENERAL ACUTE CARE HOSPITAL

## 2022-12-02 PROCEDURE — 36415 COLL VENOUS BLD VENIPUNCTURE: CPT | Mod: PO | Performed by: GENERAL ACUTE CARE HOSPITAL

## 2022-12-09 ENCOUNTER — PATIENT MESSAGE (OUTPATIENT)
Dept: FAMILY MEDICINE | Facility: CLINIC | Age: 62
End: 2022-12-09
Payer: COMMERCIAL

## 2022-12-15 ENCOUNTER — OFFICE VISIT (OUTPATIENT)
Dept: ENDOCRINOLOGY | Facility: CLINIC | Age: 62
End: 2022-12-15
Payer: COMMERCIAL

## 2022-12-15 DIAGNOSIS — E78.5 HYPERLIPIDEMIA, UNSPECIFIED HYPERLIPIDEMIA TYPE: ICD-10-CM

## 2022-12-15 DIAGNOSIS — I10 HYPERTENSION, ESSENTIAL: ICD-10-CM

## 2022-12-15 DIAGNOSIS — E11.65 TYPE 2 DIABETES MELLITUS WITH HYPERGLYCEMIA, WITH LONG-TERM CURRENT USE OF INSULIN: Primary | ICD-10-CM

## 2022-12-15 DIAGNOSIS — Z79.4 TYPE 2 DIABETES MELLITUS WITH HYPERGLYCEMIA, WITH LONG-TERM CURRENT USE OF INSULIN: Primary | ICD-10-CM

## 2022-12-15 PROCEDURE — 99214 PR OFFICE/OUTPT VISIT, EST, LEVL IV, 30-39 MIN: ICD-10-PCS | Mod: 95,,, | Performed by: GENERAL ACUTE CARE HOSPITAL

## 2022-12-15 PROCEDURE — 3044F HG A1C LEVEL LT 7.0%: CPT | Mod: CPTII,95,, | Performed by: GENERAL ACUTE CARE HOSPITAL

## 2022-12-15 PROCEDURE — 3066F PR DOCUMENTATION OF TREATMENT FOR NEPHROPATHY: ICD-10-PCS | Mod: CPTII,95,, | Performed by: GENERAL ACUTE CARE HOSPITAL

## 2022-12-15 PROCEDURE — 1159F MED LIST DOCD IN RCRD: CPT | Mod: CPTII,95,, | Performed by: GENERAL ACUTE CARE HOSPITAL

## 2022-12-15 PROCEDURE — 1159F PR MEDICATION LIST DOCUMENTED IN MEDICAL RECORD: ICD-10-PCS | Mod: CPTII,95,, | Performed by: GENERAL ACUTE CARE HOSPITAL

## 2022-12-15 PROCEDURE — 3066F NEPHROPATHY DOC TX: CPT | Mod: CPTII,95,, | Performed by: GENERAL ACUTE CARE HOSPITAL

## 2022-12-15 PROCEDURE — 3060F POS MICROALBUMINURIA REV: CPT | Mod: CPTII,95,, | Performed by: GENERAL ACUTE CARE HOSPITAL

## 2022-12-15 PROCEDURE — 3044F PR MOST RECENT HEMOGLOBIN A1C LEVEL <7.0%: ICD-10-PCS | Mod: CPTII,95,, | Performed by: GENERAL ACUTE CARE HOSPITAL

## 2022-12-15 PROCEDURE — 99214 OFFICE O/P EST MOD 30 MIN: CPT | Mod: 95,,, | Performed by: GENERAL ACUTE CARE HOSPITAL

## 2022-12-15 PROCEDURE — 1160F RVW MEDS BY RX/DR IN RCRD: CPT | Mod: CPTII,95,, | Performed by: GENERAL ACUTE CARE HOSPITAL

## 2022-12-15 PROCEDURE — 3060F PR POS MICROALBUMINURIA RESULT DOCUMENTED/REVIEW: ICD-10-PCS | Mod: CPTII,95,, | Performed by: GENERAL ACUTE CARE HOSPITAL

## 2022-12-15 PROCEDURE — 1160F PR REVIEW ALL MEDS BY PRESCRIBER/CLIN PHARMACIST DOCUMENTED: ICD-10-PCS | Mod: CPTII,95,, | Performed by: GENERAL ACUTE CARE HOSPITAL

## 2022-12-15 NOTE — PROGRESS NOTES
Subjective:      Patient ID: Angel Bell is a 62 y.o. male.      The patient location is: HOME   The chief complaint leading to consultation is: DM2     Visit type: audiovisual    Face to Face time with patient: 25 MINUTES   35 minutes of total time spent on the encounter, which includes face to face time and non-face to face time preparing to see the patient (eg, review of tests), Obtaining and/or reviewing separately obtained history, Documenting clinical information in the electronic or other health record, Independently interpreting results (not separately reported) and communicating results to the patient/family/caregiver, or Care coordination (not separately reported).         Each patient to whom he or she provides medical services by telemedicine is:  (1) informed of the relationship between the physician and patient and the respective role of any other health care provider with respect to management of the patient; and (2) notified that he or she may decline to receive medical services by telemedicine and may withdraw from such care at any time.    Notes:      Chief Complaint:  DM2;  Follow up visit     Patient Active Problem List   Diagnosis    Type 2 diabetes, uncontrolled, with renal manifestation    Hyperglycemia    Hyperlipidemia    Hypertension    Family history of prostate cancer in father    Bilateral hydronephrosis    Normocytic anemia    Benign prostatic hyperplasia with urinary obstruction    Tachycardia    Type 2 diabetes mellitus, with long-term current use of insulin    Anemia due to chronic kidney disease    Erectile dysfunction       History of Present Illness  61 YO Male w/ PMH as above that presents to the endocrine clinic for follow up of DM2.  Pt today reports feels well and denies any complaints.  Denies hyper or hypoglycemic symptoms.            Interval history:     Pt was last seen on 8/2022 and plan was:       Decrease Levemir to 12 units daily     Decrease Novolog to 3 units TID      Start Trulicity 0.75mg SC weekly           With regards to Diabetes Mellitus:   -Type 2    -Duration:  Dx 2016  -FH of DM? Mother DM2    -Microvascular complications: (Nephropathy/CKD)  -Macrovascular complications:  DENIES   -DKA?  Yes   -HHS? Yes   -DM Medications:  Novolog 3 units w/ Largest meal,   Levemir is 10 units in the morning, Trulicity 0.75 mg SC weekly     -Previously tried medications:  Metformin (CKD)   -Compliance w/ Meds:  YES   -Hyperglycemia symptoms: DENIES Since DC from hospital   -Hypoglycemia symptoms:  DENIES   -Know how to correct hypoglycemias: Yes      -Glucose monitoring:  Checking 4 times (  90 - 110)    AT GOAL      -Diet:     Been doing < 150g of carbs during the day.    Stopped all sweets and soft drinks     -Activity:  Sedentary        -Pancreatitis? DENIES      -Thyroid CA?  DENIES     -ETOH Abuse?  DENIES     Diabetes Management Status    Statin: Taking  ACE/ARB: Not taking    Screening or Prevention Patient's value Goal Complete/Controlled?   HgA1C Testing and Control   Lab Results   Component Value Date    HGBA1C 6.6 (H) 12/02/2022      Annually/Less than 8% Yes   Lipid profile : 04/29/2022 Annually Yes   LDL control Lab Results   Component Value Date    LDLCALC 88.2 04/29/2022    Annually/Less than 100 mg/dl  Yes   Nephropathy screening Lab Results   Component Value Date    LABMICR 122.0 08/15/2022     Lab Results   Component Value Date    PROTEINUA Negative 07/11/2022    Annually Yes   Blood pressure BP Readings from Last 1 Encounters:   11/03/22 (!) 128/93    Less than 140/90 No   Dilated retinal exam : 05/25/2022 Annually Yes   Foot exam   : 05/23/2022 Annually No        ROS:   As above    Objective:     There were no vitals taken for this visit.  BP Readings from Last 3 Encounters:   11/03/22 (!) 128/93   10/28/22 (!) 142/80   10/05/22 (!) 181/95     Wt Readings from Last 1 Encounters:   11/03/22 0827 78 kg (171 lb 15.3 oz)     There is no height or weight on file to  calculate BMI.      PE (NOT  PERFORMED,  VIRTUAL VISIT)            Lab Review:   Lab Results   Component Value Date    HGBA1C 6.6 (H) 12/02/2022     Lab Results   Component Value Date    CHOL 143 04/29/2022    HDL 32 (L) 04/29/2022    LDLCALC 88.2 04/29/2022    TRIG 114 04/29/2022    CHOLHDL 22.4 04/29/2022     Lab Results   Component Value Date     10/04/2022    K 4.3 10/04/2022     10/04/2022    CO2 26 10/04/2022     (H) 10/04/2022    BUN 28 (H) 10/04/2022    CREATININE 1.78 (H) 10/04/2022    CALCIUM 9.4 10/04/2022    PROT 6.2 08/29/2022    ALBUMIN 4.6 10/04/2022    BILITOT 0.3 08/29/2022    ALKPHOS 64 08/29/2022    AST 15 08/29/2022    ALT 13 08/29/2022    ANIONGAP 11 10/04/2022    ESTGFRAFRICA 50.3 (A) 07/18/2022    EGFRNONAA 43.5 (A) 07/18/2022    TSH 0.536 04/29/2022     No results found for: [id number]    Assessment and Plan     Uncontrolled type 2 diabetes mellitus with hyperglycemia  Lab Results   Component Value Date    HGBA1C 6.6 (H) 12/02/2022      -FS log  AT GOAL   -Diet, exercise, lifestyle modifications and A1c Goals discussed   -Hypoglycemia symptoms and plan of action discussed   -Low carb diet   -Seen DM Education?  NO, Will give referral today     PLAN:     Due to A1C at goal.   I will recommend the following.      Increase Trulicity to 1.5mg SC weekly     DC Novolog     Decrease Levemir to 8 units daily     Labs prior to next visit     Hyperlipidemia, unspecified hyperlipidemia type  LDL at goal On Statin   Low Fat diet     Hypertension, essential  BP at goal     Albuminuria     PCP to consider ARB or ACE on kidney function is stable

## 2023-01-06 ENCOUNTER — LAB VISIT (OUTPATIENT)
Dept: LAB | Facility: HOSPITAL | Age: 63
End: 2023-01-06
Attending: INTERNAL MEDICINE
Payer: COMMERCIAL

## 2023-01-06 DIAGNOSIS — E11.22 TYPE 2 DIABETES MELLITUS WITH STAGE 3B CHRONIC KIDNEY DISEASE, WITHOUT LONG-TERM CURRENT USE OF INSULIN: ICD-10-CM

## 2023-01-06 DIAGNOSIS — N04.9 NEPHROTIC SYNDROME: ICD-10-CM

## 2023-01-06 DIAGNOSIS — N25.81 SECONDARY HYPERPARATHYROIDISM OF RENAL ORIGIN: ICD-10-CM

## 2023-01-06 DIAGNOSIS — N18.32 TYPE 2 DIABETES MELLITUS WITH STAGE 3B CHRONIC KIDNEY DISEASE, WITHOUT LONG-TERM CURRENT USE OF INSULIN: ICD-10-CM

## 2023-01-06 DIAGNOSIS — D63.1 ANEMIA IN STAGE 3B CHRONIC KIDNEY DISEASE: ICD-10-CM

## 2023-01-06 DIAGNOSIS — N18.32 ANEMIA IN STAGE 3B CHRONIC KIDNEY DISEASE: ICD-10-CM

## 2023-01-06 LAB
ALBUMIN SERPL BCP-MCNC: 5.4 G/DL (ref 3.5–5.2)
ALBUMIN/CREAT UR: 633.5 UG/MG (ref 0–30)
ANION GAP SERPL CALC-SCNC: 9 MMOL/L (ref 8–16)
BACTERIA #/AREA URNS AUTO: ABNORMAL /HPF
BASOPHILS # BLD AUTO: 0.03 K/UL (ref 0–0.2)
BASOPHILS NFR BLD: 0.3 % (ref 0–1.9)
BILIRUB UR QL STRIP: NEGATIVE
CALCIUM SERPL-MCNC: 9.8 MG/DL (ref 8.7–10.5)
CHLORIDE SERPL-SCNC: 107 MMOL/L (ref 95–110)
CLARITY UR REFRACT.AUTO: CLEAR
CO2 SERPL-SCNC: 28 MMOL/L (ref 23–29)
COLOR UR AUTO: YELLOW
CREAT SERPL-MCNC: 1.62 MG/DL (ref 0.5–1.4)
CREAT UR-MCNC: 86.5 MG/DL (ref 23–375)
CREAT UR-MCNC: 86.5 MG/DL (ref 23–375)
DIFFERENTIAL METHOD: ABNORMAL
EOSINOPHIL # BLD AUTO: 0 K/UL (ref 0–0.5)
EOSINOPHIL NFR BLD: 0.2 % (ref 0–8)
ERYTHROCYTE [DISTWIDTH] IN BLOOD BY AUTOMATED COUNT: 15.2 % (ref 11.5–14.5)
EST. GFR  (NO RACE VARIABLE): 47.4 ML/MIN/1.73 M^2
ESTIMATED AVG GLUCOSE: 146 MG/DL (ref 68–131)
FERRITIN SERPL-MCNC: 304 NG/ML (ref 20–300)
GLUCOSE SERPL-MCNC: 131 MG/DL (ref 70–110)
GLUCOSE UR QL STRIP: NEGATIVE
HBA1C MFR BLD: 6.7 % (ref 4–5.6)
HCT VFR BLD AUTO: 45.8 % (ref 40–54)
HGB BLD-MCNC: 15.1 G/DL (ref 14–18)
HGB UR QL STRIP: ABNORMAL
HYALINE CASTS UR QL AUTO: 0 /LPF
IMM GRANULOCYTES # BLD AUTO: 0.05 K/UL (ref 0–0.04)
IMM GRANULOCYTES NFR BLD AUTO: 0.6 % (ref 0–0.5)
IRON SERPL-MCNC: 84 UG/DL (ref 45–160)
KETONES UR QL STRIP: NEGATIVE
LEUKOCYTE ESTERASE UR QL STRIP: NEGATIVE
LYMPHOCYTES # BLD AUTO: 3 K/UL (ref 1–4.8)
LYMPHOCYTES NFR BLD: 33.4 % (ref 18–48)
MCH RBC QN AUTO: 29.4 PG (ref 27–31)
MCHC RBC AUTO-ENTMCNC: 33 G/DL (ref 32–36)
MCV RBC AUTO: 89 FL (ref 82–98)
MICROALBUMIN UR DL<=1MG/L-MCNC: 548 UG/ML
MICROSCOPIC COMMENT: ABNORMAL
MONOCYTES # BLD AUTO: 0.8 K/UL (ref 0.3–1)
MONOCYTES NFR BLD: 8.7 % (ref 4–15)
NEUTROPHILS # BLD AUTO: 5.1 K/UL (ref 1.8–7.7)
NEUTROPHILS NFR BLD: 56.8 % (ref 38–73)
NITRITE UR QL STRIP: NEGATIVE
NRBC BLD-RTO: 0 /100 WBC
PH UR STRIP: 6 [PH] (ref 5–8)
PHOSPHATE SERPL-MCNC: 3.4 MG/DL (ref 2.7–4.5)
PLATELET # BLD AUTO: 209 K/UL (ref 150–450)
PMV BLD AUTO: 9.6 FL (ref 9.2–12.9)
POTASSIUM SERPL-SCNC: 5 MMOL/L (ref 3.5–5.1)
PROT UR QL STRIP: ABNORMAL
PROT UR-MCNC: 100 MG/DL (ref 0–15)
PROT/CREAT UR: 1.16 MG/G{CREAT} (ref 0–0.2)
PTH-INTACT SERPL-MCNC: 82.7 PG/ML (ref 9–77)
RBC # BLD AUTO: 5.14 M/UL (ref 4.6–6.2)
RBC #/AREA URNS AUTO: 5 /HPF (ref 0–4)
SATURATED IRON: 23 % (ref 20–50)
SODIUM SERPL-SCNC: 144 MMOL/L (ref 136–145)
SP GR UR STRIP: 1.02 (ref 1–1.03)
TOTAL IRON BINDING CAPACITY: 363 UG/DL (ref 250–450)
TRANSFERRIN SERPL-MCNC: 245 MG/DL (ref 200–375)
URATE SERPL-MCNC: 6.2 MG/DL (ref 3.4–7)
URN SPEC COLLECT METH UR: ABNORMAL
UROBILINOGEN UR STRIP-ACNC: NEGATIVE EU/DL
UUN UR-MCNC: 21 MG/DL (ref 2–20)
WBC # BLD AUTO: 8.93 K/UL (ref 3.9–12.7)
WBC #/AREA URNS AUTO: 1 /HPF (ref 0–5)

## 2023-01-06 PROCEDURE — 36415 COLL VENOUS BLD VENIPUNCTURE: CPT | Mod: PO | Performed by: INTERNAL MEDICINE

## 2023-01-06 PROCEDURE — 84156 ASSAY OF PROTEIN URINE: CPT | Performed by: INTERNAL MEDICINE

## 2023-01-06 PROCEDURE — 84550 ASSAY OF BLOOD/URIC ACID: CPT | Performed by: INTERNAL MEDICINE

## 2023-01-06 PROCEDURE — 80069 RENAL FUNCTION PANEL: CPT | Mod: PO | Performed by: INTERNAL MEDICINE

## 2023-01-06 PROCEDURE — 85025 COMPLETE CBC W/AUTO DIFF WBC: CPT | Mod: PO | Performed by: INTERNAL MEDICINE

## 2023-01-06 PROCEDURE — 83036 HEMOGLOBIN GLYCOSYLATED A1C: CPT | Performed by: INTERNAL MEDICINE

## 2023-01-06 PROCEDURE — 82728 ASSAY OF FERRITIN: CPT | Performed by: INTERNAL MEDICINE

## 2023-01-06 PROCEDURE — 82043 UR ALBUMIN QUANTITATIVE: CPT | Mod: PO | Performed by: INTERNAL MEDICINE

## 2023-01-06 PROCEDURE — 83970 ASSAY OF PARATHORMONE: CPT | Mod: PO | Performed by: INTERNAL MEDICINE

## 2023-01-06 PROCEDURE — 81000 URINALYSIS NONAUTO W/SCOPE: CPT | Mod: PO | Performed by: INTERNAL MEDICINE

## 2023-01-06 PROCEDURE — 84466 ASSAY OF TRANSFERRIN: CPT | Mod: PO | Performed by: INTERNAL MEDICINE

## 2023-01-09 ENCOUNTER — PATIENT MESSAGE (OUTPATIENT)
Dept: ADMINISTRATIVE | Facility: HOSPITAL | Age: 63
End: 2023-01-09
Payer: COMMERCIAL

## 2023-01-09 ENCOUNTER — PATIENT OUTREACH (OUTPATIENT)
Dept: ADMINISTRATIVE | Facility: HOSPITAL | Age: 63
End: 2023-01-09
Payer: COMMERCIAL

## 2023-01-09 NOTE — PROGRESS NOTES
Care Everywhere updates requested and reviewed.  Immunizations reconciled. Media reports reviewed.  Duplicate HM overrides and  orders removed.  Overdue HM topic chart audit and/or requested.  Overdue lab testing linked to upcoming lab appointments if applies.    Health Maintenance Due   Topic Date Due    Colorectal Cancer Screening  Never done    COVID-19 Vaccine (5 - Booster for Pfizer series) 2022

## 2023-01-13 ENCOUNTER — PATIENT OUTREACH (OUTPATIENT)
Dept: ADMINISTRATIVE | Facility: HOSPITAL | Age: 63
End: 2023-01-13
Payer: COMMERCIAL

## 2023-01-25 DIAGNOSIS — R00.0 TACHYCARDIA: ICD-10-CM

## 2023-01-25 RX ORDER — METOPROLOL SUCCINATE 100 MG/1
100 TABLET, EXTENDED RELEASE ORAL DAILY
Qty: 90 TABLET | Refills: 1 | Status: SHIPPED | OUTPATIENT
Start: 2023-01-25 | End: 2023-03-08 | Stop reason: SDUPTHER

## 2023-03-01 ENCOUNTER — PATIENT MESSAGE (OUTPATIENT)
Dept: ADMINISTRATIVE | Facility: HOSPITAL | Age: 63
End: 2023-03-01
Payer: COMMERCIAL

## 2023-03-01 ENCOUNTER — PATIENT OUTREACH (OUTPATIENT)
Dept: ADMINISTRATIVE | Facility: HOSPITAL | Age: 63
End: 2023-03-01
Payer: COMMERCIAL

## 2023-03-03 ENCOUNTER — LAB VISIT (OUTPATIENT)
Dept: LAB | Facility: HOSPITAL | Age: 63
End: 2023-03-03
Attending: INTERNAL MEDICINE
Payer: COMMERCIAL

## 2023-03-03 DIAGNOSIS — D63.1 ANEMIA IN STAGE 3B CHRONIC KIDNEY DISEASE: ICD-10-CM

## 2023-03-03 DIAGNOSIS — N25.81 SECONDARY HYPERPARATHYROIDISM OF RENAL ORIGIN: ICD-10-CM

## 2023-03-03 DIAGNOSIS — E79.0 HYPERURICEMIA: ICD-10-CM

## 2023-03-03 DIAGNOSIS — N18.32 ANEMIA IN STAGE 3B CHRONIC KIDNEY DISEASE: ICD-10-CM

## 2023-03-03 DIAGNOSIS — N18.32 STAGE 3B CHRONIC KIDNEY DISEASE: ICD-10-CM

## 2023-03-03 LAB
ALBUMIN SERPL BCP-MCNC: 5.2 G/DL (ref 3.5–5.2)
ALBUMIN/CREAT UR: 345.1 UG/MG (ref 0–30)
ANION GAP SERPL CALC-SCNC: 9 MMOL/L (ref 8–16)
BASOPHILS # BLD AUTO: 0.02 K/UL (ref 0–0.2)
BASOPHILS NFR BLD: 0.3 % (ref 0–1.9)
CALCIUM SERPL-MCNC: 10.1 MG/DL (ref 8.7–10.5)
CHLORIDE SERPL-SCNC: 107 MMOL/L (ref 95–110)
CO2 SERPL-SCNC: 27 MMOL/L (ref 23–29)
CREAT SERPL-MCNC: 1.71 MG/DL (ref 0.5–1.4)
CREAT UR-MCNC: 102 MG/DL (ref 23–375)
DIFFERENTIAL METHOD: ABNORMAL
EOSINOPHIL # BLD AUTO: 0 K/UL (ref 0–0.5)
EOSINOPHIL NFR BLD: 0.4 % (ref 0–8)
ERYTHROCYTE [DISTWIDTH] IN BLOOD BY AUTOMATED COUNT: 14.2 % (ref 11.5–14.5)
EST. GFR  (NO RACE VARIABLE): 44.4 ML/MIN/1.73 M^2
ESTIMATED AVG GLUCOSE: 140 MG/DL (ref 68–131)
FERRITIN SERPL-MCNC: 497 NG/ML (ref 20–300)
GLUCOSE SERPL-MCNC: 115 MG/DL (ref 70–110)
HBA1C MFR BLD: 6.5 % (ref 4–5.6)
HCT VFR BLD AUTO: 45.4 % (ref 40–54)
HGB BLD-MCNC: 14.9 G/DL (ref 14–18)
IMM GRANULOCYTES # BLD AUTO: 0.05 K/UL (ref 0–0.04)
IMM GRANULOCYTES NFR BLD AUTO: 0.6 % (ref 0–0.5)
IRON SERPL-MCNC: 62 UG/DL (ref 45–160)
LYMPHOCYTES # BLD AUTO: 2.8 K/UL (ref 1–4.8)
LYMPHOCYTES NFR BLD: 36.5 % (ref 18–48)
MCH RBC QN AUTO: 29.6 PG (ref 27–31)
MCHC RBC AUTO-ENTMCNC: 32.8 G/DL (ref 32–36)
MCV RBC AUTO: 90 FL (ref 82–98)
MICROALBUMIN UR DL<=1MG/L-MCNC: 352 UG/ML
MONOCYTES # BLD AUTO: 0.6 K/UL (ref 0.3–1)
MONOCYTES NFR BLD: 8.3 % (ref 4–15)
NEUTROPHILS # BLD AUTO: 4.2 K/UL (ref 1.8–7.7)
NEUTROPHILS NFR BLD: 53.9 % (ref 38–73)
NRBC BLD-RTO: 0 /100 WBC
PHOSPHATE SERPL-MCNC: 3.1 MG/DL (ref 2.7–4.5)
PLATELET # BLD AUTO: 246 K/UL (ref 150–450)
PMV BLD AUTO: 9.6 FL (ref 9.2–12.9)
POTASSIUM SERPL-SCNC: 5.4 MMOL/L (ref 3.5–5.1)
PTH-INTACT SERPL-MCNC: 93.9 PG/ML (ref 9–77)
RBC # BLD AUTO: 5.04 M/UL (ref 4.6–6.2)
SATURATED IRON: 21 % (ref 20–50)
SODIUM SERPL-SCNC: 143 MMOL/L (ref 136–145)
TOTAL IRON BINDING CAPACITY: 302 UG/DL (ref 250–450)
TRANSFERRIN SERPL-MCNC: 204 MG/DL (ref 200–375)
URATE SERPL-MCNC: 6.4 MG/DL (ref 3.4–7)
UUN UR-MCNC: 18 MG/DL (ref 2–20)
WBC # BLD AUTO: 7.7 K/UL (ref 3.9–12.7)

## 2023-03-03 PROCEDURE — 84550 ASSAY OF BLOOD/URIC ACID: CPT | Performed by: INTERNAL MEDICINE

## 2023-03-03 PROCEDURE — 80069 RENAL FUNCTION PANEL: CPT | Mod: PO | Performed by: INTERNAL MEDICINE

## 2023-03-03 PROCEDURE — 82043 UR ALBUMIN QUANTITATIVE: CPT | Mod: PO | Performed by: INTERNAL MEDICINE

## 2023-03-03 PROCEDURE — 83036 HEMOGLOBIN GLYCOSYLATED A1C: CPT | Performed by: INTERNAL MEDICINE

## 2023-03-03 PROCEDURE — 83540 ASSAY OF IRON: CPT | Mod: PO | Performed by: INTERNAL MEDICINE

## 2023-03-03 PROCEDURE — 36415 COLL VENOUS BLD VENIPUNCTURE: CPT | Mod: PO | Performed by: INTERNAL MEDICINE

## 2023-03-03 PROCEDURE — 84466 ASSAY OF TRANSFERRIN: CPT | Mod: PO | Performed by: INTERNAL MEDICINE

## 2023-03-03 PROCEDURE — 83970 ASSAY OF PARATHORMONE: CPT | Mod: PO | Performed by: INTERNAL MEDICINE

## 2023-03-03 PROCEDURE — 85025 COMPLETE CBC W/AUTO DIFF WBC: CPT | Mod: PO | Performed by: INTERNAL MEDICINE

## 2023-03-03 PROCEDURE — 82728 ASSAY OF FERRITIN: CPT | Performed by: INTERNAL MEDICINE

## 2023-03-07 ENCOUNTER — PATIENT OUTREACH (OUTPATIENT)
Dept: ADMINISTRATIVE | Facility: HOSPITAL | Age: 63
End: 2023-03-07
Payer: COMMERCIAL

## 2023-03-13 ENCOUNTER — PATIENT OUTREACH (OUTPATIENT)
Dept: ADMINISTRATIVE | Facility: HOSPITAL | Age: 63
End: 2023-03-13
Payer: COMMERCIAL

## 2023-03-13 NOTE — PROGRESS NOTES
Non-compliant [facility] report chart review - Chart review completed for the following HM test if overdue  (Mammogram, Colonoscopy, Cervical Cancer Screening,  Diabetic lab testing, and/or Dilated EYE EXAM)  03/13/2023    Care Everywhere and Media reports - updates requested and reviewed.      Appt for b/u  scheduled        Health Maintenance Due   Topic Date Due    Colorectal Cancer Screening  Never done

## 2023-03-15 ENCOUNTER — OFFICE VISIT (OUTPATIENT)
Dept: FAMILY MEDICINE | Facility: CLINIC | Age: 63
End: 2023-03-15
Payer: COMMERCIAL

## 2023-03-15 VITALS
HEIGHT: 67 IN | DIASTOLIC BLOOD PRESSURE: 82 MMHG | OXYGEN SATURATION: 98 % | SYSTOLIC BLOOD PRESSURE: 140 MMHG | HEART RATE: 106 BPM | WEIGHT: 169.75 LBS | BODY MASS INDEX: 26.64 KG/M2

## 2023-03-15 DIAGNOSIS — E11.22 TYPE 2 DIABETES MELLITUS WITH STAGE 3B CHRONIC KIDNEY DISEASE, WITH LONG-TERM CURRENT USE OF INSULIN: ICD-10-CM

## 2023-03-15 DIAGNOSIS — N18.32 STAGE 3B CHRONIC KIDNEY DISEASE: ICD-10-CM

## 2023-03-15 DIAGNOSIS — Z00.00 ANNUAL PHYSICAL EXAM: Primary | ICD-10-CM

## 2023-03-15 DIAGNOSIS — N18.32 TYPE 2 DIABETES MELLITUS WITH STAGE 3B CHRONIC KIDNEY DISEASE, WITH LONG-TERM CURRENT USE OF INSULIN: ICD-10-CM

## 2023-03-15 DIAGNOSIS — E78.5 HYPERLIPIDEMIA, UNSPECIFIED HYPERLIPIDEMIA TYPE: ICD-10-CM

## 2023-03-15 DIAGNOSIS — Z79.4 TYPE 2 DIABETES MELLITUS WITH STAGE 3B CHRONIC KIDNEY DISEASE, WITH LONG-TERM CURRENT USE OF INSULIN: ICD-10-CM

## 2023-03-15 DIAGNOSIS — I10 PRIMARY HYPERTENSION: ICD-10-CM

## 2023-03-15 PROCEDURE — 1159F MED LIST DOCD IN RCRD: CPT | Mod: CPTII,S$GLB,, | Performed by: FAMILY MEDICINE

## 2023-03-15 PROCEDURE — 99999 PR PBB SHADOW E&M-EST. PATIENT-LVL IV: CPT | Mod: PBBFAC,,, | Performed by: FAMILY MEDICINE

## 2023-03-15 PROCEDURE — 3077F SYST BP >= 140 MM HG: CPT | Mod: CPTII,S$GLB,, | Performed by: FAMILY MEDICINE

## 2023-03-15 PROCEDURE — 3066F PR DOCUMENTATION OF TREATMENT FOR NEPHROPATHY: ICD-10-PCS | Mod: CPTII,S$GLB,, | Performed by: FAMILY MEDICINE

## 2023-03-15 PROCEDURE — 3044F HG A1C LEVEL LT 7.0%: CPT | Mod: CPTII,S$GLB,, | Performed by: FAMILY MEDICINE

## 2023-03-15 PROCEDURE — 3044F PR MOST RECENT HEMOGLOBIN A1C LEVEL <7.0%: ICD-10-PCS | Mod: CPTII,S$GLB,, | Performed by: FAMILY MEDICINE

## 2023-03-15 PROCEDURE — 3062F PR POS MACROALBUMINURIA RESULT DOCUMENTED/REVIEW: ICD-10-PCS | Mod: CPTII,S$GLB,, | Performed by: FAMILY MEDICINE

## 2023-03-15 PROCEDURE — 3079F DIAST BP 80-89 MM HG: CPT | Mod: CPTII,S$GLB,, | Performed by: FAMILY MEDICINE

## 2023-03-15 PROCEDURE — 3008F PR BODY MASS INDEX (BMI) DOCUMENTED: ICD-10-PCS | Mod: CPTII,S$GLB,, | Performed by: FAMILY MEDICINE

## 2023-03-15 PROCEDURE — 1159F PR MEDICATION LIST DOCUMENTED IN MEDICAL RECORD: ICD-10-PCS | Mod: CPTII,S$GLB,, | Performed by: FAMILY MEDICINE

## 2023-03-15 PROCEDURE — 3062F POS MACROALBUMINURIA REV: CPT | Mod: CPTII,S$GLB,, | Performed by: FAMILY MEDICINE

## 2023-03-15 PROCEDURE — 99396 PREV VISIT EST AGE 40-64: CPT | Mod: S$GLB,,, | Performed by: FAMILY MEDICINE

## 2023-03-15 PROCEDURE — 99999 PR PBB SHADOW E&M-EST. PATIENT-LVL IV: ICD-10-PCS | Mod: PBBFAC,,, | Performed by: FAMILY MEDICINE

## 2023-03-15 PROCEDURE — 3077F PR MOST RECENT SYSTOLIC BLOOD PRESSURE >= 140 MM HG: ICD-10-PCS | Mod: CPTII,S$GLB,, | Performed by: FAMILY MEDICINE

## 2023-03-15 PROCEDURE — 3066F NEPHROPATHY DOC TX: CPT | Mod: CPTII,S$GLB,, | Performed by: FAMILY MEDICINE

## 2023-03-15 PROCEDURE — 99396 PR PREVENTIVE VISIT,EST,40-64: ICD-10-PCS | Mod: S$GLB,,, | Performed by: FAMILY MEDICINE

## 2023-03-15 PROCEDURE — 3008F BODY MASS INDEX DOCD: CPT | Mod: CPTII,S$GLB,, | Performed by: FAMILY MEDICINE

## 2023-03-15 PROCEDURE — 3079F PR MOST RECENT DIASTOLIC BLOOD PRESSURE 80-89 MM HG: ICD-10-PCS | Mod: CPTII,S$GLB,, | Performed by: FAMILY MEDICINE

## 2023-03-15 RX ORDER — ATORVASTATIN CALCIUM 40 MG/1
40 TABLET, FILM COATED ORAL DAILY
Qty: 90 TABLET | Refills: 1 | Status: SHIPPED | OUTPATIENT
Start: 2023-03-15 | End: 2023-11-20

## 2023-03-15 NOTE — PROGRESS NOTES
(Portions of this note were dictated using voice recognition software and may contain dictation related errors in spelling/grammar/syntax not found on text review)    CC:   Chief Complaint   Patient presents with    Annual Exam       HPI: 62 y.o. male presented for annual examination.  He has medical history significant for type 2 diabetes mellitus, hypertension, hyperlipidemia, migraine headaches, BPH with obstruction, bilateral hydronephrosis, urine retention.  He takes Levemir 8 units nightly, trulicity 1.5 mg weekly, recently started on farxiga 10 mg by nephrology, not able to get it yet. He reports blood sugars in the range.  He reports adherence with bp medication, dose of procardia increased to 90 mg by nephrology,also Toprol XL was increased to 200 mg, bp stable.   He follows up with nephrology for CKD and endocrinology for DM.    He denies having any cough, shortness of breath, chest pain, nausea, vomiting abdominal pain, changes in bowel habits, urine problems including burning and dysuria. He does not smoke, has no toxic habits.     Past Medical History:   Diagnosis Date    Diabetes mellitus     Disorder of kidney and ureter     Hyperlipemia     Hypertension     Iron deficiency anemia     Migraine headache     PTSD (post-traumatic stress disorder)     Stage 3a chronic kidney disease        Past Surgical History:   Procedure Laterality Date    LASER ENUCLEATION OF PROSTATE N/A 8/29/2022    Procedure: ENUCLEATION, PROSTATE, USING LASER  Time: 180 minutes Equipment: high powered 100W laser with virtual basket, morcellator, scopes for HoLEP from [facility];  Surgeon: Ted Garvin MD;  Location: [facility];  Service: Urology;  Laterality: N/A;  [facility] confirmed  8/26  confirmation # [id number]       Family History   Problem Relation Age of Onset    Diabetes Mother        Social History     Tobacco Use    Smoking status: Never    Smokeless tobacco: Never   Substance Use Topics    Alcohol use: No    Drug use:  No       Lab Results   Component Value Date    WBC 7.70 03/03/2023    HGB 14.9 03/03/2023    HCT 45.4 03/03/2023    MCV 90 03/03/2023     03/03/2023    CHOL 143 04/29/2022    TRIG 114 04/29/2022    HDL 32 (L) 04/29/2022    ALT 13 08/29/2022    AST 15 08/29/2022    BILITOT 0.3 08/29/2022    ALKPHOS 64 08/29/2022     03/03/2023    K 5.4 (H) 03/03/2023     03/03/2023    CREATININE 1.71 (H) 03/03/2023    ESTGFRAFRICA 50.3 (A) 07/18/2022    EGFRNONAA 43.5 (A) 07/18/2022    CALCIUM 10.1 03/03/2023    ALBUMIN 5.2 03/03/2023    BUN 18 03/03/2023    CO2 27 03/03/2023    TSH 0.536 04/29/2022    INR 1.0 08/16/2022    HGBA1C 6.5 (H) 03/03/2023    MICALBCREAT 345.1 (H) 03/03/2023    LDLCALC 88.2 04/29/2022     (H) 03/03/2023             Vital signs reviewed  PE:   APPEARANCE: Well nourished, well developed, in no acute distress.    HEAD: Normocephalic, atraumatic.  EYES: EOMI.  Conjunctivae noninjected.  NECK: Supple with no cervical lymphadenopathy.    CHEST: Good inspiratory effort. Lungs clear to auscultation with no wheezes or crackles.  CARDIOVASCULAR: Normal S1, S2. No rubs, murmurs, or gallops.  ABDOMEN: Bowel sounds normal. Not distended. Soft. No tenderness or masses. No organomegaly.  EXTREMITIES: No edema, cyanosis, or clubbing.    Review of Systems   Constitutional: Negative.  Negative for chills, fatigue and fever.   HENT: Negative.     Respiratory:  Negative for cough, shortness of breath and wheezing.    Cardiovascular:  Negative for chest pain, palpitations and leg swelling.   Gastrointestinal: Negative.    Genitourinary: Negative.    Musculoskeletal: Negative.    Neurological: Negative.    Psychiatric/Behavioral: Negative.     All other systems reviewed and are negative.    IMPRESSION  1. Annual physical exam    2. Hyperlipidemia, unspecified hyperlipidemia type    3. Primary hypertension    4. Type 2 diabetes mellitus with stage 3b chronic kidney disease, with long-term current use of  insulin    5. Stage 3b chronic kidney disease    6. BMI 26.0-26.9,adult            PLAN      1. Hyperlipidemia, unspecified hyperlipidemia type    - atorvastatin (LIPITOR) 40 MG tablet; Take 1 tablet (40 mg total) by mouth once daily.  Dispense: 90 tablet; Refill: 1      2. Annual physical exam      3. Primary hypertension    Stable    Continue procardia 90 mg  Continue Toprol  mg      4. Type 2 diabetes mellitus with stage 3b chronic kidney disease, with long-term current use of insulin    Hba1c 6.5 (3/2023)    Continue levemir 8 units nightly  Continue trulicity 1.5 mg weekly        5. Stage 3b chronic kidney disease    Followed by nephrology, last office visit 3/8/2023 (Dr Webster)      6. BMI 26.0-26.9,adult     Counseling provided on healthy lifestyle, encouraged to do moderate intensity regular exercise 30 minutes every day 5 days a week, to include vegetables and fruits and cut back on saturated fats and carbohydrates.         SCREENINGS        Age/demographic appropriate health maintenance:    Advised to do fit kit      Health Maintenance Due   Topic Date Due    Colorectal Cancer Screening  Never done         F/U 3-6 months      Tiago Ruiz   3/15/2023

## 2023-03-28 ENCOUNTER — PATIENT OUTREACH (OUTPATIENT)
Dept: ADMINISTRATIVE | Facility: HOSPITAL | Age: 63
End: 2023-03-28
Payer: COMMERCIAL

## 2023-03-28 ENCOUNTER — TELEPHONE (OUTPATIENT)
Dept: ADMINISTRATIVE | Facility: HOSPITAL | Age: 63
End: 2023-03-28
Payer: COMMERCIAL

## 2023-03-28 VITALS — DIASTOLIC BLOOD PRESSURE: 74 MMHG | SYSTOLIC BLOOD PRESSURE: 136 MMHG

## 2023-04-11 ENCOUNTER — PATIENT MESSAGE (OUTPATIENT)
Dept: ADMINISTRATIVE | Facility: HOSPITAL | Age: 63
End: 2023-04-11
Payer: COMMERCIAL

## 2023-05-04 ENCOUNTER — LAB VISIT (OUTPATIENT)
Dept: LAB | Facility: HOSPITAL | Age: 63
End: 2023-05-04
Attending: INTERNAL MEDICINE
Payer: COMMERCIAL

## 2023-05-04 ENCOUNTER — OFFICE VISIT (OUTPATIENT)
Dept: UROLOGY | Facility: CLINIC | Age: 63
End: 2023-05-04
Payer: COMMERCIAL

## 2023-05-04 VITALS
WEIGHT: 169.44 LBS | SYSTOLIC BLOOD PRESSURE: 166 MMHG | HEIGHT: 67 IN | BODY MASS INDEX: 26.6 KG/M2 | DIASTOLIC BLOOD PRESSURE: 92 MMHG | HEART RATE: 116 BPM

## 2023-05-04 DIAGNOSIS — N25.81 SECONDARY HYPERPARATHYROIDISM OF RENAL ORIGIN: ICD-10-CM

## 2023-05-04 DIAGNOSIS — D63.1 ANEMIA IN STAGE 3B CHRONIC KIDNEY DISEASE: ICD-10-CM

## 2023-05-04 DIAGNOSIS — N18.32 STAGE 3B CHRONIC KIDNEY DISEASE: ICD-10-CM

## 2023-05-04 DIAGNOSIS — R97.20 ELEVATED PROSTATE SPECIFIC ANTIGEN (PSA): Primary | ICD-10-CM

## 2023-05-04 DIAGNOSIS — N18.32 TYPE 2 DIABETES MELLITUS WITH STAGE 3B CHRONIC KIDNEY DISEASE, WITHOUT LONG-TERM CURRENT USE OF INSULIN: ICD-10-CM

## 2023-05-04 DIAGNOSIS — E11.22 TYPE 2 DIABETES MELLITUS WITH STAGE 3B CHRONIC KIDNEY DISEASE, WITHOUT LONG-TERM CURRENT USE OF INSULIN: ICD-10-CM

## 2023-05-04 DIAGNOSIS — N13.8 BENIGN PROSTATIC HYPERPLASIA WITH URINARY OBSTRUCTION: ICD-10-CM

## 2023-05-04 DIAGNOSIS — N40.1 BENIGN PROSTATIC HYPERPLASIA WITH URINARY OBSTRUCTION: ICD-10-CM

## 2023-05-04 DIAGNOSIS — N18.32 ANEMIA IN STAGE 3B CHRONIC KIDNEY DISEASE: ICD-10-CM

## 2023-05-04 PROBLEM — N13.30 BILATERAL HYDRONEPHROSIS: Status: RESOLVED | Noted: 2022-04-29 | Resolved: 2023-05-04

## 2023-05-04 LAB
ALBUMIN SERPL BCP-MCNC: 4.4 G/DL (ref 3.5–5.2)
ANION GAP SERPL CALC-SCNC: 10 MMOL/L (ref 8–16)
BASOPHILS # BLD AUTO: 0.03 K/UL (ref 0–0.2)
BASOPHILS NFR BLD: 0.3 % (ref 0–1.9)
BILIRUB SERPL-MCNC: NEGATIVE MG/DL
BLOOD URINE, POC: NEGATIVE
BUN SERPL-MCNC: 16 MG/DL (ref 8–23)
CALCIUM SERPL-MCNC: 10.4 MG/DL (ref 8.7–10.5)
CHLORIDE SERPL-SCNC: 109 MMOL/L (ref 95–110)
CLARITY, POC UA: CLEAR
CO2 SERPL-SCNC: 24 MMOL/L (ref 23–29)
COLOR, POC UA: ABNORMAL
CREAT SERPL-MCNC: 1.7 MG/DL (ref 0.5–1.4)
DIFFERENTIAL METHOD: ABNORMAL
EOSINOPHIL # BLD AUTO: 0 K/UL (ref 0–0.5)
EOSINOPHIL NFR BLD: 0.3 % (ref 0–8)
ERYTHROCYTE [DISTWIDTH] IN BLOOD BY AUTOMATED COUNT: 15.5 % (ref 11.5–14.5)
EST. GFR  (NO RACE VARIABLE): 45 ML/MIN/1.73 M^2
ESTIMATED AVG GLUCOSE: 140 MG/DL (ref 68–131)
FERRITIN SERPL-MCNC: 426 NG/ML (ref 20–300)
GLUCOSE SERPL-MCNC: 156 MG/DL (ref 70–110)
GLUCOSE UR QL STRIP: NORMAL
HBA1C MFR BLD: 6.5 % (ref 4–5.6)
HCT VFR BLD AUTO: 44.7 % (ref 40–54)
HGB BLD-MCNC: 14.6 G/DL (ref 14–18)
IMM GRANULOCYTES # BLD AUTO: 0.1 K/UL (ref 0–0.04)
IMM GRANULOCYTES NFR BLD AUTO: 1.1 % (ref 0–0.5)
IRON SERPL-MCNC: 105 UG/DL (ref 45–160)
KETONES UR QL STRIP: NEGATIVE
LEUKOCYTE ESTERASE URINE, POC: NEGATIVE
LYMPHOCYTES # BLD AUTO: 3.1 K/UL (ref 1–4.8)
LYMPHOCYTES NFR BLD: 33.7 % (ref 18–48)
MAGNESIUM SERPL-MCNC: 2.3 MG/DL (ref 1.6–2.6)
MCH RBC QN AUTO: 29.8 PG (ref 27–31)
MCHC RBC AUTO-ENTMCNC: 32.7 G/DL (ref 32–36)
MCV RBC AUTO: 91 FL (ref 82–98)
MONOCYTES # BLD AUTO: 0.7 K/UL (ref 0.3–1)
MONOCYTES NFR BLD: 7.7 % (ref 4–15)
NEUTROPHILS # BLD AUTO: 5.2 K/UL (ref 1.8–7.7)
NEUTROPHILS NFR BLD: 56.9 % (ref 38–73)
NITRITE, POC UA: NEGATIVE
NRBC BLD-RTO: 0 /100 WBC
PH, POC UA: 5
PHOSPHATE SERPL-MCNC: 2.5 MG/DL (ref 2.7–4.5)
PLATELET # BLD AUTO: 232 K/UL (ref 150–450)
PMV BLD AUTO: 9.7 FL (ref 9.2–12.9)
POC RESIDUAL URINE VOLUME: 10 ML (ref 0–100)
POTASSIUM SERPL-SCNC: 5.5 MMOL/L (ref 3.5–5.1)
PROTEIN, POC: ABNORMAL
PTH-INTACT SERPL-MCNC: 109.3 PG/ML (ref 9–77)
RBC # BLD AUTO: 4.9 M/UL (ref 4.6–6.2)
SATURATED IRON: 29 % (ref 20–50)
SODIUM SERPL-SCNC: 143 MMOL/L (ref 136–145)
SPECIFIC GRAVITY, POC UA: 1.01
TOTAL IRON BINDING CAPACITY: 357 UG/DL (ref 250–450)
TRANSFERRIN SERPL-MCNC: 241 MG/DL (ref 200–375)
URATE SERPL-MCNC: 6.3 MG/DL (ref 3.4–7)
UROBILINOGEN, POC UA: NORMAL
WBC # BLD AUTO: 9.09 K/UL (ref 3.9–12.7)

## 2023-05-04 PROCEDURE — 1160F RVW MEDS BY RX/DR IN RCRD: CPT | Mod: CPTII,S$GLB,, | Performed by: UROLOGY

## 2023-05-04 PROCEDURE — 51798 US URINE CAPACITY MEASURE: CPT | Mod: S$GLB,,, | Performed by: UROLOGY

## 2023-05-04 PROCEDURE — 3008F PR BODY MASS INDEX (BMI) DOCUMENTED: ICD-10-PCS | Mod: CPTII,S$GLB,, | Performed by: UROLOGY

## 2023-05-04 PROCEDURE — 99999 PR PBB SHADOW E&M-EST. PATIENT-LVL III: CPT | Mod: PBBFAC,,, | Performed by: UROLOGY

## 2023-05-04 PROCEDURE — 83970 ASSAY OF PARATHORMONE: CPT | Performed by: INTERNAL MEDICINE

## 2023-05-04 PROCEDURE — 81002 POCT URINE DIPSTICK WITHOUT MICROSCOPE: ICD-10-PCS | Mod: S$GLB,,, | Performed by: UROLOGY

## 2023-05-04 PROCEDURE — 3066F PR DOCUMENTATION OF TREATMENT FOR NEPHROPATHY: ICD-10-PCS | Mod: CPTII,S$GLB,, | Performed by: UROLOGY

## 2023-05-04 PROCEDURE — 3066F NEPHROPATHY DOC TX: CPT | Mod: CPTII,S$GLB,, | Performed by: UROLOGY

## 2023-05-04 PROCEDURE — 99213 OFFICE O/P EST LOW 20 MIN: CPT | Mod: S$GLB,,, | Performed by: UROLOGY

## 2023-05-04 PROCEDURE — 82728 ASSAY OF FERRITIN: CPT | Performed by: INTERNAL MEDICINE

## 2023-05-04 PROCEDURE — 3077F SYST BP >= 140 MM HG: CPT | Mod: CPTII,S$GLB,, | Performed by: UROLOGY

## 2023-05-04 PROCEDURE — 84466 ASSAY OF TRANSFERRIN: CPT | Performed by: INTERNAL MEDICINE

## 2023-05-04 PROCEDURE — 51798 POCT BLADDER SCAN: ICD-10-PCS | Mod: S$GLB,,, | Performed by: UROLOGY

## 2023-05-04 PROCEDURE — 3008F BODY MASS INDEX DOCD: CPT | Mod: CPTII,S$GLB,, | Performed by: UROLOGY

## 2023-05-04 PROCEDURE — 83036 HEMOGLOBIN GLYCOSYLATED A1C: CPT | Performed by: INTERNAL MEDICINE

## 2023-05-04 PROCEDURE — 3077F PR MOST RECENT SYSTOLIC BLOOD PRESSURE >= 140 MM HG: ICD-10-PCS | Mod: CPTII,S$GLB,, | Performed by: UROLOGY

## 2023-05-04 PROCEDURE — 3044F PR MOST RECENT HEMOGLOBIN A1C LEVEL <7.0%: ICD-10-PCS | Mod: CPTII,S$GLB,, | Performed by: UROLOGY

## 2023-05-04 PROCEDURE — 84550 ASSAY OF BLOOD/URIC ACID: CPT | Performed by: INTERNAL MEDICINE

## 2023-05-04 PROCEDURE — 85025 COMPLETE CBC W/AUTO DIFF WBC: CPT | Performed by: INTERNAL MEDICINE

## 2023-05-04 PROCEDURE — 99999 PR PBB SHADOW E&M-EST. PATIENT-LVL III: ICD-10-PCS | Mod: PBBFAC,,, | Performed by: UROLOGY

## 2023-05-04 PROCEDURE — 3044F HG A1C LEVEL LT 7.0%: CPT | Mod: CPTII,S$GLB,, | Performed by: UROLOGY

## 2023-05-04 PROCEDURE — 1159F PR MEDICATION LIST DOCUMENTED IN MEDICAL RECORD: ICD-10-PCS | Mod: CPTII,S$GLB,, | Performed by: UROLOGY

## 2023-05-04 PROCEDURE — 80069 RENAL FUNCTION PANEL: CPT | Performed by: INTERNAL MEDICINE

## 2023-05-04 PROCEDURE — 99213 PR OFFICE/OUTPT VISIT, EST, LEVL III, 20-29 MIN: ICD-10-PCS | Mod: S$GLB,,, | Performed by: UROLOGY

## 2023-05-04 PROCEDURE — 1159F MED LIST DOCD IN RCRD: CPT | Mod: CPTII,S$GLB,, | Performed by: UROLOGY

## 2023-05-04 PROCEDURE — 83735 ASSAY OF MAGNESIUM: CPT | Performed by: INTERNAL MEDICINE

## 2023-05-04 PROCEDURE — 3062F PR POS MACROALBUMINURIA RESULT DOCUMENTED/REVIEW: ICD-10-PCS | Mod: CPTII,S$GLB,, | Performed by: UROLOGY

## 2023-05-04 PROCEDURE — 3080F PR MOST RECENT DIASTOLIC BLOOD PRESSURE >= 90 MM HG: ICD-10-PCS | Mod: CPTII,S$GLB,, | Performed by: UROLOGY

## 2023-05-04 PROCEDURE — 36415 COLL VENOUS BLD VENIPUNCTURE: CPT | Performed by: INTERNAL MEDICINE

## 2023-05-04 PROCEDURE — 3062F POS MACROALBUMINURIA REV: CPT | Mod: CPTII,S$GLB,, | Performed by: UROLOGY

## 2023-05-04 PROCEDURE — 81002 URINALYSIS NONAUTO W/O SCOPE: CPT | Mod: S$GLB,,, | Performed by: UROLOGY

## 2023-05-04 PROCEDURE — 1160F PR REVIEW ALL MEDS BY PRESCRIBER/CLIN PHARMACIST DOCUMENTED: ICD-10-PCS | Mod: CPTII,S$GLB,, | Performed by: UROLOGY

## 2023-05-04 PROCEDURE — 3080F DIAST BP >= 90 MM HG: CPT | Mod: CPTII,S$GLB,, | Performed by: UROLOGY

## 2023-05-04 NOTE — PROGRESS NOTES
Subjective:       Patient ID: Angel Bell is a 62 y.o. male.    Chief Complaint: Follow-up       This is a 62 y.o.  male patient with history of urinary retention, BPH, bilateral hydronephrosis.    In late April 2022. CT scan at that time showed massive bilateral hydronephrosis and enlarged prostate with urinary retention.  Tavera catheter was placed after creatinine was noted to be 18.  His creatinine continued to improve.  Renal ultrasound showed improvement in hydronephrosis.      UDS showed HERRERA  PSA was 10 with cath.   HoLEP 8/29/22  QUOC after w/o hydronephrosis  Having some ED, had prior to procedure. Sildenafil working.     Doing well, AUA SS 5/1, PVR 10         IPSS Questionnaire (AUA-SS):  Over the past month    1)  Incomplete Emptying - How often have you had a sensation of not emptying your bladder completely after you finish urinating?  0 - Not at all   2)  Frequency - How often have you had to urinate again less than two hours after you finished urinating? 2 - Less than half the time   3)  Intermittency - How often have you found you stopped and started again several times when you urinated?  0 - Not at all   4) Urgency - How difficult have you found it to postpone urination?  0 - Not at all   5) Weak Stream - How often have you had a weak urinary stream?  0 - Not at all   6) Straining  - How often have you had to push or strain to begin urination?  0 - Not at all   7) Nocturia - How many times did you most typically get up to urinate from the time you went to bed until the time you got up in the morning?  2 - 2 times   Total score:  0-7 mild, 8-19 moderate, 20-35 severe 4   Quality of Life:  1 - Pleased          Lab Results   Component Value Date    CREATININE 1.71 (H) 03/03/2023       ---  Past Medical History:   Diagnosis Date    Diabetes mellitus     Disorder of kidney and ureter     Hyperlipemia     Hypertension     Iron deficiency anemia     Migraine headache     PTSD (post-traumatic stress  disorder)     Stage 3a chronic kidney disease        Past Surgical History:   Procedure Laterality Date    LASER ENUCLEATION OF PROSTATE N/A 8/29/2022    Procedure: ENUCLEATION, PROSTATE, USING LASER  Time: 180 minutes Equipment: high powered 100W laser with virtual basket, morcellator, scopes for HoLEP from [facility];  Surgeon: Ted Garvin MD;  Location: [facility] OR;  Service: Urology;  Laterality: N/A;  [facility] confirmed CW 8/26  confirmation # [id number]       Family History   Problem Relation Age of Onset    Diabetes Mother        Social History     Tobacco Use    Smoking status: Never    Smokeless tobacco: Never   Substance Use Topics    Alcohol use: No    Drug use: No       Current Outpatient Medications on File Prior to Visit   Medication Sig Dispense Refill    atorvastatin (LIPITOR) 40 MG tablet Take 1 tablet (40 mg total) by mouth once daily. 90 tablet 1    blood sugar diagnostic (TRUE METRIX GLUCOSE TEST STRIP) Strp TEST 4 TIMES A  each 11    dapagliflozin (FARXIGA) 10 mg tablet Take 1 tablet (10 mg total) by mouth once daily. 30 tablet 11    dulaglutide (TRULICITY) 1.5 mg/0.5 mL pen injector Inject 1.5 mg into the skin once a week. 4 pen 11    finerenone (KERENDIA) 10 mg Tab Take 10 mg by mouth once daily. 30 tablet 11    furosemide (LASIX) 40 MG tablet Take 1 tablet (40 mg total) by mouth daily as needed (swelling). 30 tablet 11    glucose 4 GM chewable tablet Take 4 tablets (16 g total) by mouth as needed for Low blood sugar (If having symptoms of blurry vision, palpitations, confusion, shakiness.  Please check sugars and if sugar below 70 please take 4 tablets and re-check sugar everry 15 minutes until sugars are above 70 and symptoms resolve.). 50 tablet 12    insulin detemir U-100, Levemir, 100 unit/mL (3 mL) SubQ InPn pen Inject 15 Units into the skin once daily. 15 mL 0    lancets 33 gauge Misc USE 4 TIMES A  each 11    metoprolol succinate (TOPROL-XL) 200 MG 24 hr tablet Take 1 tablet  "(200 mg total) by mouth once daily. 90 tablet 1    NIFEdipine (PROCARDIA-XL) 90 MG (OSM) 24 hr tablet Take 1 tablet (90 mg total) by mouth once daily. 90 tablet 3    pen needle, diabetic 31 gauge x 5/16" Ndle use 4 times a day 100 each 11    sildenafil (REVATIO) 20 mg Tab Take 1 tablet (20 mg total) by mouth daily as needed (Take up to 5 tablets 1 hour prior to sexual activity on an empty stomach.). 30 tablet 5    [DISCONTINUED] amoxicillin (AMOXIL) 500 MG capsule Take 500 mg by mouth every 6 (six) hours. FOR 10 DAYS      [DISCONTINUED] insulin detemir U-100 (LEVEMIR FLEXTOUCH U-100 INSULN) 100 unit/mL (3 mL) InPn pen Inject 8 Units into the skin every evening.       No current facility-administered medications on file prior to visit.       Review of patient's allergies indicates:  No Known Allergies    Review of Systems   Constitutional:  Negative for activity change, chills and fever.   HENT:  Negative for congestion.    Respiratory:  Negative for cough, chest tightness and shortness of breath.    Cardiovascular:  Negative for chest pain and palpitations.   Gastrointestinal:  Negative for abdominal distention, abdominal pain, nausea and vomiting.   Genitourinary:  Negative for difficulty urinating, flank pain, frequency, hematuria, penile pain, scrotal swelling and testicular pain.   Musculoskeletal:  Negative for gait problem.     Objective:      Physical Exam  Constitutional:       Appearance: Normal appearance.   HENT:      Head: Normocephalic.   Pulmonary:      Effort: Pulmonary effort is normal.      Breath sounds: Normal breath sounds.   Abdominal:      General: Abdomen is flat. Bowel sounds are normal.      Palpations: Abdomen is soft.      Tenderness: There is no abdominal tenderness. There is no right CVA tenderness, left CVA tenderness or guarding.   Genitourinary:     Penis: Normal.    Musculoskeletal:         General: Normal range of motion.      Cervical back: Normal range of motion.   Skin:     " General: Skin is warm.   Neurological:      Mental Status: He is alert.         UDS 6/22 (Dr. Prasad):     voided with a maximum flow rate of 1 cc/second.  His detrusor pressure was 48.6 cm of water and maximum detrusor pressure was 59 cm of water.  There was no bladder sphincter dyssynergia and no uninhibited bladder contractions  Patient's Tavera was replaced  Impression urodynamic study associated with a an elevated detrusor pressure indicative of recent urinary retention with hopefully improving detrusor pressure  Recommendations correlate with cystoscopy and truss.  Patient's bladder is hopefully recovering and is probably in need of some sort of procedure for outlet obstruction depending on the size of the prostate  such as HoLEP versus TURP versus open prostatectomy versus robotic prostatectomy    Assessment:     Problem Noted   Erectile Dysfunction 11/3/2022    Prior to catheter  Sildenafil started 11/2022       Benign Prostatic Hyperplasia With Urinary Obstruction 4/29/2022    AUA SS 4/2 post op HoLEP, PVR 82  AUA SS current: 5/1, PVR 10       Urinary Retention (Resolved) 4/29/2022    Distended bladder with bilateral hydroneprosis, ARF  Prostate volume calculated 112 myself (larger on CT measurements, 89gm on QUOC)  --UDS 6/22: shows some bladder contraction, low flow, HERRERA  --HoLEP 8/29/22, path negative volume 75       Bilateral Hydronephrosis (Resolved) 4/29/2022    QUOC 5/22: mild right hydronephrosis, greatly improved from CT  QUOC 10/22: no hydronephrosis post HoLEP           Plan:      Check PSA  Follow up in 6 months.     Ted Garvin MD

## 2023-05-12 NOTE — PROGRESS NOTES
Care Everywhere updates requested and reviewed.  Immunizations reconciled. Media reports reviewed.  Duplicate HM overrides and  orders removed.  Overdue HM topic chart audit and/or requested.  Overdue lab testing linked to upcoming lab appointments if applies.        Health Maintenance Due   Topic Date Due    Colorectal Cancer Screening  Never done       
General Sunscreen Counseling: I recommended a broad spectrum sunscreen with a SPF of 30 or higher.  I explained that SPF 30 sunscreens block approximately 97 percent of the sun's harmful rays.  Sunscreens should be applied at least 15 minutes prior to expected sun exposure and then every 2 hours after that as long as sun exposure continues. If swimming or exercising sunscreen should be reapplied every 45 minutes to an hour after getting wet or sweating. I also recommended a lip balm with a sunscreen as well. Sun protective clothing can be used in lieu of sunscreen but must be worn the entire time you are exposed to the sun's rays. Recommended my favorite sunscreens such as Blue Lizard, Neutrogena, CeraVe, and La Roche Posay.
Detail Level: Detailed

## 2023-05-24 ENCOUNTER — OFFICE VISIT (OUTPATIENT)
Dept: ENDOCRINOLOGY | Facility: CLINIC | Age: 63
End: 2023-05-24
Payer: COMMERCIAL

## 2023-05-24 DIAGNOSIS — E78.5 HYPERLIPIDEMIA, UNSPECIFIED HYPERLIPIDEMIA TYPE: ICD-10-CM

## 2023-05-24 DIAGNOSIS — I10 HYPERTENSION, ESSENTIAL: ICD-10-CM

## 2023-05-24 DIAGNOSIS — Z79.4 TYPE 2 DIABETES MELLITUS WITH HYPERGLYCEMIA, WITH LONG-TERM CURRENT USE OF INSULIN: Primary | ICD-10-CM

## 2023-05-24 DIAGNOSIS — E11.65 TYPE 2 DIABETES MELLITUS WITH HYPERGLYCEMIA, WITH LONG-TERM CURRENT USE OF INSULIN: Primary | ICD-10-CM

## 2023-05-24 PROCEDURE — 3044F PR MOST RECENT HEMOGLOBIN A1C LEVEL <7.0%: ICD-10-PCS | Mod: CPTII,95,, | Performed by: GENERAL ACUTE CARE HOSPITAL

## 2023-05-24 PROCEDURE — 3066F PR DOCUMENTATION OF TREATMENT FOR NEPHROPATHY: ICD-10-PCS | Mod: CPTII,95,, | Performed by: GENERAL ACUTE CARE HOSPITAL

## 2023-05-24 PROCEDURE — 3062F POS MACROALBUMINURIA REV: CPT | Mod: CPTII,95,, | Performed by: GENERAL ACUTE CARE HOSPITAL

## 2023-05-24 PROCEDURE — 3062F PR POS MACROALBUMINURIA RESULT DOCUMENTED/REVIEW: ICD-10-PCS | Mod: CPTII,95,, | Performed by: GENERAL ACUTE CARE HOSPITAL

## 2023-05-24 PROCEDURE — 99214 PR OFFICE/OUTPT VISIT, EST, LEVL IV, 30-39 MIN: ICD-10-PCS | Mod: 95,,, | Performed by: GENERAL ACUTE CARE HOSPITAL

## 2023-05-24 PROCEDURE — 3066F NEPHROPATHY DOC TX: CPT | Mod: CPTII,95,, | Performed by: GENERAL ACUTE CARE HOSPITAL

## 2023-05-24 PROCEDURE — 99214 OFFICE O/P EST MOD 30 MIN: CPT | Mod: 95,,, | Performed by: GENERAL ACUTE CARE HOSPITAL

## 2023-05-24 PROCEDURE — 3044F HG A1C LEVEL LT 7.0%: CPT | Mod: CPTII,95,, | Performed by: GENERAL ACUTE CARE HOSPITAL

## 2023-05-24 RX ORDER — PEN NEEDLE, DIABETIC 30 GX3/16"
NEEDLE, DISPOSABLE MISCELLANEOUS
Qty: 300 EACH | Refills: 11 | Status: SHIPPED | OUTPATIENT
Start: 2023-05-24

## 2023-05-24 RX ORDER — LANCETS 33 GAUGE
EACH MISCELLANEOUS
Qty: 100 EACH | Refills: 11 | Status: SHIPPED | OUTPATIENT
Start: 2023-05-24

## 2023-05-24 NOTE — PROGRESS NOTES
Subjective:      Patient ID: Angel Bell is a 62 y.o. male.      The patient location is: HOME   The chief complaint leading to consultation is: DM2     Visit type: audiovisual    Face to Face time with patient: 25 MINUTES   35 minutes of total time spent on the encounter, which includes face to face time and non-face to face time preparing to see the patient (eg, review of tests), Obtaining and/or reviewing separately obtained history, Documenting clinical information in the electronic or other health record, Independently interpreting results (not separately reported) and communicating results to the patient/family/caregiver, or Care coordination (not separately reported).         Each patient to whom he or she provides medical services by telemedicine is:  (1) informed of the relationship between the physician and patient and the respective role of any other health care provider with respect to management of the patient; and (2) notified that he or she may decline to receive medical services by telemedicine and may withdraw from such care at any time.    Notes:      Chief Complaint:  DM2;  Follow up visit     Patient Active Problem List   Diagnosis    Type 2 diabetes, uncontrolled, with renal manifestation    Hyperglycemia    Hyperlipidemia    Hypertension    Family history of prostate cancer in father    Normocytic anemia    Benign prostatic hyperplasia with urinary obstruction    Tachycardia    Type 2 diabetes mellitus, with long-term current use of insulin    Anemia due to chronic kidney disease    Erectile dysfunction       History of Present Illness  63 YO Male w/ PMH as above that presents to the endocrine clinic for follow up of DM2.  Pt today reports feels well and denies any complaints.  Denies hyper or hypoglycemic symptoms.          Interval history:     Pt was last seen on 12/2022 and plan was:       A1C at goal.      Increase Trulicity to 1.5mg SC weekly      DC Novolog      Decrease Levemir to 8  units daily           With regards to Diabetes Mellitus:   -Type 2    -Duration:  Dx 2016  -FH of DM? Mother DM2    -Microvascular complications: (Nephropathy/CKD)  -Macrovascular complications:  DENIES   -DKA?  Yes   -HHS? Yes   -DM Medications:  Levemir 8 units in the morning, Trulicity 1.5 mg SC weekly , Farxiga 10mg  (Started on 3./2023 by nephrology)     -Previously tried medications:  Metformin (CKD) , Novolog   -Compliance w/ Meds:  YES   -Hyperglycemia symptoms: DENIES Since DC from hospital   -Hypoglycemia symptoms:  DENIES   -Know how to correct hypoglycemias: Yes      -Glucose monitoring:  Checking 4 times (  90 - 110)    AT GOAL  ?    -Diet:     Been doing < 150g of carbs during the day.    Stopped all sweets and soft drinks     -Activity:  Sedentary        -Pancreatitis? DENIES      -Thyroid CA?  DENIES     -ETOH Abuse?  DENIES     Diabetes Management Status    Statin: Taking  ACE/ARB: Not taking    Screening or Prevention Patient's value Goal Complete/Controlled?   HgA1C Testing and Control   Lab Results   Component Value Date    HGBA1C 6.5 (H) 05/04/2023      Annually/Less than 8% Yes   Lipid profile : 04/29/2022 Annually Yes   LDL control Lab Results   Component Value Date    LDLCALC 88.2 04/29/2022    Annually/Less than 100 mg/dl  Yes   Nephropathy screening Lab Results   Component Value Date    LABMICR 352.0 03/03/2023     Lab Results   Component Value Date    PROTEINUA 2+ (A) 01/06/2023    Annually Yes   Blood pressure BP Readings from Last 1 Encounters:   05/04/23 (!) 166/92    Less than 140/90 No   Dilated retinal exam : 05/25/2022 Annually Yes   Foot exam   : 05/23/2022 Annually No        ROS:   As above    Objective:     There were no vitals taken for this visit.  BP Readings from Last 3 Encounters:   05/04/23 (!) 166/92   03/28/23 136/74   03/15/23 (!) 140/82     Wt Readings from Last 1 Encounters:   05/04/23 0818 76.9 kg (169 lb 6.8 oz)     There is no height or weight on file to calculate  BMI.      PE (NOT  PERFORMED,  VIRTUAL VISIT)            Lab Review:   Lab Results   Component Value Date    HGBA1C 6.5 (H) 05/04/2023     Lab Results   Component Value Date    CHOL 143 04/29/2022    HDL 32 (L) 04/29/2022    LDLCALC 88.2 04/29/2022    TRIG 114 04/29/2022    CHOLHDL 22.4 04/29/2022     Lab Results   Component Value Date     05/04/2023    K 5.5 (H) 05/04/2023     05/04/2023    CO2 24 05/04/2023     (H) 05/04/2023    BUN 16 05/04/2023    CREATININE 1.7 (H) 05/04/2023    CALCIUM 10.4 05/04/2023    PROT 6.2 08/29/2022    ALBUMIN 4.4 05/04/2023    BILITOT 0.3 08/29/2022    ALKPHOS 64 08/29/2022    AST 15 08/29/2022    ALT 13 08/29/2022    ANIONGAP 10 05/04/2023    ESTGFRAFRICA 50.3 (A) 07/18/2022    EGFRNONAA 43.5 (A) 07/18/2022    TSH 0.536 04/29/2022     No results found for: [id number]    Assessment and Plan     Uncontrolled type 2 diabetes mellitus with hyperglycemia  Lab Results   Component Value Date    HGBA1C 6.5 (H) 05/04/2023      -FS log  AT GOAL   -Diet, exercise, lifestyle modifications and A1c Goals discussed   -Hypoglycemia symptoms and plan of action discussed   -Low carb diet   -Seen DM Education?  NO, Will give referral today     PLAN:     Due to A1C at goal.   I will recommend the following.      Increase Trulicity to 3mg SC weekly   (Will increase once patient finishes current supply of 1.5mg)     Decrease Levemir to 6 units daily     C/w Farxiga 10mg daily per nephrology     Labs prior to next visit in 6 months    Hyperlipidemia, unspecified hyperlipidemia type  LDL at goal On Statin   Low Fat diet     Hypertension, essential  BP at goal     Albuminuria     PCP to consider ARB or ACE on kidney function is stable

## 2023-05-24 NOTE — PATIENT INSTRUCTIONS
Your diabetes is well controlled.     For now no changes to medications.     Once you finish your 3 weeks of Trulicity 1.5mg please reach out se we can increase the Trulicity to 3mg once a week and lower the Levemir to 5 units once a day.     Continue taking Farxiga 10mg once a day and stay hydrated with farxiga.     Follow up in 6 months.     Have a nice day.     Dr. Begum

## 2023-05-29 ENCOUNTER — HOSPITAL ENCOUNTER (OUTPATIENT)
Dept: RADIOLOGY | Facility: HOSPITAL | Age: 63
Discharge: HOME | End: 2023-05-29
Attending: INTERNAL MEDICINE
Payer: COMMERCIAL

## 2023-05-29 DIAGNOSIS — N18.32 STAGE 3B CHRONIC KIDNEY DISEASE: ICD-10-CM

## 2023-05-29 PROCEDURE — 76770 US EXAM ABDO BACK WALL COMP: CPT | Mod: TC,PO

## 2023-05-29 PROCEDURE — 76770 US RETROPERITONEAL COMPLETE: ICD-10-PCS | Mod: 26,,, | Performed by: RADIOLOGY

## 2023-05-29 PROCEDURE — 76770 US EXAM ABDO BACK WALL COMP: CPT | Mod: 26,,, | Performed by: RADIOLOGY

## 2023-06-01 ENCOUNTER — TELEPHONE (OUTPATIENT)
Dept: UROLOGY | Facility: CLINIC | Age: 63
End: 2023-06-01
Payer: COMMERCIAL

## 2023-06-01 ENCOUNTER — LAB VISIT (OUTPATIENT)
Dept: LAB | Facility: HOSPITAL | Age: 63
End: 2023-06-01
Attending: UROLOGY
Payer: COMMERCIAL

## 2023-06-01 ENCOUNTER — PATIENT MESSAGE (OUTPATIENT)
Dept: UROLOGY | Facility: CLINIC | Age: 63
End: 2023-06-01
Payer: COMMERCIAL

## 2023-06-01 DIAGNOSIS — R97.20 ELEVATED PROSTATE SPECIFIC ANTIGEN (PSA): ICD-10-CM

## 2023-06-01 DIAGNOSIS — R97.20 ELEVATED PROSTATE SPECIFIC ANTIGEN (PSA): Primary | ICD-10-CM

## 2023-06-01 LAB
PROSTATE SPECIFIC ANTIGEN, TOTAL: 0.63 NG/ML (ref 0–4)
PSA FREE MFR SERPL: 42.86 %
PSA FREE SERPL-MCNC: 0.27 NG/ML (ref 0–1.5)

## 2023-06-01 PROCEDURE — 84153 ASSAY OF PSA TOTAL: CPT | Mod: PO | Performed by: UROLOGY

## 2023-06-01 PROCEDURE — 36415 COLL VENOUS BLD VENIPUNCTURE: CPT | Mod: PO | Performed by: UROLOGY

## 2023-06-01 NOTE — TELEPHONE ENCOUNTER
----- Message from Akilah Kerr sent at 6/1/2023  2:41 PM CDT -----  Type:  Needs Medical Advice    Who Called: pt  Symptoms (please be specific): pt is requesting  to get a order to have a PSA lab done he is on site is requesting to get the order sent over      Would the patient rather a call back or a response via [facility]? call  Best Call Back Number:[phone]   Additional Information:

## 2023-06-01 NOTE — TELEPHONE ENCOUNTER
I called and spoke with the patient regarding his lab work. I voiced that the order is now placed. Patient voiced that he's going to get it done at [facility].

## 2023-06-02 ENCOUNTER — PATIENT MESSAGE (OUTPATIENT)
Dept: ENDOCRINOLOGY | Facility: CLINIC | Age: 63
End: 2023-06-02
Payer: COMMERCIAL

## 2023-06-02 ENCOUNTER — PATIENT MESSAGE (OUTPATIENT)
Dept: UROLOGY | Facility: CLINIC | Age: 63
End: 2023-06-02
Payer: COMMERCIAL

## 2023-06-05 DIAGNOSIS — Z79.4 TYPE 2 DIABETES MELLITUS WITH HYPERGLYCEMIA, WITH LONG-TERM CURRENT USE OF INSULIN: ICD-10-CM

## 2023-06-05 DIAGNOSIS — E11.65 TYPE 2 DIABETES MELLITUS WITH HYPERGLYCEMIA, WITH LONG-TERM CURRENT USE OF INSULIN: ICD-10-CM

## 2023-06-07 ENCOUNTER — PATIENT MESSAGE (OUTPATIENT)
Dept: ENDOCRINOLOGY | Facility: CLINIC | Age: 63
End: 2023-06-07
Payer: COMMERCIAL

## 2023-06-07 DIAGNOSIS — E11.65 TYPE 2 DIABETES MELLITUS WITH HYPERGLYCEMIA, WITH LONG-TERM CURRENT USE OF INSULIN: Primary | ICD-10-CM

## 2023-06-07 DIAGNOSIS — Z79.4 TYPE 2 DIABETES MELLITUS WITH HYPERGLYCEMIA, WITH LONG-TERM CURRENT USE OF INSULIN: Primary | ICD-10-CM

## 2023-06-07 RX ORDER — DULAGLUTIDE 3 MG/.5ML
3 INJECTION, SOLUTION SUBCUTANEOUS
Qty: 4 PEN | Refills: 11 | Status: SHIPPED | OUTPATIENT
Start: 2023-06-07 | End: 2023-12-01 | Stop reason: SDUPTHER

## 2023-06-21 ENCOUNTER — PATIENT MESSAGE (OUTPATIENT)
Dept: ADMINISTRATIVE | Facility: HOSPITAL | Age: 63
End: 2023-06-21
Payer: COMMERCIAL

## 2023-09-01 ENCOUNTER — PATIENT OUTREACH (OUTPATIENT)
Dept: ADMINISTRATIVE | Facility: HOSPITAL | Age: 63
End: 2023-09-01
Payer: COMMERCIAL

## 2023-09-11 ENCOUNTER — PATIENT MESSAGE (OUTPATIENT)
Dept: ADMINISTRATIVE | Facility: HOSPITAL | Age: 63
End: 2023-09-11
Payer: COMMERCIAL

## 2023-10-05 ENCOUNTER — PATIENT MESSAGE (OUTPATIENT)
Dept: ADMINISTRATIVE | Facility: HOSPITAL | Age: 63
End: 2023-10-05
Payer: COMMERCIAL

## 2023-10-09 ENCOUNTER — PATIENT MESSAGE (OUTPATIENT)
Dept: ADMINISTRATIVE | Facility: HOSPITAL | Age: 63
End: 2023-10-09
Payer: COMMERCIAL

## 2023-10-18 ENCOUNTER — PATIENT OUTREACH (OUTPATIENT)
Dept: ADMINISTRATIVE | Facility: HOSPITAL | Age: 63
End: 2023-10-18
Payer: COMMERCIAL

## 2023-10-18 NOTE — PROGRESS NOTES
Care Everywhere updates requested and reviewed.  Immunizations reconciled. Media reports reviewed.  Duplicate HM overrides and  orders removed.  Overdue HM topic chart audit and/or requested.  Overdue lab testing linked to upcoming lab appointments if applies.         Health Maintenance Due   Topic Date Due    Colorectal Cancer Screening  Never done    Lipid Panel  2023    Foot Exam  2023    Eye Exam  2023    Influenza Vaccine (1) 2023    COVID-19 Vaccine (2023- season) 2023    Hemoglobin A1c  2023

## 2023-11-01 ENCOUNTER — OFFICE VISIT (OUTPATIENT)
Dept: PODIATRY | Facility: CLINIC | Age: 63
End: 2023-11-01
Payer: COMMERCIAL

## 2023-11-01 VITALS
DIASTOLIC BLOOD PRESSURE: 94 MMHG | BODY MASS INDEX: 26.94 KG/M2 | HEIGHT: 67 IN | SYSTOLIC BLOOD PRESSURE: 182 MMHG | HEART RATE: 125 BPM

## 2023-11-01 DIAGNOSIS — E11.9 ENCOUNTER FOR DIABETIC FOOT EXAM: ICD-10-CM

## 2023-11-01 DIAGNOSIS — L60.9 DISEASE OF NAIL: Primary | ICD-10-CM

## 2023-11-01 PROCEDURE — 3062F POS MACROALBUMINURIA REV: CPT | Mod: CPTII,S$GLB,, | Performed by: STUDENT IN AN ORGANIZED HEALTH CARE EDUCATION/TRAINING PROGRAM

## 2023-11-01 PROCEDURE — 99214 PR OFFICE/OUTPT VISIT, EST, LEVL IV, 30-39 MIN: ICD-10-PCS | Mod: S$GLB,,, | Performed by: STUDENT IN AN ORGANIZED HEALTH CARE EDUCATION/TRAINING PROGRAM

## 2023-11-01 PROCEDURE — 4010F PR ACE/ARB THEARPY RXD/TAKEN: ICD-10-PCS | Mod: CPTII,S$GLB,, | Performed by: STUDENT IN AN ORGANIZED HEALTH CARE EDUCATION/TRAINING PROGRAM

## 2023-11-01 PROCEDURE — 3044F PR MOST RECENT HEMOGLOBIN A1C LEVEL <7.0%: ICD-10-PCS | Mod: CPTII,S$GLB,, | Performed by: STUDENT IN AN ORGANIZED HEALTH CARE EDUCATION/TRAINING PROGRAM

## 2023-11-01 PROCEDURE — 1159F PR MEDICATION LIST DOCUMENTED IN MEDICAL RECORD: ICD-10-PCS | Mod: CPTII,S$GLB,, | Performed by: STUDENT IN AN ORGANIZED HEALTH CARE EDUCATION/TRAINING PROGRAM

## 2023-11-01 PROCEDURE — 3077F SYST BP >= 140 MM HG: CPT | Mod: CPTII,S$GLB,, | Performed by: STUDENT IN AN ORGANIZED HEALTH CARE EDUCATION/TRAINING PROGRAM

## 2023-11-01 PROCEDURE — 3008F PR BODY MASS INDEX (BMI) DOCUMENTED: ICD-10-PCS | Mod: CPTII,S$GLB,, | Performed by: STUDENT IN AN ORGANIZED HEALTH CARE EDUCATION/TRAINING PROGRAM

## 2023-11-01 PROCEDURE — 99999 PR PBB SHADOW E&M-EST. PATIENT-LVL III: ICD-10-PCS | Mod: PBBFAC,,, | Performed by: STUDENT IN AN ORGANIZED HEALTH CARE EDUCATION/TRAINING PROGRAM

## 2023-11-01 PROCEDURE — 87101 SKIN FUNGI CULTURE: CPT | Performed by: STUDENT IN AN ORGANIZED HEALTH CARE EDUCATION/TRAINING PROGRAM

## 2023-11-01 PROCEDURE — 3080F PR MOST RECENT DIASTOLIC BLOOD PRESSURE >= 90 MM HG: ICD-10-PCS | Mod: CPTII,S$GLB,, | Performed by: STUDENT IN AN ORGANIZED HEALTH CARE EDUCATION/TRAINING PROGRAM

## 2023-11-01 PROCEDURE — 4010F ACE/ARB THERAPY RXD/TAKEN: CPT | Mod: CPTII,S$GLB,, | Performed by: STUDENT IN AN ORGANIZED HEALTH CARE EDUCATION/TRAINING PROGRAM

## 2023-11-01 PROCEDURE — 3066F NEPHROPATHY DOC TX: CPT | Mod: CPTII,S$GLB,, | Performed by: STUDENT IN AN ORGANIZED HEALTH CARE EDUCATION/TRAINING PROGRAM

## 2023-11-01 PROCEDURE — 3077F PR MOST RECENT SYSTOLIC BLOOD PRESSURE >= 140 MM HG: ICD-10-PCS | Mod: CPTII,S$GLB,, | Performed by: STUDENT IN AN ORGANIZED HEALTH CARE EDUCATION/TRAINING PROGRAM

## 2023-11-01 PROCEDURE — 3080F DIAST BP >= 90 MM HG: CPT | Mod: CPTII,S$GLB,, | Performed by: STUDENT IN AN ORGANIZED HEALTH CARE EDUCATION/TRAINING PROGRAM

## 2023-11-01 PROCEDURE — 99214 OFFICE O/P EST MOD 30 MIN: CPT | Mod: S$GLB,,, | Performed by: STUDENT IN AN ORGANIZED HEALTH CARE EDUCATION/TRAINING PROGRAM

## 2023-11-01 PROCEDURE — 3008F BODY MASS INDEX DOCD: CPT | Mod: CPTII,S$GLB,, | Performed by: STUDENT IN AN ORGANIZED HEALTH CARE EDUCATION/TRAINING PROGRAM

## 2023-11-01 PROCEDURE — 3066F PR DOCUMENTATION OF TREATMENT FOR NEPHROPATHY: ICD-10-PCS | Mod: CPTII,S$GLB,, | Performed by: STUDENT IN AN ORGANIZED HEALTH CARE EDUCATION/TRAINING PROGRAM

## 2023-11-01 PROCEDURE — 1159F MED LIST DOCD IN RCRD: CPT | Mod: CPTII,S$GLB,, | Performed by: STUDENT IN AN ORGANIZED HEALTH CARE EDUCATION/TRAINING PROGRAM

## 2023-11-01 PROCEDURE — 99999 PR PBB SHADOW E&M-EST. PATIENT-LVL III: CPT | Mod: PBBFAC,,, | Performed by: STUDENT IN AN ORGANIZED HEALTH CARE EDUCATION/TRAINING PROGRAM

## 2023-11-01 PROCEDURE — 3044F HG A1C LEVEL LT 7.0%: CPT | Mod: CPTII,S$GLB,, | Performed by: STUDENT IN AN ORGANIZED HEALTH CARE EDUCATION/TRAINING PROGRAM

## 2023-11-01 PROCEDURE — 3062F PR POS MACROALBUMINURIA RESULT DOCUMENTED/REVIEW: ICD-10-PCS | Mod: CPTII,S$GLB,, | Performed by: STUDENT IN AN ORGANIZED HEALTH CARE EDUCATION/TRAINING PROGRAM

## 2023-11-01 NOTE — PROGRESS NOTES
Subjective:      Patient ID: Angel Bell is a 62 y.o. male.    Chief Complaint: Diabetes Mellitus (Tiago Ruiz MD  11/29/2023) and Diabetic Foot Exam    Angel is a 62 y.o. male who presents to the clinic upon referral from Dr. Cathi dugan. provider found  for evaluation and treatment of diabetic feet. Angel has a past medical history of Diabetes mellitus, Disorder of kidney and ureter, Hyperlipemia, Hypertension, Iron deficiency anemia, Migraine headache, PTSD (post-traumatic stress disorder), and Stage 3a chronic kidney disease. Patient relates no major problem with feet. Only complaints today consist of here for a diabetic foot exam. Relates to elongated and thickened toenails that are difficult to trim. No other pedal complaints.     PCP: Tiago Ruiz MD    Date Last Seen by PCP: per above  Current shoe gear: Casual shoes    Hemoglobin A1C   Date Value Ref Range Status   05/04/2023 6.5 (H) 4.0 - 5.6 % Final     Comment:     ADA Screening Guidelines:  5.7-6.4%  Consistent with prediabetes  >or=6.5%  Consistent with diabetes    High levels of fetal hemoglobin interfere with the HbA1C  assay. Heterozygous hemoglobin variants (HbS, HgC, etc)do  not significantly interfere with this assay.   However, presence of multiple variants may affect accuracy.     03/03/2023 6.5 (H) 4.0 - 5.6 % Final     Comment:     ADA Screening Guidelines:  5.7-6.4%  Consistent with prediabetes  >or=6.5%  Consistent with diabetes    High levels of fetal hemoglobin interfere with the HbA1C  assay. Heterozygous hemoglobin variants (HbS, HgC, etc)do  not significantly interfere with this assay.   However, presence of multiple variants may affect accuracy.     01/06/2023 6.7 (H) 4.0 - 5.6 % Final     Comment:     ADA Screening Guidelines:  5.7-6.4%  Consistent with prediabetes  >or=6.5%  Consistent with diabetes    High levels of fetal hemoglobin interfere with the HbA1C  assay. Heterozygous hemoglobin variants (HbS, HgC, etc)do  not  significantly interfere with this assay.   However, presence of multiple variants may affect accuracy.             Review of Systems   Constitutional: Negative for chills, decreased appetite, diaphoresis and fever.   HENT:  Negative for congestion and hearing loss.    Cardiovascular:  Negative for chest pain, claudication, leg swelling and syncope.   Respiratory:  Negative for cough and shortness of breath.    Skin:  Positive for dry skin and nail changes. Negative for color change, flushing, itching, poor wound healing and rash.   Musculoskeletal:  Negative for arthritis, back pain, joint pain and joint swelling.   Gastrointestinal:  Negative for nausea and vomiting.   Neurological:  Negative for focal weakness, paresthesias and weakness.   Psychiatric/Behavioral:  Negative for altered mental status. The patient is not nervous/anxious.            Objective:      Physical Exam  Constitutional:       General: He is not in acute distress.     Appearance: Normal appearance. He is well-developed. He is not diaphoretic.   Cardiovascular:      Pulses:           Dorsalis pedis pulses are 2+ on the right side and 2+ on the left side.        Posterior tibial pulses are 2+ on the right side and 2+ on the left side.      Comments: Dorsalis pedis and posterior tibial pulses are within normal limits. Skin temperature is within normal limits. Toes are cool to touch and feet are warm proximally. Hair growth is within normal limits. Skin is normotrophic and without hyperpigmentation. No edema noted. No varicosities or spider veins noted, bilaterally.     Musculoskeletal:         General: No tenderness.      Comments: Adequate joint range of motion without pain, limitation, nor crepitation to foot and ankle joints. Muscle strength is 5/5 in all groups bilaterally.       Feet:      Right foot:      Protective Sensation: 10 sites tested.  10 sites sensed.      Skin integrity: Dry skin present. No ulcer, blister, erythema or warmth.       Left foot:      Protective Sensation: 10 sites tested.  10 sites sensed.      Skin integrity: Dry skin present. No ulcer, blister, erythema or warmth.   Skin:     General: Skin is warm and dry.      Capillary Refill: Capillary refill takes less than 2 seconds.      Comments: Skin is warm and dry, no acute signs of infection noted bilaterally      Toenails are thickened by 2-4 mm's, dystrophic, and are darkened in coloration with subungual fungal debris.     Otherwise, no open wounds, macerations or hyperkeratotic lesions, bilaterally            Neurological:      Mental Status: He is alert and oriented to person, place, and time.      Sensory: No sensory deficit.      Motor: No abnormal muscle tone.      Comments: Light touch within normal limits. Daykin-Guilherme 5.07 monofilamant testing is within normal limits.    Psychiatric:         Mood and Affect: Mood normal.         Behavior: Behavior normal.         Thought Content: Thought content normal.               Assessment:       Encounter Diagnoses   Name Primary?    Disease of nail Yes    Encounter for diabetic foot exam          Plan:       Angel was seen today for diabetes mellitus and diabetic foot exam.    Diagnoses and all orders for this visit:    Disease of nail  -     CULTURE, FUNGUS - SKIN, HAIR, OR NAILS    Encounter for diabetic foot exam      I counseled the patient on his conditions, their implications and medical management.    Discussed importance of keeping feet dry and changing socks and shoes on a regular basis to prevent spread of toenail fungus. Discussed treatment options for fungal toenails including topical home remedies, topical over the counter and prescription medications. Patient elects to try OTC treatment for now, recommend 6-12 month treatment. Nail cultures taken today as well    I advised him on keeping nails neatly trimmed, removing all sharp and loose edges, filing the nail as necessary to prevent damage to nail plate and  prevent unnecessary pulling of toenail. Also advised to avoid tight fitting shoes to prevent pressure related issues. Recommend shoes with wide toe box or open toed shoe to reduce pressure.     Annual diabetic foot exam performed today. Shoe inspection. Diabetic Foot Education. Patient reminded of the importance of good nutrition and blood sugar control to help prevent podiatric complications of diabetes. Patient instructed on proper foot hygeine. We discussed wearing proper shoe gear, daily foot inspections, never walking without protective shoe gear, never putting sharp instruments to feet, he is considered low risk for diabetic foot complication    RTC in 1 year, sooner PRN

## 2023-11-15 ENCOUNTER — PATIENT OUTREACH (OUTPATIENT)
Dept: ADMINISTRATIVE | Facility: HOSPITAL | Age: 63
End: 2023-11-15
Payer: COMMERCIAL

## 2023-11-15 DIAGNOSIS — Z79.4 TYPE 2 DIABETES MELLITUS WITH STAGE 3B CHRONIC KIDNEY DISEASE, WITH LONG-TERM CURRENT USE OF INSULIN: ICD-10-CM

## 2023-11-15 DIAGNOSIS — E11.22 TYPE 2 DIABETES MELLITUS WITH STAGE 3B CHRONIC KIDNEY DISEASE, WITH LONG-TERM CURRENT USE OF INSULIN: ICD-10-CM

## 2023-11-15 DIAGNOSIS — Z12.11 ENCOUNTER FOR SCREENING COLONOSCOPY: Primary | ICD-10-CM

## 2023-11-15 DIAGNOSIS — N18.32 TYPE 2 DIABETES MELLITUS WITH STAGE 3B CHRONIC KIDNEY DISEASE, WITH LONG-TERM CURRENT USE OF INSULIN: ICD-10-CM

## 2023-11-15 NOTE — PROGRESS NOTES
Care Everywhere updates requested and reviewed.  Immunizations reconciled. Media reports reviewed.  Duplicate HM overrides and  orders removed.  Overdue HM topic chart audit and/or requested.  Overdue lab testing linked to upcoming lab appointments if applies.          Health Maintenance Due   Topic Date Due    Colorectal Cancer Screening  Never done    RSV Vaccine (Age 60+) (1 - 1-dose 60+ series) Never done    Lipid Panel  2023    Eye Exam  2023    Influenza Vaccine (1) 2023    COVID-19 Vaccine (2023- season) 2023    Hemoglobin A1c  2023

## 2023-11-29 ENCOUNTER — OFFICE VISIT (OUTPATIENT)
Dept: FAMILY MEDICINE | Facility: CLINIC | Age: 63
End: 2023-11-29
Payer: COMMERCIAL

## 2023-11-29 VITALS
HEART RATE: 138 BPM | HEIGHT: 67 IN | OXYGEN SATURATION: 98 % | BODY MASS INDEX: 28.09 KG/M2 | WEIGHT: 179 LBS | SYSTOLIC BLOOD PRESSURE: 188 MMHG | DIASTOLIC BLOOD PRESSURE: 100 MMHG

## 2023-11-29 DIAGNOSIS — R00.0 TACHYCARDIA: ICD-10-CM

## 2023-11-29 DIAGNOSIS — I10 PRIMARY HYPERTENSION: ICD-10-CM

## 2023-11-29 DIAGNOSIS — E11.22 TYPE 2 DIABETES MELLITUS WITH STAGE 3B CHRONIC KIDNEY DISEASE, WITH LONG-TERM CURRENT USE OF INSULIN: ICD-10-CM

## 2023-11-29 DIAGNOSIS — Z00.00 ROUTINE GENERAL MEDICAL EXAMINATION AT A HEALTH CARE FACILITY: Primary | ICD-10-CM

## 2023-11-29 DIAGNOSIS — D63.1 ANEMIA DUE TO STAGE 3A CHRONIC KIDNEY DISEASE: ICD-10-CM

## 2023-11-29 DIAGNOSIS — N18.32 TYPE 2 DIABETES MELLITUS WITH STAGE 3B CHRONIC KIDNEY DISEASE, WITH LONG-TERM CURRENT USE OF INSULIN: ICD-10-CM

## 2023-11-29 DIAGNOSIS — N18.31 ANEMIA DUE TO STAGE 3A CHRONIC KIDNEY DISEASE: ICD-10-CM

## 2023-11-29 DIAGNOSIS — E78.5 HYPERLIPIDEMIA, UNSPECIFIED HYPERLIPIDEMIA TYPE: ICD-10-CM

## 2023-11-29 DIAGNOSIS — Z79.4 TYPE 2 DIABETES MELLITUS WITH STAGE 3B CHRONIC KIDNEY DISEASE, WITH LONG-TERM CURRENT USE OF INSULIN: ICD-10-CM

## 2023-11-29 PROCEDURE — 3044F HG A1C LEVEL LT 7.0%: CPT | Mod: CPTII,S$GLB,, | Performed by: FAMILY MEDICINE

## 2023-11-29 PROCEDURE — 3066F NEPHROPATHY DOC TX: CPT | Mod: CPTII,S$GLB,, | Performed by: FAMILY MEDICINE

## 2023-11-29 PROCEDURE — 99999 PR PBB SHADOW E&M-EST. PATIENT-LVL IV: ICD-10-PCS | Mod: PBBFAC,,, | Performed by: FAMILY MEDICINE

## 2023-11-29 PROCEDURE — 4010F ACE/ARB THERAPY RXD/TAKEN: CPT | Mod: CPTII,S$GLB,, | Performed by: FAMILY MEDICINE

## 2023-11-29 PROCEDURE — 3062F POS MACROALBUMINURIA REV: CPT | Mod: CPTII,S$GLB,, | Performed by: FAMILY MEDICINE

## 2023-11-29 PROCEDURE — 99999 PR PBB SHADOW E&M-EST. PATIENT-LVL IV: CPT | Mod: PBBFAC,,, | Performed by: FAMILY MEDICINE

## 2023-11-29 PROCEDURE — 3062F PR POS MACROALBUMINURIA RESULT DOCUMENTED/REVIEW: ICD-10-PCS | Mod: CPTII,S$GLB,, | Performed by: FAMILY MEDICINE

## 2023-11-29 PROCEDURE — 3008F BODY MASS INDEX DOCD: CPT | Mod: CPTII,S$GLB,, | Performed by: FAMILY MEDICINE

## 2023-11-29 PROCEDURE — 1159F PR MEDICATION LIST DOCUMENTED IN MEDICAL RECORD: ICD-10-PCS | Mod: CPTII,S$GLB,, | Performed by: FAMILY MEDICINE

## 2023-11-29 PROCEDURE — 4010F PR ACE/ARB THEARPY RXD/TAKEN: ICD-10-PCS | Mod: CPTII,S$GLB,, | Performed by: FAMILY MEDICINE

## 2023-11-29 PROCEDURE — 99214 OFFICE O/P EST MOD 30 MIN: CPT | Mod: S$GLB,,, | Performed by: FAMILY MEDICINE

## 2023-11-29 PROCEDURE — 1159F MED LIST DOCD IN RCRD: CPT | Mod: CPTII,S$GLB,, | Performed by: FAMILY MEDICINE

## 2023-11-29 PROCEDURE — 3044F PR MOST RECENT HEMOGLOBIN A1C LEVEL <7.0%: ICD-10-PCS | Mod: CPTII,S$GLB,, | Performed by: FAMILY MEDICINE

## 2023-11-29 PROCEDURE — 99214 PR OFFICE/OUTPT VISIT, EST, LEVL IV, 30-39 MIN: ICD-10-PCS | Mod: S$GLB,,, | Performed by: FAMILY MEDICINE

## 2023-11-29 PROCEDURE — 3080F DIAST BP >= 90 MM HG: CPT | Mod: CPTII,S$GLB,, | Performed by: FAMILY MEDICINE

## 2023-11-29 PROCEDURE — 3077F SYST BP >= 140 MM HG: CPT | Mod: CPTII,S$GLB,, | Performed by: FAMILY MEDICINE

## 2023-11-29 PROCEDURE — 3066F PR DOCUMENTATION OF TREATMENT FOR NEPHROPATHY: ICD-10-PCS | Mod: CPTII,S$GLB,, | Performed by: FAMILY MEDICINE

## 2023-11-29 PROCEDURE — 3008F PR BODY MASS INDEX (BMI) DOCUMENTED: ICD-10-PCS | Mod: CPTII,S$GLB,, | Performed by: FAMILY MEDICINE

## 2023-11-29 PROCEDURE — 3080F PR MOST RECENT DIASTOLIC BLOOD PRESSURE >= 90 MM HG: ICD-10-PCS | Mod: CPTII,S$GLB,, | Performed by: FAMILY MEDICINE

## 2023-11-29 PROCEDURE — 3077F PR MOST RECENT SYSTOLIC BLOOD PRESSURE >= 140 MM HG: ICD-10-PCS | Mod: CPTII,S$GLB,, | Performed by: FAMILY MEDICINE

## 2023-11-29 NOTE — PROGRESS NOTES
(Portions of this note were dictated using voice recognition software and may contain dictation related errors in spelling/grammar/syntax not found on text review)    CC:   Chief Complaint   Patient presents with    Annual Exam     A1c        HPI: 62 y.o. male presented for routine general examination.  He has chronic medical conditions significant for type 2 diabetes mellitus, hypertension, hyperlipidemia, migraine headaches, BPH.    He takes Levemir 5 units nightly, trulicity 3 mg weekly, farxiga 10 mg daily, last hba1c was 6.5. He reports blood sugars in the range at home monitoring, follows up with endocrinology, next visit 12/1/23.    He reports adherence with bp medication, takes procardia 90 mg and Toprol  mg, however, his bp and HR have been elevated recently when he check at home with home bp cuff.     He follows up with nephrology for CKD, next appointment 1/29.      He denies having any cough, shortness of breath, chest pain, nausea, vomiting abdominal pain, changes in bowel habits, urine problems including burning and dysuria. He does not smoke, has no toxic habits.     Past Medical History:   Diagnosis Date    Diabetes mellitus     Disorder of kidney and ureter     Hyperlipemia     Hypertension     Iron deficiency anemia     Migraine headache     PTSD (post-traumatic stress disorder)     Stage 3a chronic kidney disease        Past Surgical History:   Procedure Laterality Date    LASER ENUCLEATION OF PROSTATE N/A 8/29/2022    Procedure: ENUCLEATION, PROSTATE, USING LASER  Time: 180 minutes Equipment: high powered 100W laser with virtual basket, morcellator, scopes for HoLEP from [facility];  Surgeon: Ted Garvin MD;  Location: [facility];  Service: Urology;  Laterality: N/A;  [facility] confirmed  8/26  confirmation # [id number]       Family History   Problem Relation Age of Onset    Diabetes Mother        Social History     Tobacco Use    Smoking status: Never    Smokeless tobacco: Never   Substance  Use Topics    Alcohol use: No    Drug use: No       Lab Results   Component Value Date    WBC 9.09 05/04/2023    HGB 14.6 05/04/2023    HCT 44.7 05/04/2023    MCV 91 05/04/2023     05/04/2023    CHOL 143 04/29/2022    TRIG 114 04/29/2022    HDL 32 (L) 04/29/2022    ALT 13 08/29/2022    AST 15 08/29/2022    BILITOT 0.3 08/29/2022    ALKPHOS 64 08/29/2022     05/04/2023    K 5.5 (H) 05/04/2023     05/04/2023    CREATININE 1.7 (H) 05/04/2023    ESTGFRAFRICA 50.3 (A) 07/18/2022    EGFRNONAA 43.5 (A) 07/18/2022    CALCIUM 10.4 05/04/2023    ALBUMIN 4.4 05/04/2023    BUN 16 05/04/2023    CO2 24 05/04/2023    TSH 0.536 04/29/2022    INR 1.0 08/16/2022    HGBA1C 6.5 (H) 05/04/2023    MICALBCREAT 345.1 (H) 03/03/2023    LDLCALC 88.2 04/29/2022     (H) 05/04/2023             Vital signs reviewed  PE:   APPEARANCE: Well nourished, well developed, in no acute distress.    HEAD: Normocephalic, atraumatic.  EYES: EOMI.  Conjunctivae noninjected.  NOSE: Mucosa pink. Airway clear.  NECK: Supple with no cervical lymphadenopathy.    CHEST: Good inspiratory effort. Lungs clear to auscultation with no wheezes or crackles.  CARDIOVASCULAR: Normal S1, S2. No rubs, murmurs, or gallops.  ABDOMEN: Bowel sounds normal. Not distended. Soft. No tenderness or masses. No organomegaly.  EXTREMITIES: No edema, cyanosis, or clubbing.    Review of Systems   Constitutional:  Negative for activity change, chills and unexpected weight change.   HENT: Negative.  Negative for hearing loss, rhinorrhea and trouble swallowing.    Eyes:  Negative for discharge and visual disturbance.   Respiratory:  Negative for chest tightness and wheezing.    Cardiovascular:  Negative for chest pain and palpitations.   Gastrointestinal:  Negative for blood in stool, constipation, diarrhea and vomiting.   Endocrine: Negative for polydipsia and polyuria.   Genitourinary:  Negative for difficulty urinating, hematuria and urgency.   Musculoskeletal:   Negative for arthralgias, joint swelling and neck pain.   Neurological:  Negative for weakness and headaches.   Psychiatric/Behavioral:  Negative for confusion and dysphoric mood.    All other systems reviewed and are negative.      IMPRESSION  1. Routine general medical examination at a health care facility    2. Hyperlipidemia, unspecified hyperlipidemia type    3. Type 2 diabetes mellitus with stage 3b chronic kidney disease, with long-term current use of insulin    4. Anemia due to stage 3a chronic kidney disease    5. Tachycardia    6. Primary hypertension    7. BMI 28.0-28.9,adult            PLAN      1. Hyperlipidemia, unspecified hyperlipidemia type    - Lipid Panel; Future    Continue statin      2. Type 2 diabetes mellitus with stage 3b chronic kidney disease, with long-term current use of insulin    Hba1c 6.5     Continue current meds    Followed by endocrinology  Follow up with Optometry for eye exam   Up-to-date with foot exam      3. Anemia due to stage 3a chronic kidney disease    Stable        4. Tachycardia    Asymptomatic    Referral to cardiology placed       5. Primary hypertension    Elevated    Continue current medications   Low-salt diet, regular exercise  Advised to monitor blood pressure at home and return to clinic for nurse visit in 2-3 weeks     Cardiology appointment scheduled      6. Routine general medical examination at a health care facility        7. BMI 28.0-28.9,adult    Counseling provided on healthy lifestyle, encouraged to do moderate intensity regular exercise 30 minutes every day 5 days a week, to include vegetables and fruits and cut back on saturated fats and carbohydrates.          SCREENINGS      Immunizations:     Up-to-date with COVID and flu vaccines      Age/demographic appropriate health maintenance:    Fit kit given    Health Maintenance Due   Topic Date Due    Colorectal Cancer Screening  Never done    RSV Vaccine (Age 60+ and Pregnant patients) (1 - 1-dose 60+  series) Never done    Lipid Panel  04/29/2023    Eye Exam  05/25/2023    COVID-19 Vaccine (6 - 2023-24 season) 09/01/2023    Hemoglobin A1c  11/04/2023             Tiago Ruiz   11/29/2023

## 2023-12-01 ENCOUNTER — OFFICE VISIT (OUTPATIENT)
Dept: ENDOCRINOLOGY | Facility: CLINIC | Age: 63
End: 2023-12-01
Payer: COMMERCIAL

## 2023-12-01 VITALS
BODY MASS INDEX: 28.28 KG/M2 | WEIGHT: 180.56 LBS | HEART RATE: 58 BPM | DIASTOLIC BLOOD PRESSURE: 79 MMHG | SYSTOLIC BLOOD PRESSURE: 151 MMHG

## 2023-12-01 DIAGNOSIS — E78.2 MIXED HYPERLIPIDEMIA: ICD-10-CM

## 2023-12-01 DIAGNOSIS — E11.65 TYPE 2 DIABETES MELLITUS WITH HYPERGLYCEMIA, WITH LONG-TERM CURRENT USE OF INSULIN: Primary | ICD-10-CM

## 2023-12-01 DIAGNOSIS — Z79.4 TYPE 2 DIABETES MELLITUS WITH HYPERGLYCEMIA, WITH LONG-TERM CURRENT USE OF INSULIN: Primary | ICD-10-CM

## 2023-12-01 PROCEDURE — 3078F PR MOST RECENT DIASTOLIC BLOOD PRESSURE < 80 MM HG: ICD-10-PCS | Mod: CPTII,S$GLB,, | Performed by: INTERNAL MEDICINE

## 2023-12-01 PROCEDURE — 3066F NEPHROPATHY DOC TX: CPT | Mod: CPTII,S$GLB,, | Performed by: INTERNAL MEDICINE

## 2023-12-01 PROCEDURE — 4010F PR ACE/ARB THEARPY RXD/TAKEN: ICD-10-PCS | Mod: CPTII,S$GLB,, | Performed by: INTERNAL MEDICINE

## 2023-12-01 PROCEDURE — 1160F PR REVIEW ALL MEDS BY PRESCRIBER/CLIN PHARMACIST DOCUMENTED: ICD-10-PCS | Mod: CPTII,S$GLB,, | Performed by: INTERNAL MEDICINE

## 2023-12-01 PROCEDURE — 3062F PR POS MACROALBUMINURIA RESULT DOCUMENTED/REVIEW: ICD-10-PCS | Mod: CPTII,S$GLB,, | Performed by: INTERNAL MEDICINE

## 2023-12-01 PROCEDURE — 3066F PR DOCUMENTATION OF TREATMENT FOR NEPHROPATHY: ICD-10-PCS | Mod: CPTII,S$GLB,, | Performed by: INTERNAL MEDICINE

## 2023-12-01 PROCEDURE — 3008F BODY MASS INDEX DOCD: CPT | Mod: CPTII,S$GLB,, | Performed by: INTERNAL MEDICINE

## 2023-12-01 PROCEDURE — 99999 PR PBB SHADOW E&M-EST. PATIENT-LVL III: ICD-10-PCS | Mod: PBBFAC,,, | Performed by: INTERNAL MEDICINE

## 2023-12-01 PROCEDURE — 3044F HG A1C LEVEL LT 7.0%: CPT | Mod: CPTII,S$GLB,, | Performed by: INTERNAL MEDICINE

## 2023-12-01 PROCEDURE — 4010F ACE/ARB THERAPY RXD/TAKEN: CPT | Mod: CPTII,S$GLB,, | Performed by: INTERNAL MEDICINE

## 2023-12-01 PROCEDURE — 3044F PR MOST RECENT HEMOGLOBIN A1C LEVEL <7.0%: ICD-10-PCS | Mod: CPTII,S$GLB,, | Performed by: INTERNAL MEDICINE

## 2023-12-01 PROCEDURE — 3062F POS MACROALBUMINURIA REV: CPT | Mod: CPTII,S$GLB,, | Performed by: INTERNAL MEDICINE

## 2023-12-01 PROCEDURE — 3078F DIAST BP <80 MM HG: CPT | Mod: CPTII,S$GLB,, | Performed by: INTERNAL MEDICINE

## 2023-12-01 PROCEDURE — 99214 OFFICE O/P EST MOD 30 MIN: CPT | Mod: S$GLB,,, | Performed by: INTERNAL MEDICINE

## 2023-12-01 PROCEDURE — 99214 PR OFFICE/OUTPT VISIT, EST, LEVL IV, 30-39 MIN: ICD-10-PCS | Mod: S$GLB,,, | Performed by: INTERNAL MEDICINE

## 2023-12-01 PROCEDURE — 99999 PR PBB SHADOW E&M-EST. PATIENT-LVL III: CPT | Mod: PBBFAC,,, | Performed by: INTERNAL MEDICINE

## 2023-12-01 PROCEDURE — 1159F PR MEDICATION LIST DOCUMENTED IN MEDICAL RECORD: ICD-10-PCS | Mod: CPTII,S$GLB,, | Performed by: INTERNAL MEDICINE

## 2023-12-01 PROCEDURE — 3008F PR BODY MASS INDEX (BMI) DOCUMENTED: ICD-10-PCS | Mod: CPTII,S$GLB,, | Performed by: INTERNAL MEDICINE

## 2023-12-01 PROCEDURE — 3077F SYST BP >= 140 MM HG: CPT | Mod: CPTII,S$GLB,, | Performed by: INTERNAL MEDICINE

## 2023-12-01 PROCEDURE — 1160F RVW MEDS BY RX/DR IN RCRD: CPT | Mod: CPTII,S$GLB,, | Performed by: INTERNAL MEDICINE

## 2023-12-01 PROCEDURE — 1159F MED LIST DOCD IN RCRD: CPT | Mod: CPTII,S$GLB,, | Performed by: INTERNAL MEDICINE

## 2023-12-01 PROCEDURE — 3077F PR MOST RECENT SYSTOLIC BLOOD PRESSURE >= 140 MM HG: ICD-10-PCS | Mod: CPTII,S$GLB,, | Performed by: INTERNAL MEDICINE

## 2023-12-01 RX ORDER — DULAGLUTIDE 3 MG/.5ML
3 INJECTION, SOLUTION SUBCUTANEOUS
Qty: 12 PEN | Refills: 3 | Status: SHIPPED | OUTPATIENT
Start: 2023-12-01 | End: 2024-01-25 | Stop reason: DRUGHIGH

## 2023-12-01 NOTE — PROGRESS NOTES
ENDOCRINOLOGY CLINIC    Subjective:      Chief Complaint: Type 2 diabetes    HPI:   Angel Bell is a 63 y.o. male who presents for follow-up of type 2 diabetes. Patient was seen by Dr. Shy Rogers on 05/24/2023.    Diabetes Hx:  Diagnosed w/ DM: 2016  Complications:   Retinopathy: No   Last eye exam: : 05/25/2022, appointment in 1/2024.   Neuropathy: No. Sees podiatry for foot and nail care.   Last foot exam: : 11/01/2023   Nephropathy: Yes, CKD and follows up with Nephrology  Cardiovascular: No  Gastroparesis: No  DKA/HHS:  Yes    Severe Hypoglycemia: No, Hypoglycemia unawareness: No  Hypoglycemic episodes: None recently    Current meds:   Levemir 5 units at bedtime  Trulicity 3 mg SC weekly, last visit increased   Farxiga 10 mg daily (Started on 3/2023 by nephrology)     Tolerating his medications well.    Compliance with meds: Yes     Previous meds:  Metformin  NovoLog     Home glucose checks: 3 times a day - on recall 120-130.   Compliant: Yes    Diet/Exercise:   2 meals a day, occasional snacks  Drinks adequate water  Not very active    Diabetes education:  Few years back    Last A1c:   Lab Results   Component Value Date    HGBA1C 6.5 (H) 05/04/2023    HGBA1C 6.5 (H) 03/03/2023    HGBA1C 6.7 (H) 01/06/2023     Microalbumin:   Lab Results   Component Value Date    LABMICR 352.0 03/03/2023    CREATRANDUR 102.0 03/03/2023    MICALBCREAT 345.1 (H) 03/03/2023     Lab Results   Component Value Date    EGFRNORACEVR 45 (A) 05/04/2023    CREATININE 1.7 (H) 05/04/2023     Lipids:   Lab Results   Component Value Date    CHOL 143 04/29/2022    TRIG 114 04/29/2022    HDL 32 (L) 04/29/2022    LDLCALC 88.2 04/29/2022    CHOLHDL 22.4 04/29/2022     TSH:  Lab Results   Component Value Date    TSH 0.536 04/29/2022     Lab Results   Component Value Date    SRKFHJAL25 903 06/17/2022     Lab Results   Component Value Date    HGB 14.6 05/04/2023        Aspirin: No  Statins: Yes  ACEI/ARB: Yes, on losartan  Fatty liver: No  HTN: On  medications, being adjusted. At home this morning was 118/70.     Denies history of pancreatitis or medullary thyroid cancer.  History recurrent UTI: No  History of recurrent fungal infection: No    Polyuria: No  Polydipsia: No  Has BPH      FHx of DM: Yes, mother  Heart disease: No  Hyperlipidemia: No    ROS: see HPI     Objective:     Physical Exam     BP (!) 151/79 (BP Location: Right arm, Patient Position: Sitting)   Pulse (!) 58   Wt 81.9 kg (180 lb 8.9 oz)   BMI 28.28 kg/m²     Wt Readings from Last 3 Encounters:   12/01/23 81.9 kg (180 lb 8.9 oz)   11/29/23 81.2 kg (179 lb 0.2 oz)   05/30/23 78 kg (172 lb)       Constitutional:  Pleasant,  in no acute distress.   HENT:   Head:    Normocephalic and atraumatic.   Eyes:    EOMI. No scleral icterus.   Cardiovascular:  Normal rate  Respiratory:   Effort normal   Neurological:  No tremor  Skin:    Skin is warm, dry  Extremity:  No edema    Injection sites normal w/o lipohypertrophy    LABORATORY REVIEW:  See HPI for other labs reviewed today      Chemistry        Component Value Date/Time     05/04/2023 0850    K 5.5 (H) 05/04/2023 0850     05/04/2023 0850    CO2 24 05/04/2023 0850    BUN 16 05/04/2023 0850    CREATININE 1.7 (H) 05/04/2023 0850     (H) 05/04/2023 0850        Component Value Date/Time    CALCIUM 10.4 05/04/2023 0850    ALKPHOS 64 08/29/2022 1610    AST 15 08/29/2022 1610    ALT 13 08/29/2022 1610    BILITOT 0.3 08/29/2022 1610    ESTGFRAFRICA 50.3 (A) 07/18/2022 0743    EGFRNONAA 43.5 (A) 07/18/2022 0743          Lab Results   Component Value Date    HGBA1C 6.5 (H) 05/04/2023    HGBA1C 6.5 (H) 03/03/2023    HGBA1C 6.7 (H) 01/06/2023     Other labs reviewed today in HPI    Assessment/Plan:     Problem List Items Addressed This Visit          Cardiac/Vascular    Hyperlipidemia       Continue statin.  Has lipid panel ordered by his PCP.                Endocrine    Type 2 diabetes mellitus, with long-term current use of insulin -  Primary       Controlled type 2 diabetes.  Recent A1c was 6.5.  Repeat A1c.  Continue current diabetes regimen.  Call if any side effects from the medications    Discussed goal A1c of 6.5-7%  Call if blood sugars are persistently less than 70 or greater than 200.     Discussed importance of diet and lifestyle modifications for diabetes management  Hypoglycemia management discussed. Carry glucose tablets or snacks at all times.     Complications:  Follow up for regular diabetes eye exam  Daily self examination of feet.  Microalbumin: Monitor. On ARBs             Relevant Medications    insulin detemir U-100, Levemir, 100 unit/mL (3 mL) SubQ InPn pen    dulaglutide (TRULICITY) 3 mg/0.5 mL pen injector    Other Relevant Orders    Hemoglobin A1C        Follow up in about 6 months (around 6/1/2024).     Gloria Edgar MD

## 2023-12-01 NOTE — ASSESSMENT & PLAN NOTE
Controlled type 2 diabetes.  Recent A1c was 6.5.  Repeat A1c.  Continue current diabetes regimen.  Call if any side effects from the medications    Discussed goal A1c of 6.5-7%  Call if blood sugars are persistently less than 70 or greater than 200.     Discussed importance of diet and lifestyle modifications for diabetes management  Hypoglycemia management discussed. Carry glucose tablets or snacks at all times.     Complications:  Follow up for regular diabetes eye exam  Daily self examination of feet.  Microalbumin: Monitor. On ARBs

## 2023-12-01 NOTE — PATIENT INSTRUCTIONS
Repeat A1c lab.    Continue current diabetes medications.    Call if you have any side effects from the medication:   Trulicity:  Nausea, vomiting, diarrhea, constipation, decreased appetite, abdominal pain  Farxiga:  Increased urination, urinary tract infections, yeast infections.  Call if you have any foot infections.     Check blood sugars before meals and bedtime.   Call if blood sugars are frequently less than 70 or above 200.    Call if you need prescriptions for medications.  Carry glucose tablets or snacks with you at all times.     Follow-up in 6 months.

## 2023-12-04 LAB — FUNGUS BLD CULT: NORMAL

## 2024-01-25 ENCOUNTER — LAB VISIT (OUTPATIENT)
Dept: LAB | Facility: HOSPITAL | Age: 64
End: 2024-01-25
Attending: INTERNAL MEDICINE
Payer: MEDICARE

## 2024-01-25 DIAGNOSIS — Z79.4 TYPE 2 DIABETES MELLITUS WITH MICROALBUMINURIA, WITH LONG-TERM CURRENT USE OF INSULIN: Primary | ICD-10-CM

## 2024-01-25 DIAGNOSIS — R80.9 TYPE 2 DIABETES MELLITUS WITH MICROALBUMINURIA, WITH LONG-TERM CURRENT USE OF INSULIN: Primary | ICD-10-CM

## 2024-01-25 DIAGNOSIS — N18.32 ANEMIA IN STAGE 3B CHRONIC KIDNEY DISEASE: ICD-10-CM

## 2024-01-25 DIAGNOSIS — E11.22 TYPE 2 DIABETES MELLITUS WITH STAGE 3B CHRONIC KIDNEY DISEASE, WITHOUT LONG-TERM CURRENT USE OF INSULIN: ICD-10-CM

## 2024-01-25 DIAGNOSIS — D63.1 ANEMIA IN STAGE 3B CHRONIC KIDNEY DISEASE: ICD-10-CM

## 2024-01-25 DIAGNOSIS — E11.29 TYPE 2 DIABETES MELLITUS WITH MICROALBUMINURIA, WITH LONG-TERM CURRENT USE OF INSULIN: Primary | ICD-10-CM

## 2024-01-25 DIAGNOSIS — N18.32 TYPE 2 DIABETES MELLITUS WITH STAGE 3B CHRONIC KIDNEY DISEASE, WITHOUT LONG-TERM CURRENT USE OF INSULIN: ICD-10-CM

## 2024-01-25 LAB
25(OH)D3+25(OH)D2 SERPL-MCNC: 20 NG/ML (ref 30–96)
ALBUMIN SERPL BCP-MCNC: 4.7 G/DL (ref 3.5–5.2)
ANION GAP SERPL CALC-SCNC: 12 MMOL/L (ref 8–16)
BASOPHILS # BLD AUTO: 0.04 K/UL (ref 0–0.2)
BASOPHILS NFR BLD: 0.5 % (ref 0–1.9)
CALCIUM SERPL-MCNC: 10 MG/DL (ref 8.7–10.5)
CHLORIDE SERPL-SCNC: 107 MMOL/L (ref 95–110)
CO2 SERPL-SCNC: 23 MMOL/L (ref 23–29)
CREAT SERPL-MCNC: 1.79 MG/DL (ref 0.5–1.4)
DIFFERENTIAL METHOD BLD: ABNORMAL
EOSINOPHIL # BLD AUTO: 0 K/UL (ref 0–0.5)
EOSINOPHIL NFR BLD: 0.4 % (ref 0–8)
ERYTHROCYTE [DISTWIDTH] IN BLOOD BY AUTOMATED COUNT: 14 % (ref 11.5–14.5)
EST. GFR  (NO RACE VARIABLE): 41.8 ML/MIN/1.73 M^2
ESTIMATED AVG GLUCOSE: 180 MG/DL (ref 68–131)
FERRITIN SERPL-MCNC: 469 NG/ML (ref 20–300)
GLUCOSE SERPL-MCNC: 187 MG/DL (ref 70–110)
HBA1C MFR BLD: 7.9 % (ref 4–5.6)
HCT VFR BLD AUTO: 43.2 % (ref 40–54)
HGB BLD-MCNC: 14.3 G/DL (ref 14–18)
IMM GRANULOCYTES # BLD AUTO: 0.08 K/UL (ref 0–0.04)
IMM GRANULOCYTES NFR BLD AUTO: 1 % (ref 0–0.5)
IRON SERPL-MCNC: 104 UG/DL (ref 45–160)
LYMPHOCYTES # BLD AUTO: 3 K/UL (ref 1–4.8)
LYMPHOCYTES NFR BLD: 36.4 % (ref 18–48)
MAGNESIUM SERPL-MCNC: 2.4 MG/DL (ref 1.6–2.6)
MCH RBC QN AUTO: 30.6 PG (ref 27–31)
MCHC RBC AUTO-ENTMCNC: 33.1 G/DL (ref 32–36)
MCV RBC AUTO: 93 FL (ref 82–98)
MONOCYTES # BLD AUTO: 0.7 K/UL (ref 0.3–1)
MONOCYTES NFR BLD: 8.3 % (ref 4–15)
NEUTROPHILS # BLD AUTO: 4.5 K/UL (ref 1.8–7.7)
NEUTROPHILS NFR BLD: 53.4 % (ref 38–73)
NRBC BLD-RTO: 0 /100 WBC
PHOSPHATE SERPL-MCNC: 2.6 MG/DL (ref 2.7–4.5)
PLATELET # BLD AUTO: 226 K/UL (ref 150–450)
PMV BLD AUTO: 9.8 FL (ref 9.2–12.9)
POTASSIUM SERPL-SCNC: 5.6 MMOL/L (ref 3.5–5.1)
PTH-INTACT SERPL-MCNC: 110.3 PG/ML (ref 9–77)
RBC # BLD AUTO: 4.67 M/UL (ref 4.6–6.2)
SATURATED IRON: 30 % (ref 20–50)
SODIUM SERPL-SCNC: 142 MMOL/L (ref 136–145)
TOTAL IRON BINDING CAPACITY: 343 UG/DL (ref 250–450)
TRANSFERRIN SERPL-MCNC: 232 MG/DL (ref 200–375)
URATE SERPL-MCNC: 6.6 MG/DL (ref 3.4–7)
UUN UR-MCNC: 19 MG/DL (ref 2–20)
WBC # BLD AUTO: 8.36 K/UL (ref 3.9–12.7)

## 2024-01-25 PROCEDURE — 80069 RENAL FUNCTION PANEL: CPT | Mod: PN | Performed by: INTERNAL MEDICINE

## 2024-01-25 PROCEDURE — 82728 ASSAY OF FERRITIN: CPT | Performed by: INTERNAL MEDICINE

## 2024-01-25 PROCEDURE — 83540 ASSAY OF IRON: CPT | Mod: PN | Performed by: INTERNAL MEDICINE

## 2024-01-25 PROCEDURE — 84550 ASSAY OF BLOOD/URIC ACID: CPT | Performed by: INTERNAL MEDICINE

## 2024-01-25 PROCEDURE — 83036 HEMOGLOBIN GLYCOSYLATED A1C: CPT | Mod: 91 | Performed by: INTERNAL MEDICINE

## 2024-01-25 PROCEDURE — 83970 ASSAY OF PARATHORMONE: CPT | Mod: PN | Performed by: INTERNAL MEDICINE

## 2024-01-25 PROCEDURE — 83735 ASSAY OF MAGNESIUM: CPT | Mod: PN | Performed by: INTERNAL MEDICINE

## 2024-01-25 PROCEDURE — 82306 VITAMIN D 25 HYDROXY: CPT | Mod: PN | Performed by: INTERNAL MEDICINE

## 2024-01-25 PROCEDURE — 85025 COMPLETE CBC W/AUTO DIFF WBC: CPT | Mod: PN | Performed by: INTERNAL MEDICINE

## 2024-02-06 ENCOUNTER — OFFICE VISIT (OUTPATIENT)
Dept: UROLOGY | Facility: CLINIC | Age: 64
End: 2024-02-06
Payer: COMMERCIAL

## 2024-02-06 DIAGNOSIS — N52.9 ERECTILE DYSFUNCTION, UNSPECIFIED ERECTILE DYSFUNCTION TYPE: ICD-10-CM

## 2024-02-06 PROCEDURE — 99213 OFFICE O/P EST LOW 20 MIN: CPT | Mod: 95,,, | Performed by: UROLOGY

## 2024-02-06 PROCEDURE — 1159F MED LIST DOCD IN RCRD: CPT | Mod: CPTII,95,, | Performed by: UROLOGY

## 2024-02-06 PROCEDURE — 1160F RVW MEDS BY RX/DR IN RCRD: CPT | Mod: CPTII,95,, | Performed by: UROLOGY

## 2024-02-06 PROCEDURE — 3066F NEPHROPATHY DOC TX: CPT | Mod: CPTII,95,, | Performed by: UROLOGY

## 2024-02-06 PROCEDURE — 3051F HG A1C>EQUAL 7.0%<8.0%: CPT | Mod: CPTII,95,, | Performed by: UROLOGY

## 2024-02-06 RX ORDER — SILDENAFIL CITRATE 20 MG/1
20 TABLET ORAL DAILY PRN
Qty: 30 TABLET | Refills: 5 | Status: ACTIVE | OUTPATIENT
Start: 2024-02-06 | End: 2024-05-30

## 2024-03-05 ENCOUNTER — PATIENT MESSAGE (OUTPATIENT)
Dept: ADMINISTRATIVE | Facility: HOSPITAL | Age: 64
End: 2024-03-05
Payer: MEDICARE

## 2024-03-06 ENCOUNTER — PATIENT OUTREACH (OUTPATIENT)
Dept: ADMINISTRATIVE | Facility: HOSPITAL | Age: 64
End: 2024-03-06
Payer: MEDICARE

## 2024-03-06 NOTE — PROGRESS NOTES
Population Health Chart Review & Patient Outreach Details      Additional Pop Health Notes:    Optometry appointment scheduled       Updates Requested / Reviewed:      Updated Care Coordination Note, Care Everywhere, Care Team Updated, and Immunizations Reconciliation Completed or Queried: [place]         Health Maintenance Topics Overdue:      [facility] Score: 4     Colon Cancer Screening  Urine Screening  Lipid Panel  Uncontrolled BP                Health Maintenance Topic(s) Outreach Outcomes & Actions Taken:    Eye Exam - Outreach Outcomes & Actions Taken  : Eye Camera Scheduled or Optometry/Ophthalmology Referral Placed/Appt Scheduled

## 2024-05-16 DIAGNOSIS — E78.5 HYPERLIPIDEMIA, UNSPECIFIED HYPERLIPIDEMIA TYPE: ICD-10-CM

## 2024-05-16 RX ORDER — ATORVASTATIN CALCIUM 40 MG/1
40 TABLET, FILM COATED ORAL
Qty: 90 TABLET | Refills: 3 | Status: SHIPPED | OUTPATIENT
Start: 2024-05-16

## 2024-05-29 ENCOUNTER — LAB VISIT (OUTPATIENT)
Dept: LAB | Facility: HOSPITAL | Age: 64
End: 2024-05-29
Attending: FAMILY MEDICINE
Payer: MEDICARE

## 2024-05-29 ENCOUNTER — PATIENT MESSAGE (OUTPATIENT)
Dept: UROLOGY | Facility: CLINIC | Age: 64
End: 2024-05-29
Payer: MEDICARE

## 2024-05-29 DIAGNOSIS — N18.32 STAGE 3B CHRONIC KIDNEY DISEASE: ICD-10-CM

## 2024-05-29 LAB
ALBUMIN/CREAT UR: 20.6 UG/MG (ref 0–30)
BILIRUB UR QL STRIP: NEGATIVE
CLARITY UR REFRACT.AUTO: CLEAR
COLOR UR AUTO: YELLOW
CREAT UR-MCNC: 77.6 MG/DL (ref 23–375)
CREAT UR-MCNC: 77.6 MG/DL (ref 23–375)
GLUCOSE UR QL STRIP: NEGATIVE
HGB UR QL STRIP: NEGATIVE
KETONES UR QL STRIP: NEGATIVE
LEUKOCYTE ESTERASE UR QL STRIP: NEGATIVE
MICROALBUMIN UR DL<=1MG/L-MCNC: 16 UG/ML
NITRITE UR QL STRIP: NEGATIVE
PH UR STRIP: 6 [PH] (ref 5–8)
PROT UR QL STRIP: NEGATIVE
PROT UR-MCNC: 11 MG/DL (ref 0–15)
PROT/CREAT UR: 0.14 MG/G{CREAT} (ref 0–0.2)
SP GR UR STRIP: 1.01 (ref 1–1.03)
URN SPEC COLLECT METH UR: NORMAL
UROBILINOGEN UR STRIP-ACNC: NEGATIVE EU/DL

## 2024-05-29 PROCEDURE — 81003 URINALYSIS AUTO W/O SCOPE: CPT | Mod: PN | Performed by: INTERNAL MEDICINE

## 2024-05-29 PROCEDURE — 84156 ASSAY OF PROTEIN URINE: CPT | Performed by: INTERNAL MEDICINE

## 2024-05-29 PROCEDURE — 82043 UR ALBUMIN QUANTITATIVE: CPT | Mod: PN | Performed by: INTERNAL MEDICINE

## 2024-05-30 DIAGNOSIS — N52.9 ERECTILE DYSFUNCTION, UNSPECIFIED ERECTILE DYSFUNCTION TYPE: Primary | ICD-10-CM

## 2024-05-30 RX ORDER — SILDENAFIL 100 MG/1
100 TABLET, FILM COATED ORAL DAILY PRN
Qty: 6 TABLET | Refills: 11 | Status: SHIPPED | OUTPATIENT
Start: 2024-05-30 | End: 2024-05-31 | Stop reason: SDUPTHER

## 2024-05-31 ENCOUNTER — OFFICE VISIT (OUTPATIENT)
Dept: UROLOGY | Facility: CLINIC | Age: 64
End: 2024-05-31
Payer: MEDICARE

## 2024-05-31 VITALS
HEIGHT: 67 IN | HEART RATE: 116 BPM | SYSTOLIC BLOOD PRESSURE: 138 MMHG | WEIGHT: 167.75 LBS | BODY MASS INDEX: 26.33 KG/M2 | DIASTOLIC BLOOD PRESSURE: 72 MMHG

## 2024-05-31 DIAGNOSIS — N13.8 BENIGN PROSTATIC HYPERPLASIA WITH URINARY OBSTRUCTION: Primary | ICD-10-CM

## 2024-05-31 DIAGNOSIS — R33.9 URINARY RETENTION: ICD-10-CM

## 2024-05-31 DIAGNOSIS — N40.1 BENIGN PROSTATIC HYPERPLASIA WITH URINARY OBSTRUCTION: Primary | ICD-10-CM

## 2024-05-31 DIAGNOSIS — N52.9 ERECTILE DYSFUNCTION, UNSPECIFIED ERECTILE DYSFUNCTION TYPE: ICD-10-CM

## 2024-05-31 LAB — POC RESIDUAL URINE VOLUME: 5 ML (ref 0–100)

## 2024-05-31 PROCEDURE — 99214 OFFICE O/P EST MOD 30 MIN: CPT | Mod: PBBFAC,PO,25 | Performed by: UROLOGY

## 2024-05-31 PROCEDURE — 99999PBSHW POCT BLADDER SCAN: Mod: PBBFAC,,,

## 2024-05-31 PROCEDURE — 99999 PR PBB SHADOW E&M-EST. PATIENT-LVL IV: CPT | Mod: PBBFAC,,, | Performed by: UROLOGY

## 2024-05-31 PROCEDURE — 51798 US URINE CAPACITY MEASURE: CPT | Mod: PBBFAC,PO | Performed by: UROLOGY

## 2024-05-31 PROCEDURE — 99213 OFFICE O/P EST LOW 20 MIN: CPT | Mod: S$PBB,,, | Performed by: UROLOGY

## 2024-05-31 RX ORDER — SILDENAFIL 100 MG/1
100 TABLET, FILM COATED ORAL DAILY PRN
Qty: 30 TABLET | Refills: 5 | Status: SHIPPED | OUTPATIENT
Start: 2024-05-31 | End: 2025-05-31

## 2024-05-31 RX ORDER — CHLORPROMAZINE HYDROCHLORIDE 25 MG/1
50 TABLET, FILM COATED ORAL 2 TIMES DAILY PRN
COMMUNITY
Start: 2024-05-20 | End: 2024-06-07

## 2024-05-31 RX ORDER — ONDANSETRON 4 MG/1
TABLET, ORALLY DISINTEGRATING ORAL
COMMUNITY
Start: 2024-05-20

## 2024-05-31 RX ORDER — PROMETHAZINE HYDROCHLORIDE 25 MG/1
TABLET ORAL
COMMUNITY
Start: 2024-05-20

## 2024-05-31 NOTE — PROGRESS NOTES
Subjective:       Patient ID: Angel Bell is a 63 y.o. male.    Chief Complaint: No chief complaint on file.       This is a 63 y.o.  male patient with history of urinary retention, BPH, bilateral hydronephrosis.    In late April 2022. CT scan at that time showed massive bilateral hydronephrosis and enlarged prostate with urinary retention.  Tavera catheter was placed after creatinine was noted to be 18.  His creatinine continued to improve.  Renal ultrasound showed improvement in hydronephrosis.      UDS showed HERRERA  PSA was 10 with cath.   HoLEP 8/29/22  QUOC after w/o hydronephrosis  Having some ED, had prior to procedure. Sildenafil working.     Doing well, AUA SS 5/1, PVR 10  PSA 0.63 post HoLEP (6/23)    Having worsening ED, was on sildenafil 20 taking 2 tablets with results in past, refilled med and told to take up to 5 tablets.   Doing well on viagra 100 mg.    AUA SS 4/1(5/24), PVR 5.        IPSS Questionnaire (AUA-SS):  Over the past month    1)  Incomplete Emptying - How often have you had a sensation of not emptying your bladder completely after you finish urinating?  0 - Not at all   2)  Frequency - How often have you had to urinate again less than two hours after you finished urinating? 2 - Less than half the time   3)  Intermittency - How often have you found you stopped and started again several times when you urinated?  0 - Not at all   4) Urgency - How difficult have you found it to postpone urination?  0 - Not at all   5) Weak Stream - How often have you had a weak urinary stream?  0 - Not at all   6) Straining  - How often have you had to push or strain to begin urination?  0 - Not at all   7) Nocturia - How many times did you most typically get up to urinate from the time you went to bed until the time you got up in the morning?  2 - 2 times   Total score:  0-7 mild, 8-19 moderate, 20-35 severe 4   Quality of Life:  1 - Pleased          Lab Results   Component Value Date    CREATININE 1.62 (H)  05/29/2024       ---  Past Medical History:   Diagnosis Date    Diabetes mellitus     Disorder of kidney and ureter     Hyperlipemia     Hypertension     Iron deficiency anemia     Migraine headache     PTSD (post-traumatic stress disorder)     Stage 3a chronic kidney disease        Past Surgical History:   Procedure Laterality Date    LASER ENUCLEATION OF PROSTATE N/A 8/29/2022    Procedure: ENUCLEATION, PROSTATE, USING LASER  Time: 180 minutes Equipment: high powered 100W laser with virtual basket, morcellator, scopes for HoLEP from [facility];  Surgeon: Ted Garvin MD;  Location: [facility] OR;  Service: Urology;  Laterality: N/A;  fort confirmed CW 8/26  confirmation # [id number]       Family History   Problem Relation Name Age of Onset    Diabetes Mother         Social History     Tobacco Use    Smoking status: Never    Smokeless tobacco: Never   Substance Use Topics    Alcohol use: No    Drug use: No       Current Outpatient Medications on File Prior to Visit   Medication Sig Dispense Refill    atorvastatin (LIPITOR) 40 MG tablet TAKE 1 TABLET(40 MG) BY MOUTH EVERY DAY 90 tablet 3    blood sugar diagnostic (TRUE METRIX GLUCOSE TEST STRIP) Strp TEST 4 TIMES A  each 11    dapagliflozin propanediol (FARXIGA) 10 mg tablet Take 1 tablet (10 mg total) by mouth once daily. 30 tablet 11    dulaglutide (TRULICITY) 4.5 mg/0.5 mL pen injector Inject 4.5 mg into the skin every 7 days. 12 pen 3    finerenone (KERENDIA) 10 mg Tab Take 1 tablet by mouth once daily. 90 tablet 11    furosemide (LASIX) 40 MG tablet Take 1 tablet (40 mg total) by mouth daily as needed (swelling). 30 tablet 11    glucose 4 GM chewable tablet Take 4 tablets (16 g total) by mouth as needed for Low blood sugar (If having symptoms of blurry vision, palpitations, confusion, shakiness.  Please check sugars and if sugar below 70 please take 4 tablets and re-check sugar everry 15 minutes until sugars are above 70 and symptoms resolve.). 50 tablet  "12    insulin detemir U-100, Levemir, 100 unit/mL (3 mL) SubQ InPn pen Inject 5 Units into the skin once daily. 15 mL 3    lancets 33 gauge Misc USE 4 TIMES A  each 11    losartan (COZAAR) 50 MG tablet Take 1 tablet (50 mg total) by mouth once daily. 90 tablet 3    metoprolol succinate (TOPROL-XL) 200 MG 24 hr tablet Take 1 tablet (200 mg total) by mouth once daily. 90 tablet 1    NIFEdipine (PROCARDIA-XL) 90 MG (OSM) 24 hr tablet Take 1 tablet (90 mg total) by mouth once daily. 90 tablet 3    patiromer calcium sorbitex (VELTASSA) 8.4 gram PwPk Take 1 packet (8.4 g total) by mouth once daily. 90 packet 3    pen needle, diabetic 31 gauge x 5/16" Ndle use 4 times a day 300 each 11    sildenafiL (VIAGRA) 100 MG tablet Take 1 tablet (100 mg total) by mouth daily as needed for Erectile Dysfunction (1 hour before sexual activity on an empty stomach.). 6 tablet 11     No current facility-administered medications on file prior to visit.       Review of patient's allergies indicates:  No Known Allergies    Review of Systems   Constitutional:  Negative for activity change, chills and fever.   HENT:  Negative for congestion.    Respiratory:  Negative for cough, chest tightness and shortness of breath.    Cardiovascular:  Negative for chest pain and palpitations.   Gastrointestinal:  Negative for abdominal distention, abdominal pain, nausea and vomiting.   Genitourinary:  Negative for difficulty urinating, flank pain, frequency, hematuria, penile pain, scrotal swelling and testicular pain.   Musculoskeletal:  Negative for gait problem.       Objective:      Physical Exam  HENT:      Head: Atraumatic.   Pulmonary:      Effort: Pulmonary effort is normal.   Neurological:      General: No focal deficit present.      Mental Status: He is alert and oriented to person, place, and time.           UDS 6/22 (Dr. Prasad):     voided with a maximum flow rate of 1 cc/second.  His detrusor pressure was 48.6 cm of water and maximum " detrusor pressure was 59 cm of water.  There was no bladder sphincter dyssynergia and no uninhibited bladder contractions  Patient's Tavera was replaced  Impression urodynamic study associated with a an elevated detrusor pressure indicative of recent urinary retention with hopefully improving detrusor pressure  Recommendations correlate with cystoscopy and truss.  Patient's bladder is hopefully recovering and is probably in need of some sort of procedure for outlet obstruction depending on the size of the prostate  such as HoLEP versus TURP versus open prostatectomy versus robotic prostatectomy    Assessment:     Problem Noted   Erectile Dysfunction 11/3/2022    Prior to catheter  Sildenafil started 11/2022     Benign Prostatic Hyperplasia With Urinary Obstruction 4/29/2022    AUA SS 4/2 post op HoLEP, PVR 82  AUA SS current: 5/1, PVR 10     Urinary Retention (Resolved) 4/29/2022    Distended bladder with bilateral hydroneprosis, ARF  Prostate volume calculated 112 myself (larger on CT measurements, 89gm on QUOC)  --UDS 6/22: shows some bladder contraction, low flow, HERRERA  --HoLEP 8/29/22, path negative volume 75     Bilateral Hydronephrosis (Resolved) 4/29/2022    QUOC 5/22: mild right hydronephrosis, greatly improved from CT  QUOC 10/22: no hydronephrosis post HoLEP         Plan:      Sildenafil refilled  Doing well  Follow up in 1 year, sooner if needed.       Ted Garvin MD

## 2024-06-02 NOTE — PROGRESS NOTES
ENDOCRINOLOGY CLINIC    Subjective:      Chief Complaint: Type 2 diabetes    HPI:   Angel Bell is a 63 y.o. male who presents for follow-up of type 2 diabetes. Patient was last seen in the clinic on 12/1/2023.     Diabetes Hx:  Diagnosed w/ DM: 2016  Complications:   Retinopathy: No   Last eye exam: : 05/25/2022, has appointment in 9/2024.   Neuropathy: No. Sees podiatry for foot and nail care.   Last foot exam: : 11/01/2023.   Nephropathy: Yes, CKD and follows up with Nephrology  Cardiovascular: Denies  Gastroparesis: Denies  DKA/HHS:  DKA in 2022.    Severe Hypoglycemia: Denies  Hypoglycemia unawareness: Denies  Hypoglycemic episodes: None recently    Current meds:   Levemir 5 units at bedtime  Trulicity 4.5 mg SC weekly. Reports occasional nausea/vomiting, every few weeks.   Farxiga 10 mg daily (Started on 3/2023 by nephrology)     Compliance with meds: Yes     Previous meds:  Metformin  NovoLog     Home glucose checks: 3 times a day - on recall blood sugars running around 110.   Compliant: Yes    Diet/Exercise:   Takes 2 meals a day, occasional snacks  Reports not taking adequate water   He will start walking regularly.    Diabetes education:  Few years back - declines referral.    Last A1c:   Lab Results   Component Value Date    HGBA1C 5.8 (H) 05/29/2024    HGBA1C 7.9 (H) 01/25/2024    HGBA1C 7.8 (H) 01/25/2024     Microalbumin:   Lab Results   Component Value Date    LABMICR 16.0 05/29/2024    CREATRANDUR 77.6 05/29/2024    CREATRANDUR 77.6 05/29/2024    MICALBCREAT 20.6 05/29/2024     Lab Results   Component Value Date    EGFRNORACEVR 47.1 (A) 05/29/2024    CREATININE 1.62 (H) 05/29/2024     Lipids:   Lab Results   Component Value Date    CHOL 104 (L) 05/29/2024    TRIG 69 05/29/2024    HDL 39 (L) 05/29/2024    LDLCALC 51.2 (L) 05/29/2024    CHOLHDL 37.5 05/29/2024     TSH:  Lab Results   Component Value Date    TSH 0.536 04/29/2022     Lab Results   Component Value Date    WOUDEFMM09 903 06/17/2022     Lab  Results   Component Value Date    HGB 9.9 (L) 05/29/2024        Aspirin: No  Statins: Yes  ACEI/ARB: Yes    Fatty liver: Denies    HTN: On Losartan, Metoprolol, Nifedipine. Takes lasix as needed.    Denies history of pancreatitis or medullary thyroid cancer.  History recurrent UTI/fungal infection: Denies    Polyuria/Polydipsia: Denies  Has BPH      FHx of DM: Yes, mother  Heart disease: No  Hyperlipidemia: No    ROS: see HPI     Objective:     Physical Exam     /76 (BP Location: Right arm, Patient Position: Sitting, BP Method: Medium (Automatic))   Pulse 108   SpO2 99%     Wt Readings from Last 3 Encounters:   05/31/24 76.1 kg (167 lb 12.3 oz)   02/05/24 83 kg (183 lb)   12/01/23 81.9 kg (180 lb 8.9 oz)       Constitutional:  Pleasant,  in no acute distress.   HENT:   Head:    Normocephalic and atraumatic.   Eyes:    EOMI. No scleral icterus.   Cardiovascular:  Normal rate  Respiratory:   Effort normal   Neurological:  No tremor  Skin:    Skin is warm, dry  Extremity:  No edema      Foot exam:  Normal sensation monofilament testing bilaterally.  No ulcers or fissures seen.  Has onychomycosis.    Injection sites normal w/o lipohypertrophy    LABORATORY REVIEW:  See HPI for other labs reviewed today      Chemistry        Component Value Date/Time     (H) 05/29/2024 0708    K 5.0 05/29/2024 0708     (H) 05/29/2024 0708    CO2 22 (L) 05/29/2024 0708    BUN 20 05/29/2024 0708    CREATININE 1.62 (H) 05/29/2024 0708     (H) 05/29/2024 0708        Component Value Date/Time    CALCIUM 9.1 05/29/2024 0708    ALKPHOS 64 08/29/2022 1610    AST 15 08/29/2022 1610    ALT 13 08/29/2022 1610    BILITOT 0.3 08/29/2022 1610    ESTGFRAFRICA 50.3 (A) 07/18/2022 0743    EGFRNONAA 43.5 (A) 07/18/2022 0743          Lab Results   Component Value Date    HGBA1C 5.8 (H) 05/29/2024    HGBA1C 7.9 (H) 01/25/2024    HGBA1C 7.8 (H) 01/25/2024     Other labs reviewed today in HPI    Assessment/Plan:     Problem List  Items Addressed This Visit          Cardiac/Vascular    Hyperlipidemia       Continue statin.  Monitor lipids.                  Endocrine    Type 2 diabetes mellitus with hyperglycemia, with long-term current use of insulin - Primary       Recent A1c was 5.8.    Reports some nausea/vomiting on the Trulicity and wants to try the lower dose.  Declines switching to Ozempic or Mounjaro.    Change in diabetes regimen:  Decrease Trulicity to 3 mg weekly.  Switch Levemir to Lantus 5 units at bedtime.  Continue Farxiga.    Call if any side effects from the medications  Call if blood sugars are persistently less than 70 or greater than 200.     Discussed importance of diet and lifestyle modifications for diabetes management  Hypoglycemia management discussed. Carry glucose tablets or snacks at all times.     Complications:  Follow up for regular diabetes eye exam  Daily self examination of feet.  Microalbumin: Monitor. On ARBs    Lab Results   Component Value Date    HGBA1C 5.8 (H) 05/29/2024              Relevant Medications    insulin glargine U-100, Lantus, (LANTUS SOLOSTAR U-100 INSULIN) 100 unit/mL (3 mL) InPn pen    dulaglutide (TRULICITY) 3 mg/0.5 mL pen injector        Follow-up in 4 5 months.    Gloria Edgar MD

## 2024-06-03 ENCOUNTER — OFFICE VISIT (OUTPATIENT)
Dept: ENDOCRINOLOGY | Facility: CLINIC | Age: 64
End: 2024-06-03
Payer: COMMERCIAL

## 2024-06-03 VITALS — DIASTOLIC BLOOD PRESSURE: 76 MMHG | SYSTOLIC BLOOD PRESSURE: 136 MMHG | HEART RATE: 108 BPM | OXYGEN SATURATION: 99 %

## 2024-06-03 DIAGNOSIS — E78.2 MIXED HYPERLIPIDEMIA: ICD-10-CM

## 2024-06-03 DIAGNOSIS — Z79.4 TYPE 2 DIABETES MELLITUS WITH HYPERGLYCEMIA, WITH LONG-TERM CURRENT USE OF INSULIN: Primary | ICD-10-CM

## 2024-06-03 DIAGNOSIS — E11.65 TYPE 2 DIABETES MELLITUS WITH HYPERGLYCEMIA, WITH LONG-TERM CURRENT USE OF INSULIN: Primary | ICD-10-CM

## 2024-06-03 PROCEDURE — 99214 OFFICE O/P EST MOD 30 MIN: CPT | Mod: S$PBB,,, | Performed by: INTERNAL MEDICINE

## 2024-06-03 PROCEDURE — 99999 PR PBB SHADOW E&M-EST. PATIENT-LVL IV: CPT | Mod: PBBFAC,,, | Performed by: INTERNAL MEDICINE

## 2024-06-03 PROCEDURE — 99214 OFFICE O/P EST MOD 30 MIN: CPT | Mod: PBBFAC | Performed by: INTERNAL MEDICINE

## 2024-06-03 PROCEDURE — 4010F ACE/ARB THERAPY RXD/TAKEN: CPT | Mod: ,,, | Performed by: INTERNAL MEDICINE

## 2024-06-03 PROCEDURE — 3061F NEG MICROALBUMINURIA REV: CPT | Mod: ,,, | Performed by: INTERNAL MEDICINE

## 2024-06-03 PROCEDURE — 3066F NEPHROPATHY DOC TX: CPT | Mod: ,,, | Performed by: INTERNAL MEDICINE

## 2024-06-03 RX ORDER — INSULIN GLARGINE 100 [IU]/ML
5 INJECTION, SOLUTION SUBCUTANEOUS NIGHTLY
Qty: 15 ML | Refills: 3 | Status: SHIPPED | OUTPATIENT
Start: 2024-06-03 | End: 2025-06-03

## 2024-06-03 NOTE — PATIENT INSTRUCTIONS
Change in your diabetes medications:   Decrease Trulicity to 3 mg once a week  Switch Levemir to Lantus 5 units daily  Continue Farixiga    Call if you have any side effects from the medication:   Trulicity:  Nausea, vomiting, diarrhea, constipation, decreased appetite, abdominal pain, pancreatitis, gastroparesis, dehydration and acute kidney injury.   Farxiga:  Dehydration, increased urination, urinary tract infections, yeast infections, diabetic ketoacidosis.  Call if you have any foot infections.     Check blood sugars before meals and bedtime.   Call if blood sugars are frequently less than 70 or above 200.  Brisk walk 30 minutes a day, 5 days a week    Call if you need prescriptions for medications.  Carry glucose tablets or snacks with you at all times.     Follow-up in 4-5 months.

## 2024-06-03 NOTE — ASSESSMENT & PLAN NOTE
Recent A1c was 5.8.    Reports some nausea/vomiting on the Trulicity and wants to try the lower dose.  Declines switching to Ozempic or Mounjaro.    Change in diabetes regimen:  Decrease Trulicity to 3 mg weekly.  Switch Levemir to Lantus 5 units at bedtime.  Continue Farxiga.    Call if any side effects from the medications  Call if blood sugars are persistently less than 70 or greater than 200.     Discussed importance of diet and lifestyle modifications for diabetes management  Hypoglycemia management discussed. Carry glucose tablets or snacks at all times.     Complications:  Follow up for regular diabetes eye exam  Daily self examination of feet.  Microalbumin: Monitor. On ARBs    Lab Results   Component Value Date    HGBA1C 5.8 (H) 05/29/2024

## 2024-06-04 ENCOUNTER — PATIENT OUTREACH (OUTPATIENT)
Dept: ADMINISTRATIVE | Facility: HOSPITAL | Age: 64
End: 2024-06-04
Payer: MEDICARE

## 2024-06-04 NOTE — PROGRESS NOTES
Population Health Chart Review & Patient Outreach Details      Additional [facility] Health Notes:               Updates Requested / Reviewed:      Updated Care Coordination Note, Care Everywhere, and Immunizations Reconciliation Completed or Queried: [facility] Topics Overdue:      [facility] Score: 1     Colon Cancer Screening    RSV Vaccine                  Health Maintenance Topic(s) Outreach Outcomes & Actions Taken:    Eye Exam - Outreach Outcomes & Actions Taken  : Eye Camera Scheduled or Optometry/Ophthalmology Referral Placed/Appt Scheduled

## 2024-06-07 ENCOUNTER — HOSPITAL ENCOUNTER (EMERGENCY)
Facility: HOSPITAL | Age: 64
Discharge: HOME | End: 2024-06-07
Attending: EMERGENCY MEDICINE
Payer: COMMERCIAL

## 2024-06-07 VITALS
BODY MASS INDEX: 26.21 KG/M2 | RESPIRATION RATE: 18 BRPM | HEART RATE: 97 BPM | OXYGEN SATURATION: 99 % | HEIGHT: 67 IN | SYSTOLIC BLOOD PRESSURE: 122 MMHG | DIASTOLIC BLOOD PRESSURE: 66 MMHG | WEIGHT: 167 LBS | TEMPERATURE: 99 F

## 2024-06-07 DIAGNOSIS — R06.6 HICCUPS: Primary | ICD-10-CM

## 2024-06-07 PROCEDURE — 99283 EMERGENCY DEPT VISIT LOW MDM: CPT | Mod: ER

## 2024-06-07 RX ORDER — CHLORPROMAZINE HYDROCHLORIDE 25 MG/1
25 TABLET, FILM COATED ORAL 3 TIMES DAILY PRN
Qty: 30 TABLET | Refills: 0 | Status: SHIPPED | OUTPATIENT
Start: 2024-06-07 | End: 2024-06-14 | Stop reason: SDUPTHER

## 2024-06-08 NOTE — ED PROVIDER NOTES
Encounter Date: 6/7/2024       History     Chief Complaint   Patient presents with    Hiccups     Reports to ED c c/o intermittent hiccups x 1 week. Denies any other complaints      Angel Bell is a 63 y.o. male who has a past medical history of Diabetes mellitus, Disorder of kidney and ureter, Hyperlipemia, Hypertension, Iron deficiency anemia, Migraine headache, PTSD (post-traumatic stress disorder), and Stage 3a chronic kidney disease. presenting to the Emergency Department for hiccups. Reports he has been experiencing episodes of hiccups every few days for about a week. This has happened before. Hiccups last about 2 hours. He does report associated gas and belching. No abdominal pain. No CP or SOB. No fevers. He reports the hiccups resolved after drinking coke in the waiting room. He was seen at urgent care for hiccups before and prescribed thorazine which worked well. No other complaint.         The history is provided by the patient.     Review of patient's allergies indicates:  No Known Allergies  Past Medical History:   Diagnosis Date    Diabetes mellitus     Disorder of kidney and ureter     Hyperlipemia     Hypertension     Iron deficiency anemia     Migraine headache     PTSD (post-traumatic stress disorder)     Stage 3a chronic kidney disease      Past Surgical History:   Procedure Laterality Date    LASER ENUCLEATION OF PROSTATE N/A 8/29/2022    Procedure: ENUCLEATION, PROSTATE, USING LASER  Time: 180 minutes Equipment: high powered 100W laser with virtual basket, morcellator, scopes for HoLEP from [facility];  Surgeon: Ted Garvin MD;  Location: [facility];  Service: Urology;  Laterality: N/A;  [facility] confirmed  8/26  confirmation # [id number]     Family History   Problem Relation Name Age of Onset    Diabetes Mother       Social History     Tobacco Use    Smoking status: Never    Smokeless tobacco: Never   Substance Use Topics    Alcohol use: No    Drug use: No     Review of Systems    Constitutional:  Negative for chills and fever.   Respiratory:  Negative for cough and shortness of breath.    Cardiovascular:  Negative for chest pain.   Gastrointestinal:  Negative for abdominal pain, diarrhea, nausea and vomiting.        Gas, belching   Skin:  Negative for color change.   Neurological:  Negative for syncope and weakness.   Psychiatric/Behavioral:  Negative for agitation and confusion.        Physical Exam     Initial Vitals [06/07/24 2106]   BP Pulse Resp Temp SpO2   133/79 110 20 98 °F (36.7 °C) 100 %      MAP       --         Physical Exam    Nursing note and vitals reviewed.  Constitutional: He appears well-developed and well-nourished. He is not diaphoretic.  Non-toxic appearance. No distress.   HENT:   Head: Normocephalic and atraumatic.   Right Ear: External ear normal. No foreign bodies.   Left Ear: External ear normal. No foreign bodies.   Eyes: EOM are normal.   Neck: Neck supple.   Normal range of motion.  Cardiovascular:  Regular rhythm.   Tachycardia present.         Mildly tachycardic in triage, actively hiccuping per RN   Pulmonary/Chest: Breath sounds normal. No respiratory distress. He has no wheezes. He has no rales.   Abdominal: Abdomen is soft. Bowel sounds are normal. He exhibits no distension. There is no abdominal tenderness. There is no rebound.   Musculoskeletal:         General: Normal range of motion.      Cervical back: Normal range of motion and neck supple.     Neurological: He is alert and oriented to person, place, and time. GCS score is 15. GCS eye subscore is 4. GCS verbal subscore is 5. GCS motor subscore is 6.   Skin: Skin is dry.   Psychiatric: He has a normal mood and affect. His behavior is normal. Judgment and thought content normal.         ED Course   Procedures  Labs Reviewed - No data to display       Imaging Results    None          Medications - No data to display  Medical Decision Making  64 yo male presents with a few episodes of hiccups this week,  episodes typically lasting about two hours and occurring every few days. Had similar bouts of hiccups in May that resolved with thorazine. Hiccups resolved in waiting room after drinking coke. He is well appearing with unremarkable exam. Mildly tachycardic in triage when hiccupping, improved. Uncomplicated acute vs persistent hiccups. No red flag symptoms. He has upcoming PCP follow up. Stable for DC at this time. ED return precautions discussed. Thorazine to pharmacy.     Considered phrenic nerve irritation, GI disorder, adverse effect of medication, Low suspicion for ACS, trauma, infection,     Problems Addressed:  Hiccups: acute illness or injury    Amount and/or Complexity of Data Reviewed  Discussion of management or test interpretation with external provider(s): none    Risk  Prescription drug management.  Risk Details: Comorbidities taken into consideration during the patient's evaluation and treatment include DM, disorder of kidney, HTN, HLD, anemia, migraine, PTSD, CKD,     Social determinants of health taken into consideration during development of our treatment plan include difficulty in obtaining follow-up and obtaining medications                                            Clinical Impression:  Final diagnoses:  [R06.6] Hiccups (Primary)          ED Disposition Condition    Discharge Stable          ED Prescriptions       Medication Sig Dispense Start Date End Date Auth. Provider    chlorproMAZINE (THORAZINE) 25 MG tablet Take 1 tablet (25 mg total) by mouth 3 (three) times daily as needed. May increase to 50 mg three times daily if no relief with 25 mg. 30 tablet 6/7/2024 6/17/2024 Buffy Carbajal, PALeighannC          Follow-up Information       Follow up With Specialties Details Why Contact Info    Tiago Ruiz MD Family Medicine Schedule an appointment as soon as possible for a visit in 2 days  [address]  [address]  [facility] [address]  [phone]               Buffy Carbajal,  CYRIL  06/08/24 2001

## 2024-06-08 NOTE — DISCHARGE INSTRUCTIONS
It was nice meeting you, and I hope you feel better soon. Be sure to follow up with your primary care doctor. You may return to the ER at any time for any new or concerning symptoms, worsening condition, or failure to improve.    Our goal in the ER is to always give you outstanding care and exceptional service. You may receive a survey by mail or email in the next week about your experience in our ED. We would greatly appreciate you completing and returning the survey. Your feedback provides us with a way to recognize our staff who give very good care and it helps us learn how to improve when your experience was below our aspiration of excellence.     Sincerely,     Buffy Carbajal PA-C  Emergency Room Physician Assistant  [facility]

## 2024-06-14 ENCOUNTER — OFFICE VISIT (OUTPATIENT)
Dept: FAMILY MEDICINE | Facility: CLINIC | Age: 64
End: 2024-06-14
Payer: COMMERCIAL

## 2024-06-14 ENCOUNTER — TELEPHONE (OUTPATIENT)
Dept: FAMILY MEDICINE | Facility: CLINIC | Age: 64
End: 2024-06-14

## 2024-06-14 VITALS
DIASTOLIC BLOOD PRESSURE: 74 MMHG | SYSTOLIC BLOOD PRESSURE: 138 MMHG | BODY MASS INDEX: 25.88 KG/M2 | OXYGEN SATURATION: 98 % | HEART RATE: 109 BPM | HEIGHT: 67 IN | WEIGHT: 164.88 LBS

## 2024-06-14 DIAGNOSIS — I10 PRIMARY HYPERTENSION: ICD-10-CM

## 2024-06-14 DIAGNOSIS — E11.65 TYPE 2 DIABETES MELLITUS WITH HYPERGLYCEMIA, WITH LONG-TERM CURRENT USE OF INSULIN: ICD-10-CM

## 2024-06-14 DIAGNOSIS — E11.22 CKD STAGE 3 SECONDARY TO DIABETES: ICD-10-CM

## 2024-06-14 DIAGNOSIS — Z79.4 TYPE 2 DIABETES MELLITUS WITH HYPERGLYCEMIA, WITH LONG-TERM CURRENT USE OF INSULIN: ICD-10-CM

## 2024-06-14 DIAGNOSIS — N18.30 CKD STAGE 3 SECONDARY TO DIABETES: ICD-10-CM

## 2024-06-14 DIAGNOSIS — Z12.11 COLON CANCER SCREENING: ICD-10-CM

## 2024-06-14 DIAGNOSIS — R06.6 HICCUPS: Primary | ICD-10-CM

## 2024-06-14 PROCEDURE — 3008F BODY MASS INDEX DOCD: CPT | Mod: CPTII,S$GLB,, | Performed by: FAMILY MEDICINE

## 2024-06-14 PROCEDURE — 3078F DIAST BP <80 MM HG: CPT | Mod: CPTII,S$GLB,, | Performed by: FAMILY MEDICINE

## 2024-06-14 PROCEDURE — 3044F HG A1C LEVEL LT 7.0%: CPT | Mod: CPTII,S$GLB,, | Performed by: FAMILY MEDICINE

## 2024-06-14 PROCEDURE — 3061F NEG MICROALBUMINURIA REV: CPT | Mod: CPTII,S$GLB,, | Performed by: FAMILY MEDICINE

## 2024-06-14 PROCEDURE — 99999 PR PBB SHADOW E&M-EST. PATIENT-LVL V: CPT | Mod: PBBFAC,,, | Performed by: FAMILY MEDICINE

## 2024-06-14 PROCEDURE — 3075F SYST BP GE 130 - 139MM HG: CPT | Mod: CPTII,S$GLB,, | Performed by: FAMILY MEDICINE

## 2024-06-14 PROCEDURE — 99214 OFFICE O/P EST MOD 30 MIN: CPT | Mod: S$GLB,,, | Performed by: FAMILY MEDICINE

## 2024-06-14 PROCEDURE — 3066F NEPHROPATHY DOC TX: CPT | Mod: CPTII,S$GLB,, | Performed by: FAMILY MEDICINE

## 2024-06-14 PROCEDURE — 1159F MED LIST DOCD IN RCRD: CPT | Mod: CPTII,S$GLB,, | Performed by: FAMILY MEDICINE

## 2024-06-14 PROCEDURE — 4010F ACE/ARB THERAPY RXD/TAKEN: CPT | Mod: CPTII,S$GLB,, | Performed by: FAMILY MEDICINE

## 2024-06-14 RX ORDER — CHLORPROMAZINE HYDROCHLORIDE 25 MG/1
25 TABLET, FILM COATED ORAL 3 TIMES DAILY PRN
Qty: 30 TABLET | Refills: 0 | Status: ON HOLD | OUTPATIENT
Start: 2024-06-14 | End: 2024-06-19 | Stop reason: HOSPADM

## 2024-06-14 NOTE — PROGRESS NOTES
(Portions of this note were dictated using voice recognition software and may contain dictation related errors in spelling/grammar/syntax not found on text review)    CC:   Chief Complaint   Patient presents with    Hiccups     Pt stated he get hiccups every other day. He thinks is because of Trulicity        HPI: 63 y.o. male with medical history significant for type 2 diabetes mellitus, essential hypertension, migraine headaches, stage III CKD presented today for ED follow-up visit with concerns about hiccups.      Patient was recently seen in the ER when consistent hiccups for few days a week, he was prescribed chlorpromazine 25 mg, patient reports that he was taking the medication and it was effective in relieving the symptoms, however, he lost the last few tablets and would like to have a refill.    He was also reporting indigestion including bloating and increased heartburn due to increasing the dose of Trulicity to 4.5 mg recently, he had a follow up visit in now going back to 3 mg dosage.    He is complaint with Trulicity, Farxiga and insulin, monitor blood sugar at home, most recent A1c was improved to 5.8 as well.    He reports compliance with losartan and procardia, checks blood pressure this morning and state it was normal.    He denies having any other symptoms or concerns.    Past Medical History:   Diagnosis Date    Diabetes mellitus     Disorder of kidney and ureter     Hyperlipemia     Hypertension     Iron deficiency anemia     Migraine headache     PTSD (post-traumatic stress disorder)     Stage 3a chronic kidney disease        Past Surgical History:   Procedure Laterality Date    LASER ENUCLEATION OF PROSTATE N/A 8/29/2022    Procedure: ENUCLEATION, PROSTATE, USING LASER  Time: 180 minutes Equipment: high powered 100W laser with virtual basket, morcellator, scopes for HoLEP from [facility];  Surgeon: Ted Garvin MD;  Location: [facility];  Service: Urology;  Laterality: N/A;  [facility] confirmed [facility]  8/26  confirmation # [id number]       Family History   Problem Relation Name Age of Onset    Diabetes Mother         Social History     Tobacco Use    Smoking status: Never    Smokeless tobacco: Never   Substance Use Topics    Alcohol use: No    Drug use: No       Lab Results   Component Value Date    WBC 8.41 05/29/2024    HGB 9.9 (L) 05/29/2024    HCT 30.7 (L) 05/29/2024    MCV 91 05/29/2024     05/29/2024    CHOL 104 (L) 05/29/2024    TRIG 69 05/29/2024    HDL 39 (L) 05/29/2024    ALT 13 08/29/2022    AST 15 08/29/2022    BILITOT 0.3 08/29/2022    ALKPHOS 64 08/29/2022     (H) 05/29/2024    K 5.0 05/29/2024     (H) 05/29/2024    CREATININE 1.62 (H) 05/29/2024    ESTGFRAFRICA 50.3 (A) 07/18/2022    EGFRNONAA 43.5 (A) 07/18/2022    CALCIUM 9.1 05/29/2024    ALBUMIN 3.7 05/29/2024    BUN 20 05/29/2024    CO2 22 (L) 05/29/2024    TSH 0.536 04/29/2022    INR 1.0 08/16/2022    HGBA1C 5.8 (H) 05/29/2024    MICALBCREAT 20.6 05/29/2024    LDLCALC 51.2 (L) 05/29/2024     (H) 05/29/2024    [id number] 20 (L) 01/25/2024             Vital signs reviewed  PE:   APPEARANCE: Well nourished, well developed, in no acute distress.    HEAD: Normocephalic, atraumatic.  EYES: EOMI.  Conjunctivae noninjected.  NOSE: Mucosa pink. Airway clear.  MOUTH & THROAT: No tonsillar enlargement. No pharyngeal erythema or exudate.   NECK: Supple with no cervical lymphadenopathy.    CHEST: Good inspiratory effort. Lungs clear to auscultation with no wheezes or crackles.  CARDIOVASCULAR: Normal S1, S2. No rubs, murmurs, or gallops.  ABDOMEN: Bowel sounds normal. Not distended. Soft. No tenderness or masses. No organomegaly.  EXTREMITIES: No edema, cyanosis, or clubbing.    Review of Systems   Constitutional:  Negative for chills, fatigue, fever and unexpected weight change.   HENT: Negative.     Respiratory:  Negative for cough, shortness of breath and wheezing.    Cardiovascular:  Negative for chest pain, palpitations and  leg swelling.   Gastrointestinal: Negative.    Genitourinary: Negative.    Musculoskeletal: Negative.    Neurological: Negative.    Psychiatric/Behavioral: Negative.     All other systems reviewed and are negative.      IMPRESSION  1. Hiccups    2. Colon cancer screening    3. Primary hypertension    4. Type 2 diabetes mellitus with hyperglycemia, with long-term current use of insulin    5. CKD stage 3 secondary to diabetes            PLAN      1. Colon cancer screening    - Fecal Immunochemical Test (iFOBT); Future      2. Hiccups    - chlorproMAZINE (THORAZINE) 25 MG tablet; Take 1 tablet (25 mg total) by mouth 3 (three) times daily as needed (hiccups). May increase to 50 mg three times daily if no relief with 25 mg.  Dispense: 30 tablet; Refill: 0    Advised to take Pepcid/ant acid as needed for acid reflux and heart burn    To avoid spicy/acidic food    Encouraged high fibre diet      3. Primary hypertension    Initial high, repeat normal    Continue losartan and procardia        4. Type 2 diabetes mellitus with hyperglycemia, with long-term current use of insulin    Stable    Managed by endocrinology    Continue current medications      5. CKD stage 3 secondary to diabetes     Stable, GFR improved to 47    Avoid nephrotic agents    Advised to drink enough water          SCREENINGS      Immunizations:     Up-to-date with Tdap     Up-to-date with COVID vaccine      Age/demographic appropriate health maintenance:    Fit kit given for colon cancer screening      Health Maintenance Due   Topic Date Due    Colorectal Cancer Screening  Never done    RSV Vaccine (Age 60+ and Pregnant patients) (1 - 1-dose 60+ series) Never done    COVID-19 Vaccine (6 - 2023-24 season) 09/01/2023    PROSTATE-SPECIFIC ANTIGEN  06/01/2024           Spent adequate time in obtaining history and explaining differentials     35 minutes spent during this visit of which greater than 50% devoted to face-face counseling and coordination of care  regarding diagnosis and management plan       Tiago Ruiz

## 2024-06-15 ENCOUNTER — HOSPITAL ENCOUNTER (INPATIENT)
Facility: HOSPITAL | Age: 64
LOS: 4 days | Discharge: HOME | DRG: 640 | End: 2024-06-19
Attending: STUDENT IN AN ORGANIZED HEALTH CARE EDUCATION/TRAINING PROGRAM | Admitting: STUDENT IN AN ORGANIZED HEALTH CARE EDUCATION/TRAINING PROGRAM
Payer: COMMERCIAL

## 2024-06-15 DIAGNOSIS — R94.31 QT PROLONGATION: ICD-10-CM

## 2024-06-15 DIAGNOSIS — E11.65 TYPE 2 DIABETES MELLITUS WITH HYPERGLYCEMIA, WITH LONG-TERM CURRENT USE OF INSULIN: ICD-10-CM

## 2024-06-15 DIAGNOSIS — Z79.4 TYPE 2 DIABETES MELLITUS WITH HYPERGLYCEMIA, WITH LONG-TERM CURRENT USE OF INSULIN: ICD-10-CM

## 2024-06-15 DIAGNOSIS — E87.1 HYPONATREMIA: ICD-10-CM

## 2024-06-15 DIAGNOSIS — G93.40 ACUTE ENCEPHALOPATHY: Primary | ICD-10-CM

## 2024-06-15 DIAGNOSIS — K92.0 HEMATEMESIS WITH NAUSEA: ICD-10-CM

## 2024-06-15 PROBLEM — G25.9 EXTRAPYRAMIDAL SYNDROME: Status: ACTIVE | Noted: 2024-06-15

## 2024-06-15 PROBLEM — N18.9 CKD (CHRONIC KIDNEY DISEASE): Status: ACTIVE | Noted: 2024-06-15

## 2024-06-15 PROBLEM — R50.9 FEVER: Status: ACTIVE | Noted: 2024-06-15

## 2024-06-15 PROBLEM — R11.2 NAUSEA & VOMITING: Status: ACTIVE | Noted: 2024-06-15

## 2024-06-15 LAB
ABO + RH BLD: NORMAL
ALBUMIN SERPL BCP-MCNC: 2.3 G/DL (ref 3.5–5.2)
ALBUMIN SERPL BCP-MCNC: 2.5 G/DL (ref 3.5–5.2)
ALBUMIN SERPL BCP-MCNC: 2.7 G/DL (ref 3.5–5.2)
ALBUMIN SERPL BCP-MCNC: 2.7 G/DL (ref 3.5–5.2)
ALBUMIN SERPL BCP-MCNC: 3 G/DL (ref 3.5–5.2)
ALP SERPL-CCNC: 48 U/L (ref 55–135)
ALT SERPL W/O P-5'-P-CCNC: 12 U/L (ref 10–44)
AMPHET+METHAMPHET UR QL: NEGATIVE
ANION GAP SERPL CALC-SCNC: 11 MMOL/L (ref 8–16)
ANION GAP SERPL CALC-SCNC: 12 MMOL/L (ref 8–16)
ANION GAP SERPL CALC-SCNC: 12 MMOL/L (ref 8–16)
ANION GAP SERPL CALC-SCNC: 14 MMOL/L (ref 8–16)
ANION GAP SERPL CALC-SCNC: 16 MMOL/L (ref 8–16)
AST SERPL-CCNC: 20 U/L (ref 10–40)
BARBITURATES UR QL SCN>200 NG/ML: NEGATIVE
BASOPHILS # BLD AUTO: 0.03 K/UL (ref 0–0.2)
BASOPHILS NFR BLD: 0.2 % (ref 0–1.9)
BENZODIAZ UR QL SCN>200 NG/ML: ABNORMAL
BILIRUB SERPL-MCNC: 0.5 MG/DL (ref 0.1–1)
BILIRUB UR QL STRIP: NEGATIVE
BLD GP AB SCN CELLS X3 SERPL QL: NORMAL
BUN SERPL-MCNC: 19 MG/DL (ref 8–23)
BUN SERPL-MCNC: 21 MG/DL (ref 8–23)
BUN SERPL-MCNC: 21 MG/DL (ref 8–23)
BUN SERPL-MCNC: 23 MG/DL (ref 8–23)
BUN SERPL-MCNC: 28 MG/DL (ref 8–23)
BZE UR QL SCN: NEGATIVE
CALCIUM SERPL-MCNC: 7.5 MG/DL (ref 8.7–10.5)
CALCIUM SERPL-MCNC: 7.9 MG/DL (ref 8.7–10.5)
CALCIUM SERPL-MCNC: 8.1 MG/DL (ref 8.7–10.5)
CANNABINOIDS UR QL SCN: NEGATIVE
CHLORIDE SERPL-SCNC: 79 MMOL/L (ref 95–110)
CHLORIDE SERPL-SCNC: 90 MMOL/L (ref 95–110)
CHLORIDE SERPL-SCNC: 92 MMOL/L (ref 95–110)
CK SERPL-CCNC: 690 U/L (ref 20–200)
CLARITY UR: CLEAR
CO2 SERPL-SCNC: 18 MMOL/L (ref 23–29)
CO2 SERPL-SCNC: 19 MMOL/L (ref 23–29)
CO2 SERPL-SCNC: 20 MMOL/L (ref 23–29)
CO2 SERPL-SCNC: 21 MMOL/L (ref 23–29)
CO2 SERPL-SCNC: 21 MMOL/L (ref 23–29)
COLOR UR: NORMAL
CREAT SERPL-MCNC: 1.7 MG/DL (ref 0.5–1.4)
CREAT SERPL-MCNC: 1.8 MG/DL (ref 0.5–1.4)
CREAT SERPL-MCNC: 1.8 MG/DL (ref 0.5–1.4)
CREAT SERPL-MCNC: 1.9 MG/DL (ref 0.5–1.4)
CREAT SERPL-MCNC: 1.9 MG/DL (ref 0.5–1.4)
CREAT UR-MCNC: 35.2 MG/DL (ref 23–375)
DIFFERENTIAL METHOD BLD: ABNORMAL
EOSINOPHIL # BLD AUTO: 0 K/UL (ref 0–0.5)
EOSINOPHIL NFR BLD: 0.1 % (ref 0–8)
ERYTHROCYTE [DISTWIDTH] IN BLOOD BY AUTOMATED COUNT: 13.2 % (ref 11.5–14.5)
EST. GFR  (NO RACE VARIABLE): 39 ML/MIN/1.73 M^2
EST. GFR  (NO RACE VARIABLE): 39 ML/MIN/1.73 M^2
EST. GFR  (NO RACE VARIABLE): 42 ML/MIN/1.73 M^2
EST. GFR  (NO RACE VARIABLE): 42 ML/MIN/1.73 M^2
EST. GFR  (NO RACE VARIABLE): 44 ML/MIN/1.73 M^2
GLUCOSE SERPL-MCNC: 131 MG/DL (ref 70–110)
GLUCOSE SERPL-MCNC: 136 MG/DL (ref 70–110)
GLUCOSE SERPL-MCNC: 172 MG/DL (ref 70–110)
GLUCOSE SERPL-MCNC: 217 MG/DL (ref 70–110)
GLUCOSE SERPL-MCNC: 93 MG/DL (ref 70–110)
GLUCOSE UR QL STRIP: NEGATIVE
HCT VFR BLD AUTO: 27.9 % (ref 40–54)
HGB BLD-MCNC: 9.9 G/DL (ref 14–18)
HGB UR QL STRIP: NEGATIVE
IMM GRANULOCYTES # BLD AUTO: 0.07 K/UL (ref 0–0.04)
IMM GRANULOCYTES NFR BLD AUTO: 0.6 % (ref 0–0.5)
KETONES UR QL STRIP: NEGATIVE
LEUKOCYTE ESTERASE UR QL STRIP: NEGATIVE
LYMPHOCYTES # BLD AUTO: 1 K/UL (ref 1–4.8)
LYMPHOCYTES NFR BLD: 8.4 % (ref 18–48)
MCH RBC QN AUTO: 28.6 PG (ref 27–31)
MCHC RBC AUTO-ENTMCNC: 35.5 G/DL (ref 32–36)
MCV RBC AUTO: 81 FL (ref 82–98)
METHADONE UR QL SCN>300 NG/ML: NEGATIVE
MONOCYTES # BLD AUTO: 1.4 K/UL (ref 0.3–1)
MONOCYTES NFR BLD: 11.7 % (ref 4–15)
NEUTROPHILS # BLD AUTO: 9.7 K/UL (ref 1.8–7.7)
NEUTROPHILS NFR BLD: 79 % (ref 38–73)
NITRITE UR QL STRIP: NEGATIVE
NRBC BLD-RTO: 0 /100 WBC
OB PNL GAST: POSITIVE
OPIATES UR QL SCN: NEGATIVE
OSMOLALITY SERPL: 253 MOSM/KG (ref 280–300)
OSMOLALITY UR: 174 MOSM/KG (ref 50–1200)
PCP UR QL SCN>25 NG/ML: NEGATIVE
PH UR STRIP: 7 [PH] (ref 5–8)
PHOSPHATE SERPL-MCNC: 3.2 MG/DL (ref 2.7–4.5)
PHOSPHATE SERPL-MCNC: 3.5 MG/DL (ref 2.7–4.5)
PHOSPHATE SERPL-MCNC: 3.7 MG/DL (ref 2.7–4.5)
PHOSPHATE SERPL-MCNC: 3.9 MG/DL (ref 2.7–4.5)
PLATELET # BLD AUTO: 327 K/UL (ref 150–450)
PMV BLD AUTO: 8.5 FL (ref 9.2–12.9)
POCT GLUCOSE: 116 MG/DL (ref 70–110)
POCT GLUCOSE: 137 MG/DL (ref 70–110)
POTASSIUM SERPL-SCNC: 3.4 MMOL/L (ref 3.5–5.1)
POTASSIUM SERPL-SCNC: 3.7 MMOL/L (ref 3.5–5.1)
POTASSIUM SERPL-SCNC: 3.7 MMOL/L (ref 3.5–5.1)
POTASSIUM SERPL-SCNC: 3.8 MMOL/L (ref 3.5–5.1)
POTASSIUM SERPL-SCNC: 4.2 MMOL/L (ref 3.5–5.1)
PROT SERPL-MCNC: 5.1 G/DL (ref 6–8.4)
PROT UR QL STRIP: NEGATIVE
RBC # BLD AUTO: 3.46 M/UL (ref 4.6–6.2)
SODIUM SERPL-SCNC: 113 MMOL/L (ref 136–145)
SODIUM SERPL-SCNC: 115 MMOL/L (ref 136–145)
SODIUM SERPL-SCNC: 122 MMOL/L (ref 136–145)
SODIUM SERPL-SCNC: 123 MMOL/L (ref 136–145)
SODIUM SERPL-SCNC: 124 MMOL/L (ref 136–145)
SODIUM SERPL-SCNC: 124 MMOL/L (ref 136–145)
SODIUM SERPL-SCNC: 125 MMOL/L (ref 136–145)
SODIUM UR-SCNC: 26 MMOL/L (ref 20–250)
SP GR UR STRIP: 1 (ref 1–1.03)
SPECIMEN OUTDATE: NORMAL
TOXICOLOGY INFORMATION: ABNORMAL
TROPONIN I SERPL DL<=0.01 NG/ML-MCNC: <0.006 NG/ML (ref 0–0.03)
TSH SERPL DL<=0.005 MIU/L-ACNC: 0.41 UIU/ML (ref 0.4–4)
URN SPEC COLLECT METH UR: NORMAL
UROBILINOGEN UR STRIP-ACNC: NEGATIVE EU/DL
WBC # BLD AUTO: 12.25 K/UL (ref 3.9–12.7)

## 2024-06-15 PROCEDURE — 63600175 PHARM REV CODE 636 W HCPCS: Performed by: STUDENT IN AN ORGANIZED HEALTH CARE EDUCATION/TRAINING PROGRAM

## 2024-06-15 PROCEDURE — 94761 N-INVAS EAR/PLS OXIMETRY MLT: CPT

## 2024-06-15 PROCEDURE — 84484 ASSAY OF TROPONIN QUANT: CPT | Performed by: STUDENT IN AN ORGANIZED HEALTH CARE EDUCATION/TRAINING PROGRAM

## 2024-06-15 PROCEDURE — C9113 INJ PANTOPRAZOLE SODIUM, VIA: HCPCS | Performed by: REGISTERED NURSE

## 2024-06-15 PROCEDURE — 20000000 HC ICU ROOM

## 2024-06-15 PROCEDURE — 83930 ASSAY OF BLOOD OSMOLALITY: CPT | Performed by: STUDENT IN AN ORGANIZED HEALTH CARE EDUCATION/TRAINING PROGRAM

## 2024-06-15 PROCEDURE — 86850 RBC ANTIBODY SCREEN: CPT | Performed by: STUDENT IN AN ORGANIZED HEALTH CARE EDUCATION/TRAINING PROGRAM

## 2024-06-15 PROCEDURE — 86900 BLOOD TYPING SEROLOGIC ABO: CPT | Performed by: STUDENT IN AN ORGANIZED HEALTH CARE EDUCATION/TRAINING PROGRAM

## 2024-06-15 PROCEDURE — 85025 COMPLETE CBC W/AUTO DIFF WBC: CPT | Performed by: STUDENT IN AN ORGANIZED HEALTH CARE EDUCATION/TRAINING PROGRAM

## 2024-06-15 PROCEDURE — 86901 BLOOD TYPING SEROLOGIC RH(D): CPT | Performed by: STUDENT IN AN ORGANIZED HEALTH CARE EDUCATION/TRAINING PROGRAM

## 2024-06-15 PROCEDURE — 36415 COLL VENOUS BLD VENIPUNCTURE: CPT | Performed by: REGISTERED NURSE

## 2024-06-15 PROCEDURE — 82550 ASSAY OF CK (CPK): CPT | Performed by: STUDENT IN AN ORGANIZED HEALTH CARE EDUCATION/TRAINING PROGRAM

## 2024-06-15 PROCEDURE — 63600175 PHARM REV CODE 636 W HCPCS: Performed by: REGISTERED NURSE

## 2024-06-15 PROCEDURE — 25000003 PHARM REV CODE 250: Performed by: STUDENT IN AN ORGANIZED HEALTH CARE EDUCATION/TRAINING PROGRAM

## 2024-06-15 PROCEDURE — C9113 INJ PANTOPRAZOLE SODIUM, VIA: HCPCS | Performed by: STUDENT IN AN ORGANIZED HEALTH CARE EDUCATION/TRAINING PROGRAM

## 2024-06-15 PROCEDURE — 25000003 PHARM REV CODE 250

## 2024-06-15 PROCEDURE — 84300 ASSAY OF URINE SODIUM: CPT | Performed by: STUDENT IN AN ORGANIZED HEALTH CARE EDUCATION/TRAINING PROGRAM

## 2024-06-15 PROCEDURE — 81003 URINALYSIS AUTO W/O SCOPE: CPT | Mod: 59 | Performed by: STUDENT IN AN ORGANIZED HEALTH CARE EDUCATION/TRAINING PROGRAM

## 2024-06-15 PROCEDURE — 80069 RENAL FUNCTION PANEL: CPT | Mod: 91 | Performed by: STUDENT IN AN ORGANIZED HEALTH CARE EDUCATION/TRAINING PROGRAM

## 2024-06-15 PROCEDURE — 93005 ELECTROCARDIOGRAM TRACING: CPT

## 2024-06-15 PROCEDURE — 51702 INSERT TEMP BLADDER CATH: CPT

## 2024-06-15 PROCEDURE — C1751 CATH, INF, PER/CENT/MIDLINE: HCPCS

## 2024-06-15 PROCEDURE — 82271 OCCULT BLOOD OTHER SOURCES: CPT | Performed by: STUDENT IN AN ORGANIZED HEALTH CARE EDUCATION/TRAINING PROGRAM

## 2024-06-15 PROCEDURE — 80307 DRUG TEST PRSMV CHEM ANLYZR: CPT | Performed by: STUDENT IN AN ORGANIZED HEALTH CARE EDUCATION/TRAINING PROGRAM

## 2024-06-15 PROCEDURE — 80053 COMPREHEN METABOLIC PANEL: CPT | Performed by: STUDENT IN AN ORGANIZED HEALTH CARE EDUCATION/TRAINING PROGRAM

## 2024-06-15 PROCEDURE — 36415 COLL VENOUS BLD VENIPUNCTURE: CPT | Performed by: STUDENT IN AN ORGANIZED HEALTH CARE EDUCATION/TRAINING PROGRAM

## 2024-06-15 PROCEDURE — 96372 THER/PROPH/DIAG INJ SC/IM: CPT | Performed by: STUDENT IN AN ORGANIZED HEALTH CARE EDUCATION/TRAINING PROGRAM

## 2024-06-15 PROCEDURE — 83935 ASSAY OF URINE OSMOLALITY: CPT | Performed by: STUDENT IN AN ORGANIZED HEALTH CARE EDUCATION/TRAINING PROGRAM

## 2024-06-15 PROCEDURE — 36410 VNPNXR 3YR/> PHY/QHP DX/THER: CPT

## 2024-06-15 PROCEDURE — 93010 ELECTROCARDIOGRAM REPORT: CPT | Mod: ,,, | Performed by: INTERNAL MEDICINE

## 2024-06-15 PROCEDURE — 84443 ASSAY THYROID STIM HORMONE: CPT | Performed by: STUDENT IN AN ORGANIZED HEALTH CARE EDUCATION/TRAINING PROGRAM

## 2024-06-15 PROCEDURE — 99285 EMERGENCY DEPT VISIT HI MDM: CPT | Mod: 25

## 2024-06-15 PROCEDURE — 84295 ASSAY OF SERUM SODIUM: CPT | Mod: 91 | Performed by: REGISTERED NURSE

## 2024-06-15 PROCEDURE — 82962 GLUCOSE BLOOD TEST: CPT

## 2024-06-15 RX ORDER — MIDAZOLAM HYDROCHLORIDE 1 MG/ML
0.5 INJECTION, SOLUTION INTRAMUSCULAR; INTRAVENOUS EVERY 4 HOURS PRN
Status: DISCONTINUED | OUTPATIENT
Start: 2024-06-15 | End: 2024-06-17

## 2024-06-15 RX ORDER — DIPHENHYDRAMINE HYDROCHLORIDE 50 MG/ML
50 INJECTION INTRAMUSCULAR; INTRAVENOUS ONCE
Status: COMPLETED | OUTPATIENT
Start: 2024-06-15 | End: 2024-06-15

## 2024-06-15 RX ORDER — ONDANSETRON HYDROCHLORIDE 2 MG/ML
8 INJECTION, SOLUTION INTRAVENOUS
Status: COMPLETED | OUTPATIENT
Start: 2024-06-15 | End: 2024-06-15

## 2024-06-15 RX ORDER — MUPIROCIN 20 MG/G
OINTMENT TOPICAL 2 TIMES DAILY
Status: DISCONTINUED | OUTPATIENT
Start: 2024-06-15 | End: 2024-06-19 | Stop reason: HOSPADM

## 2024-06-15 RX ORDER — DEXTROSE MONOHYDRATE 50 MG/ML
INJECTION, SOLUTION INTRAVENOUS CONTINUOUS
Status: DISCONTINUED | OUTPATIENT
Start: 2024-06-15 | End: 2024-06-15

## 2024-06-15 RX ORDER — DEXMEDETOMIDINE HYDROCHLORIDE 4 UG/ML
INJECTION, SOLUTION INTRAVENOUS
Status: COMPLETED
Start: 2024-06-15 | End: 2024-06-15

## 2024-06-15 RX ORDER — MIDAZOLAM HYDROCHLORIDE 1 MG/ML
0.5 INJECTION, SOLUTION INTRAMUSCULAR; INTRAVENOUS EVERY 4 HOURS PRN
Status: DISCONTINUED | OUTPATIENT
Start: 2024-06-15 | End: 2024-06-15

## 2024-06-15 RX ORDER — MIDAZOLAM HYDROCHLORIDE 5 MG/ML
2.5 INJECTION INTRAMUSCULAR; INTRAVENOUS ONCE
Status: COMPLETED | OUTPATIENT
Start: 2024-06-15 | End: 2024-06-15

## 2024-06-15 RX ORDER — 3% SODIUM CHLORIDE 3 G/100ML
30 INJECTION, SOLUTION INTRAVENOUS CONTINUOUS
Status: DISCONTINUED | OUTPATIENT
Start: 2024-06-15 | End: 2024-06-15

## 2024-06-15 RX ORDER — PANTOPRAZOLE SODIUM 40 MG/10ML
40 INJECTION, POWDER, LYOPHILIZED, FOR SOLUTION INTRAVENOUS 2 TIMES DAILY
Status: DISCONTINUED | OUTPATIENT
Start: 2024-06-15 | End: 2024-06-17

## 2024-06-15 RX ORDER — SODIUM CHLORIDE 0.9 % (FLUSH) 0.9 %
10 SYRINGE (ML) INJECTION
Status: DISCONTINUED | OUTPATIENT
Start: 2024-06-15 | End: 2024-06-19 | Stop reason: HOSPADM

## 2024-06-15 RX ORDER — DEXTROSE MONOHYDRATE 50 MG/ML
INJECTION, SOLUTION INTRAVENOUS CONTINUOUS
Status: ACTIVE | OUTPATIENT
Start: 2024-06-15 | End: 2024-06-15

## 2024-06-15 RX ORDER — PANTOPRAZOLE SODIUM 40 MG/10ML
80 INJECTION, POWDER, LYOPHILIZED, FOR SOLUTION INTRAVENOUS
Status: COMPLETED | OUTPATIENT
Start: 2024-06-15 | End: 2024-06-15

## 2024-06-15 RX ORDER — DEXMEDETOMIDINE HYDROCHLORIDE 4 UG/ML
0-1.4 INJECTION, SOLUTION INTRAVENOUS CONTINUOUS
Status: DISCONTINUED | OUTPATIENT
Start: 2024-06-15 | End: 2024-06-17

## 2024-06-15 RX ORDER — ACETAMINOPHEN 650 MG/1
650 SUPPOSITORY RECTAL EVERY 6 HOURS PRN
Status: DISCONTINUED | OUTPATIENT
Start: 2024-06-15 | End: 2024-06-19 | Stop reason: HOSPADM

## 2024-06-15 RX ORDER — DEXTROSE MONOHYDRATE 50 MG/ML
INJECTION, SOLUTION INTRAVENOUS CONTINUOUS
Status: ACTIVE | OUTPATIENT
Start: 2024-06-15 | End: 2024-06-16

## 2024-06-15 RX ADMIN — DEXTROSE MONOHYDRATE: 50 INJECTION, SOLUTION INTRAVENOUS at 03:06

## 2024-06-15 RX ADMIN — ONDANSETRON 8 MG: 2 INJECTION INTRAMUSCULAR; INTRAVENOUS at 06:06

## 2024-06-15 RX ADMIN — SODIUM CHLORIDE 30 ML/HR: 3 INJECTION, SOLUTION INTRAVENOUS at 12:06

## 2024-06-15 RX ADMIN — DEXMEDETOMIDINE HYDROCHLORIDE 0.2 MCG/KG/HR: 4 INJECTION, SOLUTION INTRAVENOUS at 09:06

## 2024-06-15 RX ADMIN — DEXTROSE MONOHYDRATE: 50 INJECTION, SOLUTION INTRAVENOUS at 06:06

## 2024-06-15 RX ADMIN — SODIUM CHLORIDE 100 ML: 3 INJECTION, SOLUTION INTRAVENOUS at 12:06

## 2024-06-15 RX ADMIN — MIDAZOLAM 0.5 MG: 1 INJECTION INTRAMUSCULAR; INTRAVENOUS at 10:06

## 2024-06-15 RX ADMIN — MIDAZOLAM HYDROCHLORIDE 2.5 MG: 5 INJECTION, SOLUTION INTRAMUSCULAR; INTRAVENOUS at 04:06

## 2024-06-15 RX ADMIN — DIPHENHYDRAMINE HYDROCHLORIDE 50 MG: 50 INJECTION, SOLUTION INTRAMUSCULAR; INTRAVENOUS at 10:06

## 2024-06-15 RX ADMIN — MUPIROCIN: 20 OINTMENT TOPICAL at 10:06

## 2024-06-15 RX ADMIN — PANTOPRAZOLE SODIUM 40 MG: 40 INJECTION, POWDER, LYOPHILIZED, FOR SOLUTION INTRAVENOUS at 09:06

## 2024-06-15 RX ADMIN — MUPIROCIN: 20 OINTMENT TOPICAL at 09:06

## 2024-06-15 RX ADMIN — SODIUM CHLORIDE 1000 ML: 9 INJECTION, SOLUTION INTRAVENOUS at 07:06

## 2024-06-15 RX ADMIN — DEXMEDETOMIDINE HYDROCHLORIDE 0.3 MCG/KG/HR: 4 INJECTION, SOLUTION INTRAVENOUS at 09:06

## 2024-06-15 RX ADMIN — PANTOPRAZOLE SODIUM 80 MG: 40 INJECTION, POWDER, LYOPHILIZED, FOR SOLUTION INTRAVENOUS at 06:06

## 2024-06-15 NOTE — ASSESSMENT & PLAN NOTE
Low clinical suspicion for infection  UA negative   External cooling, antipyretics, supportive care

## 2024-06-15 NOTE — ED NOTES
Bedside care hand off from EMIR Botello RN. Pt. Is combative, pulling at lines, speech is garbled and incomprehensible. Pt. Cleaned and placed in gown, linens changed-noted to have coffee ground emesis and dark green stool. Soft restraints replaced to extremities to prevent self harm, treatment interference. Cardiac monitoring replaced. Wife at bedside. Message to Dr. Anaya regarding request for sedation.

## 2024-06-15 NOTE — ASSESSMENT & PLAN NOTE
Wife reports hiccups, N/V X 2-3 weeks   Reported to have dark vomitus in the ER  Hb has down trended from 14 in 1/2024 to 9.9    Possible PUD vs gastritis vs other    Continue IV PPI BID  GI consult to consider EGD once patient stabilizes

## 2024-06-15 NOTE — CARE UPDATE
Patient with severe hyponatremia and altered mentation, suspected SIADH. Ordered 3% saline bolus but patient does not have central line. He is CKD so would like to avoid PICC line and patient is too agitated for CVC placement despite precedex at max dose and versed IM injection.   Will plan to run 3% peripherally once PIV access has been obtained. Will run slow at 50 ml/h for 5 hours and follow serum Na closely.     Yvette Chadwick MD  [employer] Pul[facility] Critical Care Medicine Fellow

## 2024-06-15 NOTE — ASSESSMENT & PLAN NOTE
Suspect acute EPS from antidopaminergic action of thorazine   Anticholinergics, benzo PRN     Febrile, agitation, dysautonomia - concerning for neuroleptic malignant syndrome. Add CK. Consider bromocriptine or dantrolene  Supportive care     Consider neurology consult

## 2024-06-15 NOTE — SUBJECTIVE & OBJECTIVE
Past Medical History:   Diagnosis Date    Diabetes mellitus     Disorder of kidney and ureter     Hyperlipemia     Hypertension     Iron deficiency anemia     Migraine headache     PTSD (post-traumatic stress disorder)     Stage 3a chronic kidney disease        Past Surgical History:   Procedure Laterality Date    LASER ENUCLEATION OF PROSTATE N/A 8/29/2022    Procedure: ENUCLEATION, PROSTATE, USING LASER  Time: 180 minutes Equipment: high powered 100W laser with virtual basket, morcellator, scopes for HoLEP from [facility];  Surgeon: Ted Garvin MD;  Location: [facility];  Service: Urology;  Laterality: N/A;  Replaced by [facility] confirmed CW 8/26  confirmation # [id number]       Review of patient's allergies indicates:  No Known Allergies    No current facility-administered medications on file prior to encounter.     Current Outpatient Medications on File Prior to Encounter   Medication Sig    atorvastatin (LIPITOR) 40 MG tablet TAKE 1 TABLET(40 MG) BY MOUTH EVERY DAY    blood sugar diagnostic (TRUE METRIX GLUCOSE TEST STRIP) Strp TEST 4 TIMES A DAY    chlorproMAZINE (THORAZINE) 25 MG tablet Take 1 tablet (25 mg total) by mouth 3 (three) times daily as needed (hiccups). May increase to 50 mg three times daily if no relief with 25 mg.    dapagliflozin propanediol (FARXIGA) 10 mg tablet Take 1 tablet (10 mg total) by mouth once daily.    dulaglutide (TRULICITY) 3 mg/0.5 mL pen injector Inject 3 mg into the skin every 7 days.    finerenone (KERENDIA) 10 mg Tab Take 1 tablet by mouth once daily.    furosemide (LASIX) 40 MG tablet Take 1 tablet (40 mg total) by mouth daily as needed (swelling).    glucose 4 GM chewable tablet Take 4 tablets (16 g total) by mouth as needed for Low blood sugar (If having symptoms of blurry vision, palpitations, confusion, shakiness.  Please check sugars and if sugar below 70 please take 4 tablets and re-check sugar everry 15 minutes until sugars are above 70 and symptoms resolve.). (Patient not  "taking: Reported on 6/14/2024)    insulin glargine U-100, Lantus, (LANTUS SOLOSTAR U-100 INSULIN) 100 unit/mL (3 mL) InPn pen Inject 5 Units into the skin every evening.    lancets 33 gauge Misc USE 4 TIMES A DAY    losartan (COZAAR) 50 MG tablet Take 1 tablet (50 mg total) by mouth once daily.    metoprolol succinate (TOPROL-XL) 200 MG 24 hr tablet Take 1 tablet (200 mg total) by mouth once daily.    NIFEdipine (PROCARDIA-XL) 90 MG (OSM) 24 hr tablet Take 1 tablet (90 mg total) by mouth once daily.    ondansetron (ZOFRAN-ODT) 4 MG TbDL DISSOLVE 1 TABLET BY MOUTH EVERY 6 HOURS FOR 5 DAYS    patiromer calcium sorbitex (VELTASSA) 8.4 gram PwPk Take 1 packet (8.4 g total) by mouth once daily.    pen needle, diabetic 31 gauge x 5/16" Ndle use 4 times a day    promethazine (PHENERGAN) 25 MG tablet     sildenafiL (VIAGRA) 100 MG tablet Take 1 tablet (100 mg total) by mouth daily as needed for Erectile Dysfunction (1 hour before sexual activity on an empty stomach.).     Family History       Problem Relation (Age of Onset)    Diabetes Mother          Tobacco Use    Smoking status: Never    Smokeless tobacco: Never   Substance and Sexual Activity    Alcohol use: No    Drug use: No    Sexual activity: Not on file     Review of Systems   Unable to perform ROS: Mental status change     Objective:     Vital Signs (Most Recent):  Temp: (!) 101.2 °F (38.4 °C) (06/15/24 1115)  Pulse: 103 (06/15/24 1100)  Resp: (!) 23 (06/15/24 1100)  BP: 126/61 (06/15/24 1100)  SpO2: 100 % (06/15/24 1100) Vital Signs (24h Range):  Temp:  [98.2 °F (36.8 °C)-101.2 °F (38.4 °C)] 101.2 °F (38.4 °C)  Pulse:  [] 103  Resp:  [22-50] 23  SpO2:  [93 %-100 %] 100 %  BP: (108-154)/(56-85) 126/61     Weight: 73.5 kg (162 lb 0.6 oz)  Body mass index is 25.38 kg/m².     Physical Exam  Vitals and nursing note reviewed.   Constitutional:       Appearance: He is well-developed.      Comments: Repetitive involuntary face & limb movements    Cardiovascular:    "   Rate and Rhythm: Regular rhythm. Tachycardia present.   Pulmonary:      Effort: Pulmonary effort is normal. No respiratory distress.   Musculoskeletal:      Right lower leg: No edema.      Left lower leg: No edema.   Skin:     General: Skin is warm and dry.   Neurological:      Mental Status: He is alert.      Comments: Non verbal                 Significant Labs: All pertinent labs within the past 24 hours have been reviewed.    Significant Imaging: I have reviewed all pertinent imaging results/findings within the past 24 hours.

## 2024-06-15 NOTE — ED NOTES
Pt observed holding vomit in mouth does not want to spit it out in bag. Yanker and suction used. Pt speech in no cohort and he only says a few words.

## 2024-06-15 NOTE — ASSESSMENT & PLAN NOTE
Patient has hyponatremia which is uncontrolled,We will aim to correct the sodium by 4-6mEq in 24 hours. We will monitor sodium Every 4 hours. The hyponatremia is due to SIADH secondary to medications induced from antipsychotic (thorazine). We will obtain the following studies: Urine sodium, urine osmolality, serum osmolality or TSH, T4. We will treat the hyponatremia with IV fluids as follows: 100 ml of hypertonic saline ordered, Fluid restriction of:  1.5 liter per day, and Removal of offending medications. The patient's sodium results have been reviewed and are listed below.  Recent Labs   Lab 06/15/24  0746   *     Nephrology consult for acute severe symptomatic hyponatremia

## 2024-06-15 NOTE — ASSESSMENT & PLAN NOTE
Creatine stable for now. BMP reviewed- noted Estimated Creatinine Clearance: 36.7 mL/min (A) (based on SCr of 1.9 mg/dL (H)). according to latest data. Based on current GFR, CKD stage is stage 3 - GFR 30-59.  Monitor UOP and serial BMP and adjust therapy as needed. Renally dose meds. Avoid nephrotoxic medications and procedures.

## 2024-06-15 NOTE — CONSULTS
Nephrology Consult  H&P      Consult Requested By: Adrienne Anaya MD  Reason for Consult: Hyponatremia      SUBJECTIVE:     History of Present Illness:  Angel Bell is a 63 y.o.   male who  has a past medical history of Diabetes mellitus, Disorder of kidney and ureter, Hyperlipemia, Hypertension, Iron deficiency anemia, Migraine headache, PTSD (post-traumatic stress disorder), and Stage 3a chronic kidney disease.. The patient presented to the [facility] on 6/15/2024 via EMS called by his wife due to heaving  jerking movement and AMS  at home, here found to have Na+ 113 give 1L of NS now in ICU on Precedex due to combative behavior. Per wife was drinking a lots of water and Coke 0 to try to get rid of his hiccups, she noticed he drank 12 coke 0 few 16oz water bottles and 4 of 20 oz Coke 0 in one day    ?    Review of Systems   Unable to perform ROS: Acuity of condition       Past Medical History:   Diagnosis Date    Diabetes mellitus     Disorder of kidney and ureter     Hyperlipemia     Hypertension     Iron deficiency anemia     Migraine headache     PTSD (post-traumatic stress disorder)     Stage 3a chronic kidney disease      Past Surgical History:   Procedure Laterality Date    LASER ENUCLEATION OF PROSTATE N/A 8/29/2022    Procedure: ENUCLEATION, PROSTATE, USING LASER  Time: 180 minutes Equipment: high powered 100W laser with virtual basket, morcellator, scopes for HoLEP from [facility];  Surgeon: Ted Garvin MD;  Location: [facility];  Service: Urology;  Laterality: N/A;  [facility] confirmed  8/26  confirmation # [id number]     Family History   Problem Relation Name Age of Onset    Diabetes Mother       Social History     Tobacco Use    Smoking status: Never    Smokeless tobacco: Never   Substance Use Topics    Alcohol use: No    Drug use: No       Review of patient's allergies indicates:  No Known Allergies         OBJECTIVE:     Vital Signs (Most Recent)  Vitals:    06/15/24 1000 06/15/24 1030  06/15/24 1100 06/15/24 1115   BP: 134/82 108/64 126/61    Pulse: (!) 127 93 103    Resp:   (!) 23    Temp:    (!) 101.2 °F (38.4 °C)   TempSrc:    Axillary   SpO2: 100% 98% 100%    Weight:       Height:             Date 06/15/24 0700 - 06/16/24 0659   Shift 4969-7747 4671-8123 5456-2623 24 Hour Total   INTAKE   I.V.(mL/kg) 11.8(0.2)   11.8(0.2)   IV Piggyback 999   999   Shift Total(mL/kg) 1010.8(13.8)   1010.8(13.8)   OUTPUT   Urine(mL/kg/hr) 1900   1900   Shift Total(mL/kg) 1900(25.9)   1900(25.9)   Weight (kg) 73.5 73.5 73.5 73.5           Medications:   mupirocin   Nasal BID    pantoprazole  40 mg Intravenous BID    sodium chloride 3% HYPERTONIC  100 mL Intravenous Once           Physical Exam  Vitals and nursing note reviewed.   Constitutional:       General: He is not in acute distress.     Appearance: He is not diaphoretic.   HENT:      Head: Normocephalic and atraumatic.      Mouth/Throat:      Pharynx: No oropharyngeal exudate.   Eyes:      General: No scleral icterus.     Conjunctiva/sclera: Conjunctivae normal.      Pupils: Pupils are equal, round, and reactive to light.   Cardiovascular:      Rate and Rhythm: Normal rate and regular rhythm.      Heart sounds: Normal heart sounds. No murmur heard.  Pulmonary:      Effort: Pulmonary effort is normal. No respiratory distress.      Breath sounds: Normal breath sounds.   Abdominal:      General: Bowel sounds are normal. There is no distension.      Palpations: Abdomen is soft.      Tenderness: There is no abdominal tenderness.   Musculoskeletal:         General: Normal range of motion.      Cervical back: Normal range of motion and neck supple.   Skin:     General: Skin is warm and dry.      Findings: No erythema.   Neurological:      Cranial Nerves: No cranial nerve deficit.      Comments: Sedated          Laboratory:  Recent Labs   Lab 06/15/24  0508   WBC 12.25   HGB 9.9*   HCT 27.9*      MONO 11.7  1.4*   EOSINOPHIL 0.1     Recent Labs   Lab  "06/15/24  0508 06/15/24  0746   * 115*   K 3.8  --    CL 79*  --    CO2 18*  --    BUN 28*  --    CREATININE 1.9*  --    CALCIUM 7.9*  --        Diagnostic Results:  X-Ray: Reviewed  US: Reviewed  Echo: Reviewed  ASSESSMENT/PLAN:      Hyponatremia - water intoxication in CKD3 settings was drinking a lot of sugar free drinks  and water to help with his hiccups. The  day prior his admission per his wife  had more than 10 L total which most likely caused his Na+ drop    -- Now classified as Sever Symptomatic Hyponatremia - Na+ 113 to 115 after NS 1L bolus,  goal Na + correct 6mEq  STAT due to symptom then 6-8 every Q24hr   For now give 100cc 3% NS bolus then 30 cc/hr drip to maintain at 119-120 till tomorrow   -- Labs Q2Hr     -- Pending S Osm UA Osm UA Na+      CKD3 Follows with Dr Will   -- Cr at baseline 1.7 -1.9   -- Renally dose all meds  -- Please avoid nephrotoxins, including NSAIDs, aminoglycosides, IV contrast (unless absolutely necessary), gadolinium, fleets and other phosphorous-based laxatives. Caution with antibiotics.     HTN  -  controlled    Anemia of chronic kidney disease      Recent Labs   Lab 06/15/24  0508   HGB 9.9*   HCT 27.9*          Iron   Lab Results   Component Value Date    IRON 104 01/25/2024    TIBC 343 01/25/2024    FERRITIN 469 (H) 01/25/2024         MBD  - PTH  slightly up  154   Recent Labs   Lab 06/15/24  0508   CALCIUM 7.9*     No results for input(s): "MG" in the last 168 hours.    Lab Results   Component Value Date    .1 (H) 05/29/2024    CALCIUM 7.9 (L) 06/15/2024    PHOS 3.6 05/29/2024     Lab Results   Component Value Date    [id number] 20 (L) 01/25/2024     Acidosis   -- Monitor for now   Lab Results   Component Value Date    CO2 18 (L) 06/15/2024      Nutrition/Hypoalbuminemia    Recent Labs   Lab 06/15/24  0508   ALBUMIN 3.0*     Nepro with meals TID.  When able     Total critical care time 45 minutes: included management of organ failures related " to critical illness, titration of continuous infusions, review of pertinent labs and imaging studies, discussion with primary team and family.    Thank you for allowing me to participate in care of your patient  With any question please call   Judson Curiel MD     [facility]  PHONG Asher MD,   MD LENKA Middleton MD E. V. Harmon, NP  [id number]   [place], [address]  [phone]  After hours answering service: [phone]

## 2024-06-15 NOTE — ED NOTES
Pt comes into the er via ems very combative and non cooperative, ems reports spitting. Security called top bed side to help calm pt. Unable to do assessment at this time due to behavior.

## 2024-06-15 NOTE — H&P
[facility] Medicine  History & Physical    Patient Name: Angel Bell  MRN: [id number]  Patient Class: IP- Inpatient  Admission Date: 6/15/2024  Attending Physician: Adrienne Anaya MD   Primary Care Provider: Tiago Ruiz MD         Patient information was obtained from spouse/SO, past medical records, and ER records.     Subjective:     Principal Problem:Hyponatremia    Chief Complaint:   Chief Complaint   Patient presents with    Seizures     Pt arrived to ED via EMS for seizure activity. EMS reports, no history of seizures.        HPI: 63-year-old male with PMH of type 2 diabetes mellitus, CKD, hypertension brought to the ER for altered mental status.  History from the patient is limited due to AMS.  History obtained from patient's wife at bedside.  Reports patient has been having issues with hiccups, nausea/vomiting, poor p.o. intake for the past 2-3 weeks.  He was seen by primary care provider about 2 weeks ago when he was prescribed Thorazine for hiccups.  Wife states he had 30 pills in the bottle which he finished in about a week and was seen at urgent care where he received a refill with another 20 pills which he finished. He was doing okay until yesterday evening. In the middle of the night, he started screaming and she noticed tonic clonic movements and frothing from his mouth. Since then, he has been agitated and not acting himself. 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     Found to have sodium of 113 in the ED. Received 1 L IV fluid normal saline bolus, repeat sodium on 115.  Also reported to have an episode of dark vomitus in the ER that was occult blood positive.  Received IV PPI 80 mg.  Admitted to ICU for further care.    Past Medical History:   Diagnosis Date    Diabetes mellitus     Disorder of kidney and ureter     Hyperlipemia     Hypertension     Iron deficiency anemia     Migraine headache     PTSD (post-traumatic stress  disorder)     Stage 3a chronic kidney disease        Past Surgical History:   Procedure Laterality Date    LASER ENUCLEATION OF PROSTATE N/A 8/29/2022    Procedure: ENUCLEATION, PROSTATE, USING LASER  Time: 180 minutes Equipment: high powered 100W laser with virtual basket, morcellator, scopes for HoLEP from [facility];  Surgeon: Ted Garvin MD;  Location: [facility];  Service: Urology;  Laterality: N/A;  [facility] confirmed CW 8/26  confirmation # [id number]       Review of patient's allergies indicates:  No Known Allergies    No current facility-administered medications on file prior to encounter.     Current Outpatient Medications on File Prior to Encounter   Medication Sig    atorvastatin (LIPITOR) 40 MG tablet TAKE 1 TABLET(40 MG) BY MOUTH EVERY DAY    blood sugar diagnostic (TRUE METRIX GLUCOSE TEST STRIP) Strp TEST 4 TIMES A DAY    chlorproMAZINE (THORAZINE) 25 MG tablet Take 1 tablet (25 mg total) by mouth 3 (three) times daily as needed (hiccups). May increase to 50 mg three times daily if no relief with 25 mg.    dapagliflozin propanediol (FARXIGA) 10 mg tablet Take 1 tablet (10 mg total) by mouth once daily.    dulaglutide (TRULICITY) 3 mg/0.5 mL pen injector Inject 3 mg into the skin every 7 days.    finerenone (KERENDIA) 10 mg Tab Take 1 tablet by mouth once daily.    furosemide (LASIX) 40 MG tablet Take 1 tablet (40 mg total) by mouth daily as needed (swelling).    glucose 4 GM chewable tablet Take 4 tablets (16 g total) by mouth as needed for Low blood sugar (If having symptoms of blurry vision, palpitations, confusion, shakiness.  Please check sugars and if sugar below 70 please take 4 tablets and re-check sugar everry 15 minutes until sugars are above 70 and symptoms resolve.). (Patient not taking: Reported on 6/14/2024)    insulin glargine U-100, Lantus, (LANTUS SOLOSTAR U-100 INSULIN) 100 unit/mL (3 mL) InPn pen Inject 5 Units into the skin every evening.    lancets 33 gauge Misc USE 4 TIMES A DAY     "losartan (COZAAR) 50 MG tablet Take 1 tablet (50 mg total) by mouth once daily.    metoprolol succinate (TOPROL-XL) 200 MG 24 hr tablet Take 1 tablet (200 mg total) by mouth once daily.    NIFEdipine (PROCARDIA-XL) 90 MG (OSM) 24 hr tablet Take 1 tablet (90 mg total) by mouth once daily.    ondansetron (ZOFRAN-ODT) 4 MG TbDL DISSOLVE 1 TABLET BY MOUTH EVERY 6 HOURS FOR 5 DAYS    patiromer calcium sorbitex (VELTASSA) 8.4 gram PwPk Take 1 packet (8.4 g total) by mouth once daily.    pen needle, diabetic 31 gauge x 5/16" Ndle use 4 times a day    promethazine (PHENERGAN) 25 MG tablet     sildenafiL (VIAGRA) 100 MG tablet Take 1 tablet (100 mg total) by mouth daily as needed for Erectile Dysfunction (1 hour before sexual activity on an empty stomach.).     Family History       Problem Relation (Age of Onset)    Diabetes Mother          Tobacco Use    Smoking status: Never    Smokeless tobacco: Never   Substance and Sexual Activity    Alcohol use: No    Drug use: No    Sexual activity: Not on file     Review of Systems   Unable to perform ROS: Mental status change     Objective:     Vital Signs (Most Recent):  Temp: (!) 101.2 °F (38.4 °C) (06/15/24 1115)  Pulse: 103 (06/15/24 1100)  Resp: (!) 23 (06/15/24 1100)  BP: 126/61 (06/15/24 1100)  SpO2: 100 % (06/15/24 1100) Vital Signs (24h Range):  Temp:  [98.2 °F (36.8 °C)-101.2 °F (38.4 °C)] 101.2 °F (38.4 °C)  Pulse:  [] 103  Resp:  [22-50] 23  SpO2:  [93 %-100 %] 100 %  BP: (108-154)/(56-85) 126/61     Weight: 73.5 kg (162 lb 0.6 oz)  Body mass index is 25.38 kg/m².     Physical Exam  Vitals and nursing note reviewed.   Constitutional:       Appearance: He is well-developed.      Comments: Repetitive involuntary face & limb movements    Cardiovascular:      Rate and Rhythm: Regular rhythm. Tachycardia present.   Pulmonary:      Effort: Pulmonary effort is normal. No respiratory distress.   Musculoskeletal:      Right lower leg: No edema.      Left lower leg: No " edema.   Skin:     General: Skin is warm and dry.   Neurological:      Mental Status: He is alert.      Comments: Non verbal                 Significant Labs: All pertinent labs within the past 24 hours have been reviewed.    Significant Imaging: I have reviewed all pertinent imaging results/findings within the past 24 hours.  Assessment/Plan:     * Hyponatremia  Patient has hyponatremia which is uncontrolled,We will aim to correct the sodium by 4-6mEq in 24 hours. We will monitor sodium Every 4 hours. The hyponatremia is due to SIADH secondary to medications induced from antipsychotic (thorazine). We will obtain the following studies: Urine sodium, urine osmolality, serum osmolality or TSH, T4. We will treat the hyponatremia with IV fluids as follows: 100 ml of hypertonic saline ordered, Fluid restriction of:  1.5 liter per day, and Removal of offending medications. The patient's sodium results have been reviewed and are listed below.  Recent Labs   Lab 06/15/24  0746   *     Nephrology consult for acute severe symptomatic hyponatremia     CKD (chronic kidney disease)  Creatine stable for now. BMP reviewed- noted Estimated Creatinine Clearance: 36.7 mL/min (A) (based on SCr of 1.9 mg/dL (H)). according to latest data. Based on current GFR, CKD stage is stage 3 - GFR 30-59.  Monitor UOP and serial BMP and adjust therapy as needed. Renally dose meds. Avoid nephrotoxic medications and procedures.        Acute encephalopathy  Suspect in setting of hyponatremia, acute EPS  CT head showed no acute abn      Nausea & vomiting  Wife reports hiccups, N/V X 2-3 weeks   Reported to have dark vomitus in the ER  Hb has down trended from 14 in 1/2024 to 9.9    Possible PUD vs gastritis vs other    Continue IV PPI BID  GI consult to consider EGD once patient stabilizes       Fever  Low clinical suspicion for infection  UA negative   External cooling, antipyretics, supportive care           Extrapyramidal syndrome  Suspect  acute EPS from antidopaminergic action of thorazine   Anticholinergics, benzo PRN     Febrile, agitation, dysautonomia - concerning for neuroleptic malignant syndrome. Add CK. Consider bromocriptine or dantrolene  Supportive care     Consider neurology consult        Type 2 diabetes mellitus with hyperglycemia, with long-term current use of insulin  Hold insulin for now until patient can take PO      Hypertension  Chronic, controlled. Latest blood pressure and vitals reviewed-     Temp:  [98.2 °F (36.8 °C)-101.2 °F (38.4 °C)]   Pulse:  []   Resp:  [22-50]   BP: (108-154)/(56-85)   SpO2:  [93 %-100 %] .   Home meds for hypertension were reviewed and noted below.   Hypertension Medications               finerenone (KERENDIA) 10 mg Tab Take 1 tablet by mouth once daily.    furosemide (LASIX) 40 MG tablet Take 1 tablet (40 mg total) by mouth daily as needed (swelling).    losartan (COZAAR) 50 MG tablet Take 1 tablet (50 mg total) by mouth once daily.    metoprolol succinate (TOPROL-XL) 200 MG 24 hr tablet Take 1 tablet (200 mg total) by mouth once daily.    NIFEdipine (PROCARDIA-XL) 90 MG (OSM) 24 hr tablet Take 1 tablet (90 mg total) by mouth once daily.            While in the hospital, will manage blood pressure as follows; Adjust home antihypertensive regimen as follows- holding PO meds as pt is not co-operative & unable to take PO    Will utilize p.r.n. blood pressure medication only if patient's blood pressure greater than 180/110 and he develops symptoms such as worsening chest pain or shortness of breath.      VTE Risk Mitigation (From admission, onward)           Ordered     Place sequential compression device  Until discontinued         06/15/24 0652     IP VTE LOW RISK PATIENT  Once         06/15/24 0652                  Critical care time spent on the evaluation and treatment of severe organ dysfunction, review of pertinent labs and imaging studies, discussions with consulting providers and discussions  with patient/family: 40 minutes.     Case discussed with ICU team.        Adrienne Anaya MD  Department of [facility] Medicine  [place] - Intensive Care

## 2024-06-15 NOTE — CONSULTS
[facility] Medicine Note    Primary Attending Physician: Adrienne Anaya MD  ICU Attending: Willis Santos MD  ICU Fellow: Yvette Chadwick MD    Reason for Consult:     Hyponatremia    Subjective:      History of Present Illness:  Angel Bell is a 63 y.o. male with a past medical history of hypertension, hyperlipidemia, T2DM on insulin, CKD 3a, and migraines, who presented on 6/15/2024 for acute encephalopathy for several hours.     The patient was in his usual state of health until a few weeks prior to presentation, when he developed persistent nausea and vomiting, which he attributed to increased trulicity dosing. The day prior to admission, patient presented to his outpatient primary care physician endorsing hiccups, prescribed chlorpromazine. Patient's wife reports that last night she heard the patient yell from another room, when she went to check on him he was not responding appropriately and was concerned that he was having a seizure so she called EMS. On arrival to the ED patient was reportedly combative and not redirectable, so he was placed in soft restraints. He was found to be hyponatremic at 113, glucose 131. Patient was admitted to the ICU for further management.     Past Medical History:  Hypertension  Hyperlipidemia  Type 2 diabetes on insulin  CKD 3a  Migraines   COMFORT of unknown etiology     Past Surgical History:  Past Surgical History:   Procedure Laterality Date    LASER ENUCLEATION OF PROSTATE N/A 8/29/2022    Procedure: ENUCLEATION, PROSTATE, USING LASER  Time: 180 minutes Equipment: high powered 100W laser with virtual basket, morcellator, scopes for [facility] from [facility];  Surgeon: Ted Garvin MD;  Location: [facility];  Service: Urology;  Laterality: N/A;  Former[facility] confirmed  8/26  confirmation # [id number]       Allergies:  Review of patient's allergies indicates:  No Known Allergies    Medications:   In-Hospital Scheduled Medications:   diphenhydrAMINE  50 mg  Intramuscular Once    mupirocin   Nasal BID    pantoprazole  40 mg Intravenous BID    sodium chloride 3% HYPERTONIC  250 mL Intravenous Once      In-Hospital PRN Medications:    Current Facility-Administered Medications:     midazolam, 0.5 mg, Intramuscular, Q4H PRN    sodium chloride 0.9%, 10 mL, Intravenous, PRN   In-Hospital IV Infusion Medications:   dexmedeTOMIDine (Precedex) infusion (titrating)  0-1.4 mcg/kg/hr (Order-Specific) Intravenous Continuous 3.68 mL/hr at 06/15/24 0914 0.2 mcg/kg/hr at 06/15/24 0914      Home Medications:  Prior to Admission medications    Medication Sig Start Date End Date Taking? Authorizing Provider   atorvastatin (LIPITOR) 40 MG tablet TAKE 1 TABLET(40 MG) BY MOUTH EVERY DAY 5/16/24   Tiago Ruiz MD   blood sugar diagnostic (TRUE METRIX GLUCOSE TEST STRIP) Strp TEST 4 TIMES A DAY 5/24/23   Wilian Byers MD   chlorproMAZINE (THORAZINE) 25 MG tablet Take 1 tablet (25 mg total) by mouth 3 (three) times daily as needed (hiccups). May increase to 50 mg three times daily if no relief with 25 mg. 6/14/24 6/24/24  Tiago Ruiz MD   dapagliflozin propanediol (FARXIGA) 10 mg tablet Take 1 tablet (10 mg total) by mouth once daily. 2/27/24   Eleanor Will MD   dulaglutide (TRULICITY) 3 mg/0.5 mL pen injector Inject 3 mg into the skin every 7 days. 6/3/24 6/3/25  Gloria Edgar MD   finerenone (KERENDIA) 10 mg Tab Take 1 tablet by mouth once daily. 10/10/23   Eleanor Will MD   furosemide (LASIX) 40 MG tablet Take 1 tablet (40 mg total) by mouth daily as needed (swelling). 7/13/22 11/29/23  Eleanor Will MD   glucose 4 GM chewable tablet Take 4 tablets (16 g total) by mouth as needed for Low blood sugar (If having symptoms of blurry vision, palpitations, confusion, shakiness.  Please check sugars and if sugar below 70 please take 4 tablets and re-check sugar everry 15 minutes until sugars are above 70 and symptoms resolve.).  Patient not taking: Reported on 6/14/2024  "22  Wilian Byers MD   insulin glargine U-100, Lantus, (LANTUS SOLOSTAR U-100 INSULIN) 100 unit/mL (3 mL) InPn pen Inject 5 Units into the skin every evening. 6/3/24 6/3/25  Gloria Edgar MD   lancets 33 gauge Misc USE 4 TIMES A DAY 23   Wilian Byers MD   losartan (COZAAR) 50 MG tablet Take 1 tablet (50 mg total) by mouth once daily. 24  Eleanor Will MD   metoprolol succinate (TOPROL-XL) 200 MG 24 hr tablet Take 1 tablet (200 mg total) by mouth once daily. 3/21/24 3/21/25  Eleanor Will MD   NIFEdipine (PROCARDIA-XL) 90 MG (OSM) 24 hr tablet Take 1 tablet (90 mg total) by mouth once daily. 24   Eleanor Will MD   ondansetron (ZOFRAN-ODT) 4 MG TbDL DISSOLVE 1 TABLET BY MOUTH EVERY 6 HOURS FOR 5 DAYS 24   Provider, Historical   patiromer calcium sorbitex (VELTASSA) 8.4 gram PwPk Take 1 packet (8.4 g total) by mouth once daily. 24   Eleanor Will MD   pen needle, diabetic 31 gauge x 5/16" Ndle use 4 times a day 23   Wilian Byers MD   promethazine (PHENERGAN) 25 MG tablet  24   Provider, Historical   sildenafiL (VIAGRA) 100 MG tablet Take 1 tablet (100 mg total) by mouth daily as needed for Erectile Dysfunction (1 hour before sexual activity on an empty stomach.). 24  Ted Garvin MD       Family History:  Family History   Problem Relation Name Age of Onset    Diabetes Mother         Social History:  Social History     Tobacco Use    Smoking status: Never    Smokeless tobacco: Never   Substance Use Topics    Alcohol use: No    Drug use: No       Review of Systems:  All other systems are reviewed and are negative, except as stated in above HPI.      Objective:   Last 24 Hour Vital Signs:  BP  Min: 118/72  Max: 154/66  Temp  Av.5 °F (37.5 °C)  Min: 98.2 °F (36.8 °C)  Max: 100.2 °F (37.9 °C)  Pulse  Av.1  Min: 98  Max: 118  Resp  Av.8  Min: 22  Max: 50  SpO2  Av.6 %  Min: 93 %  Max: 100 " "%  Height  Av' 7" (170.2 cm)  Min: 5' 7" (170.2 cm)  Max: 5' 7" (170.2 cm)  Weight  Av.1 kg (163 lb 7.5 oz)  Min: 73.5 kg (162 lb 0.6 oz)  Max: 74.8 kg (164 lb 14.5 oz)  No intake/output data recorded.    Physical Examination:  Gen: In soft restraints in bed, agitated, requiring constant redirection   HEENT: No trauma noted to head, EOMI, PERRL  Cardio: Regular rate, no murmurs appreciated  Pulm: CTAB, no wheezing appreciated, no increased WOB, satting appropriately on RA  Abdom: Normal bowel sounds, non-tender, non-distended  MSK: Well-perfused and warm, radial pulses intact, no BLE edema  Derm: No open lesions/wounds, dry  Neuro: Moves extremities spontaneously, unable to assess further 2/2 agitation   Psych: Unable to assess 2/2 acute encephalopathy     Laboratory:  Trended Lab Data:  Recent Labs     06/15/24  0508 06/15/24  0746   WBC 12.25  --    HGB 9.9*  --    HCT 27.9*  --      --    * 115*   K 3.8  --    CL 79*  --    CO2 18*  --    BUN 28*  --    CREATININE 1.9*  --    *  --    BILITOT 0.5  --    AST 20  --    ALT 12  --    ALKPHOS 48*  --    CALCIUM 7.9*  --    ALBUMIN 3.0*  --    PROT 5.1*  --        Cardiac:   Recent Labs   Lab 06/15/24  0508   TROPONINI <0.006       Urinalysis:   Lab Results   Component Value Date    LABURIN  2022     Multiple organisms isolated. None in predominance.  Repeat if    LABURIN clinically necessary. 2022    COLORU Straw 06/15/2024    CLARITYU Clear 2023    SPECGRAV 1.005 06/15/2024    NITRITE Negative 06/15/2024    KETONESU Negative 06/15/2024    UROBILINOGEN Negative 06/15/2024       Microbiology:  Microbiology Results (last 7 days)       ** No results found for the last 168 hours. **            Radiology:  CT Head Without Contrast   Final Result      Motion compromised study.      1. No definite acute internal hemorrhage, mass effect, or midline shift.   2. No definite acute transcortical infarct by noncontrast CT.   3. " Nonspecific partially empty sella configuration questionable flattening of the posterior globes.  Findings likely present on remote CT performed 07/03/2016, however may be seen the setting of idiopathic intracranial hypertension.  Clinical correlation recommended in this regard.         Electronically signed by: Garret Wayne   Date:    06/15/2024   Time:    06:28        I have personally reviewed the above labs and imaging.    Current Medications:     Infusions:   dexmedeTOMIDine (Precedex) infusion (titrating)  0-1.4 mcg/kg/hr (Order-Specific) Intravenous Continuous 3.68 mL/hr at 06/15/24 0914 0.2 mcg/kg/hr at 06/15/24 0914        Scheduled:   diphenhydrAMINE  50 mg Intramuscular Once    mupirocin   Nasal BID    pantoprazole  40 mg Intravenous BID    sodium chloride 3% HYPERTONIC  250 mL Intravenous Once        PRN:    Current Facility-Administered Medications:     midazolam, 0.5 mg, Intramuscular, Q4H PRN    sodium chloride 0.9%, 10 mL, Intravenous, PRN     Assessment:     Angel Bell is a 63 y.o. male with a past medical history of hypertension, hyperlipidemia, T2DM on insulin, CKD 3a, and migraines, who presented on 6/15/2024 for acute encephalopathy for several hours. On arrival to the ED patient was reportedly combative and not redirectable, so he was placed in soft restraints. He was found to be hyponatremic at 113, glucose 131. Patient was admitted to the ICU for further management. On exam patient appears euvolemic, urine Na 26, hyponatremia likely 2/2 SIADH, initiated 3% hypertonic saline. Precedex gtt with PRN IM versed started for agitation.      Plan:     Neuro:  Acute Encephalopathy  - Likely 2/2 severe hyponatremia, start 3% hypertonic saline   - Patient is agitated and not redirectable, continue precedex gtt with PRN IM versed  - Currently requiring soft restraints, will discontinue when patient is redirectable   - Patient was recently prescribed chlorpromazine, concern that he could be exhibiting  extrapyramidal symptoms, will give IV benadryl 50mg and monitor for improvement in symptoms     Cardiovascular/Hemodynamics:  Hypertension  - On admission /70  - Home meds: Losartan 50mg daily and Nifedipine 90mg daily  - Patient unable to take PO meds at this time 2/2 acute encephalopathy   - Will resume home meds if patient becomes hypertensive pending SLP eval     Respiratory:   No active issues    GI/FEN:  F: 3% hypertonic saline @50cc/hr  E: K>4, Mg>2  N: NPO    Euvolemic, Symptomatic Hyponatremia  - Patient presented with acute encephalopathy, initial Na 113  - Glucose 131, TSH 0.415  - Patient with recent history of persistent vomiting 2/2 trulicity, also takes lasix and SGLT2 daily, however he is euvolemic on exam  - Urine Na 26  - Possibly 2/2 SIADH   - Start 3% hypertonic saline @50cc/hr for 5 hours   - Q4H sodium checks    ID:   No active issues    Renal:   CKD 3a  - On admission Cr 1.9, baseline ~1.7  - Renally dose all medication, avoid nephrotoxic agents  - Strict I/Os, daily weights    Heme/Onc:   Normocytic Anemia  - On admission Hgb 9.9, MCV 81  - Baseline Hgb ~14 one year previously   - Patient has h/o COMFORT, etiology unknown, no c/scope on chart review  - Check iron studies, ferritin     Endocrine:  Type 2 Diabetes on insulin   - 5/2024 A1c 5.8  - Patient takes Lantus 5 units nightly outpatient, hold while patient is NPO, BG on admission 130  - SSI + Accuchecks  - Goal glucose 140-180 while in ICU    TSH: 0.415    Rheum/MSK:  No active issues     Critical Care Daily Checklist:    F: Feeding Goal: Diabetic diet   Status: NPO     AS: Analgesia/Sedation Precedex gtt, IM versed PRN   T: Thromboembolic Prophylaxis SCD   H: HOB > 300 Yes   U: Stress Ulcer Prophylaxis (if needed) IV PPI   G: Glucose Control POCT checks    B: Bowel Function Stool Occurrence: none   I: Indwelling Catheter (Lines & Tavera) Necessity PIV x2   D: De-escalation of Antimicrobials/Pharmacotherapies N/A    Plan for the day/ETD  3% saline infusion, Q4H Na checks     Code Status:  Family/Goals of Care: Full        Jesús Camacho MD  [facility]-II    Pt seen and examined with Pulmonary/Critical Care team and this note was reviewed and validated with the following additional comments: Agitated delirium could be from his hyponatremia but also started thorazine recently and wife is worried that he may have taken too many.  Required high dose dexmedetomidine + benzos to control him.  Spot urine Na+ high suggesting euvolemic or hypervolemic hyponatremia. Hypertonic saline to correct by 6 meq then water restriction.    Critical Care time was spent validating the history and physical exam, reviewing the lab and imaging results, and discussing the care of the patient with the bedside nurse and the patient and/or surrogates. This critical care time did not overlap with that of any other provider or involve time for any procedures.  This patient has a high probability of sudden clinically significant deterioration which requires the highest level of physician preparedness to intervene urgently. I managed/supervised life or organ supporting interventions that required frequent physician assessments. I devoted my full attention in the ICU to the direct care of this patient for this period of time. Organ systems which are failing and require intensive, critical care support are: metabolic, neurologic, renal, behavioral  Critical Care time: 55 minutes    Colton Santos MD  Phone [phone]

## 2024-06-15 NOTE — ASSESSMENT & PLAN NOTE
Chronic, controlled. Latest blood pressure and vitals reviewed-     Temp:  [98.2 °F (36.8 °C)-101.2 °F (38.4 °C)]   Pulse:  []   Resp:  [22-50]   BP: (108-154)/(56-85)   SpO2:  [93 %-100 %] .   Home meds for hypertension were reviewed and noted below.   Hypertension Medications               finerenone (KERENDIA) 10 mg Tab Take 1 tablet by mouth once daily.    furosemide (LASIX) 40 MG tablet Take 1 tablet (40 mg total) by mouth daily as needed (swelling).    losartan (COZAAR) 50 MG tablet Take 1 tablet (50 mg total) by mouth once daily.    metoprolol succinate (TOPROL-XL) 200 MG 24 hr tablet Take 1 tablet (200 mg total) by mouth once daily.    NIFEdipine (PROCARDIA-XL) 90 MG (OSM) 24 hr tablet Take 1 tablet (90 mg total) by mouth once daily.            While in the hospital, will manage blood pressure as follows; Adjust home antihypertensive regimen as follows- holding PO meds as pt is not co-operative & unable to take PO    Will utilize p.r.n. blood pressure medication only if patient's blood pressure greater than 180/110 and he develops symptoms such as worsening chest pain or shortness of breath.

## 2024-06-15 NOTE — ED NOTES
Pt family reports that he has been having n/v for weeks and is very private about his health so he did not want to get help.

## 2024-06-15 NOTE — ED PROVIDER NOTES
ED Provider Note - 6/15/2024    History     Chief Complaint   Patient presents with    Seizures     Pt arrived to ED via EMS for seizure activity. EMS reports, no history of seizures.       DAVID Bell is a 63 y.o. year old male with past medical and surgical history as seen below, presenting with chief complaint of AMS.  Concern for possible seizure.  Wife heard patient call out/scream.  Saw some tonicity to his movements.  Called ambulance.  When they arrived he was very combative requiring restraints.  Presented to the emergency department this way as well.  Required security and IM Versed due to inability to redirect.        Past Medical History:   Diagnosis Date    Diabetes mellitus     Disorder of kidney and ureter     Hyperlipemia     Hypertension     Iron deficiency anemia     Migraine headache     PTSD (post-traumatic stress disorder)     Stage 3a chronic kidney disease      Past Surgical History:   Procedure Laterality Date    LASER ENUCLEATION OF PROSTATE N/A 8/29/2022    Procedure: ENUCLEATION, PROSTATE, USING LASER  Time: 180 minutes Equipment: high powered 100W laser with virtual basket, morcellator, scopes for HoLEP from [facility];  Surgeon: Ted Garvin MD;  Location: [facility];  Service: Urology;  Laterality: N/A;  [facility] confirmed  8/26  confirmation # [id number]         Family History   Problem Relation Name Age of Onset    Diabetes Mother       Social History     Tobacco Use    Smoking status: Never    Smokeless tobacco: Never   Substance Use Topics    Alcohol use: No    Drug use: No     Social Determinants of Health with Concerns     Alcohol Use: Unknown (8/30/2022)    AUDIT-C     Frequency of Alcohol Consumption: Patient declined     Average Number of Drinks: Patient declined     Frequency of Binge Drinking: Patient declined   Physical Activity: Unknown (8/30/2022)    Exercise Vital Sign     Days of Exercise per Week: Patient declined     Minutes of Exercise per Session: Patient  declined   Stress: Unknown (8/30/2022)    [employer] [place] [employer] - Occupational Stress Questionnaire     Feeling of Stress : Patient declined   Housing Stability: Unknown (8/30/2022)    Housing Stability Vital Sign     Unable to Pay for Housing in the Last Year: No     Number of Places Lived in the Last Year: Not on file     Unstable Housing in the Last Year: No   Utilities: Not on file   Health Literacy: Not on file   Social Isolation: Not on file      Review of patient's allergies indicates:  No Known Allergies    Review of Systems     A full Review of Systems (ROS) was performed and was negative unless otherwise stated in the HPI.      Physical Exam     Vitals:    06/15/24 0409 06/15/24 0424   BP:  118/70   Pulse: 107 99   Resp: (!) 24    SpO2: 98% 100%        Physical Exam    Nursing note and vitals reviewed.  Constitutional: He appears well-developed and well-nourished. He appears listless.  Non-toxic appearance. No distress.   HENT:   Head: Normocephalic and atraumatic.   Right Ear: External ear normal.   Left Ear: External ear normal.   Nose: Nose normal.   Eyes: Conjunctivae and EOM are normal. Pupils are equal, round, and reactive to light.   Neck: Neck supple.   Normal range of motion.  Cardiovascular:  Normal rate, regular rhythm and intact distal pulses.           Pulmonary/Chest: Breath sounds normal. No respiratory distress.   Musculoskeletal:         General: No edema. Normal range of motion.      Cervical back: Normal range of motion and neck supple.     Neurological: He has normal strength. He appears listless. He is disoriented. He displays no tremor. No cranial nerve deficit or sensory deficit. He displays no seizure activity. GCS eye subscore is 3. GCS verbal subscore is 3. GCS motor subscore is 4.   Skin: Skin is warm and dry. No rash noted.   Psychiatric: He has a normal mood and affect. Thought content normal.         Lab Results- Independently reviewed by myself      Labs  Reviewed   CBC W/ AUTO DIFFERENTIAL - Abnormal; Notable for the following components:       Result Value    RBC 3.46 (*)     Hemoglobin 9.9 (*)     Hematocrit 27.9 (*)     MCV 81 (*)     MPV 8.5 (*)     Immature Granulocytes 0.6 (*)     Gran # (ANC) 9.7 (*)     Immature Grans (Abs) 0.07 (*)     Mono # 1.4 (*)     Gran % 79.0 (*)     Lymph % 8.4 (*)     All other components within normal limits   COMPREHENSIVE METABOLIC PANEL - Abnormal; Notable for the following components:    Sodium 113 (*)     Chloride 79 (*)     CO2 18 (*)     Glucose 131 (*)     BUN 28 (*)     Creatinine 1.9 (*)     Calcium 7.9 (*)     Total Protein 5.1 (*)     Albumin 3.0 (*)     Alkaline Phosphatase 48 (*)     eGFR 39 (*)     All other components within normal limits    Narrative:       na critical result(s) called and verbal readback obtained from   Therese MARINO RN by CMN2 06/15/2024 05:52   DRUG SCREEN PANEL, URINE EMERGENCY - Abnormal; Notable for the following components:    Benzodiazepines Presumptive Positive (*)     All other components within normal limits    Narrative:     Specimen Source->Urine   OCCULT BLOOD, OTHER SOURCES - Abnormal; Notable for the following components:    Occult Blood Positive (*)     All other components within normal limits   SODIUM - Abnormal; Notable for the following components:    Sodium 115 (*)     All other components within normal limits    Narrative:     NA critical result(s) called and verbal readback obtained from Amarilis Carrera RN. by HVB1 06/15/2024 08:14   TROPONIN I   TSH   URINALYSIS, REFLEX TO URINE CULTURE    Narrative:     Specimen Source->Urine   SODIUM, URINE, RANDOM   OSMOLALITY, URINE RANDOM   SODIUM, URINE, RANDOM   TYPE & SCREEN   POCT GLUCOSE MONITORING CONTINUOUS           Imaging     Imaging Results              CT Head Without Contrast (Final result)  Result time 06/15/24 06:28:02      Final result by Garret Wayne MD (06/15/24 06:28:02)                   Impression:      Motion  compromised study.    1. No definite acute internal hemorrhage, mass effect, or midline shift.  2. No definite acute transcortical infarct by noncontrast CT.  3. Nonspecific partially empty sella configuration questionable flattening of the posterior globes.  Findings likely present on remote CT performed 07/03/2016, however may be seen the setting of idiopathic intracranial hypertension.  Clinical correlation recommended in this regard.      Electronically signed by: Garret Wayne  Date:    06/15/2024  Time:    06:28               Narrative:    EXAMINATION:  CT HEAD WITHOUT CONTRAST    CLINICAL HISTORY:  Seizure, new-onset, no history of trauma;    TECHNIQUE:  Low dose axial images were obtained through the head.  Coronal and sagittal reformations were also performed. Contrast was not administered.    COMPARISON:  CT head performed 07/03/2016.    FINDINGS:  Comment: Motion compromised study.    Blood: No acute intracranial hemorrhage.    Parenchyma: No definite loss of gray-white differentiation to suggest acute or subacute transcortical infarct. Generalized pattern age-related parenchymal volume loss.  Extensive patchy white matter hypoattenuation, may reflect sequela of chronic small vessel ischemic disease.    Ventricles/Extra-axial spaces: No abnormal extra-axial fluid collection. Basal cisterns are patent.    Vessels: Relatively minimal atherosclerotic calcification.    Orbits: Questionable flattening of the posterior aspect of the globes, more noticeable on the left.    Scalp: Unremarkable.    Skull: There are no depressed skull fractures or destructive bone lesions.    Sinuses and mastoids: Scattered paranasal sinus mucosal thickening.  Nonspecific frothy debris noted within the left maxillary sinus, as may be seen the setting of acute mucosal inflammation, in the appropriate clinical context.    Other findings: Partially empty sella configuration.                                    X-Rays:   Independently  Interpreted Readings:   Head CT: No hemorrhage.               ED Course         Critical Care    Date/Time: 6/15/2024 6:10 AM    Performed by: Russ Reagan MD  Authorized by: Russ Reagan MD  Direct patient critical care time: 16 minutes  Additional history critical care time: 7 minutes  Ordering / reviewing critical care time: 7 minutes  Documentation critical care time: 8 minutes  Consulting other physicians critical care time: 10 minutes  Total critical care time (exclusive of procedural time) : 48 minutes  Critical care time was exclusive of separately billable procedures and treating other patients and teaching time.  Critical care was necessary to treat or prevent imminent or life-threatening deterioration of the following conditions: metabolic crisis and CNS failure or compromise.  Critical care was time spent personally by me on the following activities: blood draw for specimens, discussions with consultants, interpretation of cardiac output measurements, evaluation of patient's response to treatment, examination of patient, obtaining history from patient or surrogate, ordering and review of laboratory studies, ordering and performing treatments and interventions, ordering and review of radiographic studies, pulse oximetry, re-evaluation of patient's condition and review of old charts.               Orders Placed This Encounter    CT Head Without Contrast    CBC auto differential    Comprehensive metabolic panel    Drug screen panel, emergency    Troponin I    TSH    Urinalysis, Reflex to Urine Culture Urine, Clean Catch    Occult blood, other sources    Bladder scan    Cardiac Monitoring - Adult    NPO (Ice Chips)    Vital signs    Type & Screen    Insert Saline lock IV    Possible Hospitalization    Restraints violent or self-destructive adult (age 18 and older)    POCT glucose    midazolam (PF) (VERSED) 5 mg/mL injection 2.5 mg    pantoprazole injection 80 mg    ondansetron injection 8 mg     "  Restraint Note         BP (!) 115/59   Pulse 70   Temp 98 °F (36.7 °C) (Axillary)   Resp 12   Ht 5' 7" (1.702 m)   Wt 73.5 kg (162 lb 0.6 oz)   SpO2 100%   BMI 25.38 kg/m²     Time: 5:12 AM    Patient's immediate situation:  Patient was combative, altered, interrupting care, danger to self and staff    Reaction to intervention: .  Attempted redirection, verbal deescalation    Medications/behavioral condition:  Versed 2.5 mg    Restraint status:  Still requires soft restraints      ED Course as of 06/16/24 0509   Sat Nestor 15, 2024   7128 Given history on chart review of urinary retention asked nurses to perform bladder scan.  This showed in amount higher than calculable per the machine.  Will place Tavera catheter to relieve this retention as patient was had significant renal failure related to postobstructive issues in the past. [KB]   1410 Patient has been vomiting dark brownish vomitus.  Sending on gastroccult card to lab to test for GI bleed....  Hemoglobin the same as it was 2 weeks ago. [KB]   1006 Discussed with Pulm/Crit Care fellow Dr. Nelson.  We discussed the possibility of hypertonic saline.  He states that at this point if we have not seen any definite seizures that he would recommend starting with 1 L normal saline and reassessing.  He would also like us to add urine sodium and oz molar testing.  These have been placed. [KB]      ED Course User Index  [KB] Russ Reagan MD              Medical Decision Making       The patient's list of active medical problems, social history, medications, and allergies as documented per RN staff has been reviewed.           Medical Decision Making  63-year-old male presents with acute alteration of mental status.  Combative requiring chemical and physical sedation at times due to inability to redirect.  Has been on Thorazine recently which could be complicating matters with some extrapyramidal symptoms but this is unclear at this time.  Primary driving issue " seems to be acute, severe hyponatremia.  Critical Care fellow consulted recommending L of normal saline and reassessment.  Further discussed with Hospital Medicine who will continue evaluation and management.    Amount and/or Complexity of Data Reviewed  Independent Historian: EMS  External Data Reviewed: labs and notes.  Labs: ordered.     Details: Severe hyponatremia acutely worse from values 2 weeks ago  Radiology: ordered and independent interpretation performed.    Risk  Prescription drug management.  Decision regarding hospitalization.                    Clinical Impression     Disposition   ED Disposition Condition    Admit             Diagnosis    ICD-10-CM ICD-9-CM   1. Acute encephalopathy  G93.40 348.30   2. Hyponatremia  E87.1 276.1   3. QT prolongation  R94.31 794.31           Russ Reagan MD        06/15/2024          DISCLAIMER: This note was prepared with [employer] voice recognition transcription software. Garbled syntax, mangled pronouns, and other bizarre constructions may be attributed to that software system.       Russ Reagan MD  06/16/24 0515

## 2024-06-15 NOTE — PLAN OF CARE
Problem: Fall Injury Risk  Goal: Absence of Fall and Fall-Related Injury  Reactivated     Problem: Adult Inpatient Plan of Care  Goal: Plan of Care Review  Reactivated  Goal: Patient-Specific Goal (Individualized)  Reactivated  Goal: Absence of Hospital-Acquired Illness or Injury  Reactivated  Goal: Optimal Comfort and Wellbeing  Reactivated  Goal: Readiness for Transition of Care  Reactivated     Problem: Restraint, Violent or Self-Destructive  Goal: Absence of Harm or Injury  Reactivated     Problem: Diabetes Comorbidity  Goal: Blood Glucose Level Within Targeted Range  Reactivated     Problem: Electrolyte Imbalance  Goal: Electrolyte Balance  Reactivated

## 2024-06-15 NOTE — Clinical Note
Diagnosis: Hyponatremia [198519]   Future Attending Provider: YASMANI HANDY [[id number]]   Reason for IP Medical Treatment  (Clinical interventions that can only be accomplished in the IP setting? ) :: hyponatremia   I certify that Inpatient services for greater than or equal to 2 midnights are medically necessary:: Yes   Plans for Post-Acute care--if anticipated (pick the single best option):: A. No post acute care anticipated at this time   Special Needs:: Disoriented [14]   Special Needs:: Fall Risk [15]

## 2024-06-15 NOTE — HPI
63-year-old male with PMH of type 2 diabetes mellitus, CKD, hypertension brought to the ER for altered mental status.  History from the patient is limited due to AMS.  History obtained from patient's wife at bedside.  Reports patient has been having issues with hiccups, nausea/vomiting, poor p.o. intake for the past 2-3 weeks.  He was seen by primary care provider about 2 weeks ago when he was prescribed Thorazine for hiccups.  Wife states he had 30 pills in the bottle which he finished in about a week and was seen at urgent care where he received a refill with another 20 pills which he finished. He was doing okay until yesterday evening. In the middle of the night, he started screaming and she noticed tonic clonic movements and frothing from his mouth. Since then, he has been agitated and not acting himself. 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     Found to have sodium of 113 in the ED. Received 1 L IV fluid normal saline bolus, repeat sodium on 115.  Also reported to have an episode of dark vomitus in the ER that was occult blood positive.  Received IV PPI 80 mg.  Admitted to ICU for further care.

## 2024-06-15 NOTE — ED NOTES
Received into care a 64 y/o male BIB EMS with seizure activity.  Wife at bedside, stating yesterday he was normal acting and speaking coherently.  He had  seizure activity during the night when they called 911.  Patient agitated, confused, non-coherent vocalization, writhing around in bed.  Soft non-violent restraints applied to all four extremities for patient safety.  Patient cleaned up in bed, coffee colored emesis noted on oneal shirt and dark tarry stool noted also.

## 2024-06-16 LAB
ALBUMIN SERPL BCP-MCNC: 2.5 G/DL (ref 3.5–5.2)
ALBUMIN SERPL BCP-MCNC: 2.6 G/DL (ref 3.5–5.2)
ALBUMIN SERPL BCP-MCNC: 2.6 G/DL (ref 3.5–5.2)
ALBUMIN SERPL BCP-MCNC: 2.7 G/DL (ref 3.5–5.2)
ALBUMIN SERPL BCP-MCNC: 2.8 G/DL (ref 3.5–5.2)
ALP SERPL-CCNC: 45 U/L (ref 55–135)
ALT SERPL W/O P-5'-P-CCNC: 13 U/L (ref 10–44)
ANION GAP SERPL CALC-SCNC: 10 MMOL/L (ref 8–16)
ANION GAP SERPL CALC-SCNC: 11 MMOL/L (ref 8–16)
ANION GAP SERPL CALC-SCNC: 8 MMOL/L (ref 8–16)
ANION GAP SERPL CALC-SCNC: 9 MMOL/L (ref 8–16)
AST SERPL-CCNC: 23 U/L (ref 10–40)
BASOPHILS # BLD AUTO: 0.01 K/UL (ref 0–0.2)
BASOPHILS NFR BLD: 0.1 % (ref 0–1.9)
BILIRUB SERPL-MCNC: 0.4 MG/DL (ref 0.1–1)
BUN SERPL-MCNC: 10 MG/DL (ref 8–23)
BUN SERPL-MCNC: 12 MG/DL (ref 8–23)
BUN SERPL-MCNC: 12 MG/DL (ref 8–23)
BUN SERPL-MCNC: 14 MG/DL (ref 8–23)
BUN SERPL-MCNC: 14 MG/DL (ref 8–23)
BUN SERPL-MCNC: 15 MG/DL (ref 8–23)
BUN SERPL-MCNC: 15 MG/DL (ref 8–23)
BUN SERPL-MCNC: 17 MG/DL (ref 8–23)
BUN SERPL-MCNC: 17 MG/DL (ref 8–23)
BUN SERPL-MCNC: 18 MG/DL (ref 8–23)
BUN SERPL-MCNC: 8 MG/DL (ref 8–23)
BUN SERPL-MCNC: 9 MG/DL (ref 8–23)
CALCIUM SERPL-MCNC: 8.1 MG/DL (ref 8.7–10.5)
CALCIUM SERPL-MCNC: 8.2 MG/DL (ref 8.7–10.5)
CALCIUM SERPL-MCNC: 8.3 MG/DL (ref 8.7–10.5)
CALCIUM SERPL-MCNC: 8.4 MG/DL (ref 8.7–10.5)
CALCIUM SERPL-MCNC: 8.5 MG/DL (ref 8.7–10.5)
CALCIUM SERPL-MCNC: 8.6 MG/DL (ref 8.7–10.5)
CALCIUM SERPL-MCNC: 8.6 MG/DL (ref 8.7–10.5)
CHLORIDE SERPL-SCNC: 93 MMOL/L (ref 95–110)
CHLORIDE SERPL-SCNC: 96 MMOL/L (ref 95–110)
CHLORIDE SERPL-SCNC: 97 MMOL/L (ref 95–110)
CHLORIDE SERPL-SCNC: 98 MMOL/L (ref 95–110)
CK SERPL-CCNC: 444 U/L (ref 20–200)
CO2 SERPL-SCNC: 20 MMOL/L (ref 23–29)
CO2 SERPL-SCNC: 21 MMOL/L (ref 23–29)
CO2 SERPL-SCNC: 22 MMOL/L (ref 23–29)
CO2 SERPL-SCNC: 23 MMOL/L (ref 23–29)
CO2 SERPL-SCNC: 23 MMOL/L (ref 23–29)
CO2 SERPL-SCNC: 24 MMOL/L (ref 23–29)
CO2 SERPL-SCNC: 24 MMOL/L (ref 23–29)
CREAT SERPL-MCNC: 1.3 MG/DL (ref 0.5–1.4)
CREAT SERPL-MCNC: 1.4 MG/DL (ref 0.5–1.4)
CREAT SERPL-MCNC: 1.5 MG/DL (ref 0.5–1.4)
CREAT SERPL-MCNC: 1.6 MG/DL (ref 0.5–1.4)
CREAT SERPL-MCNC: 1.6 MG/DL (ref 0.5–1.4)
CREAT SERPL-MCNC: 1.7 MG/DL (ref 0.5–1.4)
CREAT SERPL-MCNC: 1.8 MG/DL (ref 0.5–1.4)
DIFFERENTIAL METHOD BLD: ABNORMAL
EOSINOPHIL # BLD AUTO: 0 K/UL (ref 0–0.5)
EOSINOPHIL NFR BLD: 0.1 % (ref 0–8)
ERYTHROCYTE [DISTWIDTH] IN BLOOD BY AUTOMATED COUNT: 13.6 % (ref 11.5–14.5)
EST. GFR  (NO RACE VARIABLE): 42 ML/MIN/1.73 M^2
EST. GFR  (NO RACE VARIABLE): 44 ML/MIN/1.73 M^2
EST. GFR  (NO RACE VARIABLE): 48 ML/MIN/1.73 M^2
EST. GFR  (NO RACE VARIABLE): 48 ML/MIN/1.73 M^2
EST. GFR  (NO RACE VARIABLE): 52 ML/MIN/1.73 M^2
EST. GFR  (NO RACE VARIABLE): 56 ML/MIN/1.73 M^2
EST. GFR  (NO RACE VARIABLE): >60 ML/MIN/1.73 M^2
GLUCOSE SERPL-MCNC: 100 MG/DL (ref 70–110)
GLUCOSE SERPL-MCNC: 110 MG/DL (ref 70–110)
GLUCOSE SERPL-MCNC: 120 MG/DL (ref 70–110)
GLUCOSE SERPL-MCNC: 124 MG/DL (ref 70–110)
GLUCOSE SERPL-MCNC: 125 MG/DL (ref 70–110)
GLUCOSE SERPL-MCNC: 135 MG/DL (ref 70–110)
GLUCOSE SERPL-MCNC: 141 MG/DL (ref 70–110)
GLUCOSE SERPL-MCNC: 148 MG/DL (ref 70–110)
GLUCOSE SERPL-MCNC: 151 MG/DL (ref 70–110)
GLUCOSE SERPL-MCNC: 167 MG/DL (ref 70–110)
GLUCOSE SERPL-MCNC: 76 MG/DL (ref 70–110)
GLUCOSE SERPL-MCNC: 86 MG/DL (ref 70–110)
HCT VFR BLD AUTO: 25 % (ref 40–54)
HGB BLD-MCNC: 8.8 G/DL (ref 14–18)
IMM GRANULOCYTES # BLD AUTO: 0.05 K/UL (ref 0–0.04)
IMM GRANULOCYTES NFR BLD AUTO: 0.6 % (ref 0–0.5)
LYMPHOCYTES # BLD AUTO: 1.1 K/UL (ref 1–4.8)
LYMPHOCYTES NFR BLD: 13.1 % (ref 18–48)
MAGNESIUM SERPL-MCNC: 2.4 MG/DL (ref 1.6–2.6)
MCH RBC QN AUTO: 28.6 PG (ref 27–31)
MCHC RBC AUTO-ENTMCNC: 35.2 G/DL (ref 32–36)
MCV RBC AUTO: 81 FL (ref 82–98)
MONOCYTES # BLD AUTO: 1.1 K/UL (ref 0.3–1)
MONOCYTES NFR BLD: 13.8 % (ref 4–15)
NEUTROPHILS # BLD AUTO: 6 K/UL (ref 1.8–7.7)
NEUTROPHILS NFR BLD: 72.3 % (ref 38–73)
NRBC BLD-RTO: 0 /100 WBC
PHOSPHATE SERPL-MCNC: 1.3 MG/DL (ref 2.7–4.5)
PHOSPHATE SERPL-MCNC: 1.4 MG/DL (ref 2.7–4.5)
PHOSPHATE SERPL-MCNC: 1.6 MG/DL (ref 2.7–4.5)
PHOSPHATE SERPL-MCNC: 1.7 MG/DL (ref 2.7–4.5)
PHOSPHATE SERPL-MCNC: 1.8 MG/DL (ref 2.7–4.5)
PHOSPHATE SERPL-MCNC: 2.7 MG/DL (ref 2.7–4.5)
PHOSPHATE SERPL-MCNC: 2.8 MG/DL (ref 2.7–4.5)
PHOSPHATE SERPL-MCNC: 3 MG/DL (ref 2.7–4.5)
PHOSPHATE SERPL-MCNC: 3.2 MG/DL (ref 2.7–4.5)
PHOSPHATE SERPL-MCNC: 3.4 MG/DL (ref 2.7–4.5)
PHOSPHATE SERPL-MCNC: 3.5 MG/DL (ref 2.7–4.5)
PLATELET # BLD AUTO: 318 K/UL (ref 150–450)
PMV BLD AUTO: 9 FL (ref 9.2–12.9)
POCT GLUCOSE: 140 MG/DL (ref 70–110)
POCT GLUCOSE: 78 MG/DL (ref 70–110)
POTASSIUM SERPL-SCNC: 3.2 MMOL/L (ref 3.5–5.1)
POTASSIUM SERPL-SCNC: 3.4 MMOL/L (ref 3.5–5.1)
POTASSIUM SERPL-SCNC: 3.5 MMOL/L (ref 3.5–5.1)
POTASSIUM SERPL-SCNC: 3.7 MMOL/L (ref 3.5–5.1)
POTASSIUM SERPL-SCNC: 3.7 MMOL/L (ref 3.5–5.1)
POTASSIUM SERPL-SCNC: 3.9 MMOL/L (ref 3.5–5.1)
POTASSIUM SERPL-SCNC: 4.2 MMOL/L (ref 3.5–5.1)
PROT SERPL-MCNC: 4.8 G/DL (ref 6–8.4)
RBC # BLD AUTO: 3.08 M/UL (ref 4.6–6.2)
SODIUM SERPL-SCNC: 124 MMOL/L (ref 136–145)
SODIUM SERPL-SCNC: 124 MMOL/L (ref 136–145)
SODIUM SERPL-SCNC: 125 MMOL/L (ref 136–145)
SODIUM SERPL-SCNC: 126 MMOL/L (ref 136–145)
SODIUM SERPL-SCNC: 127 MMOL/L (ref 136–145)
SODIUM SERPL-SCNC: 127 MMOL/L (ref 136–145)
SODIUM SERPL-SCNC: 128 MMOL/L (ref 136–145)
SODIUM SERPL-SCNC: 129 MMOL/L (ref 136–145)
SODIUM SERPL-SCNC: 129 MMOL/L (ref 136–145)
SODIUM SERPL-SCNC: 130 MMOL/L (ref 136–145)
SODIUM SERPL-SCNC: 130 MMOL/L (ref 136–145)
SODIUM SERPL-SCNC: 131 MMOL/L (ref 136–145)
WBC # BLD AUTO: 8.29 K/UL (ref 3.9–12.7)

## 2024-06-16 PROCEDURE — 25000003 PHARM REV CODE 250: Performed by: STUDENT IN AN ORGANIZED HEALTH CARE EDUCATION/TRAINING PROGRAM

## 2024-06-16 PROCEDURE — 63600175 PHARM REV CODE 636 W HCPCS: Performed by: STUDENT IN AN ORGANIZED HEALTH CARE EDUCATION/TRAINING PROGRAM

## 2024-06-16 PROCEDURE — 94761 N-INVAS EAR/PLS OXIMETRY MLT: CPT

## 2024-06-16 PROCEDURE — C9113 INJ PANTOPRAZOLE SODIUM, VIA: HCPCS | Performed by: REGISTERED NURSE

## 2024-06-16 PROCEDURE — 99900035 HC TECH TIME PER 15 MIN (STAT)

## 2024-06-16 PROCEDURE — 63600175 PHARM REV CODE 636 W HCPCS: Performed by: REGISTERED NURSE

## 2024-06-16 PROCEDURE — 63600175 PHARM REV CODE 636 W HCPCS: Mod: JZ,JG | Performed by: STUDENT IN AN ORGANIZED HEALTH CARE EDUCATION/TRAINING PROGRAM

## 2024-06-16 PROCEDURE — 83735 ASSAY OF MAGNESIUM: CPT | Performed by: STUDENT IN AN ORGANIZED HEALTH CARE EDUCATION/TRAINING PROGRAM

## 2024-06-16 PROCEDURE — 80069 RENAL FUNCTION PANEL: CPT | Mod: 91 | Performed by: STUDENT IN AN ORGANIZED HEALTH CARE EDUCATION/TRAINING PROGRAM

## 2024-06-16 PROCEDURE — 93005 ELECTROCARDIOGRAM TRACING: CPT

## 2024-06-16 PROCEDURE — 85025 COMPLETE CBC W/AUTO DIFF WBC: CPT | Performed by: REGISTERED NURSE

## 2024-06-16 PROCEDURE — 82550 ASSAY OF CK (CPK): CPT | Performed by: STUDENT IN AN ORGANIZED HEALTH CARE EDUCATION/TRAINING PROGRAM

## 2024-06-16 PROCEDURE — 20000000 HC ICU ROOM

## 2024-06-16 PROCEDURE — 93010 ELECTROCARDIOGRAM REPORT: CPT | Mod: ,,, | Performed by: INTERNAL MEDICINE

## 2024-06-16 PROCEDURE — 80053 COMPREHEN METABOLIC PANEL: CPT | Performed by: STUDENT IN AN ORGANIZED HEALTH CARE EDUCATION/TRAINING PROGRAM

## 2024-06-16 PROCEDURE — 36415 COLL VENOUS BLD VENIPUNCTURE: CPT | Mod: XB | Performed by: STUDENT IN AN ORGANIZED HEALTH CARE EDUCATION/TRAINING PROGRAM

## 2024-06-16 RX ORDER — HEPARIN SODIUM 5000 [USP'U]/ML
5000 INJECTION, SOLUTION INTRAVENOUS; SUBCUTANEOUS EVERY 8 HOURS
Status: DISCONTINUED | OUTPATIENT
Start: 2024-06-16 | End: 2024-06-17

## 2024-06-16 RX ORDER — DESMOPRESSIN ACETATE 4 UG/ML
2 INJECTION, SOLUTION INTRAVENOUS; SUBCUTANEOUS ONCE
Status: COMPLETED | OUTPATIENT
Start: 2024-06-16 | End: 2024-06-16

## 2024-06-16 RX ORDER — DEXTROSE MONOHYDRATE 50 MG/ML
INJECTION, SOLUTION INTRAVENOUS CONTINUOUS
Status: DISCONTINUED | OUTPATIENT
Start: 2024-06-16 | End: 2024-06-17

## 2024-06-16 RX ORDER — METOCLOPRAMIDE HYDROCHLORIDE 5 MG/ML
5 INJECTION INTRAMUSCULAR; INTRAVENOUS ONCE
Status: DISCONTINUED | OUTPATIENT
Start: 2024-06-16 | End: 2024-06-16

## 2024-06-16 RX ORDER — DESMOPRESSIN ACETATE 4 UG/ML
1 INJECTION, SOLUTION INTRAVENOUS; SUBCUTANEOUS ONCE
Status: COMPLETED | OUTPATIENT
Start: 2024-06-16 | End: 2024-06-16

## 2024-06-16 RX ADMIN — MUPIROCIN: 20 OINTMENT TOPICAL at 08:06

## 2024-06-16 RX ADMIN — DEXTROSE MONOHYDRATE: 50 INJECTION, SOLUTION INTRAVENOUS at 12:06

## 2024-06-16 RX ADMIN — DEXTROSE MONOHYDRATE: 50 INJECTION, SOLUTION INTRAVENOUS at 11:06

## 2024-06-16 RX ADMIN — DEXTROSE MONOHYDRATE: 50 INJECTION, SOLUTION INTRAVENOUS at 05:06

## 2024-06-16 RX ADMIN — MUPIROCIN: 20 OINTMENT TOPICAL at 10:06

## 2024-06-16 RX ADMIN — DESMOPRESSIN ACETATE 2 MCG: 4 SOLUTION INTRAVENOUS at 03:06

## 2024-06-16 RX ADMIN — PANTOPRAZOLE SODIUM 40 MG: 40 INJECTION, POWDER, LYOPHILIZED, FOR SOLUTION INTRAVENOUS at 10:06

## 2024-06-16 RX ADMIN — DESMOPRESSIN ACETATE 1 MCG: 4 SOLUTION INTRAVENOUS at 09:06

## 2024-06-16 RX ADMIN — POTASSIUM PHOSPHATE, MONOBASIC POTASSIUM PHOSPHATE, DIBASIC 30 MMOL: 224; 236 INJECTION, SOLUTION, CONCENTRATE INTRAVENOUS at 10:06

## 2024-06-16 RX ADMIN — HEPARIN SODIUM 5000 UNITS: 5000 INJECTION INTRAVENOUS; SUBCUTANEOUS at 02:06

## 2024-06-16 RX ADMIN — PANTOPRAZOLE SODIUM 40 MG: 40 INJECTION, POWDER, LYOPHILIZED, FOR SOLUTION INTRAVENOUS at 08:06

## 2024-06-16 RX ADMIN — DEXTROSE MONOHYDRATE: 50 INJECTION, SOLUTION INTRAVENOUS at 10:06

## 2024-06-16 RX ADMIN — DEXTROSE MONOHYDRATE: 50 INJECTION, SOLUTION INTRAVENOUS at 04:06

## 2024-06-16 RX ADMIN — DEXTROSE MONOHYDRATE 250 ML: 50 INJECTION, SOLUTION INTRAVENOUS at 11:06

## 2024-06-16 RX ADMIN — HEPARIN SODIUM 5000 UNITS: 5000 INJECTION INTRAVENOUS; SUBCUTANEOUS at 10:06

## 2024-06-16 RX ADMIN — DEXTROSE 250 ML: 10 SOLUTION INTRAVENOUS at 12:06

## 2024-06-16 NOTE — ASSESSMENT & PLAN NOTE
Patient has hyponatremia which is uncontrolled,We will aim to correct the sodium by 4-6mEq in 24 hours. We will monitor sodium  every 2 hrs . The hyponatremia is due to SIADH secondary to medications induced from antipsychotic (thorazine). We will obtain the following studies: Urine sodium, urine osmolality, serum osmolality or TSH, T4. We will treat the hyponatremia with IV fluids as follows: s/p 100 ml of hypertonic saline, Fluid restriction of:  1.5 liter per day, and Removal of offending medications. The patient's sodium results have been reviewed and are listed below.  Recent Labs   Lab 06/16/24  1453   *       Nephrology consult for acute severe symptomatic hyponatremia   On D5W, PRN desmopressin to avoid rapid overcorrection of Na

## 2024-06-16 NOTE — PROGRESS NOTES
[facility] Medicine  Progress Note    Patient Name: Angel Bell  MRN: [id number]  Patient Class: IP- Inpatient   Admission Date: 6/15/2024  Length of Stay: 1 days  Attending Physician: Adrienne Anaya MD  Primary Care Provider: Tiago Ruiz MD        Subjective:     Principal Problem:Hyponatremia      HPI:  63-year-old male with PMH of type 2 diabetes mellitus, CKD, hypertension brought to the ER for altered mental status.  History from the patient is limited due to AMS.  History obtained from patient's wife at bedside.  Reports patient has been having issues with hiccups, nausea/vomiting, poor p.o. intake for the past 2-3 weeks.  He was seen by primary care provider about 2 weeks ago when he was prescribed Thorazine for hiccups.  Wife states he had 30 pills in the bottle which he finished in about a week and was seen at urgent care where he received a refill with another 20 pills which he finished. He was doing okay until yesterday evening. In the middle of the night, he started screaming and she noticed tonic clonic movements and frothing from his mouth. Since then, he has been agitated and not acting himself. 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     Found to have sodium of 113 in the ED. Received 1 L IV fluid normal saline bolus, repeat sodium on 115.  Also reported to have an episode of dark vomitus in the ER that was occult blood positive.  Received IV PPI 80 mg.  Admitted to ICU for further care.    Overview/Hospital Course:  No notes on file    Interval History: calmer, answering simple questions. On D5 & received desmopressin to avoid rapid overcorrection of sodium.     Review of Systems  Objective:     Vital Signs (Most Recent):  Temp: 98.6 °F (37 °C) (06/16/24 1200)  Pulse: 88 (06/16/24 1730)  Resp: (!) 26 (06/16/24 1730)  BP: (!) 168/78 (06/16/24 1730)  SpO2: 100 % (06/16/24 1730) Vital Signs (24h Range):  Temp:  [97 °F (36.1 °C)-99.8 °F  (37.7 °C)] 98.6 °F (37 °C)  Pulse:  [64-96] 88  Resp:  [11-31] 26  SpO2:  [86 %-100 %] 100 %  BP: ()/(46-78) 168/78     Weight: 72 kg (158 lb 11.7 oz)  Body mass index is 24.86 kg/m².    Intake/Output Summary (Last 24 hours) at 6/16/2024 1738  Last data filed at 6/16/2024 1700  Gross per 24 hour   Intake 4915.84 ml   Output 4850 ml   Net 65.84 ml         Physical Exam  Vitals and nursing note reviewed.   Constitutional:       Appearance: He is well-developed.   Cardiovascular:      Rate and Rhythm: Normal rate and regular rhythm.   Pulmonary:      Effort: Pulmonary effort is normal. No respiratory distress.   Musculoskeletal:      Right lower leg: No edema.      Left lower leg: No edema.   Skin:     General: Skin is warm and dry.   Neurological:      Mental Status: He is alert.      Comments: calmer, answering simple questions  No rigidity appreciated             Significant Labs: All pertinent labs within the past 24 hours have been reviewed.    Significant Imaging: I have reviewed all pertinent imaging results/findings within the past 24 hours.    Assessment/Plan:      * Hyponatremia  Patient has hyponatremia which is uncontrolled,We will aim to correct the sodium by 4-6mEq in 24 hours. We will monitor sodium  every 2 hrs . The hyponatremia is due to SIADH secondary to medications induced from antipsychotic (thorazine). We will obtain the following studies: Urine sodium, urine osmolality, serum osmolality or TSH, T4. We will treat the hyponatremia with IV fluids as follows: s/p 100 ml of hypertonic saline, Fluid restriction of:  1.5 liter per day, and Removal of offending medications. The patient's sodium results have been reviewed and are listed below.  Recent Labs   Lab 06/16/24  1453   *       Nephrology consult for acute severe symptomatic hyponatremia   On D5W, PRN desmopressin to avoid rapid overcorrection of Na    CKD (chronic kidney disease)  Creatine stable for now. BMP reviewed- noted  Estimated Creatinine Clearance: 36.7 mL/min (A) (based on SCr of 1.9 mg/dL (H)). according to latest data. Based on current GFR, CKD stage is stage 3 - GFR 30-59.  Monitor UOP and serial BMP and adjust therapy as needed. Renally dose meds. Avoid nephrotoxic medications and procedures.        Acute encephalopathy  Suspect in setting of hyponatremia, acute EPS  CT head showed no acute abn    Off precedex      Nausea & vomiting  Wife reports hiccups, N/V X 2-3 weeks   Reported to have dark vomitus in the ER  Hb has down trended from 14 in 1/2024 to 9.9    Possible PUD vs gastritis vs other    Continue IV PPI BID  GI consult to consider EGD once patient stabilizes       Fever  Isolated fever spike  Low clinical suspicion for infection  UA negative   External cooling, antipyretics, supportive care           Extrapyramidal syndrome  Suspect acute EPS from antidopaminergic action of thorazine   Anticholinergics, benzo PRN     Gradually improving         Type 2 diabetes mellitus with hyperglycemia, with long-term current use of insulin  Hold insulin for now until patient can take PO      Hypertension  Chronic, uncontrolled. Latest blood pressure and vitals reviewed-     Temp:  [97 °F (36.1 °C)-99.8 °F (37.7 °C)]   Pulse:  [64-96]   Resp:  [11-31]   BP: ()/(46-78)   SpO2:  [86 %-100 %] .   Home meds for hypertension were reviewed and noted below.   Hypertension Medications               finerenone (KERENDIA) 10 mg Tab Take 1 tablet by mouth once daily.    furosemide (LASIX) 40 MG tablet Take 1 tablet (40 mg total) by mouth daily as needed (swelling).    losartan (COZAAR) 50 MG tablet Take 1 tablet (50 mg total) by mouth once daily.    metoprolol succinate (TOPROL-XL) 200 MG 24 hr tablet Take 1 tablet (200 mg total) by mouth once daily.    NIFEdipine (PROCARDIA-XL) 90 MG (OSM) 24 hr tablet Take 1 tablet (90 mg total) by mouth once daily.            While in the hospital, will manage blood pressure as follows; Adjust  home antihypertensive regimen as follows- holding PO meds as pt is NPO, resume when able to take PO    Will utilize p.r.n. blood pressure medication only if patient's blood pressure greater than 180/110 and he develops symptoms such as worsening chest pain or shortness of breath.      VTE Risk Mitigation (From admission, onward)           Ordered     heparin (porcine) injection 5,000 Units  Every 8 hours         06/16/24 1127     Place sequential compression device  Until discontinued         06/15/24 0652     IP VTE LOW RISK PATIENT  Once         06/15/24 0652                    Discharge Planning   SAVANAH:      Code Status: Full Code   Is the patient medically ready for discharge?:     Reason for patient still in hospital (select all that apply): Patient trending condition, Treatment, and Consult recommendations               Critical care time spent on the evaluation and treatment of severe organ dysfunction, review of pertinent labs and imaging studies, discussions with consulting providers and discussions with patient/family: 38 minutes.      Adrienne Anaya MD  Department of Hospital Medicine   [facility] - Intensive Care

## 2024-06-16 NOTE — PROGRESS NOTES
"[facility] Pulmonary & Critical Care Medicine Note    Primary Attending Physician: Adrienne Anaya MD  ICU Attending: Willis Santos MD  ICU Fellow: Yvette Chadwick MD      Subjective:      History of Present Illness:  Angel Bell is a 63 y.o. male with a past medical history of hypertension, hyperlipidemia, T2DM on insulin, CKD 3a, and migraines, who presented on 6/15/2024 for acute encephalopathy for several hours.     The patient was in his usual state of health until a few weeks prior to presentation, when he developed persistent nausea and vomiting, which he attributed to increased trulicity dosing. The day prior to admission, patient presented to his outpatient primary care physician endorsing hiccups, prescribed chlorpromazine. Patient's wife reports that last night she heard the patient yell from another room, when she went to check on him he was not responding appropriately and was concerned that he was having a seizure so she called EMS. On arrival to the ED patient was reportedly combative and not redirectable, so he was placed in soft restraints. He was found to be hyponatremic at 113, glucose 131. Patient was admitted to the ICU for further management.     Interval History:  Today the patient was evaluated at bedside. Earlier this morning requiring precedex for agitation, however later precedex was discontinued as patient was calm and cooperative. Restraints removed, denies pain or discomfort.     Review of Systems:  All other systems are reviewed and are negative, except as stated in above HPI.      Objective:   Last 24 Hour Vital Signs:  BP  Min: 79/48  Max: 154/66  Temp  Av.4 °F (37.4 °C)  Min: 97 °F (36.1 °C)  Max: 101.2 °F (38.4 °C)  Pulse  Av.5  Min: 64  Max: 127  Resp  Av.7  Min: 11  Max: 50  SpO2  Av %  Min: 86 %  Max: 100 %  Height  Av' 7" (170.2 cm)  Min: 5' 7" (170.2 cm)  Max: 5' 7" (170.2 cm)  Weight  Av.8 kg (160 lb 6.2 oz)  Min: 72 kg (158 lb 11.7 oz)  Max: " 73.5 kg (162 lb 0.6 oz)  I/O last 3 completed shifts:  In: 4578.5 [I.V.:3579.5; IV Piggyback:999]  Out: 6585 [Urine:6585]    Physical Examination:  Gen: Resting comfortably in NAD  HEENT: No trauma noted to head, EOMI, PERRL  Cardio: Regular rate, no murmurs appreciated  Pulm: CTAB, no wheezing appreciated, no increased WOB, satting appropriately on RA  Abdom: Normal bowel sounds, non-tender, non-distended  MSK: Well-perfused and warm, radial pulses intact, no BLE edema  Derm: No open lesions/wounds, dry  Neuro: Moves extremities spontaneously, unable to assess further 2/2 AMS    Laboratory:  Trended Lab Data:  Recent Labs     06/15/24  0508 06/15/24  0746 06/15/24  2243 06/16/24  0228 06/16/24  0541   WBC 12.25  --   --  8.29  --    HGB 9.9*  --   --  8.8*  --    HCT 27.9*  --   --  25.0*  --      --   --  318  --    *   < > 123* 124*  124*  125* 129*   K 3.8   < > 3.4* 3.9  3.9  3.9 3.9   CL 79*   < > 92* 93*  93*  93* 96   CO2 18*   < > 19* 21*  21*  22* 23   BUN 28*   < > 19 18  17  17 15   CREATININE 1.9*   < > 1.8* 1.8*  1.8*  1.8* 1.7*   *   < > 217* 124*  125*  135* 148*   BILITOT 0.5  --   --  0.4  --    AST 20  --   --  23  --    ALT 12  --   --  13  --    ALKPHOS 48*  --   --  45*  --    CALCIUM 7.9*   < > 7.5* 8.2*  8.1*  8.2* 8.2*   ALBUMIN 3.0*   < > 2.3* 2.6*  2.5*  2.6* 2.5*   PROT 5.1*  --   --  4.8*  --    MG  --   --   --  2.4  --    PHOS  --    < > 3.5 3.5  3.4 3.2    < > = values in this interval not displayed.       Cardiac:   Recent Labs   Lab 06/15/24  0508   TROPONINI <0.006       Urinalysis:   Lab Results   Component Value Date    LABURIN  08/16/2022     Multiple organisms isolated. None in predominance.  Repeat if    LABURIN clinically necessary. 08/16/2022    COLORU Straw 06/15/2024    CLARITYU Clear 05/04/2023    SPECGRAV 1.005 06/15/2024    NITRITE Negative 06/15/2024    KETONESU Negative 06/15/2024    UROBILINOGEN Negative 06/15/2024        Microbiology:  Microbiology Results (last 7 days)       ** No results found for the last 168 hours. **            Radiology:  CT Head Without Contrast   Final Result      Motion compromised study.      1. No definite acute internal hemorrhage, mass effect, or midline shift.   2. No definite acute transcortical infarct by noncontrast CT.   3. Nonspecific partially empty sella configuration questionable flattening of the posterior globes.  Findings likely present on remote CT performed 07/03/2016, however may be seen the setting of idiopathic intracranial hypertension.  Clinical correlation recommended in this regard.         Electronically signed by: Garret Wayne   Date:    06/15/2024   Time:    06:28        I have personally reviewed the above labs and imaging.    Current Medications:     Infusions:   dexmedeTOMIDine (Precedex) infusion (titrating)  0-1.4 mcg/kg/hr (Order-Specific) Intravenous Continuous 5.51 mL/hr at 06/16/24 0701 0.3 mcg/kg/hr at 06/16/24 0701    dextrose 5 % (D5W)   Intravenous Continuous 250 mL/hr at 06/16/24 0701 Rate Verify at 06/16/24 0701        Scheduled:   desmopressin (DDAVP) 7.352 mcg in sodium chloride 0.9% 50 mL IVPB  0.1 mcg/kg (Dosing Weight) Intravenous Once    mupirocin   Nasal BID    pantoprazole  40 mg Intravenous BID        PRN:    Current Facility-Administered Medications:     acetaminophen, 650 mg, Rectal, Q6H PRN    midazolam, 0.5 mg, Intravenous, Q4H PRN    sodium chloride 0.9%, 10 mL, Intravenous, PRN     Assessment:     Angel Bell is a 63 y.o. male with a past medical history of hypertension, hyperlipidemia, T2DM on insulin, CKD 3a, and migraines, who presented on 6/15/2024 for acute encephalopathy for several hours. On arrival to the ED patient was reportedly combative and not redirectable, so he was placed in soft restraints. He was found to be hyponatremic at 113, glucose 131. Patient was admitted to the ICU for further management. On exam patient appears  euvolemic, urine Na 26, hyponatremia likely 2/2 SIADH, initiated 3% hypertonic saline. Precedex gtt with PRN IM versed started for agitation.      Plan:     Neuro:  Acute Encephalopathy - improved   - Likely 2/2 severe hyponatremia, improved  - Patient presented agitated and not redirectable, continue precedex gtt with PRN IM versed  - Patient was recently prescribed chlorpromazine, concern that he could be exhibiting extrapyramidal symptoms, however IV benadryl 50mg did not improve symptoms    - This morning patient is calm and cooperative, precedex gtt discontinued, retsraints removed     Cardiovascular/Hemodynamics:  Hypertension  - On admission /70  - Home meds: Losartan 50mg daily and Nifedipine 90mg daily  - Patient unable to take PO meds at this time 2/2 acute encephalopathy   - Will resume home meds if patient becomes hypertensive pending SLP eval     Respiratory:   No active issues    GI/FEN:  F: D5W @250 cc/hr  E: K>4, Mg>2  N: NPO    Euvolemic, Symptomatic Hyponatremia  - Patient presented with acute encephalopathy, initial Na 113  - Glucose 131, TSH 0.415  - Patient with recent history of persistent vomiting 2/2 trulicity, also takes lasix and SGLT2 daily, however he is euvolemic on exam  - Urine Na 26  - Possibly 2/2 water intoxication based on history provided by family   - S/p 3% hypertonic saline, overnight patient overcorrected to 131  - Gave DDAVP, now on D5W   - Q4H sodium checks    ID:   No active issues    Renal:   CKD 3a  - On admission Cr 1.9, baseline ~1.7  - Renally dose all medication, avoid nephrotoxic agents  - Strict I/Os, daily weights    Heme/Onc:   Normocytic Anemia  - On admission Hgb 9.9, MCV 81  - Baseline Hgb ~14 one year previously   - Patient has h/o COMFORT, etiology unknown, no c/scope on chart review  - Check iron studies, ferritin     Endocrine:  Type 2 Diabetes on insulin   - 5/2024 A1c 5.8  - Patient takes Lantus 5 units nightly outpatient, hold while patient is NPO, BG  on admission 130  - SSI + Accuchecks  - Goal glucose 140-180 while in ICU    TSH: 0.415    Rheum/MSK:  No active issues     Critical Care Daily Checklist:    F: Feeding Goal: Diabetic diet   Status: NPO     AS: Analgesia/Sedation none   T: Thromboembolic Prophylaxis SubQ heparin    H: HOB > 300 Yes   U: Stress Ulcer Prophylaxis (if needed) IV PPI   G: Glucose Control POCT checks    B: Bowel Function Stool Occurrence: none   I: Indwelling Catheter (Lines & Tavera) Necessity PIV x2   D: De-escalation of Antimicrobials/Pharmacotherapies N/A    Plan for the day/ETD D5W, Q4H Na checks     Code Status:  Family/Goals of Care: Full        Jesús Camacho MD  [facility]II    Pt seen and examined with Pulmonary/Critical Care team and this note reviewed and validated with the following additional comments: Improved with hands off, confirming diagnosis of water intoxication. Now oriented.  Restraints off.  Risk of ODS seems low.    The complexity of Medical Decision Making (MDM) was high.  The number of problems addressed was 3.  The risk of complications and/or morbidity/mortality was high.  The tests ordered were serum Na+.  I communicated with the following providers Nephrology.  Time spent in the care of this patient was 30 minutes    Colton Santos MD  Phone [phone]

## 2024-06-16 NOTE — ASSESSMENT & PLAN NOTE
Isolated fever spike  Low clinical suspicion for infection  UA negative   External cooling, antipyretics, supportive care

## 2024-06-16 NOTE — ASSESSMENT & PLAN NOTE
Suspect acute EPS from antidopaminergic action of thorazine   Anticholinergics, benzo PRN     Gradually improving

## 2024-06-16 NOTE — PLAN OF CARE
Problem: Adult Inpatient Plan of Care  Goal: Plan of Care Review  6/16/2024 1717 by Niesha Perez RN  Outcome: Progressing  6/16/2024 1717 by Niesha Perez RN  Outcome: Progressing  Goal: Patient-Specific Goal (Individualized)  6/16/2024 1717 by Niesha Perez RN  Outcome: Progressing  6/16/2024 1717 by Niesha Perez RN  Outcome: Progressing  Goal: Absence of Hospital-Acquired Illness or Injury  6/16/2024 1717 by Niesha Perez RN  Outcome: Progressing  6/16/2024 1717 by Niesha Perez RN  Outcome: Progressing  Goal: Optimal Comfort and Wellbeing  6/16/2024 1717 by Niesha Perez RN  Outcome: Progressing  6/16/2024 1717 by Niesha Perez RN  Outcome: Progressing  Goal: Readiness for Transition of Care  6/16/2024 1717 by Niesha Perez RN  Outcome: Progressing  6/16/2024 1717 by Niesha Perez RN  Outcome: Progressing     Problem: Infection  Goal: Absence of Infection Signs and Symptoms  6/16/2024 1717 by Niesha Perez RN  Outcome: Progressing  6/16/2024 1717 by Niesha Perez RN  Outcome: Progressing     Problem: Restraint, Violent or Self-Destructive  Goal: Absence of Harm or Injury  6/16/2024 1717 by Niesha Perez RN  Outcome: Progressing  6/16/2024 1717 by Niesha Perez RN  Outcome: Progressing     Problem: Diabetes Comorbidity  Goal: Blood Glucose Level Within Targeted Range  6/16/2024 1717 by Niesha Perez RN  Outcome: Progressing  6/16/2024 1717 by Niesha Perez RN  Outcome: Progressing     Problem: Electrolyte Imbalance  Goal: Electrolyte Balance  6/16/2024 1717 by Niesha Perez RN  Outcome: Progressing  6/16/2024 1717 by Niesha Perez RN  Outcome: Progressing

## 2024-06-16 NOTE — PROGRESS NOTES
Nephrology Progress Note       Consult Requested By: Adrienne Anaya MD  Reason for Consult: Hyponatremia      SUBJECTIVE:          Review of Systems   Constitutional:  Negative for chills and fever.   HENT:  Negative for congestion and sore throat.    Eyes:  Negative for blurred vision, double vision and photophobia.   Respiratory:  Negative for cough and shortness of breath.    Cardiovascular:  Negative for chest pain, palpitations and leg swelling.   Gastrointestinal:  Negative for abdominal pain, diarrhea, nausea and vomiting.   Genitourinary:  Negative for dysuria and urgency.   Musculoskeletal:  Negative for joint pain and myalgias.   Skin:  Negative for itching and rash.   Neurological:  Positive for weakness. Negative for dizziness, sensory change and headaches.   Endo/Heme/Allergies:  Negative for polydipsia. Does not bruise/bleed easily.   Psychiatric/Behavioral:  Negative for depression.        Past Medical History:   Diagnosis Date    Diabetes mellitus     Disorder of kidney and ureter     Hyperlipemia     Hypertension     Iron deficiency anemia     Migraine headache     PTSD (post-traumatic stress disorder)     Stage 3a chronic kidney disease      Past Surgical History:   Procedure Laterality Date    LASER ENUCLEATION OF PROSTATE N/A 8/29/2022    Procedure: ENUCLEATION, PROSTATE, USING LASER  Time: 180 minutes Equipment: high powered 100W laser with virtual basket, morcellator, scopes for [facility] from [facility];  Surgeon: Ted Garvin MD;  Location: [facility];  Service: Urology;  Laterality: N/A;  [facility] confirmed  8/26  confirmation # [id number]     Family History   Problem Relation Name Age of Onset    Diabetes Mother       Social History     Tobacco Use    Smoking status: Never    Smokeless tobacco: Never   Substance Use Topics    Alcohol use: No    Drug use: No       Review of patient's allergies indicates:  No Known Allergies         OBJECTIVE:     Vital Signs (Most Recent)  Vitals:    06/16/24  0801 06/16/24 0830 06/16/24 0900 06/16/24 0930   BP: (!) 108/58 (!) 105/57 (!) 110/59 (!) 109/58   BP Location:       Patient Position:       Pulse: 72 68 70 77   Resp: 14 13 13 13   Temp:       TempSrc:       SpO2: 98% 99% 100% 97%   Weight:       Height:             Date 06/16/24 0700 - 06/17/24 0659   Shift 4499-3323 4216-2995 6851-0662 24 Hour Total   INTAKE   I.V.(mL/kg) 401.1(5.6)   401.1(5.6)   Shift Total(mL/kg) 401.1(5.6)   401.1(5.6)   OUTPUT   Urine(mL/kg/hr) 725   725   Shift Total(mL/kg) 725(10.1)   725(10.1)   Weight (kg) 72 72 72 72           Medications:   dextrose 10%  250 mL Intravenous Once    mupirocin   Nasal BID    pantoprazole  40 mg Intravenous BID           Physical Exam  Vitals and nursing note reviewed.   Constitutional:       General: He is not in acute distress.     Appearance: He is not diaphoretic.   HENT:      Head: Normocephalic and atraumatic.      Mouth/Throat:      Pharynx: No oropharyngeal exudate.   Eyes:      General: No scleral icterus.     Conjunctiva/sclera: Conjunctivae normal.      Pupils: Pupils are equal, round, and reactive to light.   Cardiovascular:      Rate and Rhythm: Normal rate and regular rhythm.      Heart sounds: Normal heart sounds. No murmur heard.  Pulmonary:      Effort: Pulmonary effort is normal. No respiratory distress.      Breath sounds: Normal breath sounds.   Abdominal:      General: Bowel sounds are normal. There is no distension.      Palpations: Abdomen is soft.      Tenderness: There is no abdominal tenderness.   Musculoskeletal:         General: Normal range of motion.      Cervical back: Normal range of motion and neck supple.   Skin:     General: Skin is warm and dry.      Findings: No erythema.   Neurological:      Mental Status: He is disoriented.      Cranial Nerves: No cranial nerve deficit.      Comments: Sedated    Psychiatric:         Thought Content: Thought content normal.         Laboratory:  Recent Labs   Lab 06/15/24  0446  06/16/24  0228   WBC 12.25 8.29   HGB 9.9* 8.8*   HCT 27.9* 25.0*    318   MONO 11.7  1.4* 13.8  1.1*   EOSINOPHIL 0.1 0.1     Recent Labs   Lab 06/16/24  0541 06/16/24  0818 06/16/24  1010   * 128* 131*   K 3.9 3.4* 3.7   CL 96 96 98   CO2 23 24 24   BUN 15 15 14   CREATININE 1.7* 1.6* 1.6*   CALCIUM 8.2* 8.3* 8.6*   PHOS 3.2 3.0 2.8       Diagnostic Results:  X-Ray: Reviewed  US: Reviewed  Echo: Reviewed  ASSESSMENT/PLAN:      Hyponatremia - water intoxication in CKD3 settings was drinking a lots of sugar free drinks  and water to help with his hiccups. The  day prior his admission per his wife  had more than 10 L total which most likely caused his Na+ drop    -- 6/15 Sever Symptomatic Hyponatremia - Na+ 113 to 115 after NS 1L bolus,  goal Na + correct 6mEq  STAT due to symptom then 6-8 every Q24hr however corrected to 124 in PM D5 % increased to 250 cc/hr this  DDAVP was ordered at 8AM given dose of DDAVP at 9:43  repeat na+ 131 Give bolus of D10 % 250 cc and resume D5% 250 cc/hr repeat labs now may need to repeat dose of DDAVP with is not ideal due to CKD3 - UOP seems drop as anticipated after DDAVP   -- AMS resolved   -- Labs Q2Hr           CKD3 Follows with Dr Will   -- Cr at baseline 1.7 -1.9   -- Renally dose all meds  -- Please avoid nephrotoxins, including NSAIDs, aminoglycosides, IV contrast (unless absolutely necessary), gadolinium, fleets and other phosphorous-based laxatives. Caution with antibiotics.     HTN  -  controlled    Anemia of chronic kidney disease      Recent Labs   Lab 06/15/24  0508 06/16/24 0228   HGB 9.9* 8.8*   HCT 27.9* 25.0*    318       Iron   Lab Results   Component Value Date    IRON 104 01/25/2024    TIBC 343 01/25/2024    FERRITIN 469 (H) 01/25/2024         MBD  - PTH  slightly up  154   Recent Labs   Lab 06/16/24  1010   CALCIUM 8.6*   PHOS 2.8     Recent Labs   Lab 06/16/24  0228   MG 2.4       Lab Results   Component Value Date    .1  (H) 05/29/2024    CALCIUM 8.6 (L) 06/16/2024    PHOS 2.8 06/16/2024     Lab Results   Component Value Date    FLVYRXTZ64HW 20 (L) 01/25/2024     Acidosis   -- Monitor for now   Lab Results   Component Value Date    CO2 24 06/16/2024      Nutrition/Hypoalbuminemia    Recent Labs   Lab 06/16/24  0818 06/16/24  1010   ALBUMIN 2.5* 2.7*     Nepro with meals TID.  When able     Total critical care time 35 minutes: included management of organ failures related to critical illness, titration of continuous infusions, review of pertinent labs and imaging studies, discussion with primary team and ICU .    Thank you for allowing me to participate in care of your patient  With any question please call   Judson Curiel MD     [facility]  PHONG Asher MD,   MD LENKA Middleton MD E. V. Harmon, NP  [id number]   [place], [id number]  [phone]  After hours answering service: [phone]

## 2024-06-16 NOTE — ASSESSMENT & PLAN NOTE
Chronic, uncontrolled. Latest blood pressure and vitals reviewed-     Temp:  [97 °F (36.1 °C)-99.8 °F (37.7 °C)]   Pulse:  [64-96]   Resp:  [11-31]   BP: ()/(46-78)   SpO2:  [86 %-100 %] .   Home meds for hypertension were reviewed and noted below.   Hypertension Medications               finerenone (KERENDIA) 10 mg Tab Take 1 tablet by mouth once daily.    furosemide (LASIX) 40 MG tablet Take 1 tablet (40 mg total) by mouth daily as needed (swelling).    losartan (COZAAR) 50 MG tablet Take 1 tablet (50 mg total) by mouth once daily.    metoprolol succinate (TOPROL-XL) 200 MG 24 hr tablet Take 1 tablet (200 mg total) by mouth once daily.    NIFEdipine (PROCARDIA-XL) 90 MG (OSM) 24 hr tablet Take 1 tablet (90 mg total) by mouth once daily.            While in the hospital, will manage blood pressure as follows; Adjust home antihypertensive regimen as follows- holding PO meds as pt is NPO, resume when able to take PO    Will utilize p.r.n. blood pressure medication only if patient's blood pressure greater than 180/110 and he develops symptoms such as worsening chest pain or shortness of breath.

## 2024-06-16 NOTE — SUBJECTIVE & OBJECTIVE
Interval History: calmer, answering simple questions. On D5 & received desmopressin to avoid rapid overcorrection of sodium.     Review of Systems  Objective:     Vital Signs (Most Recent):  Temp: 98.6 °F (37 °C) (06/16/24 1200)  Pulse: 88 (06/16/24 1730)  Resp: (!) 26 (06/16/24 1730)  BP: (!) 168/78 (06/16/24 1730)  SpO2: 100 % (06/16/24 1730) Vital Signs (24h Range):  Temp:  [97 °F (36.1 °C)-99.8 °F (37.7 °C)] 98.6 °F (37 °C)  Pulse:  [64-96] 88  Resp:  [11-31] 26  SpO2:  [86 %-100 %] 100 %  BP: ()/(46-78) 168/78     Weight: 72 kg (158 lb 11.7 oz)  Body mass index is 24.86 kg/m².    Intake/Output Summary (Last 24 hours) at 6/16/2024 1738  Last data filed at 6/16/2024 1700  Gross per 24 hour   Intake 4915.84 ml   Output 4850 ml   Net 65.84 ml         Physical Exam  Vitals and nursing note reviewed.   Constitutional:       Appearance: He is well-developed.   Cardiovascular:      Rate and Rhythm: Normal rate and regular rhythm.   Pulmonary:      Effort: Pulmonary effort is normal. No respiratory distress.   Musculoskeletal:      Right lower leg: No edema.      Left lower leg: No edema.   Skin:     General: Skin is warm and dry.   Neurological:      Mental Status: He is alert.      Comments: calmer, answering simple questions  No rigidity appreciated             Significant Labs: All pertinent labs within the past 24 hours have been reviewed.    Significant Imaging: I have reviewed all pertinent imaging results/findings within the past 24 hours.

## 2024-06-17 PROBLEM — R50.9 FEVER: Status: RESOLVED | Noted: 2024-06-15 | Resolved: 2024-06-17

## 2024-06-17 LAB
ALBUMIN SERPL BCP-MCNC: 2.6 G/DL (ref 3.5–5.2)
ALBUMIN SERPL BCP-MCNC: 2.7 G/DL (ref 3.5–5.2)
ALBUMIN SERPL BCP-MCNC: 2.8 G/DL (ref 3.5–5.2)
ALBUMIN SERPL BCP-MCNC: 2.9 G/DL (ref 3.5–5.2)
ANION GAP SERPL CALC-SCNC: 10 MMOL/L (ref 8–16)
ANION GAP SERPL CALC-SCNC: 12 MMOL/L (ref 8–16)
ANION GAP SERPL CALC-SCNC: 13 MMOL/L (ref 8–16)
ANION GAP SERPL CALC-SCNC: 8 MMOL/L (ref 8–16)
BUN SERPL-MCNC: 6 MG/DL (ref 8–23)
BUN SERPL-MCNC: 7 MG/DL (ref 8–23)
BUN SERPL-MCNC: 8 MG/DL (ref 8–23)
BUN SERPL-MCNC: 8 MG/DL (ref 8–23)
CALCIUM SERPL-MCNC: 8.2 MG/DL (ref 8.7–10.5)
CALCIUM SERPL-MCNC: 8.3 MG/DL (ref 8.7–10.5)
CALCIUM SERPL-MCNC: 8.4 MG/DL (ref 8.7–10.5)
CALCIUM SERPL-MCNC: 8.5 MG/DL (ref 8.7–10.5)
CALCIUM SERPL-MCNC: 8.7 MG/DL (ref 8.7–10.5)
CALCIUM SERPL-MCNC: 8.8 MG/DL (ref 8.7–10.5)
CHLORIDE SERPL-SCNC: 92 MMOL/L (ref 95–110)
CHLORIDE SERPL-SCNC: 93 MMOL/L (ref 95–110)
CHLORIDE SERPL-SCNC: 94 MMOL/L (ref 95–110)
CHLORIDE SERPL-SCNC: 95 MMOL/L (ref 95–110)
CO2 SERPL-SCNC: 19 MMOL/L (ref 23–29)
CO2 SERPL-SCNC: 20 MMOL/L (ref 23–29)
CO2 SERPL-SCNC: 20 MMOL/L (ref 23–29)
CO2 SERPL-SCNC: 21 MMOL/L (ref 23–29)
CO2 SERPL-SCNC: 23 MMOL/L (ref 23–29)
CO2 SERPL-SCNC: 24 MMOL/L (ref 23–29)
CREAT SERPL-MCNC: 1.3 MG/DL (ref 0.5–1.4)
CREAT SERPL-MCNC: 1.4 MG/DL (ref 0.5–1.4)
ERYTHROCYTE [DISTWIDTH] IN BLOOD BY AUTOMATED COUNT: 13.3 % (ref 11.5–14.5)
EST. GFR  (NO RACE VARIABLE): 56 ML/MIN/1.73 M^2
EST. GFR  (NO RACE VARIABLE): >60 ML/MIN/1.73 M^2
GLUCOSE SERPL-MCNC: 104 MG/DL (ref 70–110)
GLUCOSE SERPL-MCNC: 107 MG/DL (ref 70–110)
GLUCOSE SERPL-MCNC: 110 MG/DL (ref 70–110)
GLUCOSE SERPL-MCNC: 113 MG/DL (ref 70–110)
GLUCOSE SERPL-MCNC: 134 MG/DL (ref 70–110)
GLUCOSE SERPL-MCNC: 144 MG/DL (ref 70–110)
HCT VFR BLD AUTO: 29.4 % (ref 40–54)
HGB BLD-MCNC: 10.2 G/DL (ref 14–18)
MCH RBC QN AUTO: 28.1 PG (ref 27–31)
MCHC RBC AUTO-ENTMCNC: 34.7 G/DL (ref 32–36)
MCV RBC AUTO: 81 FL (ref 82–98)
PHOSPHATE SERPL-MCNC: 2.1 MG/DL (ref 2.7–4.5)
PHOSPHATE SERPL-MCNC: 2.2 MG/DL (ref 2.7–4.5)
PHOSPHATE SERPL-MCNC: 2.9 MG/DL (ref 2.7–4.5)
PHOSPHATE SERPL-MCNC: 2.9 MG/DL (ref 2.7–4.5)
PHOSPHATE SERPL-MCNC: 3.1 MG/DL (ref 2.7–4.5)
PHOSPHATE SERPL-MCNC: 3.8 MG/DL (ref 2.7–4.5)
PLATELET # BLD AUTO: 362 K/UL (ref 150–450)
PMV BLD AUTO: 8.5 FL (ref 9.2–12.9)
POCT GLUCOSE: 104 MG/DL (ref 70–110)
POCT GLUCOSE: 162 MG/DL (ref 70–110)
POCT GLUCOSE: 319 MG/DL (ref 70–110)
POTASSIUM SERPL-SCNC: 3.9 MMOL/L (ref 3.5–5.1)
POTASSIUM SERPL-SCNC: 4.2 MMOL/L (ref 3.5–5.1)
POTASSIUM SERPL-SCNC: 4.5 MMOL/L (ref 3.5–5.1)
POTASSIUM SERPL-SCNC: 4.5 MMOL/L (ref 3.5–5.1)
POTASSIUM SERPL-SCNC: 4.9 MMOL/L (ref 3.5–5.1)
POTASSIUM SERPL-SCNC: 5.1 MMOL/L (ref 3.5–5.1)
RBC # BLD AUTO: 3.63 M/UL (ref 4.6–6.2)
SODIUM SERPL-SCNC: 123 MMOL/L (ref 136–145)
SODIUM SERPL-SCNC: 124 MMOL/L (ref 136–145)
SODIUM SERPL-SCNC: 124 MMOL/L (ref 136–145)
SODIUM SERPL-SCNC: 125 MMOL/L (ref 136–145)
SODIUM SERPL-SCNC: 126 MMOL/L (ref 136–145)
SODIUM SERPL-SCNC: 126 MMOL/L (ref 136–145)
WBC # BLD AUTO: 11.2 K/UL (ref 3.9–12.7)

## 2024-06-17 PROCEDURE — 36415 COLL VENOUS BLD VENIPUNCTURE: CPT | Mod: XB | Performed by: STUDENT IN AN ORGANIZED HEALTH CARE EDUCATION/TRAINING PROGRAM

## 2024-06-17 PROCEDURE — 63600175 PHARM REV CODE 636 W HCPCS: Performed by: REGISTERED NURSE

## 2024-06-17 PROCEDURE — 80069 RENAL FUNCTION PANEL: CPT | Mod: 91 | Performed by: STUDENT IN AN ORGANIZED HEALTH CARE EDUCATION/TRAINING PROGRAM

## 2024-06-17 PROCEDURE — 36415 COLL VENOUS BLD VENIPUNCTURE: CPT | Performed by: STUDENT IN AN ORGANIZED HEALTH CARE EDUCATION/TRAINING PROGRAM

## 2024-06-17 PROCEDURE — 99223 1ST HOSP IP/OBS HIGH 75: CPT | Mod: ,,, | Performed by: INTERNAL MEDICINE

## 2024-06-17 PROCEDURE — 80069 RENAL FUNCTION PANEL: CPT | Performed by: STUDENT IN AN ORGANIZED HEALTH CARE EDUCATION/TRAINING PROGRAM

## 2024-06-17 PROCEDURE — 63600175 PHARM REV CODE 636 W HCPCS: Performed by: STUDENT IN AN ORGANIZED HEALTH CARE EDUCATION/TRAINING PROGRAM

## 2024-06-17 PROCEDURE — 25000003 PHARM REV CODE 250: Performed by: STUDENT IN AN ORGANIZED HEALTH CARE EDUCATION/TRAINING PROGRAM

## 2024-06-17 PROCEDURE — 25000003 PHARM REV CODE 250

## 2024-06-17 PROCEDURE — 63600175 PHARM REV CODE 636 W HCPCS

## 2024-06-17 PROCEDURE — 85027 COMPLETE CBC AUTOMATED: CPT | Performed by: STUDENT IN AN ORGANIZED HEALTH CARE EDUCATION/TRAINING PROGRAM

## 2024-06-17 PROCEDURE — 11000001 HC ACUTE MED/SURG PRIVATE ROOM

## 2024-06-17 PROCEDURE — C9113 INJ PANTOPRAZOLE SODIUM, VIA: HCPCS | Performed by: REGISTERED NURSE

## 2024-06-17 RX ORDER — INSULIN ASPART 100 [IU]/ML
0-5 INJECTION, SOLUTION INTRAVENOUS; SUBCUTANEOUS
Status: DISCONTINUED | OUTPATIENT
Start: 2024-06-17 | End: 2024-06-19 | Stop reason: HOSPADM

## 2024-06-17 RX ORDER — NIFEDIPINE 60 MG/1
60 TABLET, EXTENDED RELEASE ORAL DAILY
Status: DISCONTINUED | OUTPATIENT
Start: 2024-06-17 | End: 2024-06-19 | Stop reason: HOSPADM

## 2024-06-17 RX ORDER — BACLOFEN 5 MG/1
5 TABLET ORAL ONCE
Status: COMPLETED | OUTPATIENT
Start: 2024-06-17 | End: 2024-06-17

## 2024-06-17 RX ORDER — ENOXAPARIN SODIUM 100 MG/ML
40 INJECTION SUBCUTANEOUS EVERY 24 HOURS
Status: DISCONTINUED | OUTPATIENT
Start: 2024-06-17 | End: 2024-06-19 | Stop reason: HOSPADM

## 2024-06-17 RX ORDER — INSULIN GLARGINE 100 [IU]/ML
5 INJECTION, SOLUTION SUBCUTANEOUS NIGHTLY
Status: DISCONTINUED | OUTPATIENT
Start: 2024-06-17 | End: 2024-06-19 | Stop reason: HOSPADM

## 2024-06-17 RX ORDER — PANTOPRAZOLE SODIUM 40 MG/10ML
40 INJECTION, POWDER, LYOPHILIZED, FOR SOLUTION INTRAVENOUS 2 TIMES DAILY
Status: DISCONTINUED | OUTPATIENT
Start: 2024-06-17 | End: 2024-06-17

## 2024-06-17 RX ORDER — IBUPROFEN 200 MG
16 TABLET ORAL
Status: DISCONTINUED | OUTPATIENT
Start: 2024-06-17 | End: 2024-06-19 | Stop reason: HOSPADM

## 2024-06-17 RX ORDER — GLUCAGON 1 MG
1 KIT INJECTION
Status: DISCONTINUED | OUTPATIENT
Start: 2024-06-17 | End: 2024-06-19 | Stop reason: HOSPADM

## 2024-06-17 RX ORDER — IBUPROFEN 200 MG
24 TABLET ORAL
Status: DISCONTINUED | OUTPATIENT
Start: 2024-06-17 | End: 2024-06-19 | Stop reason: HOSPADM

## 2024-06-17 RX ORDER — METOPROLOL SUCCINATE 50 MG/1
200 TABLET, EXTENDED RELEASE ORAL DAILY
Status: DISCONTINUED | OUTPATIENT
Start: 2024-06-17 | End: 2024-06-19 | Stop reason: HOSPADM

## 2024-06-17 RX ORDER — BACLOFEN 5 MG/1
5 TABLET ORAL 3 TIMES DAILY
Status: DISCONTINUED | OUTPATIENT
Start: 2024-06-17 | End: 2024-06-19 | Stop reason: HOSPADM

## 2024-06-17 RX ADMIN — PANTOPRAZOLE SODIUM 40 MG: 40 INJECTION, POWDER, LYOPHILIZED, FOR SOLUTION INTRAVENOUS at 08:06

## 2024-06-17 RX ADMIN — BACLOFEN 5 MG: 5 TABLET ORAL at 08:06

## 2024-06-17 RX ADMIN — INSULIN GLARGINE 5 UNITS: 100 INJECTION, SOLUTION SUBCUTANEOUS at 08:06

## 2024-06-17 RX ADMIN — MUPIROCIN: 20 OINTMENT TOPICAL at 08:06

## 2024-06-17 RX ADMIN — ENOXAPARIN SODIUM 40 MG: 40 INJECTION SUBCUTANEOUS at 04:06

## 2024-06-17 RX ADMIN — BACLOFEN 5 MG: 5 TABLET ORAL at 04:06

## 2024-06-17 RX ADMIN — HEPARIN SODIUM 5000 UNITS: 5000 INJECTION INTRAVENOUS; SUBCUTANEOUS at 06:06

## 2024-06-17 RX ADMIN — METOPROLOL SUCCINATE 200 MG: 50 TABLET, EXTENDED RELEASE ORAL at 10:06

## 2024-06-17 RX ADMIN — NIFEDIPINE 60 MG: 60 TABLET, FILM COATED, EXTENDED RELEASE ORAL at 04:06

## 2024-06-17 NOTE — CONSULTS
[facility] - Intensive Care  Gastroenterology  Consult Note    Patient Name: Angel Bell  MRN: [id number]  Admission Date: 6/15/2024  Hospital Length of Stay: 2 days  Code Status: Full Code   Attending Provider: Adrienne Anaya MD   Consulting Provider: Mik Benítez MD  Primary Care Physician: Tiago Ruiz MD  Principal Problem:Hyponatremia    Inpatient consult to [facility]  Consult performed by: Mik Benítez MD  Consult ordered by: Adrienne Anaya MD        Subjective:     HPI:  PMH of type 2 diabetes mellitus, CKD, hypertension brought to the ER for altered mental status.  History from the patient is limited due to AMS.  History obtained from patient's wife at bedside.  Reports patient has been having issues with hiccups, nausea/vomiting, poor p.o. intake for the past 2-3 weeks.  He was seen by primary care provider about 2 weeks ago when he was prescribed Thorazine for hiccups.   In the ED pt was found to have significant hyponatremia and new drop in Hgb. Pt was admitted to ICU for electrolyte management. Hyponatremia is somewhat better but patient remains anemic. Gi consulted for evaluation    Past Medical History:   Diagnosis Date    Diabetes mellitus     Disorder of kidney and ureter     Hyperlipemia     Hypertension     Iron deficiency anemia     Migraine headache     PTSD (post-traumatic stress disorder)     Stage 3a chronic kidney disease        Past Surgical History:   Procedure Laterality Date    LASER ENUCLEATION OF PROSTATE N/A 8/29/2022    Procedure: ENUCLEATION, PROSTATE, USING LASER  Time: 180 minutes Equipment: high powered 100W laser with virtual basket, morcellator, scopes for [facility] from [facility];  Surgeon: Ted Garvin MD;  Location: [facility];  Service: Urology;  Laterality: N/A;  [facility] confirmed  8/26  confirmation # [id number]       Review of patient's allergies indicates:  No Known Allergies  Family History       Problem Relation (Age of Onset)     Diabetes Mother          Tobacco Use    Smoking status: Never    Smokeless tobacco: Never   Substance and Sexual Activity    Alcohol use: No    Drug use: No    Sexual activity: Not on file     Review of Systems   Gastrointestinal:  Positive for abdominal distention, nausea and vomiting. Negative for abdominal pain.     Objective:     Vital Signs (Most Recent):  Temp: 99.3 °F (37.4 °C) (06/17/24 1502)  Pulse: 87 (06/17/24 1502)  Resp: (!) 24 (06/17/24 1502)  BP: (!) 151/78 (06/17/24 1502)  SpO2: 100 % (06/17/24 1502) Vital Signs (24h Range):  Temp:  [98.6 °F (37 °C)-100 °F (37.8 °C)] 99.3 °F (37.4 °C)  Pulse:  [] 87  Resp:  [13-43] 24  SpO2:  [77 %-100 %] 100 %  BP: (142-188)/(62-97) 151/78     Weight: 71 kg (156 lb 8.4 oz) (06/17/24 0615)  Body mass index is 24.52 kg/m².      Intake/Output Summary (Last 24 hours) at 6/17/2024 1526  Last data filed at 6/17/2024 1222  Gross per 24 hour   Intake 1329.12 ml   Output 1965 ml   Net -635.88 ml       Lines/Drains/Airways       Drain  Duration                  Urethral Catheter 06/15/24 0547 Non-latex;Straight-tip 16 Fr. 2 days              Peripheral Intravenous Line  Duration                  Peripheral IV - Single Lumen 06/15/24 1100 20 G Anterior;Left Upper Arm 2 days         Peripheral IV - Single Lumen 06/15/24 1100 22 G Left;Posterior Hand 2 days         Midline Catheter - Single Lumen 06/15/24 1900 Right basilic vein (medial side of arm) other (see comments) 1 day                     Physical Exam  Vitals and nursing note reviewed.   Constitutional:       Appearance: He is well-developed.   HENT:      Head: Normocephalic and atraumatic.   Eyes:      General: No scleral icterus.     Pupils: Pupils are equal, round, and reactive to light.   Cardiovascular:      Rate and Rhythm: Normal rate and regular rhythm.      Heart sounds: Normal heart sounds.   Pulmonary:      Effort: Pulmonary effort is normal. No respiratory distress.      Breath sounds: Normal breath  sounds.   Abdominal:      General: Bowel sounds are normal. There is no distension.      Palpations: Abdomen is soft.      Tenderness: There is no abdominal tenderness.   Musculoskeletal:         General: Normal range of motion.      Cervical back: Normal range of motion and neck supple.   Skin:     General: Skin is warm and dry.      Findings: No erythema or rash.   Neurological:      Mental Status: He is alert and oriented to person, place, and time.      Comments: No asterixis   Psychiatric:         Behavior: Behavior normal.          Significant Labs:  Recent Lab Results  (Last 5 results in the past 24 hours)        06/17/24  1345   06/17/24  1221   06/17/24  0802   06/17/24  0629   06/17/24  0246        Albumin 2.9     2.7   2.7   2.7       Anion Gap 8     10   13   12       BUN 6     6   6   8       Calcium 8.8     8.5   8.4   8.3       Chloride 94     93   92   94       CO2 23     20   19   20       Creatinine 1.4     1.3   1.3   1.3       eGFR 56     >60   >60   >60       Glucose 144     107   104   113       Hematocrit 29.4               Hemoglobin 10.2               MCH 28.1               MCHC 34.7               MCV 81               MPV 8.5               Phosphorus Level 2.9     3.1   3.8   2.1       Platelet Count 362               POCT Glucose   104             Potassium 5.1     4.5   4.5   3.9       RBC 3.63               RDW 13.3               Sodium 125     123   124   126       WBC 11.20                                      Significant Imaging:  Imaging results within the past 24 hours have been reviewed.  Assessment/Plan:     GI  Nausea & vomiting  With drop in hemoglobin and reported dark emesis  Continue PPI  Trend Hgb  Plan for EGD tomorrow        Thank you for your consult. I will follow-up with patient. Please contact us if you have any additional questions.    Mik Benítez MD  Gastroenterology  [place] - Intensive Care

## 2024-06-17 NOTE — HPI
PMH of type 2 diabetes mellitus, CKD, hypertension brought to the ER for altered mental status.  History from the patient is limited due to AMS.  History obtained from patient's wife at bedside.  Reports patient has been having issues with hiccups, nausea/vomiting, poor p.o. intake for the past 2-3 weeks.  He was seen by primary care provider about 2 weeks ago when he was prescribed Thorazine for hiccups.   In the ED pt was found to have significant hyponatremia and new drop in Hgb. Pt was admitted to ICU for electrolyte management. Hyponatremia is somewhat better but patient remains anemic. Gi consulted for evaluation

## 2024-06-17 NOTE — ASSESSMENT & PLAN NOTE
Chronic, uncontrolled. Latest blood pressure and vitals reviewed-     Temp:  [98.6 °F (37 °C)-100 °F (37.8 °C)]   Pulse:  []   Resp:  [13-43]   BP: (142-188)/(62-97)   SpO2:  [77 %-100 %] .   Home meds for hypertension were reviewed and noted below.   Hypertension Medications               finerenone (KERENDIA) 10 mg Tab Take 1 tablet by mouth once daily.    furosemide (LASIX) 40 MG tablet Take 1 tablet (40 mg total) by mouth daily as needed (swelling).    losartan (COZAAR) 50 MG tablet Take 1 tablet (50 mg total) by mouth once daily.    metoprolol succinate (TOPROL-XL) 200 MG 24 hr tablet Take 1 tablet (200 mg total) by mouth once daily.    NIFEdipine (PROCARDIA-XL) 90 MG (OSM) 24 hr tablet Take 1 tablet (90 mg total) by mouth once daily.            While in the hospital, will manage blood pressure as follows; Adjust home antihypertensive regimen as follows- restart nifedipine, BB    Will utilize p.r.n. blood pressure medication only if patient's blood pressure greater than 180/110 and he develops symptoms such as worsening chest pain or shortness of breath.

## 2024-06-17 NOTE — PLAN OF CARE
Problem: Fall Injury Risk  Goal: Absence of Fall and Fall-Related Injury  Outcome: Progressing     Problem: Restraint, Violent or Self-Destructive  Goal: Absence of Harm or Injury  Outcome: Progressing     Problem: Infection  Goal: Absence of Infection Signs and Symptoms  Outcome: Progressing     Problem: Adult Inpatient Plan of Care  Goal: Plan of Care Review  Outcome: Progressing  Goal: Patient-Specific Goal (Individualized)  Outcome: Progressing  Goal: Absence of Hospital-Acquired Illness or Injury  Outcome: Progressing  Goal: Optimal Comfort and Wellbeing  Outcome: Progressing  Goal: Readiness for Transition of Care  Outcome: Progressing     Problem: Restraint, Nonviolent  Goal: Absence of Harm or Injury  Outcome: Progressing     Problem: Diabetes Comorbidity  Goal: Blood Glucose Level Within Targeted Range  Outcome: Progressing     Problem: Electrolyte Imbalance  Goal: Electrolyte Balance  Outcome: Progressing     Problem: Skin Injury Risk Increased  Goal: Skin Health and Integrity  Outcome: Progressing

## 2024-06-17 NOTE — ASSESSMENT & PLAN NOTE
Patient has hyponatremia which is uncontrolled,We will aim to correct the sodium by 4-6mEq in 24 hours. We will monitor sodium  every 4 hrs . 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 We will obtain the following studies: Urine sodium, urine osmolality, serum osmolality or TSH, T4. We will treat the hyponatremia with IV fluids as follows: s/p 100 ml of hypertonic saline, Fluid restriction of:  1 liter per day, and Removal of offending medications. The patient's sodium results have been reviewed and are listed below.  Recent Labs   Lab 06/17/24  1345   *       Nephrology consult for acute severe symptomatic hyponatremia   S/p D5W, PRN desmopressin to avoid rapid overcorrection of Na    Off fluids. On fluid restriction

## 2024-06-17 NOTE — PROGRESS NOTES
Nephrology Progress Note       Consult Requested By: Adrienne Anaya MD  Reason for Consult: Hyponatremia      SUBJECTIVE:          Review of Systems   Constitutional:  Negative for chills and fever.   HENT:  Negative for congestion and sore throat.    Eyes:  Negative for blurred vision, double vision and photophobia.   Respiratory:  Negative for cough and shortness of breath.    Cardiovascular:  Negative for chest pain, palpitations and leg swelling.   Gastrointestinal:  Negative for abdominal pain, diarrhea, nausea and vomiting.   Genitourinary:  Negative for dysuria and urgency.   Musculoskeletal:  Negative for joint pain and myalgias.   Skin:  Negative for itching and rash.   Neurological:  Positive for weakness. Negative for dizziness, sensory change and headaches.   Endo/Heme/Allergies:  Negative for polydipsia. Does not bruise/bleed easily.   Psychiatric/Behavioral:  Negative for depression.        Past Medical History:   Diagnosis Date    Diabetes mellitus     Disorder of kidney and ureter     Hyperlipemia     Hypertension     Iron deficiency anemia     Migraine headache     PTSD (post-traumatic stress disorder)     Stage 3a chronic kidney disease         OBJECTIVE:     Vital Signs (Most Recent)  Vitals:    06/17/24 0700 06/17/24 0800 06/17/24 0900 06/17/24 0902   BP: (!) 172/70 (!) 147/71 (!) 183/77 (!) 183/77   BP Location: Right arm Right arm Right arm    Patient Position: Lying Lying Lying    Pulse: 92 96 98 100   Resp: (!) 26 (!) 31 (!) 25 (!) 28   Temp:  99.2 °F (37.3 °C)     TempSrc:  Oral     SpO2: 100% 100% 100% 100%   Weight:       Height:             Date 06/17/24 0700 - 06/18/24 0659   Shift 8409-9589 3042-4143 7140-6600 24 Hour Total   INTAKE   Shift Total(mL/kg)       OUTPUT   Urine(mL/kg/hr) 260   260   Shift Total(mL/kg) 260(3.7)   260(3.7)   Weight (kg) 71 71 71 71           Medications:   heparin (porcine)  5,000 Units Subcutaneous Q8H    metoprolol succinate  200 mg Oral Daily     mupirocin   Nasal BID    pantoprazole  40 mg Intravenous BID           Physical Exam  Vitals and nursing note reviewed.   Constitutional:       General: He is not in acute distress.     Appearance: He is not diaphoretic.   HENT:      Head: Normocephalic and atraumatic.      Mouth/Throat:      Pharynx: No oropharyngeal exudate.   Eyes:      General: No scleral icterus.     Conjunctiva/sclera: Conjunctivae normal.      Pupils: Pupils are equal, round, and reactive to light.   Cardiovascular:      Rate and Rhythm: Normal rate and regular rhythm.      Heart sounds: Normal heart sounds. No murmur heard.  Pulmonary:      Effort: Pulmonary effort is normal. No respiratory distress.      Breath sounds: Normal breath sounds.   Abdominal:      General: Bowel sounds are normal. There is no distension.      Palpations: Abdomen is soft.      Tenderness: There is no abdominal tenderness.   Musculoskeletal:         General: Normal range of motion.      Cervical back: Normal range of motion and neck supple.   Skin:     General: Skin is warm and dry.      Findings: No erythema.   Neurological:      Mental Status: He is disoriented.      Cranial Nerves: No cranial nerve deficit.      Comments:     Psychiatric:         Thought Content: Thought content normal.         Laboratory:  Recent Labs   Lab 06/15/24  0508 06/16/24  0228   WBC 12.25 8.29   HGB 9.9* 8.8*   HCT 27.9* 25.0*    318   MONO 11.7  1.4* 13.8  1.1*   EOSINOPHIL 0.1 0.1     Recent Labs   Lab 06/17/24  0246 06/17/24  0629 06/17/24  0802   * 124* 123*   K 3.9 4.5 4.5   CL 94* 92* 93*   CO2 20* 19* 20*   BUN 8 6* 6*   CREATININE 1.3 1.3 1.3   CALCIUM 8.3* 8.4* 8.5*   PHOS 2.1* 3.8 3.1       Diagnostic Results:  X-Ray: Reviewed  US: Reviewed  Echo: Reviewed  ASSESSMENT/PLAN:      Hyponatremia - water intoxication in CKD3 settings was drinking a lots of sugar free drinks  and water to help with his hiccups. The  day prior his admission per his wife  had more  than 10 L total which most likely caused his Na+ drop    -- 6/15 Sever Symptomatic Hyponatremia - Na+ 113 to 115 after NS 1L bolus,  goal Na + correct 6mEq  STAT due to symptom then 6-8 every Q24hr however corrected to 124 in PM D5 % increased to 250 cc/hr 6/16   DDAVP was ordered at 8AM given dose of DDAVP at 9:43  repeat na+ 131 Give bolus of D10 % 250 cc and resume D5% 250 cc/hr repeat labs   repeat dose of DDAVP   UOP seems drop as anticipated after DDAVP   -- Na -126 - 124 IVF stopped let self correct   -- AMS resolved   -- Labs Q4Hr  if correcting correctly then Q12    -- Fluid restriction 1500 cc         CKD3 Follows with Dr Will   -- Cr at baseline 1.7 -1.9   -- Renally dose all meds  -- Please avoid nephrotoxins, including NSAIDs, aminoglycosides, IV contrast (unless absolutely necessary), gadolinium, fleets and other phosphorous-based laxatives. Caution with antibiotics.     HTN  -  not controlled   Resume BP meds    Anemia of chronic kidney disease      Recent Labs   Lab 06/15/24  0508 06/16/24  0228   HGB 9.9* 8.8*   HCT 27.9* 25.0*    318       Iron   Lab Results   Component Value Date    IRON 104 01/25/2024    TIBC 343 01/25/2024    FERRITIN 469 (H) 01/25/2024         MBD  - PTH  slightly up  154   Had Hypophosphatemia/Hypokalemia  - replaced IV now all stable   Recent Labs   Lab 06/17/24  0802   CALCIUM 8.5*   PHOS 3.1     Recent Labs   Lab 06/16/24  0228   MG 2.4       Lab Results   Component Value Date    .1 (H) 05/29/2024    CALCIUM 8.5 (L) 06/17/2024    PHOS 3.1 06/17/2024     Lab Results   Component Value Date    EMGTFBXK48KW 20 (L) 01/25/2024     Acidosis   -- Monitor for now   Lab Results   Component Value Date    CO2 20 (L) 06/17/2024      Nutrition/Hypoalbuminemia    Recent Labs   Lab 06/17/24  0629 06/17/24  0802   ALBUMIN 2.7* 2.7*     Nepro with meals TID.          Thank you for allowing me to participate in care of your patient  With any question please call    Judson Curiel MD     [facility]  PHONG Asher MD,   MD LENKA Middleton MD E. V. Harmon, NP  [address]   [place], [address] [phone]  After hours answering service: [phone]

## 2024-06-17 NOTE — PLAN OF CARE
The sw met with the pt and Josseline Bell(spouse)[phone] who was at bedside during the assessment. The pt and his wife live in [place]. The pt's independent with his ADL's and has the dme listed below. The pt still drives but his wife will transport him home at d/c. The pt has a huge support system from his friends,family and his [employer]. The pt's a retired [employer]. The pt's a very private person when it comes down to his health. The sw completed the white board in the pt's room with her name and contact info. The sw gave the pt's wife a d/c brochure with her contact info on it. The sw encouraged them to call if they have any further questions or concerns. The sw will continue to follow the pt throughout his transitions of care and will assist with any d/c needs.     [place] - Intensive Care  Initial Discharge Assessment       Primary Care Provider: Tiago Ruiz MD    Admission Diagnosis: Hyponatremia [E87.1]  Acute encephalopathy [G93.40]    Admission Date: 6/15/2024  Expected Discharge Date:     Transition of Care Barriers: (P) None    Payor: MEDICARE / Plan: MEDICARE PART A & B / Product Type: Government /     Extended Emergency Contact Information  Primary Emergency Contact: Josseline Bell   [place]  Mobile Phone: [phone]  Relation: Spouse  Secondary Emergency Contact: Maria Eugenia Bell   [place]  Mobile Phone: [phone]  Relation: Daughter    Discharge Plan A: (P) Home with family  Discharge Plan B: (P) Home Health      [id number] - [place] - [address] AT [facility] [address]  [address]  [place] [facility] [address]  Phone: [phone] Fax: [phone]    [place] [id number] [facility] - [place] - [address]  [address]  [address]  [place] [address]  Phone: [phone] Fax: [phone]      Initial Assessment (most recent)       Adult Discharge Assessment - 06/17/24 1258          Discharge Assessment     Assessment Type Discharge Planning Assessment (P)      Confirmed/corrected address, phone number and insurance Yes (P)      Confirmed Demographics Correct on Facesheet (P)      Source of Information patient;family (P)      When was your last doctors appointment? 06/14/24 (P)      Communicated SAVANAH with patient/caregiver Date not available/Unable to determine (P)      Reason For Admission Hyponatremia (P)      People in Home spouse (P)      Do you expect to return to your current living situation? Yes (P)      Do you have help at home or someone to help you manage your care at home? Yes (P)      Who are your caregiver(s) and their phone number(s)? Josseline Bell(spouse)[phone] (P)      Prior to hospitilization cognitive status: Alert/Oriented (P)      Current cognitive status: Alert/Oriented (P)      Walking or Climbing Stairs Difficulty no (P)      Dressing/Bathing Difficulty no (P)      Home Accessibility wheelchair accessible (P)      Home Layout Able to live on 1st floor (P)      Equipment Currently Used at Home blood pressure machine;glucometer (P)      Readmission within 30 days? No (P)      Patient currently being followed by outpatient case management? No (P)      Do you currently have service(s) that help you manage your care at home? No (P)      Do you take prescription medications? Yes (P)      Do you have prescription coverage? Yes (P)      Coverage Medicare A/B & BCBS (P)      Do you have any problems affording any of your prescribed medications? No (P)      Is the patient taking medications as prescribed? yes (P)      How do you get to doctors appointments? car, drives self (P)      Are you on dialysis? No (P)      Do you take coumadin? No (P)      Discharge Plan A Home with family (P)      Discharge Plan B Home Health (P)      DME Needed Upon Discharge  other (see comments) (P)    TBD    Discharge Plan discussed with: Spouse/sig other (P)      Name(s) and Number(s) Josseline Bell(spouse)[phone] (P)       Transition of Care Barriers None (P)         Physical Activity    On average, how many days per week do you engage in moderate to strenuous exercise (like a brisk walk)? 0 days (P)      On average, how many minutes do you engage in exercise at this level? 0 min (P)         Financial Resource Strain    How hard is it for you to pay for the very basics like food, housing, medical care, and heating? Not hard at all (P)         Housing Stability    In the last 12 months, was there a time when you were not able to pay the mortgage or rent on time? No (P)      At any time in the past 12 months, were you homeless or living in a shelter (including now)? No (P)         Transportation Needs    Has the lack of transportation kept you from medical appointments, meetings, work or from getting things needed for daily living? No (P)         Food Insecurity    Within the past 12 months, you worried that your food would run out before you got the money to buy more. Never true (P)      Within the past 12 months, the food you bought just didn't last and you didn't have money to get more. Never true (P)         Stress    Do you feel stress - tense, restless, nervous, or anxious, or unable to sleep at night because your mind is troubled all the time - these days? Not at all (P)         Social Isolation    How often do you feel lonely or isolated from those around you?  Never (P)         Alcohol Use    Q1: How often do you have a drink containing alcohol? Never (P)      Q2: How many drinks containing alcohol do you have on a typical day when you are drinking? Patient does not drink (P)      Q3: How often do you have six or more drinks on one occasion? Never (P)         Liquidations [employer] Limitedities    In the past 12 months has the electric, gas, oil, or water company threatened to shut off services in your home? No (P)         Health Literacy    How often do you need to have someone help you when you read instructions, pamphlets, or other written material  from your doctor or pharmacy? Sometimes (P)         OTHER    Name(s) of People in Home Josseline Bell(spouse)[phone] (P)

## 2024-06-17 NOTE — ASSESSMENT & PLAN NOTE
Wife reports hiccups, N/V X 2-3 weeks   Reported to have dark vomitus in the ER  Hb has down trended from 14 in 1/2024 to 9.9    Possible PUD vs gastritis vs other    Continue IV PPI BID  GI consult to consider EGD. Consider CT abdomen/ pelvis

## 2024-06-17 NOTE — ASSESSMENT & PLAN NOTE
Suspect acute EPS from antidopaminergic action of thorazine   Anticholinergics, benzo PRN     Improving

## 2024-06-17 NOTE — SUBJECTIVE & OBJECTIVE
Interval History: mentation improved. Able to take po. Started having hiccups again. Denies abdominal pain. Confirms having N/V, poor appetite for the last few weeks.   Wife at bedside. All questions answered.     Review of Systems  Objective:     Vital Signs (Most Recent):  Temp: 99.2 °F (37.3 °C) (06/17/24 1200)  Pulse: 89 (06/17/24 1500)  Resp: (!) 24 (06/17/24 1500)  BP: (!) 151/78 (06/17/24 1500)  SpO2: 99 % (06/17/24 1500) Vital Signs (24h Range):  Temp:  [98.6 °F (37 °C)-100 °F (37.8 °C)] 99.2 °F (37.3 °C)  Pulse:  [] 89  Resp:  [13-43] 24  SpO2:  [77 %-100 %] 99 %  BP: (142-188)/(62-97) 151/78     Weight: 71 kg (156 lb 8.4 oz)  Body mass index is 24.52 kg/m².    Intake/Output Summary (Last 24 hours) at 6/17/2024 1523  Last data filed at 6/17/2024 1222  Gross per 24 hour   Intake 1329.12 ml   Output 1965 ml   Net -635.88 ml         Physical Exam  Vitals and nursing note reviewed.   Constitutional:       Appearance: He is well-developed.   Cardiovascular:      Rate and Rhythm: Normal rate and regular rhythm.   Pulmonary:      Effort: Pulmonary effort is normal. No respiratory distress.   Musculoskeletal:      Right lower leg: No edema.      Left lower leg: No edema.   Skin:     General: Skin is warm and dry.   Neurological:      Mental Status: He is alert.      Comments: Calm, answering questions appropriately             Significant Labs: All pertinent labs within the past 24 hours have been reviewed.    Significant Imaging: I have reviewed all pertinent imaging results/findings within the past 24 hours.

## 2024-06-17 NOTE — ASSESSMENT & PLAN NOTE
Suspect in setting of hyponatremia, acute EPS  CT head showed no acute abn  Improved     Off precedex

## 2024-06-17 NOTE — PROGRESS NOTES
[facility] Medicine Note    Primary Attending Physician: Adrienne Anaya MD  ICU Attending: Pao Pugh MD  ICU Fellow: Herbie Lord MD      Subjective:      History of Present Illness:  Angel Bell is a 63 y.o. male with a past medical history of hypertension, hyperlipidemia, T2DM on insulin, CKD 3a, and migraines, who presented on 6/15/2024 for acute encephalopathy for several hours.     The patient was in his usual state of health until a few weeks prior to presentation, when he developed persistent nausea and vomiting, which he attributed to increased trulicity dosing. The day prior to admission, patient presented to his outpatient primary care physician endorsing hiccups, prescribed chlorpromazine. Patient's wife reports that last night she heard the patient yell from another room, when she went to check on him he was not responding appropriately and was concerned that he was having a seizure so she called EMS. On arrival to the ED patient was reportedly combative and not redirectable, so he was placed in soft restraints. He was found to be hyponatremic at 113, glucose 131. Patient was admitted to the ICU for further management.     Interval History:  Today the patient was evaluated at bedside. Patient is oriented to person, place, and year. He is calm and cooperative with exam. Patient does not remember the events earlier in his hospitalization, all questions were answered at this time.      Review of Systems:  All other systems are reviewed and are negative, except as stated in above HPI.      Objective:   Last 24 Hour Vital Signs:  BP  Min: 105/57  Max: 188/79  Temp  Av.2 °F (37.3 °C)  Min: 98.6 °F (37 °C)  Max: 100 °F (37.8 °C)  Pulse  Av.6  Min: 67  Max: 105  Resp  Av.3  Min: 13  Max: 43  SpO2  Av.2 %  Min: 77 %  Max: 100 %  Weight  Av kg (156 lb 8.4 oz)  Min: 71 kg (156 lb 8.4 oz)  Max: 71 kg (156 lb 8.4 oz)  I/O last 3 completed shifts:  In: 5734.8  [P.O.:90; I.V.:4895.9; IV Piggyback:749]  Out: 5635 [Urine:5635]    Physical Examination:  Gen: Resting comfortably in NAD  HEENT: No trauma noted to head, EOMI, PERRL  Cardio: Regular rate, no murmurs appreciated  Pulm: CTAB, no wheezing appreciated, no increased WOB, satting appropriately on RA  Abdom: Normal bowel sounds, non-tender, non-distended  MSK: Well-perfused and warm, radial pulses intact, no BLE edema  Derm: No open lesions/wounds, dry  Neuro: Oriented to person, place, and year. Strength 5/5 in all extremities. Sensation normal.     Laboratory:  Trended Lab Data:  Recent Labs     06/15/24  0508 06/15/24  0746 06/16/24  0228 06/16/24  0541 06/16/24  2325 06/17/24  0246 06/17/24  0629   WBC 12.25  --  8.29  --   --   --   --    HGB 9.9*  --  8.8*  --   --   --   --    HCT 27.9*  --  25.0*  --   --   --   --      --  318  --   --   --   --    *   < > 124*  124*  125*   < > 124* 126* 124*   K 3.8   < > 3.9  3.9  3.9   < > 4.2 3.9 4.5   CL 79*   < > 93*  93*  93*   < > 95 94* 92*   CO2 18*   < > 21*  21*  22*   < > 21* 20* 19*   BUN 28*   < > 18  17  17   < > 8 8 6*   CREATININE 1.9*   < > 1.8*  1.8*  1.8*   < > 1.4 1.3 1.3   *   < > 124*  125*  135*   < > 134* 113* 104   BILITOT 0.5  --  0.4  --   --   --   --    AST 20  --  23  --   --   --   --    ALT 12  --  13  --   --   --   --    ALKPHOS 48*  --  45*  --   --   --   --    CALCIUM 7.9*   < > 8.2*  8.1*  8.2*   < > 8.2* 8.3* 8.4*   ALBUMIN 3.0*   < > 2.6*  2.5*  2.6*   < > 2.6* 2.7* 2.7*   PROT 5.1*  --  4.8*  --   --   --   --    MG  --   --  2.4  --   --   --   --    PHOS  --    < > 3.5  3.4   < > 2.2* 2.1* 3.8    < > = values in this interval not displayed.       Cardiac:   Recent Labs   Lab 06/15/24  0508   TROPONINI <0.006       Urinalysis:   Lab Results   Component Value Date    LABURIN  08/16/2022     Multiple organisms isolated. None in predominance.  Repeat if    LABURIN clinically necessary. 08/16/2022     COLORU Straw 06/15/2024    CLARITYU Clear 05/04/2023    SPECGRAV 1.005 06/15/2024    NITRITE Negative 06/15/2024    KETONESU Negative 06/15/2024    UROBILINOGEN Negative 06/15/2024       Microbiology:  Microbiology Results (last 7 days)       ** No results found for the last 168 hours. **            Radiology:  CT Head Without Contrast   Final Result      Motion compromised study.      1. No definite acute internal hemorrhage, mass effect, or midline shift.   2. No definite acute transcortical infarct by noncontrast CT.   3. Nonspecific partially empty sella configuration questionable flattening of the posterior globes.  Findings likely present on remote CT performed 07/03/2016, however may be seen the setting of idiopathic intracranial hypertension.  Clinical correlation recommended in this regard.         Electronically signed by: Garret Wayne   Date:    06/15/2024   Time:    06:28        I have personally reviewed the above labs and imaging.    Current Medications:     Infusions:   dexmedeTOMIDine (Precedex) infusion (titrating)  0-1.4 mcg/kg/hr (Order-Specific) Intravenous Continuous   Stopped at 06/16/24 1103        Scheduled:   baclofen  5 mg Oral Once    heparin (porcine)  5,000 Units Subcutaneous Q8H    mupirocin   Nasal BID    pantoprazole  40 mg Intravenous BID        PRN:    Current Facility-Administered Medications:     acetaminophen, 650 mg, Rectal, Q6H PRN    midazolam, 0.5 mg, Intravenous, Q4H PRN    sodium chloride 0.9%, 10 mL, Intravenous, PRN     Assessment:     Angel Bell is a 63 y.o. male with a past medical history of hypertension, hyperlipidemia, T2DM on insulin, CKD 3a, and migraines, who presented on 6/15/2024 for acute encephalopathy for several hours. On arrival to the ED patient was reportedly combative and not redirectable, so he was placed in soft restraints. He was found to be hyponatremic at 113, glucose 131. Patient was admitted to the ICU for further management. On exam  patient appears euvolemic, urine Na 26, hyponatremia likely 2/2 water intoxication, initiated 3% hypertonic saline. Precedex gtt with PRN IM versed started for agitation.      Plan:     Neuro:  Acute Encephalopathy - resolved   - Likely 2/2 severe hyponatremia, improved  - Patient presented agitated and not redirectable, was on precedex gtt with PRN IM versed  - Patient was recently prescribed chlorpromazine, concern that he could be exhibiting extrapyramidal symptoms, however IV benadryl 50mg did not improve symptoms    - This morning patient is calm and cooperative, precedex gtt discontinued, retsraints removed for past 24 hours    Cardiovascular/Hemodynamics:  Hypertension  - On admission /70  - Home meds: Losartan 50mg daily and Nifedipine 90mg daily  - This morning /74  - Home Toprol 200mg daily restarted per nephrology   - Resume losartan and nifedipine as needed for BP control     Respiratory:   No active issues    GI/FEN:  F: none  E: K>4, Mg>2  N: NPO    Euvolemic, Symptomatic Hyponatremia - improved   - Patient presented with acute encephalopathy, initial Na 113  - Glucose 131, TSH 0.415  - Patient with recent history of persistent vomiting 2/2 trulicity, also takes lasix and SGLT2 daily, however he is euvolemic on exam  - Urine Na 26  - Likley 2/2 water intoxication based on history provided by family, patient reportedly consumed over 10 L of water and coke zero the day prior to admission in an attempt to resolved persistent hiccups   - S/p 3% hypertonic saline, patient overcorrected to 131. Gave DDAVP, switched fluids to D5W to prevent further overcorrection   - This morning Na 124. IV fluids discontinued, will allow patient to autocorrect with PO fluid monitoring    - Q4H sodium checks  - Nephrology consulted, appreciate recs     ID:   No active issues    Renal:   CKD 3a  - On admission Cr 1.9, baseline ~1.7  - Renally dose all medication, avoid nephrotoxic agents  - Strict I/Os, daily  weights    Heme/Onc:   Normocytic Anemia  - On admission Hgb 9.9, MCV 81  - Baseline Hgb ~14 one year previously   - Patient has h/o COMFORT, etiology unknown, no c/scope on chart review  - iron studies from 1/2024 indicative of AoCD, likely from CKD  - Patient will need colonoscopy outpatient     Endocrine:  Type 2 Diabetes on insulin   - 5/2024 A1c 5.8  - Patient takes Lantus 5 units nightly outpatient, hold while patient is NPO  - BG past 24 hours:   - Resume basal insulin when patient is no longer NPO  - SSI + Accuchecks  - Goal glucose 140-180 while in ICU    TSH: 0.415    Rheum/MSK:  No active issues     Critical Care Daily Checklist:    F: Feeding Goal: Diabetic diet   Status: NPO     AS: Analgesia/Sedation none   T: Thromboembolic Prophylaxis SubQ heparin    H: HOB > 300 Yes   U: Stress Ulcer Prophylaxis (if needed) N/A   G: Glucose Control POCT checks    B: Bowel Function Stool Occurrence: none   I: Indwelling Catheter (Lines & Tavera) Necessity PIV x2   D: De-escalation of Antimicrobials/Pharmacotherapies N/A    Plan for the day/ETD Step down today    Code Status:  Family/Goals of Care: Full        Jesús Camacho MD  [facility]

## 2024-06-17 NOTE — PROGRESS NOTES
[facility] Medicine  Progress Note    Patient Name: Angel Bell  MRN: [id number]  Patient Class: IP- Inpatient   Admission Date: 6/15/2024  Length of Stay: 2 days  Attending Physician: Adrienne Anaya MD  Primary Care Provider: Tiago Ruiz MD        Subjective:     Principal Problem:Hyponatremia    HPI:  63-year-old male with PMH of type 2 diabetes mellitus, CKD, hypertension brought to the ER for altered mental status.  History from the patient is limited due to AMS.  History obtained from patient's wife at bedside.  Reports patient has been having issues with hiccups, nausea/vomiting, poor p.o. intake for the past 2-3 weeks.  He was seen by primary care provider about 2 weeks ago when he was prescribed Thorazine for hiccups.  Wife states he had 30 pills in the bottle which he finished in about a week and was seen at urgent care where he received a refill with another 20 pills which he finished. He was doing okay until yesterday evening. In the middle of the night, he started screaming and she noticed tonic clonic movements and frothing from his mouth. Since then, he has been agitated and not acting himself. 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     Found to have sodium of 113 in the ED. Received 1 L IV fluid normal saline bolus, repeat sodium on 115.  Also reported to have an episode of dark vomitus in the ER that was occult blood positive.  Received IV PPI 80 mg.  Admitted to ICU for further care.    Overview/Hospital Course:  No notes on file    Interval History: mentation improved. Able to take po. Started having hiccups again. Denies abdominal pain. Confirms having N/V, poor appetite for the last few weeks.   Wife at bedside. All questions answered.     Review of Systems  Objective:     Vital Signs (Most Recent):  Temp: 99.2 °F (37.3 °C) (06/17/24 1200)  Pulse: 89 (06/17/24 1500)  Resp: (!) 24 (06/17/24 1500)  BP: (!) 151/78 (06/17/24  1500)  SpO2: 99 % (06/17/24 1500) Vital Signs (24h Range):  Temp:  [98.6 °F (37 °C)-100 °F (37.8 °C)] 99.2 °F (37.3 °C)  Pulse:  [] 89  Resp:  [13-43] 24  SpO2:  [77 %-100 %] 99 %  BP: (142-188)/(62-97) 151/78     Weight: 71 kg (156 lb 8.4 oz)  Body mass index is 24.52 kg/m².    Intake/Output Summary (Last 24 hours) at 6/17/2024 1523  Last data filed at 6/17/2024 1222  Gross per 24 hour   Intake 1329.12 ml   Output 1965 ml   Net -635.88 ml         Physical Exam  Vitals and nursing note reviewed.   Constitutional:       Appearance: He is well-developed.   Cardiovascular:      Rate and Rhythm: Normal rate and regular rhythm.   Pulmonary:      Effort: Pulmonary effort is normal. No respiratory distress.   Musculoskeletal:      Right lower leg: No edema.      Left lower leg: No edema.   Skin:     General: Skin is warm and dry.   Neurological:      Mental Status: He is alert.      Comments: Calm, answering questions appropriately             Significant Labs: All pertinent labs within the past 24 hours have been reviewed.    Significant Imaging: I have reviewed all pertinent imaging results/findings within the past 24 hours.    Assessment/Plan:      * Hyponatremia  Patient has hyponatremia which is uncontrolled,We will aim to correct the sodium by 4-6mEq in 24 hours. We will monitor sodium  every 4 hrs . 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 We will obtain the following studies: Urine sodium, urine osmolality, serum osmolality or TSH, T4. We will treat the hyponatremia with IV fluids as follows: s/p 100 ml of hypertonic saline, Fluid restriction of:  1 liter per day, and Removal of offending medications. The patient's sodium results have been reviewed and are listed below.  Recent Labs   Lab 06/17/24  1345   *       Nephrology consult for acute severe symptomatic hyponatremia   S/p D5W, PRN desmopressin to avoid rapid  overcorrection of Na    Off fluids. On fluid restriction    CKD (chronic kidney disease)  Creatine stable for now. BMP reviewed- noted Estimated Creatinine Clearance: 49.8 mL/min (based on SCr of 1.4 mg/dL). according to latest data. Based on current GFR, CKD stage is stage 3 - GFR 30-59.  Monitor UOP and serial BMP and adjust therapy as needed. Renally dose meds. Avoid nephrotoxic medications and procedures.        Acute encephalopathy  Suspect in setting of hyponatremia, acute EPS  CT head showed no acute abn  Improved     Off precedex      Nausea & vomiting  Wife reports hiccups, N/V X 2-3 weeks   Reported to have dark vomitus in the ER  Hb has down trended from 14 in 1/2024 to 9.9    Possible PUD vs gastritis vs other    Continue IV PPI BID  GI consult to consider EGD. Consider CT abdomen/ pelvis       Extrapyramidal syndrome  Suspect acute EPS from antidopaminergic action of thorazine   Anticholinergics, benzo PRN     Improving        Type 2 diabetes mellitus with hyperglycemia, with long-term current use of insulin  Placed on basal/ bolus insulin  Hypoglycemia protocol       Hypertension  Chronic, uncontrolled. Latest blood pressure and vitals reviewed-     Temp:  [98.6 °F (37 °C)-100 °F (37.8 °C)]   Pulse:  []   Resp:  [13-43]   BP: (142-188)/(62-97)   SpO2:  [77 %-100 %] .   Home meds for hypertension were reviewed and noted below.   Hypertension Medications               finerenone (KERENDIA) 10 mg Tab Take 1 tablet by mouth once daily.    furosemide (LASIX) 40 MG tablet Take 1 tablet (40 mg total) by mouth daily as needed (swelling).    losartan (COZAAR) 50 MG tablet Take 1 tablet (50 mg total) by mouth once daily.    metoprolol succinate (TOPROL-XL) 200 MG 24 hr tablet Take 1 tablet (200 mg total) by mouth once daily.    NIFEdipine (PROCARDIA-XL) 90 MG (OSM) 24 hr tablet Take 1 tablet (90 mg total) by mouth once daily.            While in the hospital, will manage blood pressure as follows; Adjust  home antihypertensive regimen as follows- restart nifedipine, BB    Will utilize p.r.n. blood pressure medication only if patient's blood pressure greater than 180/110 and he develops symptoms such as worsening chest pain or shortness of breath.      VTE Risk Mitigation (From admission, onward)           Ordered     enoxaparin injection 40 mg  Daily         06/17/24 1117     IP VTE LOW RISK PATIENT  Once         06/17/24 1117     Place sequential compression device  Until discontinued         06/15/24 0652                    Discharge Planning   SAVANAH:      Code Status: Full Code   Is the patient medically ready for discharge?:     Reason for patient still in hospital (select all that apply): Patient trending condition, Treatment, and Consult recommendations  Discharge Plan A: Home with family        Discussed with ICU team. Stable for transfer to the floor. Transfer order placed.     Critical care time spent on the evaluation and treatment of severe organ dysfunction, review of pertinent labs and imaging studies, discussions with consulting providers and discussions with patient/family: 36 minutes.      Adrienne Anaya MD  Department of [facility] Medicine   [facility] - Intensive Care

## 2024-06-17 NOTE — ASSESSMENT & PLAN NOTE
Creatine stable for now. BMP reviewed- noted Estimated Creatinine Clearance: 49.8 mL/min (based on SCr of 1.4 mg/dL). according to latest data. Based on current GFR, CKD stage is stage 3 - GFR 30-59.  Monitor UOP and serial BMP and adjust therapy as needed. Renally dose meds. Avoid nephrotoxic medications and procedures.

## 2024-06-17 NOTE — SUBJECTIVE & OBJECTIVE
Past Medical History:   Diagnosis Date    Diabetes mellitus     Disorder of kidney and ureter     Hyperlipemia     Hypertension     Iron deficiency anemia     Migraine headache     PTSD (post-traumatic stress disorder)     Stage 3a chronic kidney disease        Past Surgical History:   Procedure Laterality Date    LASER ENUCLEATION OF PROSTATE N/A 8/29/2022    Procedure: ENUCLEATION, PROSTATE, USING LASER  Time: 180 minutes Equipment: high powered 100W laser with virtual basket, morcellator, scopes for HoLEP from [facility];  Surgeon: Ted Garvin MD;  Location: [facility];  Service: Urology;  Laterality: N/A;  fortec confirmed CW 8/26  confirmation # [id number]       Review of patient's allergies indicates:  No Known Allergies  Family History       Problem Relation (Age of Onset)    Diabetes Mother          Tobacco Use    Smoking status: Never    Smokeless tobacco: Never   Substance and Sexual Activity    Alcohol use: No    Drug use: No    Sexual activity: Not on file     Review of Systems   Gastrointestinal:  Positive for abdominal distention, nausea and vomiting. Negative for abdominal pain.     Objective:     Vital Signs (Most Recent):  Temp: 99.3 °F (37.4 °C) (06/17/24 1502)  Pulse: 87 (06/17/24 1502)  Resp: (!) 24 (06/17/24 1502)  BP: (!) 151/78 (06/17/24 1502)  SpO2: 100 % (06/17/24 1502) Vital Signs (24h Range):  Temp:  [98.6 °F (37 °C)-100 °F (37.8 °C)] 99.3 °F (37.4 °C)  Pulse:  [] 87  Resp:  [13-43] 24  SpO2:  [77 %-100 %] 100 %  BP: (142-188)/(62-97) 151/78     Weight: 71 kg (156 lb 8.4 oz) (06/17/24 0615)  Body mass index is 24.52 kg/m².      Intake/Output Summary (Last 24 hours) at 6/17/2024 1526  Last data filed at 6/17/2024 1222  Gross per 24 hour   Intake 1329.12 ml   Output 1965 ml   Net -635.88 ml       Lines/Drains/Airways       Drain  Duration                  Urethral Catheter 06/15/24 0591 Non-latex;Straight-tip 16 Fr. 2 days              Peripheral Intravenous Line  Duration                   Peripheral IV - Single Lumen 06/15/24 1100 20 G Anterior;Left Upper Arm 2 days         Peripheral IV - Single Lumen 06/15/24 1100 22 G Left;Posterior Hand 2 days         Midline Catheter - Single Lumen 06/15/24 1900 Right basilic vein (medial side of arm) other (see comments) 1 day                     Physical Exam  Vitals and nursing note reviewed.   Constitutional:       Appearance: He is well-developed.   HENT:      Head: Normocephalic and atraumatic.   Eyes:      General: No scleral icterus.     Pupils: Pupils are equal, round, and reactive to light.   Cardiovascular:      Rate and Rhythm: Normal rate and regular rhythm.      Heart sounds: Normal heart sounds.   Pulmonary:      Effort: Pulmonary effort is normal. No respiratory distress.      Breath sounds: Normal breath sounds.   Abdominal:      General: Bowel sounds are normal. There is no distension.      Palpations: Abdomen is soft.      Tenderness: There is no abdominal tenderness.   Musculoskeletal:         General: Normal range of motion.      Cervical back: Normal range of motion and neck supple.   Skin:     General: Skin is warm and dry.      Findings: No erythema or rash.   Neurological:      Mental Status: He is alert and oriented to person, place, and time.      Comments: No asterixis   Psychiatric:         Behavior: Behavior normal.          Significant Labs:  Recent Lab Results  (Last 5 results in the past 24 hours)        06/17/24  1345   06/17/24  1221   06/17/24  0802   06/17/24  0629   06/17/24  0246        Albumin 2.9     2.7   2.7   2.7       Anion Gap 8     10   13   12       BUN 6     6   6   8       Calcium 8.8     8.5   8.4   8.3       Chloride 94     93   92   94       CO2 23     20   19   20       Creatinine 1.4     1.3   1.3   1.3       eGFR 56     >60   >60   >60       Glucose 144     107   104   113       Hematocrit 29.4               Hemoglobin 10.2               MCH 28.1               MCHC 34.7               MCV 81                MPV 8.5               Phosphorus Level 2.9     3.1   3.8   2.1       Platelet Count 362               POCT Glucose   104             Potassium 5.1     4.5   4.5   3.9       RBC 3.63               RDW 13.3               Sodium 125     123   124   126       WBC 11.20                                      Significant Imaging:  Imaging results within the past 24 hours have been reviewed.

## 2024-06-18 ENCOUNTER — ANESTHESIA (OUTPATIENT)
Dept: ENDOSCOPY | Facility: HOSPITAL | Age: 64
End: 2024-06-18
Payer: MEDICARE

## 2024-06-18 ENCOUNTER — ANESTHESIA EVENT (OUTPATIENT)
Dept: ENDOSCOPY | Facility: HOSPITAL | Age: 64
End: 2024-06-18
Payer: MEDICARE

## 2024-06-18 LAB
ALBUMIN SERPL BCP-MCNC: 2.9 G/DL (ref 3.5–5.2)
ALP SERPL-CCNC: 51 U/L (ref 55–135)
ALT SERPL W/O P-5'-P-CCNC: 18 U/L (ref 10–44)
ANION GAP SERPL CALC-SCNC: 9 MMOL/L (ref 8–16)
AST SERPL-CCNC: 23 U/L (ref 10–40)
BASOPHILS # BLD AUTO: 0.03 K/UL (ref 0–0.2)
BASOPHILS NFR BLD: 0.4 % (ref 0–1.9)
BILIRUB SERPL-MCNC: 0.3 MG/DL (ref 0.1–1)
BUN SERPL-MCNC: 8 MG/DL (ref 8–23)
CALCIUM SERPL-MCNC: 9.2 MG/DL (ref 8.7–10.5)
CHLORIDE SERPL-SCNC: 99 MMOL/L (ref 95–110)
CO2 SERPL-SCNC: 24 MMOL/L (ref 23–29)
CREAT SERPL-MCNC: 1.4 MG/DL (ref 0.5–1.4)
DIFFERENTIAL METHOD BLD: ABNORMAL
EOSINOPHIL # BLD AUTO: 0 K/UL (ref 0–0.5)
EOSINOPHIL NFR BLD: 0.3 % (ref 0–8)
ERYTHROCYTE [DISTWIDTH] IN BLOOD BY AUTOMATED COUNT: 13.2 % (ref 11.5–14.5)
EST. GFR  (NO RACE VARIABLE): 56 ML/MIN/1.73 M^2
GLUCOSE SERPL-MCNC: 95 MG/DL (ref 70–110)
HCT VFR BLD AUTO: 28.8 % (ref 40–54)
HGB BLD-MCNC: 10 G/DL (ref 14–18)
IMM GRANULOCYTES # BLD AUTO: 0.05 K/UL (ref 0–0.04)
IMM GRANULOCYTES NFR BLD AUTO: 0.7 % (ref 0–0.5)
LYMPHOCYTES # BLD AUTO: 1.4 K/UL (ref 1–4.8)
LYMPHOCYTES NFR BLD: 19.9 % (ref 18–48)
MAGNESIUM SERPL-MCNC: 2.5 MG/DL (ref 1.6–2.6)
MCH RBC QN AUTO: 28.2 PG (ref 27–31)
MCHC RBC AUTO-ENTMCNC: 34.7 G/DL (ref 32–36)
MCV RBC AUTO: 81 FL (ref 82–98)
MONOCYTES # BLD AUTO: 0.9 K/UL (ref 0.3–1)
MONOCYTES NFR BLD: 12.5 % (ref 4–15)
NEUTROPHILS # BLD AUTO: 4.8 K/UL (ref 1.8–7.7)
NEUTROPHILS NFR BLD: 66.2 % (ref 38–73)
NRBC BLD-RTO: 0 /100 WBC
OHS QRS DURATION: 66 MS
OHS QRS DURATION: 86 MS
OHS QTC CALCULATION: 422 MS
OHS QTC CALCULATION: 491 MS
PHOSPHATE SERPL-MCNC: 3.7 MG/DL (ref 2.7–4.5)
PLATELET # BLD AUTO: 372 K/UL (ref 150–450)
PMV BLD AUTO: 8.6 FL (ref 9.2–12.9)
POCT GLUCOSE: 135 MG/DL (ref 70–110)
POCT GLUCOSE: 169 MG/DL (ref 70–110)
POCT GLUCOSE: 94 MG/DL (ref 70–110)
POTASSIUM SERPL-SCNC: 4.3 MMOL/L (ref 3.5–5.1)
PROT SERPL-MCNC: 5.7 G/DL (ref 6–8.4)
RBC # BLD AUTO: 3.55 M/UL (ref 4.6–6.2)
SODIUM SERPL-SCNC: 132 MMOL/L (ref 136–145)
WBC # BLD AUTO: 7.22 K/UL (ref 3.9–12.7)

## 2024-06-18 PROCEDURE — 84100 ASSAY OF PHOSPHORUS: CPT | Performed by: STUDENT IN AN ORGANIZED HEALTH CARE EDUCATION/TRAINING PROGRAM

## 2024-06-18 PROCEDURE — 97166 OT EVAL MOD COMPLEX 45 MIN: CPT

## 2024-06-18 PROCEDURE — 88342 IMHCHEM/IMCYTCHM 1ST ANTB: CPT | Mod: 26,,, | Performed by: PATHOLOGY

## 2024-06-18 PROCEDURE — 63600175 PHARM REV CODE 636 W HCPCS

## 2024-06-18 PROCEDURE — 25000003 PHARM REV CODE 250: Performed by: STUDENT IN AN ORGANIZED HEALTH CARE EDUCATION/TRAINING PROGRAM

## 2024-06-18 PROCEDURE — 88342 IMHCHEM/IMCYTCHM 1ST ANTB: CPT | Performed by: PATHOLOGY

## 2024-06-18 PROCEDURE — 25000003 PHARM REV CODE 250

## 2024-06-18 PROCEDURE — 37000009 HC ANESTHESIA EA ADD 15 MINS: Performed by: INTERNAL MEDICINE

## 2024-06-18 PROCEDURE — 37000008 HC ANESTHESIA 1ST 15 MINUTES: Performed by: INTERNAL MEDICINE

## 2024-06-18 PROCEDURE — 97161 PT EVAL LOW COMPLEX 20 MIN: CPT

## 2024-06-18 PROCEDURE — 85025 COMPLETE CBC W/AUTO DIFF WBC: CPT | Performed by: STUDENT IN AN ORGANIZED HEALTH CARE EDUCATION/TRAINING PROGRAM

## 2024-06-18 PROCEDURE — 25000003 PHARM REV CODE 250: Performed by: NURSE ANESTHETIST, CERTIFIED REGISTERED

## 2024-06-18 PROCEDURE — 63600175 PHARM REV CODE 636 W HCPCS: Performed by: NURSE ANESTHETIST, CERTIFIED REGISTERED

## 2024-06-18 PROCEDURE — 27201012 HC FORCEPS, HOT/COLD, DISP: Performed by: INTERNAL MEDICINE

## 2024-06-18 PROCEDURE — 36415 COLL VENOUS BLD VENIPUNCTURE: CPT | Performed by: STUDENT IN AN ORGANIZED HEALTH CARE EDUCATION/TRAINING PROGRAM

## 2024-06-18 PROCEDURE — 97535 SELF CARE MNGMENT TRAINING: CPT

## 2024-06-18 PROCEDURE — 43239 EGD BIOPSY SINGLE/MULTIPLE: CPT | Mod: ,,, | Performed by: INTERNAL MEDICINE

## 2024-06-18 PROCEDURE — 43239 EGD BIOPSY SINGLE/MULTIPLE: CPT | Performed by: INTERNAL MEDICINE

## 2024-06-18 PROCEDURE — 11000001 HC ACUTE MED/SURG PRIVATE ROOM

## 2024-06-18 PROCEDURE — 80053 COMPREHEN METABOLIC PANEL: CPT | Performed by: STUDENT IN AN ORGANIZED HEALTH CARE EDUCATION/TRAINING PROGRAM

## 2024-06-18 PROCEDURE — 83735 ASSAY OF MAGNESIUM: CPT | Performed by: STUDENT IN AN ORGANIZED HEALTH CARE EDUCATION/TRAINING PROGRAM

## 2024-06-18 PROCEDURE — 88305 TISSUE EXAM BY PATHOLOGIST: CPT | Mod: 26,,, | Performed by: PATHOLOGY

## 2024-06-18 PROCEDURE — 0DB68ZX EXCISION OF STOMACH, VIA NATURAL OR ARTIFICIAL OPENING ENDOSCOPIC, DIAGNOSTIC: ICD-10-PCS | Performed by: INTERNAL MEDICINE

## 2024-06-18 PROCEDURE — 88305 TISSUE EXAM BY PATHOLOGIST: CPT | Performed by: PATHOLOGY

## 2024-06-18 RX ORDER — LIDOCAINE HYDROCHLORIDE 20 MG/ML
INJECTION INTRAVENOUS
Status: DISCONTINUED | OUTPATIENT
Start: 2024-06-18 | End: 2024-06-18

## 2024-06-18 RX ORDER — PHENYLEPHRINE HYDROCHLORIDE 10 MG/ML
INJECTION INTRAVENOUS
Status: DISCONTINUED | OUTPATIENT
Start: 2024-06-18 | End: 2024-06-18

## 2024-06-18 RX ORDER — PROPOFOL 10 MG/ML
VIAL (ML) INTRAVENOUS
Status: DISCONTINUED | OUTPATIENT
Start: 2024-06-18 | End: 2024-06-18

## 2024-06-18 RX ORDER — TALC
6 POWDER (GRAM) TOPICAL NIGHTLY PRN
Status: DISCONTINUED | OUTPATIENT
Start: 2024-06-18 | End: 2024-06-19 | Stop reason: HOSPADM

## 2024-06-18 RX ADMIN — TOPICAL ANESTHETIC 1 EACH: 200 SPRAY DENTAL; PERIODONTAL at 01:06

## 2024-06-18 RX ADMIN — ENOXAPARIN SODIUM 40 MG: 40 INJECTION SUBCUTANEOUS at 04:06

## 2024-06-18 RX ADMIN — Medication 6 MG: at 07:06

## 2024-06-18 RX ADMIN — BACLOFEN 5 MG: 5 TABLET ORAL at 08:06

## 2024-06-18 RX ADMIN — INSULIN GLARGINE 5 UNITS: 100 INJECTION, SOLUTION SUBCUTANEOUS at 08:06

## 2024-06-18 RX ADMIN — Medication 20 MG: at 01:06

## 2024-06-18 RX ADMIN — METOPROLOL SUCCINATE 200 MG: 50 TABLET, EXTENDED RELEASE ORAL at 08:06

## 2024-06-18 RX ADMIN — MUPIROCIN: 20 OINTMENT TOPICAL at 10:06

## 2024-06-18 RX ADMIN — LIDOCAINE HYDROCHLORIDE 75 MG: 20 INJECTION, SOLUTION INTRAVENOUS at 01:06

## 2024-06-18 RX ADMIN — MUPIROCIN: 20 OINTMENT TOPICAL at 08:06

## 2024-06-18 RX ADMIN — Medication 60 MG: at 01:06

## 2024-06-18 RX ADMIN — PHENYLEPHRINE HYDROCHLORIDE 200 MCG: 10 INJECTION INTRAVENOUS at 01:06

## 2024-06-18 RX ADMIN — BACLOFEN 5 MG: 5 TABLET ORAL at 02:06

## 2024-06-18 RX ADMIN — NIFEDIPINE 60 MG: 60 TABLET, FILM COATED, EXTENDED RELEASE ORAL at 08:06

## 2024-06-18 NOTE — PT/OT/SLP EVAL
Occupational Therapy   Evaluation    Name: Angel Bell  MRN: [id number]  Admitting Diagnosis: Hyponatremia  Recent Surgery: Procedure(s) (LRB):  EGD (ESOPHAGOGASTRODUODENOSCOPY) (N/A)      Recommendations:     Discharge Recommendations: Low Intensity Therapy (with adequate family assistance but if family unable to provide support while spouse is at work, pt may require moderate intensity therapy)  Discharge Equipment Recommendations:  walker, rolling, shower chair  Barriers to discharge:  Decreased caregiver support    Assessment:     Angel Bell is a 63 y.o. male with a medical diagnosis of Hyponatremia.  He presents with deconditioning, some confusion but overall able to answer questions fairly accurately with some assistance from spouse for home set up. Performance deficits affecting function: weakness, impaired endurance, impaired self care skills, impaired functional mobility, impaired balance, decreased safety awareness, decreased lower extremity function, decreased upper extremity function, impaired cognition, impaired cardiopulmonary response to activity.      Rehab Prognosis: Good; patient would benefit from acute skilled OT services to address these deficits and reach maximum level of function.       Plan:     Patient to be seen 3 x/week to address the above listed problems via self-care/home management, therapeutic activities, therapeutic exercises  Plan of Care Expires: 07/18/24  Plan of Care Reviewed with: patient, spouse    Subjective     Chief Complaint: Pt reports feeling a little better  Patient/Family Comments/goals: To return to [facility]    Occupational Profile:  Living Environment: Pt [facility]/ and [facility]  Previous level of function: Indep ADLs and functional mobility.  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 Light housework and meal prep, drives, completes own grocery shopping. Pt spend many days at [facility] per spouse   Equipment Used at Home: none  Assistance upon  Discharge: Limited, spouse and adult children work full time. Spouse states friends may be able to assist pt.     Pain/Comfort:  Pain Rating 1: 0/10    Patients cultural, spiritual, Spiritism conflicts given the current situation:      Objective:     Communicated with: panfilo prior to session.  Patient found HOB elevated with bed alarm, peripheral IV, telemetry, pulse ox (continuous), blood pressure cuff upon OT entry to room.    General Precautions: Standard, fall  Orthopedic Precautions: N/A  Braces: N/A  Respiratory Status: Room air    Occupational Performance:    Bed Mobility:    Patient completed Rolling/Turning to Right with contact guard assistance  Patient completed Scooting/Bridging with contact guard assistance  Patient completed Supine to Sit with minimum assistance  Patient completed Sit to Supine with minimum assistance    Functional Mobility/Transfers:  Patient completed Sit <> Stand Transfer with contact guard assistance  with  rolling walker   Functional Mobility: Pt with fair+ dynamic seated and standing balance. Pt ambulated to brush teeth in stance at sink with no LOB    Activities of Daily Living:  Grooming: stand by assistance to brush teeth in stance at sink  Upper Body Dressing: minimum assistance to don gown as robe seated EOB  Lower Body Dressing: contact guard assistance to don/doff B socks seated EOB    Cognitive/Visual Perceptual:  Cognitive/Psychosocial Skills:     -       Oriented to: Person, Place, Time and Situation though states he lives with his children to which spouse states they do not.  -       Follows Commands/attention:Follows multistep commands  -       Communication: clear/fluent  -       Memory: Some occasional deficits  -       Safety awareness/insight to disability: intact   -       Mood/Affect/Coping skills/emotional control: Appropriate to situation    Physical Exam:  Postural examination/scapula alignment:    -       Rounded shoulders, forward head  Skin integrity:  Visible skin intact  Sensation:    -       Intact  Motor Planning:    -       WFL  Dominant hand:    -       R handed  Upper Extremity Range of Motion: BUE WFL     Upper Extremity Strength:  BUE grossly 3+ to 4-/5   Strength:  B hands WFL  Fine Motor Coordination:    -       Intact  Gross motor coordination:   WFL     AMPAC 6 Click ADL:  AMPAC Total Score: 19    Treatment & Education:  Pt educated on role of OT and POC.   Pt performing skills as listed above.     Patient left HOB elevated with all lines intact, call button in reach, nsg notified, and spouse present    GOALS:   Multidisciplinary Problems       Occupational Therapy Goals          Problem: Occupational Therapy    Goal Priority Disciplines Outcome Interventions   Occupational Therapy Goal     OT, PT/OT Progressing    Description: Goals to be met by: 07/18/2024      Patient will increase functional independence with ADLs by performing:    UE Dressing with Modified [place].  LE Dressing with Modified [place].  Grooming while standing with Modified [place].  Toileting from toilet with Modified [place] for hygiene and clothing management.   Toilet transfer to toilet with Modified [place].  Increased functional strength to [place] for self care.  Upper extremity exercise program x10 reps per handout, with independence.                          History:     Past Medical History:   Diagnosis Date    Diabetes mellitus     Disorder of kidney and ureter     Hyperlipemia     Hypertension     Iron deficiency anemia     Migraine headache     PTSD (post-traumatic stress disorder)     Stage 3a chronic kidney disease          Past Surgical History:   Procedure Laterality Date    LASER ENUCLEATION OF PROSTATE N/A 8/29/2022    Procedure: ENUCLEATION, PROSTATE, USING LASER  Time: 180 minutes Equipment: high powered 100W laser with virtual basket, morcellator, scopes for HoLEP from [employer];  Surgeon: Ted Garvin MD;  Location: [facility] OR;   Service: Urology;  Laterality: N/A;  fortec confirmed CW 8/26  confirmation # [id number]       Time Tracking:     OT Date of Treatment: 06/18/24  OT Start Time: 1104  OT Stop Time: 1131  OT Total Time (min): 27 min    Billable Minutes:Evaluation 15  Self Care/Home Management 12    6/18/2024

## 2024-06-18 NOTE — PROGRESS NOTES
[facility]  Progress Note    Patient Name: Angel Bell  MRN: [id number]  Patient Class: IP- Inpatient   Admission Date: 6/15/2024  Length of Stay: 3 days  Attending Physician: Yair Jurado MD  Primary Care Provider: Tiago Ruiz MD        Subjective:     Principal Problem:Hyponatremia  Acute Condition: moderate        HPI:  63-year-old male with PMH of type 2 diabetes mellitus, CKD, hypertension brought to the ER for altered mental status.  History from the patient is limited due to AMS.  History obtained from patient's wife at bedside.  Reports patient has been having issues with hiccups, nausea/vomiting, poor p.o. intake for the past 2-3 weeks.  He was seen by primary care provider about 2 weeks ago when he was prescribed Thorazine for hiccups.  Wife states he had 30 pills in the bottle which he finished in about a week and was seen at urgent care where he received a refill with another 20 pills which he finished. He was doing okay until yesterday evening. In the middle of the night, he started screaming and she noticed tonic clonic movements and frothing from his mouth. Since then, he has been agitated and not acting himself. 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     Found to have sodium of 113 in the ED. Received 1 L IV fluid normal saline bolus, repeat sodium on 115.  Also reported to have an episode of dark vomitus in the ER that was occult blood positive.  Received IV PPI 80 mg.  Admitted to ICU for further care.    Overview/Hospital Course:  6/18/24 improvement of sodium level, as well as mental state    Interval History:   Seen and examined, alert, verbal, stated feeling much better, with sodium level continue trending up, no CP or SOB, no spike of fever.    Review of Systems   Constitutional:  Negative for activity change, appetite change, chills and fever.   Respiratory:  Negative for chest tightness and shortness of breath.     Cardiovascular:  Negative for chest pain and leg swelling.   Gastrointestinal:  Negative for abdominal distention, abdominal pain, nausea and vomiting.   Musculoskeletal:  Negative for arthralgias.   Skin:  Negative for wound.   Neurological:  Negative for speech difficulty, weakness and headaches.   Psychiatric/Behavioral:  Negative for agitation, confusion and hallucinations.    All other systems reviewed and are negative.    Objective:     Vital Signs (Most Recent):  Temp: 98.8 °F (37.1 °C) (06/18/24 1130)  Pulse: 99 (06/18/24 1420)  Resp: (!) 26 (06/18/24 1420)  BP: (!) 151/67 (06/18/24 1420)  SpO2: 99 % (06/18/24 1420) Vital Signs (24h Range):  Temp:  [98.8 °F (37.1 °C)-99.1 °F (37.3 °C)] 98.8 °F (37.1 °C)  Pulse:  [] 99  Resp:  [9-35] 26  SpO2:  [93 %-100 %] 99 %  BP: (102-163)/(56-90) 151/67     Weight: 71 kg (156 lb 8.4 oz)  Body mass index is 24.52 kg/m².    Intake/Output Summary (Last 24 hours) at 6/18/2024 1536  Last data filed at 6/17/2024 1800  Gross per 24 hour   Intake --   Output 500 ml   Net -500 ml         Physical Exam  Vitals and nursing note reviewed.   Constitutional:       General: He is not in acute distress.     Appearance: He is not toxic-appearing.   HENT:      Head: Normocephalic and atraumatic.      Mouth/Throat:      Mouth: Mucous membranes are moist.      Pharynx: No oropharyngeal exudate.   Eyes:      Extraocular Movements: Extraocular movements intact.      Pupils: Pupils are equal, round, and reactive to light.   Cardiovascular:      Rate and Rhythm: Normal rate.      Heart sounds: No murmur heard.     No friction rub. No gallop.   Pulmonary:      Effort: Pulmonary effort is normal. No respiratory distress.      Breath sounds: No wheezing or rales.   Chest:      Chest wall: No tenderness.   Abdominal:      General: Bowel sounds are normal. There is no distension.      Palpations: Abdomen is soft.      Tenderness: There is no abdominal tenderness. There is no guarding or  "rebound.   Musculoskeletal:         General: No swelling or tenderness.      Cervical back: No rigidity or tenderness.      Right lower leg: No edema.      Left lower leg: No edema.   Skin:     Findings: No erythema.   Neurological:      General: No focal deficit present.      Mental Status: He is alert and oriented to person, place, and time.      Motor: No weakness.      Coordination: Coordination normal.      Gait: Gait normal.   Psychiatric:         Thought Content: Thought content normal.             Significant Labs: All pertinent labs within the past 24 hours have been reviewed.  A1C:   Recent Labs   Lab 01/25/24  0802 05/29/24  0708   HGBA1C 7.9*  7.8* 5.8*     BMP:   Recent Labs   Lab 06/18/24  0317   GLU 95   *   K 4.3   CL 99   CO2 24   BUN 8   CREATININE 1.4   CALCIUM 9.2   MG 2.5     CBC:   Recent Labs   Lab 06/17/24  1345 06/18/24  0317   WBC 11.20 7.22   HGB 10.2* 10.0*   HCT 29.4* 28.8*    372     CMP:   Recent Labs   Lab 06/17/24  1345 06/17/24  1613 06/18/24  0317   * 126* 132*   K 5.1 4.9 4.3   CL 94* 94* 99   CO2 23 24 24   * 110 95   BUN 6* 7* 8   CREATININE 1.4 1.4 1.4   CALCIUM 8.8 8.7 9.2   PROT  --   --  5.7*   ALBUMIN 2.9* 2.8* 2.9*   BILITOT  --   --  0.3   ALKPHOS  --   --  51*   AST  --   --  23   ALT  --   --  18   ANIONGAP 8 8 9     Cardiac Markers: No results for input(s): "CKMB", "MYOGLOBIN", "BNP", "TROPISTAT" in the last 48 hours.  Lipid Panel: No results for input(s): "CHOL", "HDL", "LDLCALC", "TRIG", "CHOLHDL" in the last 48 hours.  Respiratory Culture: No results for input(s): "GSRESP", "RESPIRATORYC" in the last 48 hours.  Troponin: No results for input(s): "TROPONINI", "TROPONINIHS" in the last 48 hours.  TSH:   Recent Labs   Lab 06/15/24  0508   TSH 0.415     Urine Culture: No results for input(s): "LABURIN" in the last 48 hours.  Urine Studies: No results for input(s): "COLORU", "APPEARANCEUA", "PHUR", "SPECGRAV", "PROTEINUA", "GLUCUA", "KETONESU", " ""BILIRUBINUA", "OCCULTUA", "NITRITE", "UROBILINOGEN", "LEUKOCYTESUR", "RBCUA", "WBCUA", "BACTERIA", "SQUAMEPITHEL", "HYALINECASTS" in the last 48 hours.    Invalid input(s): "WRIGHTSUR"  Recent Lab Results  (Last 5 results in the past 24 hours)        06/18/24  1136   06/18/24  0744   06/18/24  0317   06/17/24  2212   06/17/24  2048        Albumin     2.9           ALP     51           ALT     18           Anion Gap     9           AST     23           Baso #     0.03           Basophil %     0.4           BILIRUBIN TOTAL     0.3  Comment: For infants and newborns, interpretation of results should be based  on gestational age, weight and in agreement with clinical  observations.    Premature Infant recommended reference ranges:  Up to 24 hours.............<8.0 mg/dL  Up to 48 hours............<12.0 mg/dL  3-5 days..................<15.0 mg/dL  6-29 days.................<15.0 mg/dL             BUN     8           Calcium     9.2           Chloride     99           CO2     24           Creatinine     1.4           Differential Method     Automated           eGFR     56           Eos #     0.0           Eos %     0.3           Glucose     95           Gran # (ANC)     4.8           Gran %     66.2           Hematocrit     28.8           Hemoglobin     10.0           Immature Grans (Abs)     0.05  Comment: Mild elevation in immature granulocytes is non specific and   can be seen in a variety of conditions including stress response,   acute inflammation, trauma and pregnancy. Correlation with other   laboratory and clinical findings is essential.             Immature Granulocytes     0.7           Lymph #     1.4           Lymph %     19.9           Magnesium      2.5           MCH     28.2           MCHC     34.7           MCV     81           Mono #     0.9           Mono %     12.5           MPV     8.6           nRBC     0           Phosphorus Level     3.7           Platelet Count     372           POCT Glucose " 135   94     162   319       Potassium     4.3           PROTEIN TOTAL     5.7           RBC     3.55           RDW     13.2           Sodium     132           WBC     7.22                                  Significant Imaging: I have reviewed all pertinent imaging results/findings within the past 24 hours.    Assessment/Plan:      * Hyponatremia  Patient has hyponatremia which is uncontrolled,We will aim to correct the sodium by 4-6mEq in 24 hours. We will monitor sodium  every 4 hrs . 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 We will obtain the following studies: Urine sodium, urine osmolality, serum osmolality or TSH, T4. We will treat the hyponatremia with IV fluids as follows: s/p 100 ml of hypertonic saline, Fluid restriction of:  1 liter per day, and Removal of offending medications. The patient's sodium results have been reviewed and are listed below.  Recent Labs   Lab 06/18/24  0317   *       Nephrology consult for acute severe symptomatic hyponatremia   S/p D5W, PRN desmopressin to avoid rapid overcorrection of Na    Off fluids. On fluid restriction  improved    CKD (chronic kidney disease)  Creatine stable for now. BMP reviewed- noted Estimated Creatinine Clearance: 49.8 mL/min (based on SCr of 1.4 mg/dL). according to latest data. Based on current GFR, CKD stage is stage 3 - GFR 30-59.  Monitor UOP and serial BMP and adjust therapy as needed. Renally dose meds. Avoid nephrotoxic medications and procedures.        Acute encephalopathy  Suspect in setting of hyponatremia, acute EPS  CT head showed no acute abn  Improved     Off precedex      Nausea & vomiting  Wife reports hiccups, N/V X 2-3 weeks   Reported to have dark vomitus in the ER  Hb has down trended from 14 in 1/2024 to 9.9    Possible PUD vs gastritis vs other    Continue IV PPI BID  GI consult to consider EGD. Consider CT abdomen/ pelvis       Extrapyramidal  syndrome  Suspect acute EPS from antidopaminergic action of thorazine   Anticholinergics, benzo PRN     Improving        Type 2 diabetes mellitus with hyperglycemia, with long-term current use of insulin  Placed on basal/ bolus insulin  Hypoglycemia protocol       Hypertension  Chronic, uncontrolled. Latest blood pressure and vitals reviewed-     Temp:  [98.8 °F (37.1 °C)-99.1 °F (37.3 °C)]   Pulse:  []   Resp:  [9-35]   BP: (102-163)/(56-89)   SpO2:  [93 %-100 %] .   Home meds for hypertension were reviewed and noted below.   Hypertension Medications               finerenone (KERENDIA) 10 mg Tab Take 1 tablet by mouth once daily.    furosemide (LASIX) 40 MG tablet Take 1 tablet (40 mg total) by mouth daily as needed (swelling).    losartan (COZAAR) 50 MG tablet Take 1 tablet (50 mg total) by mouth once daily.    metoprolol succinate (TOPROL-XL) 200 MG 24 hr tablet Take 1 tablet (200 mg total) by mouth once daily.    NIFEdipine (PROCARDIA-XL) 90 MG (OSM) 24 hr tablet Take 1 tablet (90 mg total) by mouth once daily.            While in the hospital, will manage blood pressure as follows; Adjust home antihypertensive regimen as follows- restart nifedipine, BB    Will utilize p.r.n. blood pressure medication only if patient's blood pressure greater than 180/110 and he develops symptoms such as worsening chest pain or shortness of breath.    Hyperlipidemia          VTE Risk Mitigation (From admission, onward)           Ordered     enoxaparin injection 40 mg  Daily         06/17/24 1117     IP VTE LOW RISK PATIENT  Once         06/17/24 1117     Place sequential compression device  Until discontinued         06/15/24 0652                    Discharge Planning   SAVANAH:      Code Status: Full Code   Is the patient medically ready for discharge?:     Reason for patient still in hospital (select all that apply): Patient trending condition, Consult recommendations, and Pending disposition  Discharge Plan A: Home with  family        Time spent > 35 min          Yair Jurado MD  Department of Hospital Medicine   [facility]

## 2024-06-18 NOTE — SUBJECTIVE & OBJECTIVE
Interval History:   Seen and examined, alert, verbal, stated feeling much better, with sodium level continue trending up, no CP or SOB, no spike of fever.    Review of Systems   Constitutional:  Negative for activity change, appetite change, chills and fever.   Respiratory:  Negative for chest tightness and shortness of breath.    Cardiovascular:  Negative for chest pain and leg swelling.   Gastrointestinal:  Negative for abdominal distention, abdominal pain, nausea and vomiting.   Musculoskeletal:  Negative for arthralgias.   Skin:  Negative for wound.   Neurological:  Negative for speech difficulty, weakness and headaches.   Psychiatric/Behavioral:  Negative for agitation, confusion and hallucinations.    All other systems reviewed and are negative.    Objective:     Vital Signs (Most Recent):  Temp: 98.8 °F (37.1 °C) (06/18/24 1130)  Pulse: 99 (06/18/24 1420)  Resp: (!) 26 (06/18/24 1420)  BP: (!) 151/67 (06/18/24 1420)  SpO2: 99 % (06/18/24 1420) Vital Signs (24h Range):  Temp:  [98.8 °F (37.1 °C)-99.1 °F (37.3 °C)] 98.8 °F (37.1 °C)  Pulse:  [] 99  Resp:  [9-35] 26  SpO2:  [93 %-100 %] 99 %  BP: (102-163)/(56-90) 151/67     Weight: 71 kg (156 lb 8.4 oz)  Body mass index is 24.52 kg/m².    Intake/Output Summary (Last 24 hours) at 6/18/2024 1536  Last data filed at 6/17/2024 1800  Gross per 24 hour   Intake --   Output 500 ml   Net -500 ml         Physical Exam  Vitals and nursing note reviewed.   Constitutional:       General: He is not in acute distress.     Appearance: He is not toxic-appearing.   HENT:      Head: Normocephalic and atraumatic.      Mouth/Throat:      Mouth: Mucous membranes are moist.      Pharynx: No oropharyngeal exudate.   Eyes:      Extraocular Movements: Extraocular movements intact.      Pupils: Pupils are equal, round, and reactive to light.   Cardiovascular:      Rate and Rhythm: Normal rate.      Heart sounds: No murmur heard.     No friction rub. No gallop.   Pulmonary:       "Effort: Pulmonary effort is normal. No respiratory distress.      Breath sounds: No wheezing or rales.   Chest:      Chest wall: No tenderness.   Abdominal:      General: Bowel sounds are normal. There is no distension.      Palpations: Abdomen is soft.      Tenderness: There is no abdominal tenderness. There is no guarding or rebound.   Musculoskeletal:         General: No swelling or tenderness.      Cervical back: No rigidity or tenderness.      Right lower leg: No edema.      Left lower leg: No edema.   Skin:     Findings: No erythema.   Neurological:      General: No focal deficit present.      Mental Status: He is alert and oriented to person, place, and time.      Motor: No weakness.      Coordination: Coordination normal.      Gait: Gait normal.   Psychiatric:         Thought Content: Thought content normal.             Significant Labs: All pertinent labs within the past 24 hours have been reviewed.  A1C:   Recent Labs   Lab 01/25/24  0802 05/29/24  0708   HGBA1C 7.9*  7.8* 5.8*     BMP:   Recent Labs   Lab 06/18/24  0317   GLU 95   *   K 4.3   CL 99   CO2 24   BUN 8   CREATININE 1.4   CALCIUM 9.2   MG 2.5     CBC:   Recent Labs   Lab 06/17/24  1345 06/18/24  0317   WBC 11.20 7.22   HGB 10.2* 10.0*   HCT 29.4* 28.8*    372     CMP:   Recent Labs   Lab 06/17/24  1345 06/17/24  1613 06/18/24  0317   * 126* 132*   K 5.1 4.9 4.3   CL 94* 94* 99   CO2 23 24 24   * 110 95   BUN 6* 7* 8   CREATININE 1.4 1.4 1.4   CALCIUM 8.8 8.7 9.2   PROT  --   --  5.7*   ALBUMIN 2.9* 2.8* 2.9*   BILITOT  --   --  0.3   ALKPHOS  --   --  51*   AST  --   --  23   ALT  --   --  18   ANIONGAP 8 8 9     Cardiac Markers: No results for input(s): "CKMB", "MYOGLOBIN", "BNP", "TROPISTAT" in the last 48 hours.  Lipid Panel: No results for input(s): "CHOL", "HDL", "LDLCALC", "TRIG", "CHOLHDL" in the last 48 hours.  Respiratory Culture: No results for input(s): "GSRESP", "RESPIRATORYC" in the last 48 " "hours.  Troponin: No results for input(s): "TROPONINI", "TROPONINIHS" in the last 48 hours.  TSH:   Recent Labs   Lab 06/15/24  0508   TSH 0.415     Urine Culture: No results for input(s): "LABURIN" in the last 48 hours.  Urine Studies: No results for input(s): "COLORU", "APPEARANCEUA", "PHUR", "SPECGRAV", "PROTEINUA", "GLUCUA", "KETONESU", "BILIRUBINUA", "OCCULTUA", "NITRITE", "UROBILINOGEN", "LEUKOCYTESUR", "RBCUA", "WBCUA", "BACTERIA", "SQUAMEPITHEL", "HYALINECASTS" in the last 48 hours.    Invalid input(s): "WRIGHTSUR"  Recent Lab Results  (Last 5 results in the past 24 hours)        06/18/24  1136   06/18/24  0744   06/18/24  0317   06/17/24  2212   06/17/24  2048        Albumin     2.9           ALP     51           ALT     18           Anion Gap     9           AST     23           Baso #     0.03           Basophil %     0.4           BILIRUBIN TOTAL     0.3  Comment: For infants and newborns, interpretation of results should be based  on gestational age, weight and in agreement with clinical  observations.    Premature Infant recommended reference ranges:  Up to 24 hours.............<8.0 mg/dL  Up to 48 hours............<12.0 mg/dL  3-5 days..................<15.0 mg/dL  6-29 days.................<15.0 mg/dL             BUN     8           Calcium     9.2           Chloride     99           CO2     24           Creatinine     1.4           Differential Method     Automated           eGFR     56           Eos #     0.0           Eos %     0.3           Glucose     95           Gran # (ANC)     4.8           Gran %     66.2           Hematocrit     28.8           Hemoglobin     10.0           Immature Grans (Abs)     0.05  Comment: Mild elevation in immature granulocytes is non specific and   can be seen in a variety of conditions including stress response,   acute inflammation, trauma and pregnancy. Correlation with other   laboratory and clinical findings is essential.             Immature Granulocytes     " 0.7           Lymph #     1.4           Lymph %     19.9           Magnesium      2.5           MCH     28.2           MCHC     34.7           MCV     81           Mono #     0.9           Mono %     12.5           MPV     8.6           nRBC     0           Phosphorus Level     3.7           Platelet Count     372           POCT Glucose 135   94     162   319       Potassium     4.3           PROTEIN TOTAL     5.7           RBC     3.55           RDW     13.2           Sodium     132           WBC     7.22                                  Significant Imaging: I have reviewed all pertinent imaging results/findings within the past 24 hours.

## 2024-06-18 NOTE — PLAN OF CARE
Problem: Adult Inpatient Plan of Care  Goal: Plan of Care Review  Outcome: Progressing     Problem: Adult Inpatient Plan of Care  Goal: Patient-Specific Goal (Individualized)  Outcome: Progressing

## 2024-06-18 NOTE — PROVATION PATIENT INSTRUCTIONS
Discharge Summary/Instructions after an Endoscopic Procedure  Patient Name: Angel Bell  Patient MRN: [id number]  Patient YOB: 1960 Tuesday, June 18, 2024  Mik Benítez MD  Dear patient,  As a result of recent federal legislation (The Federal Cures Act), you may   receive lab or pathology results from your procedure in your [facility]   account before your physician is able to contact you. Your physician or   their representative will relay the results to you with their   recommendations at their soonest availability.  Thank you,  Your health is very important to us during the Covid Crisis. Following your   procedure today, you will receive a daily text for 2 weeks asking about   signs or symptoms of Covid 19.  Please respond to this text when you   receive it so we can follow up and keep you as safe as possible.   RESTRICTIONS:  During your procedure today, you received medications for sedation.  These   medications may affect your judgment, balance and coordination.  Therefore,   for 24 hours, you have the following restrictions:   - DO NOT drive a car, operate machinery, make legal/financial decisions,   sign important papers or drink alcohol.    ACTIVITY:  Today: no heavy lifting, straining or running due to procedural   sedation/anesthesia.  The following day: return to full activity including work.  DIET:  Eat and drink normally unless instructed otherwise.     TREATMENT FOR COMMON SIDE EFFECTS:  - Mild abdominal pain, nausea, belching, bloating or excessive gas:  rest,   eat lightly and use a heating pad.  - Sore Throat: treat with throat lozenges and/or gargle with warm salt   water.  - Because air was used during the procedure, expelling large amounts of air   from your rectum or belching is normal.  - If a bowel prep was taken, you may not have a bowel movement for 1-3 days.    This is normal.  SYMPTOMS TO WATCH FOR AND REPORT TO YOUR PHYSICIAN:  1. Abdominal pain or bloating, other  than gas cramps.  2. Chest pain.  3. Back pain.  4. Signs of infection such as: chills or fever occurring within 24 hours   after the procedure.  5. Rectal bleeding, which would show as bright red, maroon, or black stools.   (A tablespoon of blood from the rectum is not serious, especially if   hemorrhoids are present.)  6. Vomiting.  7. Weakness or dizziness.  GO DIRECTLY TO THE NEAREST EMERGENCY ROOM IF YOU HAVE ANY OF THE FOLLOWING:      Difficulty breathing              Chills and/or fever over 101 F   Persistent vomiting and/or vomiting blood   Severe abdominal pain   Severe chest pain   Black, tarry stools   Bleeding- more than one tablespoon   Any other symptom or condition that you feel may need urgent attention  Your doctor recommends these additional instructions:  If any biopsies were taken, your doctors clinic will contact you in 1 to 2   weeks with any results.  - Return patient to ICU for ongoing care.   - Advance diet as tolerated.   - Continue present medications.   - Use Protonix (pantoprazole) 40 mg PO BID for 8 weeks.   - Repeat upper endoscopy in 8 weeks to check healing.   - Observe patient's clinical course.  For questions, problems or results please call your physician - Mik Benítez MD.  EMERGENCY PHONE NUMBER: [phone],  LAB RESULTS: [phone]  IF A COMPLICATION OR EMERGENCY SITUATION ARISES AND YOU ARE UNABLE TO REACH   YOUR PHYSICIAN - GO DIRECTLY TO THE EMERGENCY ROOM.  Mik Benítez MD  6/18/2024 1:45:34 PM  This report has been verified and signed electronically.  Dear patient,  As a result of recent federal legislation (The Federal Cures Act), you may   receive lab or pathology results from your procedure in your [facility]   account before your physician is able to contact you. Your physician or   their representative will relay the results to you with their   recommendations at their soonest availability.  Thank you,  PROVATION

## 2024-06-18 NOTE — ANESTHESIA PREPROCEDURE EVALUATION
06/18/2024  Angel Bell is a 63 y.o., male.      Pre-op Assessment     I have reviewed the Nursing Notes.    I have reviewed the Medications.     Review of Systems  Anesthesia Hx:  No problems with previous Anesthesia             Denies Family Hx of Anesthesia complications.     Social:  Non-Smoker, No Alcohol Use       Hematology/Oncology:  Hematology Normal   Oncology Normal                                   EENT/Dental:  EENT/Dental Normal           Cardiovascular:  Exercise tolerance: good   Hypertension                                  Hypertension         Pulmonary:  Pulmonary Normal                       Renal/:  Chronic Renal Disease        Kidney Function/Disease             Hepatic/GI:  Hepatic/GI Normal                 Musculoskeletal:  Musculoskeletal Normal                Neurological:      Headaches      Dx of Headaches                           Endocrine:  Diabetes    Diabetes                      Psych:  Psychiatric History                  Physical Exam  General: Well nourished    Airway:  Mallampati: II / II  Mouth Opening: Normal  TM Distance: Normal  Tongue: Normal  Neck ROM: Normal ROM    Dental:  Intact        Anesthesia Plan  Type of Anesthesia, risks & benefits discussed:    Anesthesia Type: Gen ETT  Intra-op Monitoring Plan: Standard ASA Monitors  Post Op Pain Control Plan: multimodal analgesia  Induction:  IV  Airway Plan: Direct, Post-Induction  Informed Consent: Informed consent signed with the Patient and all parties understand the risks and agree with anesthesia plan.  All questions answered.   ASA Score: 2    Ready For Surgery From Anesthesia Perspective.     .

## 2024-06-18 NOTE — PLAN OF CARE
Problem: Occupational Therapy  Goal: Occupational Therapy Goal  Description: Goals to be met by: 07/18/2024      Patient will increase functional independence with ADLs by performing:    UE Dressing with Modified [place].  LE Dressing with Modified [place].  Grooming while standing with Modified [place].  Toileting from toilet with Modified [place] for hygiene and clothing management.   Toilet transfer to toilet with Modified [place].  Increased functional strength to WFL for self care.  Upper extremity exercise program x10 reps per handout, with independence.     Outcome: Progressing    Pt would benefit from continued OT to address deficits in self care and functional mobility. Recommending low intensity therapy with adequate family support; DME needs TBD pending progress with therapies but may benefit from RW and SC

## 2024-06-18 NOTE — NURSING TRANSFER
Nursing Transfer Note      6/18/2024   2:41 PM    Nurse giving handoff: Krystina  Nurse receiving handoff:Jaquan    Transfer To: [id number]    Transfer via bed    Transfer with cardiac monitoring    Transported by ROSEMARY Dove    Transfer Vital Signs:  Blood Pressure: 151/67  Heart Rate: 99  O2: 99%  Respirations: 26    Order for Tele Monitor? Yes    Medicines sent: Mupirocin    Patient belongings transferred with patient: Yes    Chart sent with patient: Yes    Notified: family at bedside    Upon arrival to floor: Charge ROSEMARY Hernandez notified. Cardiac monitor applied, patient oriented to room, call bell in reach, and bed in lowest position.

## 2024-06-18 NOTE — HOSPITAL COURSE
6/18/24 improvement of sodium level, as well as mental state   63-year-old male with PMH of type 2 diabetes mellitus, CKD, hypertension was admitted for altered mental status. He was found to have hyponatremia due water intoxication. He had a CT head which showed No definite acute internal hemorrhage, mass effect, or midline shift. Nonspecific partially empty sella configuration questionable flattening of the posterior globes. Findings likely present on remote CT performed 07/03/2016, however may be seen the setting of idiopathic intracranial hypertension.Nephrology was consulted and was placed on electrolytes replacement. Serial routine labs done with creatine still at 1.5 prior DC and anemia. GI was consulted and underwent EGD Medium-sized hiatal hernia. LA Grade D esophagitis with no bleeding. Recommendation for PPI for 8 weeks and return to [facility] for repeat EGD in 8 weeksHe was evaluated by PT and tolerate well. He is stable for DC home follow with PCP and GI as outpatient

## 2024-06-18 NOTE — TRANSFER OF CARE
"Anesthesia Transfer of Care Note    Patient: Angel Bell    Procedure(s) Performed: Procedure(s) (LRB):  EGD (ESOPHAGOGASTRODUODENOSCOPY) (N/A)    Patient location: ICU    Anesthesia Type: general    Transport from OR: Transported from OR on room air with adequate spontaneous ventilation    Post pain: adequate analgesia    Post assessment: no apparent anesthetic complications and tolerated procedure well    Post vital signs: stable    Level of consciousness: awake and alert    Nausea/Vomiting: no nausea/vomiting    Complications: none    Transfer of care protocol was followed      Last vitals: Visit Vitals  BP (!) 142/68 (BP Location: Right arm, Patient Position: Lying)   Pulse 86   Temp 37.1 °C (98.8 °F) (Oral)   Resp 20   Ht 5' 7" (1.702 m)   Wt 71 kg (156 lb 8.4 oz)   SpO2 99%   BMI 24.52 kg/m²     "

## 2024-06-18 NOTE — ASSESSMENT & PLAN NOTE
Chronic, uncontrolled. Latest blood pressure and vitals reviewed-     Temp:  [98.8 °F (37.1 °C)-99.1 °F (37.3 °C)]   Pulse:  []   Resp:  [9-35]   BP: (102-163)/(56-89)   SpO2:  [93 %-100 %] .   Home meds for hypertension were reviewed and noted below.   Hypertension Medications               finerenone (KERENDIA) 10 mg Tab Take 1 tablet by mouth once daily.    furosemide (LASIX) 40 MG tablet Take 1 tablet (40 mg total) by mouth daily as needed (swelling).    losartan (COZAAR) 50 MG tablet Take 1 tablet (50 mg total) by mouth once daily.    metoprolol succinate (TOPROL-XL) 200 MG 24 hr tablet Take 1 tablet (200 mg total) by mouth once daily.    NIFEdipine (PROCARDIA-XL) 90 MG (OSM) 24 hr tablet Take 1 tablet (90 mg total) by mouth once daily.            While in the hospital, will manage blood pressure as follows; Adjust home antihypertensive regimen as follows- restart nifedipine, BB    Will utilize p.r.n. blood pressure medication only if patient's blood pressure greater than 180/110 and he develops symptoms such as worsening chest pain or shortness of breath.

## 2024-06-18 NOTE — ASSESSMENT & PLAN NOTE
Patient has hyponatremia which is uncontrolled,We will aim to correct the sodium by 4-6mEq in 24 hours. We will monitor sodium  every 4 hrs . 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 We will obtain the following studies: Urine sodium, urine osmolality, serum osmolality or TSH, T4. We will treat the hyponatremia with IV fluids as follows: s/p 100 ml of hypertonic saline, Fluid restriction of:  1 liter per day, and Removal of offending medications. The patient's sodium results have been reviewed and are listed below.  Recent Labs   Lab 06/18/24  0317   *       Nephrology consult for acute severe symptomatic hyponatremia   S/p D5W, PRN desmopressin to avoid rapid overcorrection of Na    Off fluids. On fluid restriction  improved

## 2024-06-18 NOTE — PLAN OF CARE
Problem: Physical Therapy  Goal: Physical Therapy Goal  Description: Goals to be met by: 2024     Patient will increase functional independence with mobility by performin. Supine to sit with Modified [place]  2. Sit to supine with Modified [place]  3. Sit to stand transfer with Modified [place]  4. Bed to chair transfer with Modified [place]   5. Gait  x 150 feet with Modified [place] with or without AD.   6. Ascend/Descend 4 inch curb step with Modified [place] with or without AD.  7. Lower extremity exercise program x10 reps with independence    Outcome: Progressing   Patient evaluated by PT/OT in consideration of ICU setting, for improved outcomes and safety; patient tolerated limited eval in ICU setting, amb at first with RW and CGA, then limited stepping at EOB forward/backward/side stepping and turning without RW and close CGA; recommending low intensity therapy with adequate family support; DME needs TBD pending progress with therapies but may benefit from RW and SC.

## 2024-06-18 NOTE — ANESTHESIA POSTPROCEDURE EVALUATION
Anesthesia Post Evaluation    Patient: Angel Bell    Procedure(s) Performed: Procedure(s) (LRB):  EGD (ESOPHAGOGASTRODUODENOSCOPY) (N/A)    Final Anesthesia Type: general      Patient location during evaluation: GI PACU  Patient participation: Yes- Able to Participate  Level of consciousness: awake and alert  Post-procedure vital signs: reviewed and stable  Pain management: adequate  Airway patency: patent    PONV status at discharge: No PONV  Anesthetic complications: no      Cardiovascular status: blood pressure returned to baseline and hemodynamically stable  Respiratory status: unassisted  Hydration status: euvolemic  Follow-up not needed.              Vitals Value Taken Time   BP  06/18/24 1404   Temp  06/18/24 1404   Pulse 108 06/18/24 1404   Resp 38 06/18/24 1404   SpO2 99 % 06/18/24 1404   Vitals shown include unfiled device data.      No case tracking events are documented in the log.      Pain/Lobo Score: No data recorded

## 2024-06-18 NOTE — PROGRESS NOTES
Nephrology Progress Note       Consult Requested By: Yair Jurado MD  Reason for Consult: Hyponatremia      SUBJECTIVE:          Review of Systems   Constitutional:  Negative for chills and fever.   HENT:  Negative for congestion and sore throat.    Eyes:  Negative for blurred vision, double vision and photophobia.   Respiratory:  Negative for cough and shortness of breath.    Cardiovascular:  Negative for chest pain, palpitations and leg swelling.   Gastrointestinal:  Negative for abdominal pain, diarrhea, nausea and vomiting.   Genitourinary:  Negative for dysuria and urgency.   Musculoskeletal:  Negative for joint pain and myalgias.   Skin:  Negative for itching and rash.   Neurological:  Negative for dizziness, sensory change, weakness and headaches.   Endo/Heme/Allergies:  Negative for polydipsia. Does not bruise/bleed easily.   Psychiatric/Behavioral:  Negative for depression.        Past Medical History:   Diagnosis Date    Diabetes mellitus     Disorder of kidney and ureter     Hyperlipemia     Hypertension     Iron deficiency anemia     Migraine headache     PTSD (post-traumatic stress disorder)     Stage 3a chronic kidney disease         OBJECTIVE:     Vital Signs (Most Recent)  Vitals:    06/18/24 0900 06/18/24 1000 06/18/24 1100 06/18/24 1130   BP: 125/65 (!) 148/89 128/73    BP Location: Right arm Right arm Right arm    Patient Position: Lying Lying Lying    Pulse: 96 89 90    Resp: (!) 25 20 14    Temp:    98.8 °F (37.1 °C)   TempSrc:    Oral   SpO2: 99% 100% 97%    Weight:       Height:                       Medications:   baclofen  5 mg Oral TID    enoxparin  40 mg Subcutaneous Daily    insulin glargine U-100  5 Units Subcutaneous QHS    metoprolol succinate  200 mg Oral Daily    mupirocin   Nasal BID    NIFEdipine  60 mg Oral Daily           Physical Exam  Vitals and nursing note reviewed.   Constitutional:       General: He is not in acute distress.     Appearance: He is not diaphoretic.    HENT:      Head: Normocephalic and atraumatic.      Mouth/Throat:      Pharynx: No oropharyngeal exudate.   Eyes:      General: No scleral icterus.     Conjunctiva/sclera: Conjunctivae normal.      Pupils: Pupils are equal, round, and reactive to light.   Cardiovascular:      Rate and Rhythm: Normal rate and regular rhythm.      Heart sounds: Normal heart sounds. No murmur heard.  Pulmonary:      Effort: Pulmonary effort is normal. No respiratory distress.      Breath sounds: Normal breath sounds.   Abdominal:      General: Bowel sounds are normal. There is no distension.      Palpations: Abdomen is soft.      Tenderness: There is no abdominal tenderness.   Musculoskeletal:         General: Normal range of motion.      Cervical back: Normal range of motion and neck supple.   Skin:     General: Skin is warm and dry.      Findings: No erythema.   Neurological:      Mental Status: He is disoriented.      Cranial Nerves: No cranial nerve deficit.      Comments:     Psychiatric:         Thought Content: Thought content normal.         Laboratory:  Recent Labs   Lab 06/15/24  0508 06/16/24  0228 06/17/24  1345 06/18/24  0317   WBC 12.25 8.29 11.20 7.22   HGB 9.9* 8.8* 10.2* 10.0*   HCT 27.9* 25.0* 29.4* 28.8*    318 362 372   MONO 11.7  1.4* 13.8  1.1*  --  12.5  0.9   EOSINOPHIL 0.1 0.1  --  0.3     Recent Labs   Lab 06/17/24  1345 06/17/24  1613 06/18/24  0317   * 126* 132*   K 5.1 4.9 4.3   CL 94* 94* 99   CO2 23 24 24   BUN 6* 7* 8   CREATININE 1.4 1.4 1.4   CALCIUM 8.8 8.7 9.2   PHOS 2.9 2.9 3.7       Diagnostic Results:  X-Ray: Reviewed  US: Reviewed  Echo: Reviewed  ASSESSMENT/PLAN:      Hyponatremia - water intoxication in CKD3 settings was drinking a lots of sugar free drinks  and water to help with his hiccups. The  day prior his admission per his wife  had more than 10 L total which most likely caused his Na+ drop    -- 6/15 Sever Symptomatic Hyponatremia - Na+ 113 to 115 after NS 1L bolus,   goal Na + correct 6mEq  STAT due to symptom then 6-8 every Q24hr however corrected to 124 in PM D5 % increased to 250 cc/hr 6/16   DDAVP was ordered at 8AM given dose of DDAVP at 9:43  repeat na+ 131 Give bolus of D10 % 250 cc and resume D5% 250 cc/hr repeat labs   repeat dose of DDAVP   UOP seems drop as anticipated after DDAVP   6/17  and today Na+slowly correcting as anticipated  let self correct now 132   -- AMS resolved   -- Labs daily            CKD3 Follows with Dr Will   -- Cr at baseline 1.7 -1.9  now 1.4   -- Renally dose all meds  -- Please avoid nephrotoxins, including NSAIDs, aminoglycosides, IV contrast (unless absolutely necessary), gadolinium, fleets and other phosphorous-based laxatives. Caution with antibiotics.     HTN  -   controlled   Resume home BP meds    Anemia of chronic kidney disease      Recent Labs   Lab 06/16/24  0228 06/17/24  1345 06/18/24  0317   HGB 8.8* 10.2* 10.0*   HCT 25.0* 29.4* 28.8*    362 372       Iron   Lab Results   Component Value Date    IRON 104 01/25/2024    TIBC 343 01/25/2024    FERRITIN 469 (H) 01/25/2024         MBD  - PTH  slightly up  154   Had Hypophosphatemia/Hypokalemia  - replaced    all stable   Recent Labs   Lab 06/18/24  0317   CALCIUM 9.2   PHOS 3.7     Recent Labs   Lab 06/16/24  0228 06/18/24  0317   MG 2.4 2.5       Lab Results   Component Value Date    .1 (H) 05/29/2024    CALCIUM 9.2 06/18/2024    PHOS 3.7 06/18/2024     Lab Results   Component Value Date    HDYYMQAT27LN 20 (L) 01/25/2024     Acidosis   -- resolved   Lab Results   Component Value Date    CO2 24 06/18/2024      Nutrition/Hypoalbuminemia    Recent Labs   Lab 06/17/24  1613 06/18/24  0317   ALBUMIN 2.8* 2.9*     Nepro with meals TID.          Thank you for allowing me to participate in care of your patient  With any question please call   Judson Curiel MD     [facility]  PHONG Asher MD,   MD LENKA Middleton MD E. V. Harmon, NP  [address]   [place], [address]  [phone]  After hours answering service: [phone]

## 2024-06-19 VITALS
OXYGEN SATURATION: 97 % | DIASTOLIC BLOOD PRESSURE: 77 MMHG | HEART RATE: 89 BPM | TEMPERATURE: 97 F | BODY MASS INDEX: 24.56 KG/M2 | SYSTOLIC BLOOD PRESSURE: 120 MMHG | RESPIRATION RATE: 18 BRPM | HEIGHT: 67 IN | WEIGHT: 156.5 LBS

## 2024-06-19 LAB
ANION GAP SERPL CALC-SCNC: 10 MMOL/L (ref 8–16)
BASOPHILS # BLD AUTO: 0.03 K/UL (ref 0–0.2)
BASOPHILS NFR BLD: 0.4 % (ref 0–1.9)
BUN SERPL-MCNC: 11 MG/DL (ref 8–23)
CALCIUM SERPL-MCNC: 9.3 MG/DL (ref 8.7–10.5)
CHLORIDE SERPL-SCNC: 104 MMOL/L (ref 95–110)
CO2 SERPL-SCNC: 21 MMOL/L (ref 23–29)
CREAT SERPL-MCNC: 1.5 MG/DL (ref 0.5–1.4)
DIFFERENTIAL METHOD BLD: ABNORMAL
EOSINOPHIL # BLD AUTO: 0 K/UL (ref 0–0.5)
EOSINOPHIL NFR BLD: 0.4 % (ref 0–8)
ERYTHROCYTE [DISTWIDTH] IN BLOOD BY AUTOMATED COUNT: 13.8 % (ref 11.5–14.5)
ERYTHROCYTE [DISTWIDTH] IN BLOOD BY AUTOMATED COUNT: 13.8 % (ref 11.5–14.5)
EST. GFR  (NO RACE VARIABLE): 52 ML/MIN/1.73 M^2
GLUCOSE SERPL-MCNC: 84 MG/DL (ref 70–110)
HCT VFR BLD AUTO: 27.7 % (ref 40–54)
HCT VFR BLD AUTO: 27.7 % (ref 40–54)
HGB BLD-MCNC: 9.4 G/DL (ref 14–18)
HGB BLD-MCNC: 9.4 G/DL (ref 14–18)
IMM GRANULOCYTES # BLD AUTO: 0.07 K/UL (ref 0–0.04)
IMM GRANULOCYTES NFR BLD AUTO: 0.9 % (ref 0–0.5)
IRON SERPL-MCNC: 42 UG/DL (ref 45–160)
LYMPHOCYTES # BLD AUTO: 1.9 K/UL (ref 1–4.8)
LYMPHOCYTES NFR BLD: 25.1 % (ref 18–48)
MCH RBC QN AUTO: 28.2 PG (ref 27–31)
MCH RBC QN AUTO: 28.2 PG (ref 27–31)
MCHC RBC AUTO-ENTMCNC: 33.9 G/DL (ref 32–36)
MCHC RBC AUTO-ENTMCNC: 33.9 G/DL (ref 32–36)
MCV RBC AUTO: 83 FL (ref 82–98)
MCV RBC AUTO: 83 FL (ref 82–98)
MONOCYTES # BLD AUTO: 0.9 K/UL (ref 0.3–1)
MONOCYTES NFR BLD: 11.7 % (ref 4–15)
NEUTROPHILS # BLD AUTO: 4.7 K/UL (ref 1.8–7.7)
NEUTROPHILS NFR BLD: 61.5 % (ref 38–73)
NRBC BLD-RTO: 0 /100 WBC
PLATELET # BLD AUTO: 400 K/UL (ref 150–450)
PLATELET # BLD AUTO: 400 K/UL (ref 150–450)
PMV BLD AUTO: 9.1 FL (ref 9.2–12.9)
PMV BLD AUTO: 9.1 FL (ref 9.2–12.9)
POCT GLUCOSE: 106 MG/DL (ref 70–110)
POCT GLUCOSE: 122 MG/DL (ref 70–110)
POCT GLUCOSE: 80 MG/DL (ref 70–110)
POTASSIUM SERPL-SCNC: 4.3 MMOL/L (ref 3.5–5.1)
RBC # BLD AUTO: 3.33 M/UL (ref 4.6–6.2)
RBC # BLD AUTO: 3.33 M/UL (ref 4.6–6.2)
SATURATED IRON: 18 % (ref 20–50)
SODIUM SERPL-SCNC: 135 MMOL/L (ref 136–145)
TOTAL IRON BINDING CAPACITY: 235 UG/DL (ref 250–450)
TRANSFERRIN SERPL-MCNC: 159 MG/DL (ref 200–375)
WBC # BLD AUTO: 7.61 K/UL (ref 3.9–12.7)
WBC # BLD AUTO: 7.61 K/UL (ref 3.9–12.7)

## 2024-06-19 PROCEDURE — 36415 COLL VENOUS BLD VENIPUNCTURE: CPT | Performed by: FAMILY MEDICINE

## 2024-06-19 PROCEDURE — 25000003 PHARM REV CODE 250: Performed by: STUDENT IN AN ORGANIZED HEALTH CARE EDUCATION/TRAINING PROGRAM

## 2024-06-19 PROCEDURE — 25000003 PHARM REV CODE 250: Performed by: FAMILY MEDICINE

## 2024-06-19 PROCEDURE — 97116 GAIT TRAINING THERAPY: CPT | Mod: CQ

## 2024-06-19 PROCEDURE — 97530 THERAPEUTIC ACTIVITIES: CPT | Mod: CO

## 2024-06-19 PROCEDURE — 80048 BASIC METABOLIC PNL TOTAL CA: CPT | Performed by: FAMILY MEDICINE

## 2024-06-19 PROCEDURE — 84466 ASSAY OF TRANSFERRIN: CPT | Performed by: FAMILY MEDICINE

## 2024-06-19 PROCEDURE — 97535 SELF CARE MNGMENT TRAINING: CPT | Mod: CO

## 2024-06-19 PROCEDURE — 25000003 PHARM REV CODE 250

## 2024-06-19 PROCEDURE — 97110 THERAPEUTIC EXERCISES: CPT | Mod: CQ

## 2024-06-19 PROCEDURE — 85025 COMPLETE CBC W/AUTO DIFF WBC: CPT | Performed by: STUDENT IN AN ORGANIZED HEALTH CARE EDUCATION/TRAINING PROGRAM

## 2024-06-19 RX ORDER — PANTOPRAZOLE SODIUM 40 MG/1
40 TABLET, DELAYED RELEASE ORAL DAILY
Qty: 30 TABLET | Refills: 3 | Status: SHIPPED | OUTPATIENT
Start: 2024-06-19 | End: 2024-06-19

## 2024-06-19 RX ORDER — LANOLIN ALCOHOL/MO/W.PET/CERES
1 CREAM (GRAM) TOPICAL DAILY
Status: DISCONTINUED | OUTPATIENT
Start: 2024-06-19 | End: 2024-06-19 | Stop reason: HOSPADM

## 2024-06-19 RX ORDER — FERROUS SULFATE 325(65) MG
325 TABLET, DELAYED RELEASE (ENTERIC COATED) ORAL DAILY
Qty: 30 TABLET | Refills: 0 | Status: SHIPPED | OUTPATIENT
Start: 2024-06-19

## 2024-06-19 RX ORDER — PANTOPRAZOLE SODIUM 40 MG/1
40 TABLET, DELAYED RELEASE ORAL 2 TIMES DAILY
Status: DISCONTINUED | OUTPATIENT
Start: 2024-06-19 | End: 2024-06-19 | Stop reason: HOSPADM

## 2024-06-19 RX ORDER — PANTOPRAZOLE SODIUM 40 MG/1
40 TABLET, DELAYED RELEASE ORAL 2 TIMES DAILY
Qty: 60 TABLET | Refills: 1 | Status: SHIPPED | OUTPATIENT
Start: 2024-06-19 | End: 2025-06-19

## 2024-06-19 RX ADMIN — BACLOFEN 5 MG: 5 TABLET ORAL at 03:06

## 2024-06-19 RX ADMIN — NIFEDIPINE 60 MG: 60 TABLET, FILM COATED, EXTENDED RELEASE ORAL at 09:06

## 2024-06-19 RX ADMIN — FERROUS SULFATE TAB 325 MG (65 MG ELEMENTAL FE) 1 EACH: 325 (65 FE) TAB at 01:06

## 2024-06-19 RX ADMIN — BACLOFEN 5 MG: 5 TABLET ORAL at 09:06

## 2024-06-19 RX ADMIN — PANTOPRAZOLE SODIUM 40 MG: 40 TABLET, DELAYED RELEASE ORAL at 11:06

## 2024-06-19 RX ADMIN — MUPIROCIN: 20 OINTMENT TOPICAL at 09:06

## 2024-06-19 RX ADMIN — METOPROLOL SUCCINATE 200 MG: 50 TABLET, EXTENDED RELEASE ORAL at 09:06

## 2024-06-19 NOTE — PLAN OF CARE
Problem: Physical Therapy  Goal: Physical Therapy Goal  Description: Goals to be met by: 2024     Patient will increase functional independence with mobility by performin. Supine to sit with Modified [place]  2. Sit to supine with Modified [place]  3. Sit to stand transfer with Modified [place]  4. Bed to chair transfer with Modified [place]   5. Gait  x 150 feet with Modified [place] with or without AD.   6. Ascend/Descend 4 inch curb step with Modified [place] with or without AD.  7. Lower extremity exercise program x10 reps with independence    Outcome: Progressing   Pt continues to work toward all goals. Pt able to perform ambulation training ~80-90 ft with RW requiring CGA/SBA.

## 2024-06-19 NOTE — PLAN OF CARE
Problem: Fall Injury Risk  Goal: Absence of Fall and Fall-Related Injury  Outcome: Progressing     Problem: Infection  Goal: Absence of Infection Signs and Symptoms  Outcome: Progressing     Problem: Adult Inpatient Plan of Care  Goal: Absence of Hospital-Acquired Illness or Injury  Outcome: Progressing     Problem: Electrolyte Imbalance  Goal: Electrolyte Balance  Outcome: Progressing     Problem: Comorbidity Management  Goal: Blood Pressure in Desired Range  Outcome: Progressing

## 2024-06-19 NOTE — PLAN OF CARE
Introduced as VN and will be reviewing discharge instructions.  Educated patient on reason for admission, home medication list, and discharge instructions including when to return to ED and the following doctor appointments.  Education per flowsheet.  Opportunity given for questions and questions answered.  Nurse  notified of   completion of discharge education.Patient is waiting for a wheelchair

## 2024-06-19 NOTE — NURSING
Received report from Nakita, received the patient lying supine awake and alert, bed locked in low with alarm on and call light in the patients right hand. Instructed to call for assistance.

## 2024-06-19 NOTE — PT/OT/SLP PROGRESS
Occupational Therapy   Treatment    Name: Angel Bell  MRN: [id number]  Admitting Diagnosis:  Hyponatremia  1 Day Post-Op    Recommendations:     Discharge Recommendations: Low Intensity Therapy (assist from spouse)  Discharge Equipment Recommendations:  walker, rolling, shower chair  Barriers to discharge:  Decreased caregiver support, Other (Comment) (Increased level of assistance)    Assessment:     Angel Bell is a 63 y.o. male with a medical diagnosis of Hyponatremia.  Performance deficits affecting function are weakness, impaired endurance, impaired self care skills, impaired functional mobility, gait instability, impaired balance, decreased lower extremity function, decreased safety awareness, impaired coordination.     Pt found in chair, agreeable to therapy.  Pt with good participation with therapy and progressing towards goals.  However, pt with increased HR (155) during activity requiring minimal exertion. Nurse notified therapy during session. Pt reports no symptoms and feeling good. Continue OT services to address functional goals, progressing as able.      Rehab Prognosis:  Good; patient would benefit from acute skilled OT services to address these deficits and reach maximum level of function.       Plan:     Patient to be seen 3 x/week to address the above listed problems via self-care/home management, therapeutic exercises, therapeutic activities  Plan of Care Expires: 07/18/24  Plan of Care Reviewed with: patient    Subjective     Chief Complaint: no complaints   Patient/Family Comments/goals: ready to go home   Pain/Comfort:  Pain Rating 1: 0/10  Pain Rating Post-Intervention 1: 0/10    Objective:     Communicated with: nurse prior to session.  Patient found up in chair with bed alarm, peripheral IV, telemetry upon OT entry to room.    General Precautions: Standard, fall    Orthopedic Precautions:N/A  Braces: N/A  Respiratory Status: Room air     Occupational Performance:       Functional  Mobility/Transfers:  Patient completed Sit <> Stand Transfer with stand by assistance  with  no assistive device   Patient completed Bed <> Chair Transfer using Stand Pivot technique with stand by assistance and contact guard assistance with no assistive device  Patient completed Toilet Transfer Stand Pivot technique with stand by assistance and contact guard assistance with  no AD  Functional Mobility: Pt ambulated room distances with SBA/CGA w/o AD. Pt with 1 bout of lateral LOB within first 2 steps requiring CGA to prevent fall.     Activities of Daily Living:  Grooming: modified independence SBA for standing balance at sink   Toileting: stand by assistance        [facility] 6 Click ADL: 19    Treatment & Education:  Sit<>stand x 10 reps with SBA.   Pt with increased HR (155) during activity requiring minimal exertion. Nurse notified therapy during session. At rest in chair .         Patient left up in chair with all lines intact, call button in reach, chair alarm on, and nurse notified    GOALS:   Multidisciplinary Problems       Occupational Therapy Goals          Problem: Occupational Therapy    Goal Priority Disciplines Outcome Interventions   Occupational Therapy Goal     OT, PT/OT Progressing    Description: Goals to be met by: 07/18/2024      Patient will increase functional independence with ADLs by performing:    UE Dressing with Modified [place].  LE Dressing with Modified [place].  Grooming while standing with Modified [place].  Toileting from toilet with Modified [place] for hygiene and clothing management.   Toilet transfer to toilet with Modified [place].  Increased functional strength to WFL for self care.  Upper extremity exercise program x10 reps per handout, with independence.                          Time Tracking:     OT Date of Treatment: 06/19/24  OT Start Time: 1047  OT Stop Time: 1105  OT Total Time (min): 18 min    Billable Minutes:Self Care/Home Management  10  Therapeutic Activity 8            6/19/2024

## 2024-06-19 NOTE — DISCHARGE SUMMARY
[facility] Medicine  Discharge Summary      Patient Name: Angel Bell  MRN: [id number]  CHARLES: [id number]  Patient Class: IP- Inpatient  Admission Date: 6/15/2024  Hospital Length of Stay: 4 days  Discharge Date and Time:  06/19/2024 1:14 PM  Attending Physician: Yair Jurado MD   Discharging Provider: Yair Jurado MD  Primary Care Provider: Tiago Ruiz MD    Primary Care Team: Networked reference to record PCT     HPI:   63-year-old male with PMH of type 2 diabetes mellitus, CKD, hypertension brought to the ER for altered mental status.  History from the patient is limited due to AMS.  History obtained from patient's wife at bedside.  Reports patient has been having issues with hiccups, nausea/vomiting, poor p.o. intake for the past 2-3 weeks.  He was seen by primary care provider about 2 weeks ago when he was prescribed Thorazine for hiccups.  Wife states he had 30 pills in the bottle which he finished in about a week and was seen at urgent care where he received a refill with another 20 pills which he finished. He was doing okay until yesterday evening. In the middle of the night, he started screaming and she noticed tonic clonic movements and frothing from his mouth. Since then, he has been agitated and not acting himself. 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     Found to have sodium of 113 in the ED. Received 1 L IV fluid normal saline bolus, repeat sodium on 115.  Also reported to have an episode of dark vomitus in the ER that was occult blood positive.  Received IV PPI 80 mg.  Admitted to ICU for further care.    Procedure(s) (LRB):  EGD (ESOPHAGOGASTRODUODENOSCOPY) (N/A)      Hospital Course:   6/18/24 improvement of sodium level, as well as mental state   63-year-old male with PMH of type 2 diabetes mellitus, CKD, hypertension was admitted for altered mental status. He was found to have hyponatremia due water intoxication. He had a CT head  which showed No definite acute internal hemorrhage, mass effect, or midline shift. Nonspecific partially empty sella configuration questionable flattening of the posterior globes. Findings likely present on remote CT performed 07/03/2016, however may be seen the setting of idiopathic intracranial hypertension.Nephrology was consulted and was placed on electrolytes replacement. Serial routine labs done with creatine still at 1.5 prior DC and anemia. GI was consulted and underwent EGD Medium-sized hiatal hernia. LA Grade D esophagitis with no bleeding. Recommendation [facility] PPI for [facility] 8 weeks[facility] was evaluated by PT and tolerate well. He is stable for DC home follow with PCP and GI as outpatient       Goals of Care Treatment Preferences:  Code Status: Full Code      Consults:   Consults (From admission, onward)          Status Ordering Provider     Inpatient consult to Gastroenterology-[facility]  Once        Provider:  Mik Benítez MD    Completed YASMANI HANDY     Inpatient consult to Midline team  Once        Provider:  (Not yet assigned)    Acknowledged BREEZY VALENTINO     Inpatient consult to [facility]  Once        Provider:  (Not yet assigned)    Completed YASMANI HANDY     Inpatient consult to Critical Care Medicine  Once        Provider:  Willis Caban MD    Completed NACHO PARKS            No new Assessment & Plan notes have been filed under this hospital service since the last note was generated.  Service: Hospital Medicine    Final Active Diagnoses:    Diagnosis Date Noted POA    PRINCIPAL PROBLEM:  Hyponatremia [E87.1] 06/15/2024 Yes    Extrapyramidal syndrome [G25.9] 06/15/2024 Yes    Nausea & vomiting [R11.2] 06/15/2024 Yes    Acute encephalopathy [G93.40] 06/15/2024 Yes    CKD (chronic kidney disease) [N18.9] 06/15/2024 Yes    Type 2 diabetes mellitus with hyperglycemia, with  long-term current use of insulin [E11.65, Z79.4] 06/16/2022 Not Applicable    Hypertension [I10] 07/12/2016 Yes      Problems Resolved During this Admission:    Diagnosis Date Noted Date Resolved POA    Fever [R50.9] 06/15/2024 06/17/2024 No       Discharged Condition: good    Disposition: Home or Self Care    Follow Up:   Follow-up Information       Yolanda Weaver MD Follow up on 6/25/2024.    Specialty: Internal Medicine  Why: 3:00pm HSP F/U DX:Hyponatremia  Contact information:  [address] [facility]  [address]  [place] [address]  [phone]                           Patient Instructions:      Diet diabetic     Notify your health care provider if you experience any of the following:  temperature >100.4     Notify your health care provider if you experience any of the following:  persistent nausea and vomiting or diarrhea     Notify your health care provider if you experience any of the following:  severe uncontrolled pain     Notify your health care provider if you experience any of the following:  redness, tenderness, or signs of infection (pain, swelling, redness, odor or green/yellow discharge around incision site)     Notify your health care provider if you experience any of the following:  difficulty breathing or increased cough     Notify your health care provider if you experience any of the following:  severe persistent headache     Notify your health care provider if you experience any of the following:  worsening rash     Notify your health care provider if you experience any of the following:  persistent dizziness, light-headedness, or visual disturbances     Notify your health care provider if you experience any of the following:  increased confusion or weakness     Activity as tolerated       Significant Diagnostic Studies: Labs: BMP:   Recent Labs   Lab 06/17/24  1613 06/18/24  0317 06/19/24  0510    95 84   * 132* 135*   K 4.9 4.3 4.3   CL 94* 99 104   CO2 24 24 21*   BUN 7* 8 11    CREATININE 1.4 1.4 1.5*   CALCIUM 8.7 9.2 9.3   MG  --  2.5  --    , CMP   Recent Labs   Lab 06/17/24  1345 06/17/24  1613 06/18/24  0317 06/19/24  0510   * 126* 132* 135*   K 5.1 4.9 4.3 4.3   CL 94* 94* 99 104   CO2 23 24 24 21*   * 110 95 84   BUN 6* 7* 8 11   CREATININE 1.4 1.4 1.4 1.5*   CALCIUM 8.8 8.7 9.2 9.3   PROT  --   --  5.7*  --    ALBUMIN 2.9* 2.8* 2.9*  --    BILITOT  --   --  0.3  --    ALKPHOS  --   --  51*  --    AST  --   --  23  --    ALT  --   --  18  --    ANIONGAP 8 8 9 10   , CBC   Recent Labs   Lab 06/17/24  1345 06/18/24  0317 06/19/24  0510   WBC 11.20 7.22 7.61  7.61   HGB 10.2* 10.0* 9.4*  9.4*   HCT 29.4* 28.8* 27.7*  27.7*    372 400  400   , Lipid Panel   Lab Results   Component Value Date    CHOL 104 (L) 05/29/2024    HDL 39 (L) 05/29/2024    LDLCALC 51.2 (L) 05/29/2024    TRIG 69 05/29/2024    CHOLHDL 37.5 05/29/2024   , Troponin   Recent Labs   Lab 06/15/24  0508   TROPONINI <0.006   , A1C:   Recent Labs   Lab 01/25/24  0802 05/29/24  0708   HGBA1C 7.9*  7.8* 5.8*   , and All labs within the past 24 hours have been reviewed  Radiology: CT scan:  head    Pending Diagnostic Studies:       Procedure Component Value Units Date/Time    EKG 12-lead [[id number]]     Order Status: Sent Lab Status: No result     EKG 12-lead [[id number]]     Order Status: Sent Lab Status: No result     Iron and TIBC [[id number]] Collected: 06/19/24 0510    Order Status: Sent Lab Status: No result     Specimen: Blood     Specimen to Pathology, Surgery Gastrointestinal tract [[id number]] Collected: 06/18/24 1330    Order Status: Sent Lab Status: In process Updated: 06/19/24 0756    Specimen: Tissue            Medications:  Reconciled Home Medications:      Medication List        START taking these medications      ferrous sulfate 325 (65 FE) MG EC tablet  Take 1 tablet (325 mg total) by mouth once daily.     pantoprazole 40 MG tablet  Commonly known as: PROTONIX  Take 1 tablet (40 mg  "total) by mouth 2 (two) times daily.            CONTINUE taking these medications      atorvastatin 40 MG tablet  Commonly known as: LIPITOR  TAKE 1 TABLET(40 MG) BY MOUTH EVERY DAY     BASAGLAR KWIKPEN U-100 INSULIN 100 unit/mL (3 mL) Inpn pen  Generic drug: insulin glargine U-100 (Lantus)  Inject 5 Units into the skin every evening.     blood sugar diagnostic Strp  Commonly known as: TRUE METRIX GLUCOSE TEST STRIP  TEST 4 TIMES A DAY     dapagliflozin propanediol 10 mg tablet  Commonly known as: FARXIGA  Take 1 tablet (10 mg total) by mouth once daily.     furosemide 40 MG tablet  Commonly known as: LASIX  Take 1 tablet (40 mg total) by mouth daily as needed (swelling).     glucose 4 GM chewable tablet  Take 4 tablets (16 g total) by mouth as needed for Low blood sugar (If having symptoms of blurry vision, palpitations, confusion, shakiness.  Please check sugars and if sugar below 70 please take 4 tablets and re-check sugar everry 15 minutes until sugars are above 70 and symptoms resolve.).     KERENDIA 10 mg Tab  Generic drug: finerenone  Take 1 tablet by mouth once daily.     lancets 33 gauge Misc  USE 4 TIMES A DAY     losartan 50 MG tablet  Commonly known as: COZAAR  Take 1 tablet (50 mg total) by mouth once daily.     metoprolol succinate 200 MG 24 hr tablet  Commonly known as: TOPROL-XL  Take 1 tablet (200 mg total) by mouth once daily.     NIFEdipine 90 MG (OSM) 24 hr tablet  Commonly known as: PROCARDIA-XL  Take 1 tablet (90 mg total) by mouth once daily.     ondansetron 4 MG Tbdl  Commonly known as: ZOFRAN-ODT  DISSOLVE 1 TABLET BY MOUTH EVERY 6 HOURS FOR 5 DAYS     patiromer calcium sorbitex 8.4 gram Pwpk  Commonly known as: VELTASSA  Take 1 packet (8.4 g total) by mouth once daily.     pen needle, diabetic 31 gauge x 5/16" Ndle  use 4 times a day     promethazine 25 MG tablet  Commonly known as: PHENERGAN     sildenafiL 100 MG tablet  Commonly known as: VIAGRA  Take 1 tablet (100 mg total) by mouth " daily as needed for Erectile Dysfunction (1 hour before sexual activity on an empty stomach.).     TRULICITY 3 mg/0.5 mL pen injector  Generic drug: dulaglutide  Inject 3 mg into the skin every 7 days.            STOP taking these medications      chlorproMAZINE 25 MG tablet  Commonly known as: THORAZINE              Indwelling Lines/Drains at time of discharge:   Lines/Drains/Airways       None                   Time spent on the discharge of patient: >35 minutes         Yair Jurado MD  Department of Hospital Medicine  [facility]

## 2024-06-19 NOTE — PT/OT/SLP PROGRESS
Physical Therapy Treatment    Patient Name:  Angel Bell   MRN:  [id number]    Recommendations:     Discharge Recommendations: Low Intensity Therapy (With adequate family support)  Discharge Equipment Recommendations: shower chair, walker, rolling  Barriers to discharge: Decreased caregiver support    Assessment:     Angel Bell is a 63 y.o. male admitted with a medical diagnosis of Hyponatremia.  He presents with the following impairments/functional limitations: weakness, impaired endurance, impaired self care skills, impaired functional mobility, gait instability, impaired balance, decreased coordination, decreased upper extremity function, decreased lower extremity function, decreased safety awareness, decreased ROM, impaired coordination. Pt able to perform ambulation training ~80-90 ft with RW requiring CGA/SBA. Recommending low intensity therapy upon discharge with adequate family support and assistance.    Rehab Prognosis: Good; patient would benefit from acute skilled PT services to address these deficits and reach maximum level of function.    Recent Surgery: Procedure(s) (LRB):  EGD (ESOPHAGOGASTRODUODENOSCOPY) (N/A) 1 Day Post-Op    Plan:     During this hospitalization, patient to be seen 3 x/week to address the identified rehab impairments via gait training, therapeutic activities, therapeutic exercises, neuromuscular re-education and progress toward the following goals:    Plan of Care Expires:  07/19/24    Subjective     Chief Complaint: None Expressed  Patient/Family Comments/goals: None Expressed  Pain/Comfort:  Pain Rating 1: 0/10  Pain Rating Post-Intervention 1: 0/10      Objective:     Communicated with nurse prior to session.  Patient found supine with bed alarm, telemetry upon PT entry to room.     General Precautions: Standard, fall  Orthopedic Precautions: N/A  Braces: N/A  Respiratory Status: Room air     Functional Mobility:  Bed Mobility:     Rolling Right: stand by assistance and contact  guard assistance  Scooting: stand by assistance  Supine to Sit: contact guard assistance  Transfers:     Sit to Stand:  contact guard assistance with rolling walker  Gait: ~80-90 ft with RW requiring CGA/SBA.      AM-PAC 6 CLICK MOBILITY  Turning over in bed (including adjusting bedclothes, sheets and blankets)?: 4  Sitting down on and standing up from a chair with arms (e.g., wheelchair, bedside commode, etc.): 3  Moving from lying on back to sitting on the side of the bed?: 4  Moving to and from a bed to a chair (including a wheelchair)?: 3  Need to walk in hospital room?: 3  Climbing 3-5 steps with a railing?: 3  Basic Mobility Total Score: 20       Treatment & Education:  Pt able to perform ambulation training ~80-90 ft with RW requiring CGA/SBA. Pt demonstrated a slow gait speed throughout. Instructed in and performed  1 x 10 hip/knee flexion (marches)each BLE, 1 x 10 reps of sit to stand transfer trials both requiring CGA/SBA. Performed seated therapeutic exercises 1 x 10 reps BLE's consisting of hip add/abd and LAQ's with an ~3 second hold at end range.    Patient left up in chair with all lines intact, call button in reach, chair alarm on, and nurse notified..    GOALS:   Multidisciplinary Problems       Physical Therapy Goals          Problem: Physical Therapy    Goal Priority Disciplines Outcome Goal Variances Interventions   Physical Therapy Goal     PT, PT/OT Progressing     Description: Goals to be met by: 2024     Patient will increase functional independence with mobility by performin. Supine to sit with Modified [place]  2. Sit to supine with Modified [place]  3. Sit to stand transfer with Modified [place]  4. Bed to chair transfer with Modified [place]   5. Gait  x 150 feet with Modified [place] with or without AD.   6. Ascend/Descend 4 inch curb step with Modified [place] with or without AD.  7. Lower extremity exercise program x10 reps with independence                          Time Tracking:     PT Received On: 06/19/24  PT Start Time: 0940     PT Stop Time: 1005  PT Total Time (min): 25 min     Billable Minutes: Gait Training 12 and Therapeutic Exercise 13    Treatment Type: Treatment  PT/PTA: PTA     Number of PTA visits since last PT visit: 1 06/19/2024

## 2024-06-19 NOTE — PLAN OF CARE
Problem: Occupational Therapy  Goal: Occupational Therapy Goal  Description: Goals to be met by: 07/18/2024      Patient will increase functional independence with ADLs by performing:    UE Dressing with Modified [place].  LE Dressing with Modified [place].  Grooming while standing with Modified [place].  Toileting from toilet with Modified [place] for hygiene and clothing management.   Toilet transfer to toilet with Modified [place].  Increased functional strength to WFL for self care.  Upper extremity exercise program x10 reps per handout, with independence.     Outcome: Danilo Bell is a 63 y.o. male with a medical diagnosis of Hyponatremia.  Performance deficits affecting function are weakness, impaired endurance, impaired self care skills, impaired functional mobility, gait instability, impaired balance, decreased lower extremity function, decreased safety awareness, impaired coordination.     Pt found in chair, agreeable to therapy.  Pt with good participation with therapy and progressing towards goals.  However, pt with increased HR (155) during activity requiring minimal exertion. Nurse notified therapy during session. Pt reports no symptoms and feeling good. Continue OT services to address functional goals, progressing as able.

## 2024-06-19 NOTE — PROGRESS NOTES
Nephrology Progress Note       Consult Requested By: Yair Jurado MD  Reason for Consult: Hyponatremia      SUBJECTIVE:          Review of Systems   Constitutional:  Negative for chills and fever.   HENT:  Negative for congestion and sore throat.    Eyes:  Negative for blurred vision, double vision and photophobia.   Respiratory:  Negative for cough and shortness of breath.    Cardiovascular:  Negative for chest pain, palpitations and leg swelling.   Gastrointestinal:  Negative for abdominal pain, diarrhea, nausea and vomiting.   Genitourinary:  Negative for dysuria and urgency.   Musculoskeletal:  Negative for joint pain and myalgias.   Skin:  Negative for itching and rash.   Neurological:  Negative for dizziness, sensory change, weakness and headaches.   Endo/Heme/Allergies:  Negative for polydipsia. Does not bruise/bleed easily.   Psychiatric/Behavioral:  Negative for depression.        Past Medical History:   Diagnosis Date    Diabetes mellitus     Disorder of kidney and ureter     Hyperlipemia     Hypertension     Iron deficiency anemia     Migraine headache     PTSD (post-traumatic stress disorder)     Stage 3a chronic kidney disease         OBJECTIVE:     Vital Signs (Most Recent)  Vitals:    06/19/24 0430 06/19/24 0431 06/19/24 0721 06/19/24 1118   BP: 130/62  117/64 120/77   BP Location: Right arm      Patient Position: Lying  Lying Lying   Pulse: 95 86 88 99   Resp: 18  18 18   Temp: 98.2 °F (36.8 °C)  98.2 °F (36.8 °C) 97.2 °F (36.2 °C)   TempSrc: Oral  Oral Oral   SpO2: 99%  100% 97%   Weight:       Height:                       Medications:   baclofen  5 mg Oral TID    enoxparin  40 mg Subcutaneous Daily    insulin glargine U-100  5 Units Subcutaneous QHS    metoprolol succinate  200 mg Oral Daily    mupirocin   Nasal BID    NIFEdipine  60 mg Oral Daily    pantoprazole  40 mg Oral BID           Physical Exam  Vitals and nursing note reviewed.   Constitutional:       General: He is not in acute  distress.     Appearance: He is not diaphoretic.   HENT:      Head: Normocephalic and atraumatic.      Mouth/Throat:      Pharynx: No oropharyngeal exudate.   Eyes:      General: No scleral icterus.     Conjunctiva/sclera: Conjunctivae normal.      Pupils: Pupils are equal, round, and reactive to light.   Cardiovascular:      Rate and Rhythm: Normal rate and regular rhythm.      Heart sounds: Normal heart sounds. No murmur heard.  Pulmonary:      Effort: Pulmonary effort is normal. No respiratory distress.      Breath sounds: Normal breath sounds.   Abdominal:      General: Bowel sounds are normal. There is no distension.      Palpations: Abdomen is soft.      Tenderness: There is no abdominal tenderness.   Musculoskeletal:         General: Normal range of motion.      Cervical back: Normal range of motion and neck supple.   Skin:     General: Skin is warm and dry.      Findings: No erythema.   Neurological:      Mental Status: He is disoriented.      Cranial Nerves: No cranial nerve deficit.      Comments:     Psychiatric:         Thought Content: Thought content normal.         Laboratory:  Recent Labs   Lab 06/16/24  0228 06/17/24  1345 06/18/24  0317 06/19/24  0510   WBC 8.29 11.20 7.22 7.61  7.61   HGB 8.8* 10.2* 10.0* 9.4*  9.4*   HCT 25.0* 29.4* 28.8* 27.7*  27.7*    362 372 400  400   MONO 13.8  1.1*  --  12.5  0.9 11.7  0.9   EOSINOPHIL 0.1  --  0.3 0.4     Recent Labs   Lab 06/17/24  1345 06/17/24  1613 06/18/24  0317 06/19/24  0510   * 126* 132* 135*   K 5.1 4.9 4.3 4.3   CL 94* 94* 99 104   CO2 23 24 24 21*   BUN 6* 7* 8 11   CREATININE 1.4 1.4 1.4 1.5*   CALCIUM 8.8 8.7 9.2 9.3   PHOS 2.9 2.9 3.7  --        Diagnostic Results:  X-Ray: Reviewed  US: Reviewed  Echo: Reviewed  ASSESSMENT/PLAN:      Hyponatremia - water intoxication in CKD3 settings was drinking a lots of sugar free drinks  and water to help with his hiccups. The  day prior his admission per his wife  had more than 10 L  total which most likely caused his Na+ drop    -- 6/15 Sever Symptomatic Hyponatremia - Na+ 113 to 115 after NS 1L bolus,  goal Na + correct 6mEq  STAT due to symptom then 6-8 every Q24hr however corrected to 124 in PM D5 % increased to 250 cc/hr 6/16   DDAVP was ordered at 8AM given dose of DDAVP at 9:43  repeat na+ 131 Give bolus of D10 % 250 cc and resume D5% 250 cc/hr repeat labs   repeat dose of DDAVP   UOP seems drop as anticipated after DDAVP   6/17  and today Na+slowly correcting as anticipated  let self correct now 132 - 135   -- AMS resolved   -- Labs daily            CKD3 Follows with Dr Will   -- Cr at baseline 1.7 -1.9  now 1.4 - 1.5   -- Renally dose all meds  -- Please avoid nephrotoxins, including NSAIDs, aminoglycosides, IV contrast (unless absolutely necessary), gadolinium, fleets and other phosphorous-based laxatives. Caution with antibiotics.     HTN  -   controlled   Resume home BP meds    Anemia of chronic kidney disease      Recent Labs   Lab 06/17/24  1345 06/18/24  0317 06/19/24  0510   HGB 10.2* 10.0* 9.4*  9.4*   HCT 29.4* 28.8* 27.7*  27.7*    372 400  400       Iron   Lab Results   Component Value Date    IRON 104 01/25/2024    TIBC 343 01/25/2024    FERRITIN 469 (H) 01/25/2024         MBD  - PTH  slightly up  154   Had Hypophosphatemia/Hypokalemia  - replaced    all stable   Recent Labs   Lab 06/18/24  0317 06/19/24  0510   CALCIUM 9.2 9.3   PHOS 3.7  --      Recent Labs   Lab 06/16/24  0228 06/18/24  0317   MG 2.4 2.5       Lab Results   Component Value Date    .1 (H) 05/29/2024    CALCIUM 9.3 06/19/2024    PHOS 3.7 06/18/2024     Lab Results   Component Value Date    [id number] 20 (L) 01/25/2024     Acidosis   -- Monitor for now   Lab Results   Component Value Date    CO2 21 (L) 06/19/2024      Nutrition/Hypoalbuminemia    Recent Labs   Lab 06/17/24  1613 06/18/24  0317   ALBUMIN 2.8* 2.9*     Nepro with meals TID.          Thank you for allowing me to  participate in care of your patient  With any question please call   Judson Curiel MD     [facility]  PHONG Asher MD,   MD LENKA Middleton MD E. V. Harmon, NP  [id number]   [place], [id number] [phone]  After hours answering service: [phone]

## 2024-06-19 NOTE — PLAN OF CARE
SW communicated with pt to discuss dc planning. Pt made aware of PT/OT recs. Pt declined outpatient PT/OT and HH services. Pt also declined SC and RW. Pt reports that his wife will provide transport home. SW will continue to follow pt throughout his transitions of care and assist with any dc needs.     SW requested GI follow up    Cleared from CM . Bedside Nurse and VN notified.    Future Appointments   Date Time Provider Department Center   6/25/2024  3:00 PM Yolanda Weaver MD [facility] [place] [facility]   7/3/2024  2:15 PM Eleanor Will MD [facility]   9/9/2024  8:00 AM Nicole Shannon, OD [facility] [place]   12/2/2024 10:30 AM Gloria Edgar MD [facility] [place]   12/9/2024  8:15 AM Brooke Santos DPM [facility] POD [place]        06/19/24 1338   Final Note   Assessment Type Final Discharge Note   Anticipated Discharge Disposition Home   Hospital Resources/Appts/Education Provided Appointments scheduled and added to AVS   Post-Acute Status   Discharge Delays None known at this time

## 2024-06-21 LAB
FINAL PATHOLOGIC DIAGNOSIS: NORMAL
GROSS: NORMAL
Lab: NORMAL
MICROSCOPIC EXAM: NORMAL

## 2024-06-25 ENCOUNTER — LAB VISIT (OUTPATIENT)
Dept: LAB | Facility: HOSPITAL | Age: 64
End: 2024-06-25
Attending: INTERNAL MEDICINE
Payer: MEDICARE

## 2024-06-25 ENCOUNTER — OFFICE VISIT (OUTPATIENT)
Dept: PRIMARY CARE CLINIC | Facility: CLINIC | Age: 64
End: 2024-06-25
Payer: MEDICARE

## 2024-06-25 ENCOUNTER — E-CONSULT (OUTPATIENT)
Dept: NEUROLOGY | Facility: OTHER | Age: 64
End: 2024-06-25
Payer: MEDICARE

## 2024-06-25 VITALS
BODY MASS INDEX: 24.14 KG/M2 | HEART RATE: 117 BPM | OXYGEN SATURATION: 99 % | WEIGHT: 154.13 LBS | DIASTOLIC BLOOD PRESSURE: 87 MMHG | SYSTOLIC BLOOD PRESSURE: 142 MMHG

## 2024-06-25 DIAGNOSIS — K20.90 ESOPHAGITIS: ICD-10-CM

## 2024-06-25 DIAGNOSIS — K92.2 GASTROINTESTINAL HEMORRHAGE, UNSPECIFIED GASTROINTESTINAL HEMORRHAGE TYPE: ICD-10-CM

## 2024-06-25 DIAGNOSIS — E87.1 HYPONATREMIA: Primary | ICD-10-CM

## 2024-06-25 DIAGNOSIS — E87.1 HYPONATREMIA: ICD-10-CM

## 2024-06-25 DIAGNOSIS — R56.9 SEIZURE: ICD-10-CM

## 2024-06-25 PROBLEM — R11.2 NAUSEA & VOMITING: Status: RESOLVED | Noted: 2024-06-15 | Resolved: 2024-06-25

## 2024-06-25 LAB
ALBUMIN SERPL BCP-MCNC: 3.7 G/DL (ref 3.5–5.2)
ALP SERPL-CCNC: 54 U/L (ref 55–135)
ALT SERPL W/O P-5'-P-CCNC: 17 U/L (ref 10–44)
ANION GAP SERPL CALC-SCNC: 9 MMOL/L (ref 8–16)
AST SERPL-CCNC: 15 U/L (ref 10–40)
BASOPHILS # BLD AUTO: 0.02 K/UL (ref 0–0.2)
BASOPHILS NFR BLD: 0.2 % (ref 0–1.9)
BILIRUB SERPL-MCNC: 0.2 MG/DL (ref 0.1–1)
BUN SERPL-MCNC: 25 MG/DL (ref 8–23)
CALCIUM SERPL-MCNC: 9.8 MG/DL (ref 8.7–10.5)
CHLORIDE SERPL-SCNC: 110 MMOL/L (ref 95–110)
CO2 SERPL-SCNC: 21 MMOL/L (ref 23–29)
CREAT SERPL-MCNC: 1.8 MG/DL (ref 0.5–1.4)
DIFFERENTIAL METHOD BLD: ABNORMAL
EOSINOPHIL # BLD AUTO: 0 K/UL (ref 0–0.5)
EOSINOPHIL NFR BLD: 0.1 % (ref 0–8)
ERYTHROCYTE [DISTWIDTH] IN BLOOD BY AUTOMATED COUNT: 14.7 % (ref 11.5–14.5)
EST. GFR  (NO RACE VARIABLE): 42 ML/MIN/1.73 M^2
GLUCOSE SERPL-MCNC: 136 MG/DL (ref 70–110)
HCT VFR BLD AUTO: 28.8 % (ref 40–54)
HGB BLD-MCNC: 9.6 G/DL (ref 14–18)
IMM GRANULOCYTES # BLD AUTO: 0.05 K/UL (ref 0–0.04)
IMM GRANULOCYTES NFR BLD AUTO: 0.6 % (ref 0–0.5)
LYMPHOCYTES # BLD AUTO: 1.6 K/UL (ref 1–4.8)
LYMPHOCYTES NFR BLD: 18.7 % (ref 18–48)
MCH RBC QN AUTO: 28.3 PG (ref 27–31)
MCHC RBC AUTO-ENTMCNC: 33.3 G/DL (ref 32–36)
MCV RBC AUTO: 85 FL (ref 82–98)
MONOCYTES # BLD AUTO: 0.7 K/UL (ref 0.3–1)
MONOCYTES NFR BLD: 8.6 % (ref 4–15)
NEUTROPHILS # BLD AUTO: 6 K/UL (ref 1.8–7.7)
NEUTROPHILS NFR BLD: 71.8 % (ref 38–73)
NRBC BLD-RTO: 0 /100 WBC
PLATELET # BLD AUTO: 411 K/UL (ref 150–450)
PMV BLD AUTO: 8.6 FL (ref 9.2–12.9)
POTASSIUM SERPL-SCNC: 4.4 MMOL/L (ref 3.5–5.1)
PROT SERPL-MCNC: 6.6 G/DL (ref 6–8.4)
RBC # BLD AUTO: 3.39 M/UL (ref 4.6–6.2)
SODIUM SERPL-SCNC: 140 MMOL/L (ref 136–145)
WBC # BLD AUTO: 8.29 K/UL (ref 3.9–12.7)

## 2024-06-25 PROCEDURE — 85025 COMPLETE CBC W/AUTO DIFF WBC: CPT | Performed by: INTERNAL MEDICINE

## 2024-06-25 PROCEDURE — 80053 COMPREHEN METABOLIC PANEL: CPT | Performed by: INTERNAL MEDICINE

## 2024-06-25 PROCEDURE — 99215 OFFICE O/P EST HI 40 MIN: CPT | Mod: PBBFAC,PO | Performed by: INTERNAL MEDICINE

## 2024-06-25 PROCEDURE — 99999 PR PBB SHADOW E&M-EST. PATIENT-LVL V: CPT | Mod: PBBFAC,,, | Performed by: INTERNAL MEDICINE

## 2024-06-25 PROCEDURE — 36415 COLL VENOUS BLD VENIPUNCTURE: CPT | Performed by: INTERNAL MEDICINE

## 2024-06-25 NOTE — CONSULTS
Merged with [facility] NEUROLOGY  Response for E-Consult     Patient Name: nAgel Bell  MRN: [id number]  Primary Care Provider: Tiago Ruiz MD   Requesting Provider: Yolanda Weaver MD  Consults    62 y/o male with a recent admission for hyponatremia secondary to water intoxication.  Had a witnessed tonic-clonic seizure.   Na 113 in the ER. Treated with hypertonic saline and DDAVP.    Per the chart, he had no further seizures during his admission and none since discharge. He has also apparently resumed his normal activities    Recommendation:   -- A single tonic-clonic seizure in the setting of hyponatremia represents a provoked seizure and does not require any long-term ASDs.   -- Target eunatremia.  -- Follow up with Neurology.      Total time of Consultation: 10 minute    I did not speak to the requesting provider verbally about this.     *This eConsult is based on the clinical data available to me and is furnished without benefit of a physical examination. The eConsult will need to be interpreted in light of any clinical issues or changes in patient status not available to me at the time of filing this eConsults. Significant changes in patient condition or level of acuity should result in immediate formal consultation and reevaluation. Please alert me if you have further questions.    Thank you for this eConsult referral.     Peewee Rivera MD  M[facility]ed with [facility] NEUROLOGY

## 2024-06-25 NOTE — PROGRESS NOTES
[facility]   New Visit Progress Note   Recent Hospital Discharge     PRESENTING HISTORY     Chief Complaint/Reason for Admission:  Follow up Hospital Discharge   PCP: Tiago Ruiz MD    History of Present Illness:  Mr. Angel Bell is a 63 y.o. male who was recently admitted to the hospital.    [facility] Medicine  Discharge Summary        Patient Name: Angel Bell  MRN: [id number]  CHARLES: [id number]  Patient Class: IP- Inpatient  Admission Date: 6/15/2024  Hospital Length of Stay: 4 days  Discharge Date and Time:  06/19/2024 1:14 PM  Attending Physician: Yair Jurado MD   Discharging Provider: Yair Jurado MD  Primary Care Provider: Tiago Ruiz MD  ___________________________________________________________________    Today:  [facility] to [facility] for initial hospital follow up.  Recently hospitalized for management of hyponatremia and GI bleed.  Serum sodium 113 at presentation.  Presented with report of witnessed seizure activity-> Per wife report- patient had stiffness and shaking to both arms, foaming at the mouth, urinary incontinence, and tongue biting.  Episode resolved spontaneously but followed by followed by lethargy then mental status changes including agitation and repetitive speech.   Patient had coffee ground emesis in ED.  Admitted to [facility] Medicine service in ICU level of care.  Nephrology and GI teams consulted.  Hyponatremia thought 2/2 to water intoxication- patient drinking excessive water to help with hiccups - had > 10 L water the day prior to presentation.  Patient treated with fluid restriction and IV fluids including 3% saline followed by D5 and D10, with a dose of DDAVP-> good response.   IV PPI initiated for GI bleed.  EGD 6/18/24-> Grade D esophagitis, no bleeding. Biopsy obtained for H pylori.  GI team recommended Protonix 40 mg BID x 8 weeks and repeat upper endoscopy in 8 weeks to assess healing.   Patient discharged to home.     Pathology  Gastric biopsy 6/21/24->  Chronic gastritis   Immunohistochemical stain for Helicobacter pylori is negative   Negative for intestinal metaplasia, dysplasia, or malignancy     Patient accompanied today by his wife.  Ambulatory and independent with ADL's.  Brought all medication bottles for review and reports compliance.  No recurrence of seizure activity.  He has resumed all usual activities.    Review of Systems  General ROS: negative for chills, fever or weight loss  Psychological ROS: negative for hallucination, depression or suicidal ideation  Ophthalmic ROS: negative for blurry vision, photophobia or eye pain  ENT ROS: negative for epistaxis, sore throat or rhinorrhea  Respiratory ROS: no cough, shortness of breath, or wheezing  Cardiovascular ROS: no chest pain or dyspnea on exertion  Gastrointestinal ROS: no abdominal pain, change in bowel habits, or black/ bloody stools  Genito-Urinary ROS: no dysuria, trouble voiding, or hematuria  Musculoskeletal ROS: negative for gait disturbance or muscular weakness  Neurological ROS: no syncope or seizures; no ataxia  Dermatological ROS: negative for pruritis, rash and jaundice      PAST HISTORY:     Past Medical History:   Diagnosis Date    Diabetes mellitus     Disorder of kidney and ureter     Hyperlipemia     Hypertension     Iron deficiency anemia     Migraine headache     PTSD (post-traumatic stress disorder)     Stage 3a chronic kidney disease        Past Surgical History:   Procedure Laterality Date    ESOPHAGOGASTRODUODENOSCOPY N/A 6/18/2024    Procedure: EGD (ESOPHAGOGASTRODUODENOSCOPY);  Surgeon: Mik Benítez MD;  Location: [facility];  Service: Endoscopy;  Laterality: N/A;    LASER ENUCLEATION OF PROSTATE N/A 8/29/2022    Procedure: ENUCLEATION, PROSTATE, USING LASER  Time: 180 minutes Equipment: high powered 100W laser with virtual basket, morcellator, scopes for HoLEP from [facility];  Surgeon: Ted Garvin MD;  Location: [facility] OR;  Service:  Urology;  Laterality: N/A;  fortec confirmed CW 8/26  confirmation # [id number]       Family History   Problem Relation Name Age of Onset    Diabetes Mother           MEDICATIONS & ALLERGIES:     Current Outpatient Medications on File Prior to Visit   Medication Sig Dispense Refill    atorvastatin (LIPITOR) 40 MG tablet TAKE 1 TABLET(40 MG) BY MOUTH EVERY DAY 90 tablet 3    blood sugar diagnostic (TRUE METRIX GLUCOSE TEST STRIP) Strp TEST 4 TIMES A  each 11    dapagliflozin propanediol (FARXIGA) 10 mg tablet Take 1 tablet (10 mg total) by mouth once daily. 30 tablet 11    dulaglutide (TRULICITY) 3 mg/0.5 mL pen injector Inject 3 mg into the skin every 7 days. 12 pen 3    ferrous sulfate 325 (65 FE) MG EC tablet Take 1 tablet (325 mg total) by mouth once daily. 30 tablet 0    finerenone (KERENDIA) 10 mg Tab Take 1 tablet by mouth once daily. 90 tablet 11    furosemide (LASIX) 40 MG tablet Take 1 tablet (40 mg total) by mouth daily as needed (swelling). 30 tablet 11    glucose 4 GM chewable tablet Take 4 tablets (16 g total) by mouth as needed for Low blood sugar (If having symptoms of blurry vision, palpitations, confusion, shakiness.  Please check sugars and if sugar below 70 please take 4 tablets and re-check sugar everry 15 minutes until sugars are above 70 and symptoms resolve.). (Patient not taking: Reported on 6/14/2024) 50 tablet 12    insulin glargine U-100, Lantus, (LANTUS SOLOSTAR U-100 INSULIN) 100 unit/mL (3 mL) InPn pen Inject 5 Units into the skin every evening. 15 mL 3    lancets 33 gauge Misc USE 4 TIMES A  each 11    losartan (COZAAR) 50 MG tablet Take 1 tablet (50 mg total) by mouth once daily. 90 tablet 3    metoprolol succinate (TOPROL-XL) 200 MG 24 hr tablet Take 1 tablet (200 mg total) by mouth once daily. 90 tablet 1    NIFEdipine (PROCARDIA-XL) 90 MG (OSM) 24 hr tablet Take 1 tablet (90 mg total) by mouth once daily. 90 tablet 3    ondansetron (ZOFRAN-ODT) 4 MG TbDL DISSOLVE 1  "TABLET BY MOUTH EVERY 6 HOURS FOR 5 DAYS      pantoprazole (PROTONIX) 40 MG tablet Take 1 tablet (40 mg total) by mouth 2 (two) times daily. 60 tablet 1    patiromer calcium sorbitex (VELTASSA) 8.4 gram PwPk Take 1 packet (8.4 g total) by mouth once daily. 90 packet 3    pen needle, diabetic 31 gauge x 5/16" Ndle use 4 times a day 300 each 11    promethazine (PHENERGAN) 25 MG tablet       sildenafiL (VIAGRA) 100 MG tablet Take 1 tablet (100 mg total) by mouth daily as needed for Erectile Dysfunction (1 hour before sexual activity on an empty stomach.). 30 tablet 5     No current facility-administered medications on file prior to visit.        Review of patient's allergies indicates:  No Known Allergies    OBJECTIVE:     Vital Signs:  BP (!) 142/87 (BP Location: Left arm, Patient Position: Sitting, BP Method: Small (Automatic))   Pulse (!) 117   Wt 69.9 kg (154 lb 1.6 oz)   SpO2 99%   BMI 24.14 kg/m²   Wt Readings from Last 3 Encounters:   06/17/24 0615 71 kg (156 lb 8.4 oz)   06/16/24 0600 72 kg (158 lb 11.7 oz)   06/15/24 0915 73.5 kg (162 lb 0.6 oz)   06/14/24 1338 74.8 kg (164 lb 14.5 oz)   06/07/24 2106 75.8 kg (167 lb)     Body mass index is 24.14 kg/m².        Physical Exam:  BP (!) 142/87 (BP Location: Left arm, Patient Position: Sitting, BP Method: Small (Automatic))   Pulse (!) 117   Wt 69.9 kg (154 lb 1.6 oz)   SpO2 99%   BMI 24.14 kg/m²   General appearance: alert, cooperative, no distress  Constitutional:Oriented to person, place, and time  + appears well-developed and well-nourished.   HEENT: Normocephalic, atraumatic, neck symmetrical, no nasal discharge   Eyes: conjunctivae/corneas clear, PERRL, EOM's intact  Lungs: clear to auscultation bilaterally, no dullness to percussion bilaterally  Heart:+ tachycardia, regular rhythm without rub; no displacement of the PMI   Abdomen: soft, non-tender; bowel sounds normoactive; no organomegaly  Extremities: extremities symmetric; no clubbing, cyanosis, " or edema  Integument: Skin color, texture, turgor normal; no rashes; hair distrubution normal  Neurologic: Alert and oriented X 3, normal strength, normal coordination and gait  Psychiatric: no pressured speech; normal affect; no evidence of impaired cognition     Laboratory  Lab Results   Component Value Date    WBC 7.61 06/19/2024    WBC 7.61 06/19/2024    HGB 9.4 (L) 06/19/2024    HGB 9.4 (L) 06/19/2024    HCT 27.7 (L) 06/19/2024    HCT 27.7 (L) 06/19/2024    MCV 83 06/19/2024    MCV 83 06/19/2024     06/19/2024     06/19/2024     BMP  Lab Results   Component Value Date     (L) 06/19/2024    K 4.3 06/19/2024     06/19/2024    CO2 21 (L) 06/19/2024    BUN 11 06/19/2024    CREATININE 1.5 (H) 06/19/2024    CALCIUM 9.3 06/19/2024    ANIONGAP 10 06/19/2024    EGFRNORACEVR 52 (A) 06/19/2024     Lab Results   Component Value Date    ALT 18 06/18/2024    AST 23 06/18/2024    ALKPHOS 51 (L) 06/18/2024    BILITOT 0.3 06/18/2024     Lab Results   Component Value Date    INR 1.0 08/16/2022    INR 1.1 07/11/2016    INR 1.0 07/02/2016     Lab Results   Component Value Date    HGBA1C 5.8 (H) 05/29/2024       Diagnostic Results:    CT Head 6/15/24:  1. No definite acute internal hemorrhage, mass effect, or midline shift.  2. No definite acute transcortical infarct by noncontrast CT.  3. Nonspecific partially empty sella configuration questionable flattening of the posterior globes.  Findings likely present on remote CT performed 07/03/2016, however may be seen the setting of idiopathic intracranial hypertension.  Clinical correlation recommended in this regard.    ASSESSMENT & PLAN:     Hyponatremia  - recent hospitalization as above  - serum Na 113 at presentation  - thought 2/2 to water intoxication -> patient drinking large amounts of water to help with hiccups  - Na improved with free water restriction, IV fluids, and DDAVP   - repeat labs today  - see Nephrology 7/3/24   -     Comprehensive  metabolic panel; Future; Expected date: 06/25/2024    Seizure  - occurred in setting of hyponatremia with serum Na 113  - witnessed by wife- had bilateral arm stiffness/shaking with foaming at the mouth, urinary incontinence, and tongue biting  - did not have Neurology evaluation while hospitalized   - arranged Neurology consultation for 8/21/24  - E consult placed to Neurology team for interim recommendations   -     Ambulatory referral/consult to Neurology; Future; Expected date: 07/02/2024  -     E-Consult to General Neurology    Esophagitis  Gastrointestinal hemorrhage, unspecified gastrointestinal hemorrhage type  - patient had coffee ground emesis in ED  - anemia noted but did not require transfusion  - EGD with esophagitis  - biopsy H pylori NEG  - continue Protonix BID x 8 weeks  - needs repeat outpatient EGD in 8 weeks- I have communicated with GI team regarding scheduling   -     CBC Auto Differential; Future; Expected date: 06/26/2024    Patient will be released from hospital follow up clinic.  He will see his PCP, Dr Ruiz, as directed.     Instructions for the patient:      Scheduled Follow-up :  Future Appointments   Date Time Provider Department Center   6/25/2024  3:00 PM Yolanda Weaver MD [facility] IMPRI [facility]   7/3/2024  2:15 PM Eleanor Will MD [facility]   8/21/2024 11:00 AM Violeta Rodriguez MD [facility] OVI EPI [address]   9/9/2024  8:00 AM Nicole Shannon OD [facility] OPTOMTY [place]   12/2/2024 10:30 AM Gloria Edgar MD [facility] ENDOCR [place]   12/9/2024  8:15 [facility] [place]       Post Visit Medication List:     Medication List            Accurate as of June 25, 2024 11:11 AM. If you have any questions, ask your nurse or doctor.                CONTINUE taking these medications      atorvastatin 40 MG tablet  Commonly known as: LIPITOR  TAKE 1 TABLET(40 MG) BY MOUTH EVERY DAY     BASAGLAR KWIKPEN U-100 INSULIN 100 unit/mL (3 mL) Inpn  "pen  Generic drug: insulin glargine U-100 (Lantus)  Inject 5 Units into the skin every evening.     blood sugar diagnostic Strp  Commonly known as: TRUE METRIX GLUCOSE TEST STRIP  TEST 4 TIMES A DAY     dapagliflozin propanediol 10 mg tablet  Commonly known as: FARXIGA  Take 1 tablet (10 mg total) by mouth once daily.     ferrous sulfate 325 (65 FE) MG EC tablet  Take 1 tablet (325 mg total) by mouth once daily.     furosemide 40 MG tablet  Commonly known as: LASIX  Take 1 tablet (40 mg total) by mouth daily as needed (swelling).     glucose 4 GM chewable tablet  Take 4 tablets (16 g total) by mouth as needed for Low blood sugar (If having symptoms of blurry vision, palpitations, confusion, shakiness.  Please check sugars and if sugar below 70 please take 4 tablets and re-check sugar everry 15 minutes until sugars are above 70 and symptoms resolve.).     KERENDIA 10 mg Tab  Generic drug: finerenone  Take 1 tablet by mouth once daily.     lancets 33 gauge Misc  USE 4 TIMES A DAY     losartan 50 MG tablet  Commonly known as: COZAAR  Take 1 tablet (50 mg total) by mouth once daily.     metoprolol succinate 200 MG 24 hr tablet  Commonly known as: TOPROL-XL  Take 1 tablet (200 mg total) by mouth once daily.     NIFEdipine 90 MG (OSM) 24 hr tablet  Commonly known as: PROCARDIA-XL  Take 1 tablet (90 mg total) by mouth once daily.     ondansetron 4 MG Tbdl  Commonly known as: ZOFRAN-ODT     pantoprazole 40 MG tablet  Commonly known as: PROTONIX  Take 1 tablet (40 mg total) by mouth 2 (two) times daily.     patiromer calcium sorbitex 8.4 gram Pwpk  Commonly known as: VELTASSA  Take 1 packet (8.4 g total) by mouth once daily.     pen needle, diabetic 31 gauge x 5/16" Ndle  use 4 times a day     promethazine 25 MG tablet  Commonly known as: PHENERGAN     sildenafiL 100 MG tablet  Commonly known as: VIAGRA  Take 1 tablet (100 mg total) by mouth daily as needed for Erectile Dysfunction (1 hour before sexual activity on an empty " stomach.).     TRULICITY 3 mg/0.5 mL pen injector  Generic drug: dulaglutide  Inject 3 mg into the skin every 7 days.              Signing Physician:  Yolanda Weaver MD

## 2024-06-27 ENCOUNTER — PATIENT MESSAGE (OUTPATIENT)
Dept: ADMINISTRATIVE | Facility: HOSPITAL | Age: 64
End: 2024-06-27
Payer: MEDICARE

## 2024-06-28 ENCOUNTER — TELEPHONE (OUTPATIENT)
Dept: GASTROENTEROLOGY | Facility: CLINIC | Age: 64
End: 2024-06-28
Payer: MEDICARE

## 2024-06-28 DIAGNOSIS — K20.90 ESOPHAGITIS: Primary | ICD-10-CM

## 2024-06-28 NOTE — PROGRESS NOTES
Pathologic findings for recent EGD reviewed.  No evidence of H pylori infection identified.  Some mild inflammation was noted.  Continue current medications.  Follow up as needed. Repeat EGD as scheduled.

## 2024-06-29 NOTE — PHYSICIAN QUERY
Provider, If possible- please further specify type of encephalopathy:      Metabolic Encephalopathy

## 2024-07-08 NOTE — PHYSICIAN QUERY
Please clarify the pathology findings:    Pathology findings noted above are not confirmed as diagnoses    Clinically Undetermined

## 2024-07-09 ENCOUNTER — PATIENT OUTREACH (OUTPATIENT)
Dept: ADMINISTRATIVE | Facility: HOSPITAL | Age: 64
End: 2024-07-09
Payer: MEDICARE

## 2024-07-09 NOTE — PROGRESS NOTES
Population Health Chart Review & Patient Outreach Details      Additional [facility] Health Notes:    Eye exam scheduled       Updates Requested / Reviewed:      Updated Care Coordination Note, Care Everywhere, Care Team Updated, and Immunizations Reconciliation Completed or Queried: [facility] Topics Overdue:      [facility] Score: 1     Colon Cancer Screening    RSV Vaccine                  Health Maintenance Topic(s) Outreach Outcomes & Actions Taken:    Eye Exam - Outreach Outcomes & Actions Taken  : Eye Camera Scheduled or Optometry/Ophthalmology Referral Placed/Appt Scheduled

## 2024-07-12 ENCOUNTER — PATIENT MESSAGE (OUTPATIENT)
Dept: PRIMARY CARE CLINIC | Facility: CLINIC | Age: 64
End: 2024-07-12
Payer: MEDICARE

## 2024-07-15 RX ORDER — FERROUS SULFATE 325(65) MG
325 TABLET, DELAYED RELEASE (ENTERIC COATED) ORAL DAILY
Qty: 30 TABLET | Refills: 0 | Status: SHIPPED | OUTPATIENT
Start: 2024-07-15

## 2024-08-01 NOTE — PROGRESS NOTES
Cardiology Clinic note    Subjective:   Patient ID:  Angel Bell is a 63 y.o. male who presents for evaluation of tachycardia, abnormal heart beat    HPI    8/1/2024: 62 yo M with hx of DM, HTN, HLD, CKD3a present to clinic for evaluation of tachycardia, irregular heart beat. Seen in clinic reports overall doing well. Recent hospitalization with hyponatremia, follows close with nephrology, doing well since D/C. Staying active, walks frequently. Reports he's had a fast heart beat all his life, never diagnosed with A. Fib or known to have any arrhythmias. Patient denies CP, SOB/DON, orthopnea, PND, syncope, palpitations. Intermittent LE edema. Reports compliance and tolerance with all medications.  - EMR reviewed including past hospitalizations and 12-leads to [facility], no noted hx of A. Fib but with long-stating history of tachycardia.       CV Hx  ------------------    FH  -none    SH  -never smoker, never drinker  -DM2    Echo 4/2022  Concentric remodeling and normal systolic function.  The estimated ejection fraction is 55%.  Normal left ventricular diastolic function.  Normal right ventricular size with normal right ventricular systolic function.  Normal central venous pressure (3 mmHg).  Trivial pericardial effusion.    Past Medical History:   Diagnosis Date    Diabetes mellitus     Disorder of kidney and ureter     Hyperlipemia     Hypertension     Iron deficiency anemia     Migraine headache     PTSD (post-traumatic stress disorder)     Stage 3a chronic kidney disease           Patient Active Problem List    Diagnosis Date Noted    Hyponatremia 06/15/2024    Extrapyramidal syndrome 06/15/2024    Acute encephalopathy 06/15/2024    CKD (chronic kidney disease) 06/15/2024    Erectile dysfunction 11/03/2022     Prior to catheter  Sildenafil started 11/2022      Type 2 diabetes mellitus with hyperglycemia, with long-term current use of insulin 06/16/2022    Anemia due to chronic kidney disease 06/16/2022     Tachycardia 06/11/2022    Normocytic anemia 04/29/2022    Benign prostatic hyperplasia with urinary obstruction 04/29/2022     AUA SS 4/2 post op HoLEP, PVR 82  AUA SS current: 5/1, PVR 10      Family history of prostate cancer in father 08/16/2016    Hypertension 07/12/2016    Hyperlipidemia 07/04/2016    Type 2 diabetes, uncontrolled, with renal manifestation 07/03/2016    Hyperglycemia 07/03/2016       Patient's Medications   New Prescriptions    No medications on file   Previous Medications    ATORVASTATIN (LIPITOR) 40 MG TABLET    TAKE 1 TABLET(40 MG) BY MOUTH EVERY DAY    BLOOD SUGAR DIAGNOSTIC (TRUE METRIX GLUCOSE TEST STRIP) STRP    TEST 4 TIMES A DAY    DAPAGLIFLOZIN PROPANEDIOL (FARXIGA) 10 MG TABLET    Take 1 tablet (10 mg total) by mouth once daily.    DULAGLUTIDE (TRULICITY) 3 MG/0.5 ML PEN INJECTOR    Inject 3 mg into the skin every 7 days.    FERROUS SULFATE 325 (65 FE) MG EC TABLET    Take 1 tablet (325 mg total) by mouth once daily.    FINERENONE (KERENDIA) 10 MG TAB    Take 1 tablet by mouth once daily.    FUROSEMIDE (LASIX) 40 MG TABLET    Take 1 tablet (40 mg total) by mouth daily as needed (swelling).    GLUCOSE 4 GM CHEWABLE TABLET    Take 4 tablets (16 g total) by mouth as needed for Low blood sugar (If having symptoms of blurry vision, palpitations, confusion, shakiness.  Please check sugars and if sugar below 70 please take 4 tablets and re-check sugar everry 15 minutes until sugars are above 70 and symptoms resolve.).    INSULIN GLARGINE U-100, LANTUS, (LANTUS SOLOSTAR U-100 INSULIN) 100 UNIT/ML (3 ML) INPN PEN    Inject 5 Units into the skin every evening.    LANCETS 33 GAUGE MISC    USE 4 TIMES A DAY    LOSARTAN (COZAAR) 50 MG TABLET    Take 1 tablet (50 mg total) by mouth once daily.    METOPROLOL SUCCINATE (TOPROL-XL) 200 MG 24 HR TABLET    Take 1 tablet (200 mg total) by mouth once daily.    NIFEDIPINE (PROCARDIA-XL) 90 MG (OSM) 24 HR TABLET    Take 1 tablet (90 mg total) by mouth once  "daily.    ONDANSETRON (ZOFRAN-ODT) 4 MG TBDL    as needed.    PANTOPRAZOLE (PROTONIX) 40 MG TABLET    Take 1 tablet (40 mg total) by mouth 2 (two) times daily.    PATIROMER CALCIUM SORBITEX (VELTASSA) 8.4 GRAM PWPK    Take 1 packet (8.4 g total) by mouth once daily.    PEN NEEDLE, DIABETIC 31 GAUGE X 5/16" NDLE    use 4 times a day    PROMETHAZINE (PHENERGAN) 25 MG TABLET        SILDENAFIL (VIAGRA) 100 MG TABLET    Take 1 tablet (100 mg total) by mouth daily as needed for Erectile Dysfunction (1 hour before sexual activity on an empty stomach.).   Modified Medications    No medications on file   Discontinued Medications    No medications on file        Review of Systems   Constitutional: Negative for chills, decreased appetite, diaphoresis, malaise/fatigue, weight gain and weight loss.   Cardiovascular:  Negative for chest pain, claudication, dyspnea on exertion, irregular heartbeat, leg swelling, near-syncope, orthopnea, palpitations, paroxysmal nocturnal dyspnea and syncope.   Respiratory:  Negative for cough, hemoptysis, shortness of breath and snoring.    Gastrointestinal:  Negative for bloating, abdominal pain, nausea and vomiting.   Neurological:  Negative for light-headedness and weakness.     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     Social Determinants of Health     Financial Resource Strain: Low Risk  (6/17/2024)    Overall Financial Resource Strain (CARDIA)     Difficulty of Paying Living Expenses: Not hard at all   Food Insecurity: No Food Insecurity (6/17/2024)    Hunger Vital Sign     Worried About Running Out of Food in the Last Year: Never true     Ran Out of Food in the Last Year: Never " true   Transportation Needs: No Transportation Needs (6/17/2024)    TRANSPORTATION NEEDS     Transportation : No   Physical Activity: Inactive (6/17/2024)    Exercise Vital Sign     Days of Exercise per Week: 0 days     Minutes of Exercise per Session: 0 min   Stress: No Stress Concern Present (6/17/2024)    [employer] - Occupational Stress Questionnaire     Feeling of Stress : Not at all   Housing Stability: Low Risk  (6/17/2024)    Housing Stability Vital Sign     Unable to Pay for Housing in the Last Year: No     Homeless in the Last Year: No            Objective:   Vitals  There were no vitals filed for this visit.       Physical Exam  Vitals and nursing note reviewed.   Constitutional:       Appearance: Normal appearance.   Cardiovascular:      Rate and Rhythm: Regular rhythm. Tachycardia present.      Heart sounds: No murmur heard.     No gallop.   Pulmonary:      Effort: Pulmonary effort is normal.      Breath sounds: Normal breath sounds.   Abdominal:      General: Bowel sounds are normal. There is no distension.      Palpations: Abdomen is soft.      Tenderness: There is no abdominal tenderness.   Musculoskeletal:      Right lower leg: No edema.      Left lower leg: No edema.   Skin:     General: Skin is warm and dry.   Neurological:      Mental Status: He is alert and oriented to person, place, and time.       Lab Results    Lab Results   Component Value Date    WBC 8.29 06/25/2024    HGB 9.6 (L) 06/25/2024    HCT 28.8 (L) 06/25/2024    HCT 39 04/28/2022    MCV 85 06/25/2024       Lab Results   Component Value Date     06/25/2024    INR 1.0 08/16/2022       Lab Results   Component Value Date    K 4.4 06/25/2024    MG 2.5 06/18/2024    BUN 25 (H) 06/25/2024    CREATININE 1.8 (H) 06/25/2024       Lab Results   Component Value Date     (H) 06/25/2024    HGBA1C 5.8 (H) 05/29/2024       Lab Results   Component Value Date    AST 15 06/25/2024    ALT 17 06/25/2024     ALBUMIN 3.7 06/25/2024    PROT 6.6 06/25/2024       Lab Results   Component Value Date    CHOL 104 (L) 05/29/2024    HDL 39 (L) 05/29/2024    LDLCALC 51.2 (L) 05/29/2024    TRIG 69 05/29/2024       Lab Results   Component Value Date    BNP 62 06/16/2022         Assessment:     1. Hyperlipidemia, unspecified hyperlipidemia type    2. Primary hypertension    3. Tachycardia    4. Stage 3 chronic kidney disease, unspecified whether stage 3a or 3b CKD        Plan:     Tachycardia, abnormal heart beat  -chronic, stable, tolerating max dose toprol  -ECG today: ST, NSTWA, rate 101, QTc 451  -30d event to r/o sig arrythmia, update Echo    2. HTN  -well controlled, continue regimen as above    3. CKD3  -worsening, Cr 1.8, eGFR 42  -avoid NSAIDs, nephrotoxic agents  -continue F/U with nephrology   -can consider D/C of ARB if continues to worsen, happy to F/U for BP management if needed      The ASCVD Risk score (Crow DK, et al., 2019) failed to calculate for the following reasons:    The valid total cholesterol range is 130 to 320 mg/dL    No orders of the defined types were placed in this encounter.      He expressed verbal understanding and agreed with the plan    Follow up in 3m     Pertinent cardiac images and EKG reviewed independently.    Continue with current medical plan and lifestyle changes.  Return sooner for concerns or questions. If symptoms persist go to the ED.  I have reviewed all pertinent data including patient's medical history in detail and updated the computerized patient record.     Counseling included discussion regarding imaging findings, diagnosis, possibilities, treatment options, risks and benefits.    Thank you for the opportunity to care for this patient. Will be available for questions if needed.        Signed:  Cirilo Ash DNP  08/01/2024

## 2024-08-02 ENCOUNTER — OFFICE VISIT (OUTPATIENT)
Dept: CARDIOLOGY | Facility: CLINIC | Age: 64
End: 2024-08-02
Payer: MEDICARE

## 2024-08-02 VITALS
BODY MASS INDEX: 23.47 KG/M2 | HEIGHT: 67 IN | DIASTOLIC BLOOD PRESSURE: 75 MMHG | OXYGEN SATURATION: 99 % | WEIGHT: 149.56 LBS | SYSTOLIC BLOOD PRESSURE: 135 MMHG | HEART RATE: 106 BPM

## 2024-08-02 DIAGNOSIS — E78.5 HYPERLIPIDEMIA, UNSPECIFIED HYPERLIPIDEMIA TYPE: ICD-10-CM

## 2024-08-02 DIAGNOSIS — I48.11 LONGSTANDING PERSISTENT ATRIAL FIBRILLATION: Primary | ICD-10-CM

## 2024-08-02 DIAGNOSIS — R00.0 TACHYCARDIA: ICD-10-CM

## 2024-08-02 DIAGNOSIS — N18.30 STAGE 3 CHRONIC KIDNEY DISEASE, UNSPECIFIED WHETHER STAGE 3A OR 3B CKD: ICD-10-CM

## 2024-08-02 DIAGNOSIS — I10 PRIMARY HYPERTENSION: ICD-10-CM

## 2024-08-02 PROCEDURE — 99215 OFFICE O/P EST HI 40 MIN: CPT | Mod: PBBFAC,PN

## 2024-08-02 PROCEDURE — 99999 PR PBB SHADOW E&M-EST. PATIENT-LVL V: CPT | Mod: PBBFAC,,,

## 2024-08-07 ENCOUNTER — PATIENT OUTREACH (OUTPATIENT)
Dept: ADMINISTRATIVE | Facility: HOSPITAL | Age: 64
End: 2024-08-07
Payer: COMMERCIAL

## 2024-08-13 ENCOUNTER — HOSPITAL ENCOUNTER (OUTPATIENT)
Dept: CARDIOLOGY | Facility: HOSPITAL | Age: 64
Discharge: HOME | End: 2024-08-13
Payer: MEDICARE

## 2024-08-13 DIAGNOSIS — I10 PRIMARY HYPERTENSION: ICD-10-CM

## 2024-08-13 DIAGNOSIS — R00.0 TACHYCARDIA: ICD-10-CM

## 2024-08-13 PROCEDURE — 93226 XTRNL ECG REC<48 HR SCAN A/R: CPT | Mod: PN

## 2024-08-13 PROCEDURE — 93227 XTRNL ECG REC<48 HR R&I: CPT | Mod: ,,, | Performed by: STUDENT IN AN ORGANIZED HEALTH CARE EDUCATION/TRAINING PROGRAM

## 2024-08-13 PROCEDURE — 93225 XTRNL ECG REC<48 HRS REC: CPT | Mod: PN

## 2024-08-14 RX ORDER — FERROUS SULFATE 325(65) MG
325 TABLET, DELAYED RELEASE (ENTERIC COATED) ORAL DAILY
Qty: 30 TABLET | Refills: 11 | Status: SHIPPED | OUTPATIENT
Start: 2024-08-14

## 2024-08-14 RX ORDER — PANTOPRAZOLE SODIUM 40 MG/1
40 TABLET, DELAYED RELEASE ORAL 2 TIMES DAILY
Qty: 60 TABLET | Refills: 1 | Status: SHIPPED | OUTPATIENT
Start: 2024-08-14 | End: 2025-08-14

## 2024-08-14 RX ORDER — PANTOPRAZOLE SODIUM 40 MG/1
40 TABLET, DELAYED RELEASE ORAL 2 TIMES DAILY
Qty: 60 TABLET | Refills: 1 | Status: CANCELLED | OUTPATIENT
Start: 2024-08-14 | End: 2025-08-14

## 2024-08-15 ENCOUNTER — HOSPITAL ENCOUNTER (OUTPATIENT)
Dept: CARDIOLOGY | Facility: HOSPITAL | Age: 64
Discharge: HOME | End: 2024-08-15
Payer: MEDICARE

## 2024-08-15 VITALS — WEIGHT: 149 LBS | HEIGHT: 67 IN | BODY MASS INDEX: 23.39 KG/M2

## 2024-08-15 DIAGNOSIS — I10 PRIMARY HYPERTENSION: ICD-10-CM

## 2024-08-15 DIAGNOSIS — R00.0 TACHYCARDIA: ICD-10-CM

## 2024-08-15 PROCEDURE — 93306 TTE W/DOPPLER COMPLETE: CPT | Mod: PN

## 2024-08-15 PROCEDURE — 93306 TTE W/DOPPLER COMPLETE: CPT | Mod: 26,,, | Performed by: STUDENT IN AN ORGANIZED HEALTH CARE EDUCATION/TRAINING PROGRAM

## 2024-08-16 ENCOUNTER — PATIENT MESSAGE (OUTPATIENT)
Dept: ENDOCRINOLOGY | Facility: CLINIC | Age: 64
End: 2024-08-16
Payer: COMMERCIAL

## 2024-08-16 LAB
APICAL FOUR CHAMBER EJECTION FRACTION: 68 %
APICAL TWO CHAMBER EJECTION FRACTION: 60 %
ASCENDING AORTA: 2.98 CM
AV INDEX (PROSTH): 0.95
AV MEAN GRADIENT: 4 MMHG
AV PEAK GRADIENT: 8 MMHG
AV VALVE AREA BY VELOCITY RATIO: 2.2 CM²
AV VALVE AREA: 2.67 CM²
AV VELOCITY RATIO: 0.78
BSA FOR ECHO PROCEDURE: 1.79 M2
CV ECHO LV RWT: 0.32 CM
DOP CALC AO PEAK VEL: 1.43 M/S
DOP CALC AO VTI: 24.9 CM
DOP CALC LVOT AREA: 2.8 CM2
DOP CALC LVOT DIAMETER: 1.89 CM
DOP CALC LVOT PEAK VEL: 1.12 M/S
DOP CALC LVOT STROKE VOLUME: 66.46 CM3
DOP CALC MV VTI: 26.2 CM
DOP CALCLVOT PEAK VEL VTI: 23.7 CM
E WAVE DECELERATION TIME: 187.64 MSEC
E/A RATIO: 1.07
E/E' RATIO: 9.67 M/S
ECHO LV POSTERIOR WALL: 0.73 CM (ref 0.6–1.1)
FRACTIONAL SHORTENING: 38 % (ref 28–44)
INTERVENTRICULAR SEPTUM: 0.91 CM (ref 0.6–1.1)
IVC DIAMETER: 1.59 CM
IVRT: 76.12 MSEC
LA MAJOR: 5.28 CM
LA MINOR: 4.64 CM
LA WIDTH: 3.6 CM
LEFT ATRIUM AREA SYSTOLIC (APICAL 2 CHAMBER): 13.36 CM2
LEFT ATRIUM AREA SYSTOLIC (APICAL 4 CHAMBER): 15.55 CM2
LEFT ATRIUM SIZE: 3.38 CM
LEFT ATRIUM VOLUME INDEX MOD: 20.7 ML/M2
LEFT ATRIUM VOLUME INDEX: 28.7 ML/M2
LEFT ATRIUM VOLUME MOD: 36.91 CM3
LEFT ATRIUM VOLUME: 51.09 CM3
LEFT INTERNAL DIMENSION IN SYSTOLE: 2.85 CM (ref 2.1–4)
LEFT VENTRICLE DIASTOLIC VOLUME INDEX: 54.06 ML/M2
LEFT VENTRICLE DIASTOLIC VOLUME: 96.22 ML
LEFT VENTRICLE END DIASTOLIC VOLUME APICAL 2 CHAMBER: 75.6 ML
LEFT VENTRICLE END DIASTOLIC VOLUME APICAL 4 CHAMBER: 61.45 ML
LEFT VENTRICLE END SYSTOLIC VOLUME APICAL 2 CHAMBER: 32.25 ML
LEFT VENTRICLE END SYSTOLIC VOLUME APICAL 4 CHAMBER: 35.37 ML
LEFT VENTRICLE MASS INDEX: 68 G/M2
LEFT VENTRICLE SYSTOLIC VOLUME INDEX: 17.4 ML/M2
LEFT VENTRICLE SYSTOLIC VOLUME: 30.97 ML
LEFT VENTRICULAR INTERNAL DIMENSION IN DIASTOLE: 4.58 CM (ref 3.5–6)
LEFT VENTRICULAR MASS: 120.88 G
LV LATERAL E/E' RATIO: 9.67 M/S
LV SEPTAL E/E' RATIO: 9.67 M/S
LVED V (TEICH): 96.22 ML
LVES V (TEICH): 30.97 ML
LVOT MG: 2.81 MMHG
LVOT MV: 0.81 CM/S
MV A" WAVE DURATION": 98.95 MSEC
MV MEAN GRADIENT: 2 MMHG
MV PEAK A VEL: 0.81 M/S
MV PEAK E VEL: 0.87 M/S
MV PEAK GRADIENT: 4 MMHG
MV STENOSIS PRESSURE HALF TIME: 54.42 MS
MV VALVE AREA BY CONTINUITY EQUATION: 2.54 CM2
MV VALVE AREA P 1/2 METHOD: 4.04 CM2
OHS CV EVENT MONITOR DAY: 0
OHS CV HOLTER LENGTH DECIMAL HOURS: 47.98
OHS CV HOLTER LENGTH HOURS: 47
OHS CV HOLTER LENGTH MINUTES: 59
OHS CV HOLTER SINUS AVERAGE HR: 86
OHS CV HOLTER SINUS MAX HR: 132
OHS CV HOLTER SINUS MIN HR: 56
OHS CV RV/LV RATIO: 0.71 CM
OHS LV EJECTION FRACTION SIMPSONS BIPLANE MOD: 63 %
PISA MRMAX VEL: 5.97 M/S
PISA TR MAX VEL: 2.54 M/S
PULM VEIN S/D RATIO: 2.13
PV PEAK D VEL: 0.31 M/S
PV PEAK GRADIENT: 4 MMHG
PV PEAK S VEL: 0.66 M/S
PV PEAK VELOCITY: 0.99 M/S
RA MAJOR: 4.59 CM
RA PRESSURE ESTIMATED: 3 MMHG
RA WIDTH: 3.54 CM
RIGHT VENTRICULAR END-DIASTOLIC DIMENSION: 3.27 CM
RV TB RVSP: 6 MMHG
RV TISSUE DOPPLER FREE WALL SYSTOLIC VELOCITY 1 (APICAL 4 CHAMBER VIEW): 16.25 CM/S
SINUS: 2.45 CM
STJ: 2.81 CM
TDI LATERAL: 0.09 M/S
TDI SEPTAL: 0.09 M/S
TDI: 0.09 M/S
TR MAX PG: 26 MMHG
TRICUSPID ANNULAR PLANE SYSTOLIC EXCURSION: 2.59 CM
TV REST PULMONARY ARTERY PRESSURE: 29 MMHG
Z-SCORE OF LEFT VENTRICULAR DIMENSION IN END DIASTOLE: -0.72
Z-SCORE OF LEFT VENTRICULAR DIMENSION IN END SYSTOLE: -0.52

## 2024-08-19 ENCOUNTER — TELEPHONE (OUTPATIENT)
Dept: NEUROLOGY | Facility: CLINIC | Age: 64
End: 2024-08-19
Payer: COMMERCIAL

## 2024-08-19 NOTE — TELEPHONE ENCOUNTER
Left pt a voicemail informing him that Dr. Sow does not see New Patient's virtually and the appointment on 08/20 needs to be rescheduled.

## 2024-08-19 NOTE — TELEPHONE ENCOUNTER
Called pt to reschedule appt, as Dr. Sow does not see np virtually. I offered to schedule the pt with Dr. Blank, at [facility]. Pt stated that he would call main campus to get scheduled. I verbalized understanding and canceled pt appt.

## 2024-08-20 NOTE — PROGRESS NOTES
[facility] Department of Neurology  Virtual Visit      [facility] - NEUROLOGY [address]TH [address] [facility], [place]    Date: 8/21/24  Patient Name: Angel Bell   MRN: [id number]   PCP: Tiago Ruiz  Referring Provider: No ref. provider found    The patient location is: [place]  The chief complaint leading to consultation is: seizure  Visit type: Virtual visit with synchronous audio and video  Total time spent with patient: 6 minutes  Each patient to whom he or she provides medical services by telemedicine is:  (1) informed of the relationship between the physician and patient and the respective role of any other health care provider with respect to management of the patient; and (2) notified that he or she may decline to receive medical services by telemedicine and may withdraw from such care at any time.      Assessment:   63 year old male with provoked seizure secondary to hyponatremia.  No further workup needed and no indication for anti seizure medications.  Follow up with neurology not necessary unless any changes.      Plan:     Problem List Items Addressed This Visit    None  Visit Diagnoses       Provoked seizure    -  Primary            Sheridan Rodriguez MD  [facility]   Department of Neurology    Patient note was created using MModal Dictation.  Any errors in syntax or even information may not have been identified and edited on initial review prior to signing this note.  Subjective:     Patient seen for hospital follow up.    Per discharge summary:  63-year-old male with PMH of type 2 diabetes mellitus, CKD, hypertension brought to the ER for altered mental status.  History from the patient is limited due to AMS.  History obtained from patient's wife at bedside.  Reports patient has been having issues with hiccups, nausea/vomiting, poor p.o. intake for the past 2-3 weeks.  He was seen by primary care provider about 2 weeks ago when he was prescribed Thorazine for hiccups.   Wife states he had 30 pills in the bottle which he finished in about a week and was seen at urgent care where he received a refill with another 20 pills which he finished. He was doing okay until yesterday evening. In the middle of the night, he started screaming and she noticed tonic clonic movements and frothing from his mouth. Since then, he has been agitated and not acting himself. 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      Found to have sodium of 113 in the ED. Received 1 L IV fluid normal saline bolus, repeat sodium on 115.  Also reported to have an episode of dark vomitus in the ER that was occult blood positive.  Received IV PPI 80 mg.  Admitted to ICU for further care.    Patient seen by nephrology while inpatient, impression was severe hyponatremia due to water intoxication (drinking more fluids/water to help with hiccups).  With improvement in sodium the patient had resolution of his encephalopathy.    CTH obtained showing no acute findings.     The patient today reports he is doing well with no further seizures or confusion.  Denies history of prior seizures.  No family history of epilepsy, no prior head injuries, no prior meningitis/encephalitis.  He reports he is still has the hiccups occasionally.   He denies headaches,vision changes, weakness, or numbness.     PAST MEDICAL HISTORY:  Past Medical History:   Diagnosis Date    Diabetes mellitus     Disorder of kidney and ureter     Hyperlipemia     Hypertension     Iron deficiency anemia     Migraine headache     PTSD (post-traumatic stress disorder)     Stage 3a chronic kidney disease        PAST SURGICAL HISTORY:  Past Surgical History:   Procedure Laterality Date    ESOPHAGOGASTRODUODENOSCOPY N/A 6/18/2024    Procedure: EGD (ESOPHAGOGASTRODUODENOSCOPY);  Surgeon: Mik Benítez MD;  Location: [facility];  Service: Endoscopy;  Laterality: N/A;    LASER ENUCLEATION OF PROSTATE N/A 8/29/2022     Procedure: ENUCLEATION, PROSTATE, USING LASER  Time: 180 minutes Equipment: high powered 100W laser with virtual basket, morcellator, scopes for HoLEP from [facility];  Surgeon: Ted Garvin MD;  Location: [facility] OR;  Service: Urology;  Laterality: N/A;  fortec confirmed CW 8/26  confirmation # [id number]       CURRENT MEDS:  Current Outpatient Medications   Medication Sig Dispense Refill    atorvastatin (LIPITOR) 40 MG tablet TAKE 1 TABLET(40 MG) BY MOUTH EVERY DAY 90 tablet 3    blood sugar diagnostic (TRUE METRIX GLUCOSE TEST STRIP) Strp TEST 4 TIMES A  each 11    dapagliflozin propanediol (FARXIGA) 10 mg tablet Take 1 tablet (10 mg total) by mouth once daily. 30 tablet 11    dulaglutide (TRULICITY) 3 mg/0.5 mL pen injector Inject 3 mg into the skin every 7 days. 12 pen 3    ferrous sulfate 325 (65 FE) MG EC tablet Take 1 tablet (325 mg total) by mouth once daily. 30 tablet 11    finerenone (KERENDIA) 10 mg Tab Take 1 tablet by mouth once daily. 90 tablet 11    furosemide (LASIX) 40 MG tablet Take 1 tablet (40 mg total) by mouth daily as needed (swelling). 30 tablet 11    glucose 4 GM chewable tablet Take 4 tablets (16 g total) by mouth as needed for Low blood sugar (If having symptoms of blurry vision, palpitations, confusion, shakiness.  Please check sugars and if sugar below 70 please take 4 tablets and re-check sugar everry 15 minutes until sugars are above 70 and symptoms resolve.). 50 tablet 12    insulin glargine U-100, Lantus, (LANTUS SOLOSTAR U-100 INSULIN) 100 unit/mL (3 mL) InPn pen Inject 5 Units into the skin every evening. 15 mL 3    lancets 33 gauge Misc USE 4 TIMES A  each 11    losartan (COZAAR) 50 MG tablet Take 1 tablet (50 mg total) by mouth once daily. 90 tablet 3    metoprolol succinate (TOPROL-XL) 200 MG 24 hr tablet Take 1 tablet (200 mg total) by mouth once daily. 90 tablet 1    NIFEdipine (PROCARDIA-XL) 90 MG (OSM) 24 hr tablet Take 1 tablet (90 mg total) by mouth once  "daily. 90 tablet 3    ondansetron (ZOFRAN-ODT) 4 MG TbDL as needed.      pantoprazole (PROTONIX) 40 MG tablet Take 1 tablet (40 mg total) by mouth 2 (two) times daily. 60 tablet 1    patiromer calcium sorbitex (VELTASSA) 8.4 gram PwPk Take 1 packet (8.4 g total) by mouth once daily. 90 packet 3    pen needle, diabetic 31 gauge x 5/16" Ndle use 4 times a day 300 each 11    promethazine (PHENERGAN) 25 MG tablet       sildenafiL (VIAGRA) 100 MG tablet Take 1 tablet (100 mg total) by mouth daily as needed for Erectile Dysfunction (1 hour before sexual activity on an empty stomach.). 30 tablet 5     No current facility-administered medications for this visit.       ALLERGIES:  Review of patient's allergies indicates:  No Known Allergies    FAMILY HISTORY:  Family History   Problem Relation Name Age of Onset    Diabetes Mother         SOCIAL HISTORY:  Social History     Tobacco Use    Smoking status: Never    Smokeless tobacco: Never   Substance Use Topics    Alcohol use: No    Drug use: No       Review of Systems:  12 system review of systems is negative except for the symptoms mentioned in HPI.      Objective:   There were no vitals filed for this visit.  General: NAD, well nourished   Eyes: no tearing, discharge, no erythema   ENT: moist mucous membranes of the oral cavity, nares patent    Neck: Supple, full range of motion  Cardiovascular: Appears well perfused  Lungs: Normal work of breathing, normal chest wall excursions  Skin: No rash, lesions, or breakdown on exposed skin  Psychiatry: Mood and affect are appropriate   Abdomen: nondistended, non tender  Extremeties: No cyanosis, clubbing or edema visible    Neurological   MENTAL STATUS: Alert and oriented to person, place, and time. Attention and concentration within normal limits. Speech without dysarthria, able to name and repeat without difficulty. Recent and remote memory within normal limits   CRANIAL NERVES: Visual fields intact.  EOMI. Facial sensation " intact. Face symmetrical. Hearing grossly intact. Full shoulder shrug bilaterally. Tongue protrudes midline   SENSORY: Sensation is intact to light touch throughout.    MOTOR: Normal bulk No pronator drift.  Moves all extremities symmetrically.  REFLEXES: Deferred due to virtual visit  CEREBELLAR/COORDINATION/GAIT: Finger to nose intact. Normal rapid alternating movements.

## 2024-08-21 ENCOUNTER — OFFICE VISIT (OUTPATIENT)
Dept: NEUROLOGY | Facility: CLINIC | Age: 64
End: 2024-08-21
Payer: MEDICARE

## 2024-08-21 ENCOUNTER — TELEPHONE (OUTPATIENT)
Dept: ENDOSCOPY | Facility: HOSPITAL | Age: 64
End: 2024-08-21
Payer: COMMERCIAL

## 2024-08-21 ENCOUNTER — PATIENT MESSAGE (OUTPATIENT)
Dept: ENDOSCOPY | Facility: HOSPITAL | Age: 64
End: 2024-08-21
Payer: COMMERCIAL

## 2024-08-21 DIAGNOSIS — R56.9 PROVOKED SEIZURE: Primary | ICD-10-CM

## 2024-08-21 PROCEDURE — 99211 OFF/OP EST MAY X REQ PHY/QHP: CPT | Mod: 95,,, | Performed by: STUDENT IN AN ORGANIZED HEALTH CARE EDUCATION/TRAINING PROGRAM

## 2024-08-21 NOTE — TELEPHONE ENCOUNTER
Spoke to patient for pre-call to confirm scheduled Upper Endoscopy (EGD) and patient verbalized understanding of the following:       Date of Procedure (s)  verified 8/28/24  Arrival Time 8:00 AM verified.  Location of Procedure(s) [address] verified.  NPO status reinforced. Ok to continue clear liquids up until 4 hours prior to the Endoscopy procedure.     Confirmed Pt is currently taking Trulicity (Dulaglutide), Pt instructed to stop stop taking Trulicity (Dulaglutide) on 8/20/24.     Patient stated he was told to stop Trulicity on 8/21/24.    Pt confirmed receipt of prep instructions and Rx prep (if applicable).  Instructions provided to patient via [facility]  Pt confirmed ride home after procedure if procedure requires anesthesia.   Pre-call screening questionnaire reviewed and completed with patient.   Appointment details are tentative, including check-in time.  If the patient begins taking any blood thinning medications, injectable weight loss/diabetes medications (other than insulin), or Adipex (phentermine) patient was instructed to contact the endoscopy scheduling department as soon as possible.  Patient was advised to call the endoscopy scheduling department if any questions or concerns arise.       [facility]oscopy Scheduling Department

## 2024-08-28 ENCOUNTER — HOSPITAL ENCOUNTER (OUTPATIENT)
Facility: HOSPITAL | Age: 64
Discharge: HOME | End: 2024-08-28
Attending: INTERNAL MEDICINE | Admitting: INTERNAL MEDICINE
Payer: MEDICARE

## 2024-08-28 ENCOUNTER — ANESTHESIA (OUTPATIENT)
Dept: ENDOSCOPY | Facility: HOSPITAL | Age: 64
End: 2024-08-28
Payer: COMMERCIAL

## 2024-08-28 ENCOUNTER — ANESTHESIA EVENT (OUTPATIENT)
Dept: ENDOSCOPY | Facility: HOSPITAL | Age: 64
End: 2024-08-28
Payer: MEDICARE

## 2024-08-28 VITALS
BODY MASS INDEX: 24.11 KG/M2 | TEMPERATURE: 99 F | HEIGHT: 66 IN | OXYGEN SATURATION: 100 % | RESPIRATION RATE: 19 BRPM | WEIGHT: 150 LBS | HEART RATE: 91 BPM | DIASTOLIC BLOOD PRESSURE: 86 MMHG | SYSTOLIC BLOOD PRESSURE: 131 MMHG

## 2024-08-28 DIAGNOSIS — K21.9 GERD (GASTROESOPHAGEAL REFLUX DISEASE): ICD-10-CM

## 2024-08-28 LAB
GLUCOSE SERPL-MCNC: 121 MG/DL (ref 70–110)
POCT GLUCOSE: 121 MG/DL (ref 70–110)

## 2024-08-28 PROCEDURE — 37000008 HC ANESTHESIA 1ST 15 MINUTES: Performed by: INTERNAL MEDICINE

## 2024-08-28 PROCEDURE — 25000003 PHARM REV CODE 250: Performed by: NURSE ANESTHETIST, CERTIFIED REGISTERED

## 2024-08-28 PROCEDURE — 25000003 PHARM REV CODE 250: Performed by: INTERNAL MEDICINE

## 2024-08-28 PROCEDURE — 43235 EGD DIAGNOSTIC BRUSH WASH: CPT | Performed by: INTERNAL MEDICINE

## 2024-08-28 PROCEDURE — 43235 EGD DIAGNOSTIC BRUSH WASH: CPT | Mod: ,,, | Performed by: INTERNAL MEDICINE

## 2024-08-28 PROCEDURE — 37000009 HC ANESTHESIA EA ADD 15 MINS: Performed by: INTERNAL MEDICINE

## 2024-08-28 PROCEDURE — 63600175 PHARM REV CODE 636 W HCPCS: Performed by: NURSE ANESTHETIST, CERTIFIED REGISTERED

## 2024-08-28 RX ORDER — LIDOCAINE HYDROCHLORIDE 20 MG/ML
INJECTION INTRAVENOUS
Status: DISCONTINUED | OUTPATIENT
Start: 2024-08-28 | End: 2024-08-28

## 2024-08-28 RX ORDER — PROPOFOL 10 MG/ML
VIAL (ML) INTRAVENOUS
Status: DISCONTINUED | OUTPATIENT
Start: 2024-08-28 | End: 2024-08-28

## 2024-08-28 RX ORDER — TOPICAL ANESTHETIC 200 MG/ML
SPRAY DENTAL; PERIODONTAL
Status: DISCONTINUED | OUTPATIENT
Start: 2024-08-28 | End: 2024-08-28

## 2024-08-28 RX ORDER — SODIUM CHLORIDE 9 MG/ML
INJECTION, SOLUTION INTRAVENOUS CONTINUOUS
Status: DISCONTINUED | OUTPATIENT
Start: 2024-08-28 | End: 2024-08-28 | Stop reason: HOSPADM

## 2024-08-28 RX ADMIN — PROPOFOL 100 MCG/KG/MIN: 10 INJECTION, EMULSION INTRAVENOUS at 08:08

## 2024-08-28 RX ADMIN — LIDOCAINE HYDROCHLORIDE 100 MG: 20 INJECTION, SOLUTION INTRAVENOUS at 08:08

## 2024-08-28 RX ADMIN — TOPICAL ANESTHETIC 1 EACH: 200 SPRAY DENTAL; PERIODONTAL at 08:08

## 2024-08-28 RX ADMIN — SODIUM CHLORIDE: 0.9 INJECTION, SOLUTION INTRAVENOUS at 08:08

## 2024-08-28 RX ADMIN — SODIUM CHLORIDE: 9 INJECTION, SOLUTION INTRAVENOUS at 08:08

## 2024-08-28 NOTE — PLAN OF CARE
Dr. Benítez visited at bedside, discussed findings and recommendations with patient and family member; all questions asked and answered. Verbalized understanding of information given. Handout provided at time of discharge.

## 2024-08-28 NOTE — [employer] PATIENT INSTRUCTIONS
Discharge Summary/Instructions after an Endoscopic Procedure  Patient Name: Angel Bell  Patient MRN: [id number]  Patient YOB: 1960 Wednesday, August 28, 2024  Mik Benítez MD  Dear patient,  As a result of recent federal legislation (The Federal Cures Act), you may   receive lab or pathology results from your procedure in your [facility]   account before your physician is able to contact you. Your physician or   their representative will relay the results to you with their   recommendations at their soonest availability.  Thank you,  Your health is very important to us during the Covid Crisis. Following your   procedure today, you will receive a daily text for 2 weeks asking about   signs or symptoms of Covid 19.  Please respond to this text when you   receive it so we can follow up and keep you as safe as possible.   RESTRICTIONS:  During your procedure today, you received medications for sedation.  These   medications may affect your judgment, balance and coordination.  Therefore,   for 24 hours, you have the following restrictions:   - DO NOT drive a car, operate machinery, make legal/financial decisions,   sign important papers or drink alcohol.    ACTIVITY:  Today: no heavy lifting, straining or running due to procedural   sedation/anesthesia.  The following day: return to full activity including work.  DIET:  Eat and drink normally unless instructed otherwise.     TREATMENT FOR COMMON SIDE EFFECTS:  - Mild abdominal pain, nausea, belching, bloating or excessive gas:  rest,   eat lightly and use a heating pad.  - Sore Throat: treat with throat lozenges and/or gargle with warm salt   water.  - Because air was used during the procedure, expelling large amounts of air   from your rectum or belching is normal.  - If a bowel prep was taken, you may not have a bowel movement for 1-3 days.    This is normal.  SYMPTOMS TO WATCH FOR AND REPORT TO YOUR PHYSICIAN:  1. Abdominal pain or bloating,  other than gas cramps.  2. Chest pain.  3. Back pain.  4. Signs of infection such as: chills or fever occurring within 24 hours   after the procedure.  5. Rectal bleeding, which would show as bright red, maroon, or black stools.   (A tablespoon of blood from the rectum is not serious, especially if   hemorrhoids are present.)  6. Vomiting.  7. Weakness or dizziness.  GO DIRECTLY TO THE NEAREST EMERGENCY ROOM IF YOU HAVE ANY OF THE FOLLOWING:      Difficulty breathing              Chills and/or fever over 101 F   Persistent vomiting and/or vomiting blood   Severe abdominal pain   Severe chest pain   Black, tarry stools   Bleeding- more than one tablespoon   Any other symptom or condition that you feel may need urgent attention  Your doctor recommends these additional instructions:  If any biopsies were taken, your doctors clinic will contact you in 1 to 2   weeks with any results.  - Discharge patient to home.   - Resume previous diet.   - Continue present medications.   - Return to GI office PRN.  For questions, problems or results please call your physician - Mik Benítez MD.  EMERGENCY PHONE NUMBER: [phone],  LAB RESULTS: [phone]  IF A COMPLICATION OR EMERGENCY SITUATION ARISES AND YOU ARE UNABLE TO REACH   YOUR PHYSICIAN - GO DIRECTLY TO THE EMERGENCY ROOM.  Mik Benítez MD  8/28/2024 9:00:41 AM  This report has been verified and signed electronically.  Dear patient,  As a result of recent federal legislation (The Federal Cures Act), you may   receive lab or pathology results from your procedure in your [facility]   account before your physician is able to contact you. Your physician or   their representative will relay the results to you with their   recommendations at their soonest availability.  Thank you,  PROVATION

## 2024-08-28 NOTE — ANESTHESIA PREPROCEDURE EVALUATION
[facility]  Anesthesia Pre-Operative Evaluation         Patient Name: Angel Bell  YOB: 1960  MRN: [id number]    SUBJECTIVE:     Pre-operative evaluation for Procedure(s) (LRB):  ESOPHAGOGASTRODUODENOSCOPY (EGD) (N/A)     08/28/2024    Angel Bell is a 63 y.o. male w/ a significant PMHx as below who presents for the above procedure.    h/o seizure 6/2024 2/2 hyponatremia from polydipsia in attempt to self treat hiccups. Recently seen by Neurology.     LDA: None documented.    Prev airway: None documented.     Drips: None documented.    Patient Active Problem List   Diagnosis    Type 2 diabetes, uncontrolled, with renal manifestation    Hyperglycemia    Hyperlipidemia    Hypertension    Family history of prostate cancer in father    Normocytic anemia    Benign prostatic hyperplasia with urinary obstruction    Tachycardia    Type 2 diabetes mellitus with hyperglycemia, with long-term current use of insulin    Anemia due to chronic kidney disease    Erectile dysfunction    Hyponatremia    Extrapyramidal syndrome    Acute encephalopathy    CKD (chronic kidney disease)       Review of patient's allergies indicates:  No Known Allergies    Current Inpatient Medications:      No current facility-administered medications on file prior to encounter.     Current Outpatient Medications on File Prior to Encounter   Medication Sig Dispense Refill    atorvastatin (LIPITOR) 40 MG tablet TAKE 1 TABLET(40 MG) BY MOUTH EVERY DAY 90 tablet 3    blood sugar diagnostic (TRUE METRIX GLUCOSE TEST STRIP) Strp TEST 4 TIMES A  each 11    dapagliflozin propanediol (FARXIGA) 10 mg tablet Take 1 tablet (10 mg total) by mouth once daily. 30 tablet 11    finerenone (KERENDIA) 10 mg Tab Take 1 tablet by mouth once daily. 90 tablet 11    insulin glargine U-100, Lantus, (LANTUS SOLOSTAR U-100 INSULIN) 100 unit/mL (3 mL) InPn pen Inject 5 Units  "into the skin every evening. 15 mL 3    losartan (COZAAR) 50 MG tablet Take 1 tablet (50 mg total) by mouth once daily. 90 tablet 3    metoprolol succinate (TOPROL-XL) 200 MG 24 hr tablet Take 1 tablet (200 mg total) by mouth once daily. 90 tablet 1    NIFEdipine (PROCARDIA-XL) 90 MG (OSM) 24 hr tablet Take 1 tablet (90 mg total) by mouth once daily. 90 tablet 3    ondansetron (ZOFRAN-ODT) 4 MG TbDL as needed.      pen needle, diabetic 31 gauge x 5/16" Ndle use 4 times a day 300 each 11    dulaglutide (TRULICITY) 3 mg/0.5 mL pen injector Inject 3 mg into the skin every 7 days. 12 pen 3    furosemide (LASIX) 40 MG tablet Take 1 tablet (40 mg total) by mouth daily as needed (swelling). 30 tablet 11    glucose 4 GM chewable tablet Take 4 tablets (16 g total) by mouth as needed for Low blood sugar (If having symptoms of blurry vision, palpitations, confusion, shakiness.  Please check sugars and if sugar below 70 please take 4 tablets and re-check sugar everry 15 minutes until sugars are above 70 and symptoms resolve.). 50 tablet 12    lancets 33 gauge Misc USE 4 TIMES A  each 11    promethazine (PHENERGAN) 25 MG tablet       sildenafiL (VIAGRA) 100 MG tablet Take 1 tablet (100 mg total) by mouth daily as needed for Erectile Dysfunction (1 hour before sexual activity on an empty stomach.). 30 tablet 5       Past Surgical History:   Procedure Laterality Date    ESOPHAGOGASTRODUODENOSCOPY N/A 6/18/2024    Procedure: EGD (ESOPHAGOGASTRODUODENOSCOPY);  Surgeon: Mik Benítez MD;  Location: [facility] ENDO;  Service: Endoscopy;  Laterality: N/A;    LASER ENUCLEATION OF PROSTATE N/A 8/29/2022    Procedure: ENUCLEATION, PROSTATE, USING LASER  Time: 180 minutes Equipment: high powered 100W laser with virtual basket, morcellator, scopes for [facility] from [facility];  Surgeon: Ted Garvin MD;  Location: [facility] OR;  Service: Urology;  Laterality: N/A;  [facility] confirmed CW 8/26  confirmation # [id number] " "      Social History     Socioeconomic History    Marital status:    Tobacco Use    Smoking status: Never    Smokeless tobacco: Never   Substance and Sexual Activity    Alcohol use: No    Drug use: No   Social History Narrative    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     Social Determinants of Health     Financial Resource Strain: Low Risk  (8/2/2024)    Overall Financial Resource Strain (CARDIA)     Difficulty of Paying Living Expenses: Not hard at all   Food Insecurity: No Food Insecurity (8/2/2024)    Hunger Vital Sign     Worried About Running Out of Food in the Last Year: Never true     Ran Out of Food in the Last Year: Never true   Transportation Needs: No Transportation Needs (6/17/2024)    TRANSPORTATION NEEDS     Transportation : No   Physical Activity: Insufficiently Active (8/2/2024)    Exercise Vital Sign     Days of Exercise per Week: 2 days     Minutes of Exercise per Session: 40 min   Stress: No Stress Concern Present (6/17/2024)    Citizen of the [place] [employer] Occupational Health - Occupational Stress Questionnaire     Feeling of Stress : Not at all   Housing Stability: Low Risk  (8/2/2024)    Housing Stability Vital Sign     Unable to Pay for Housing in the Last Year: No     Homeless in the Last Year: No       OBJECTIVE:     Vital Signs Range (Last 24H):         CBC:   No results for input(s): "WBC", "RBC", "HGB", "HCT", "PLT", "MCV", "MCH", "MCHC" in the last 72 hours.    CMP: No results for input(s): "NA", "K", "CL", "CO2", "BUN", "CREATININE", "GLU", "MG", "PHOS", "CALCIUM", "ALBUMIN", "PROT", "ALKPHOS", "ALT", "AST", "BILITOT" in the last 72 hours.    INR:  No results for input(s): "PT", "INR", "PROTIME", "APTT" in the last 72 hours.    Diagnostic Studies: No relevant studies.    EKG: No recent studies available.    2D ECHO:  No results found for this or any previous visit.      ASSESSMENT/PLAN:           Pre-op Assessment    I have reviewed the Patient Summary Reports.    I " have reviewed the NPO Status.   I have reviewed the Medications.     Review of Systems  Anesthesia Hx:  No problems with previous Anesthesia                Hematology/Oncology:       -- Anemia:                                  Cardiovascular:     Hypertension                                        Renal/:  Chronic Renal Disease                Neurological:      Headaches                                 Endocrine:  Diabetes, type 2, using insulin           Psych:  Psychiatric History                Physical Exam  General: Alert and Cooperative    Airway:  Mallampati: III / II  Mouth Opening: Normal  TM Distance: Normal  Tongue: Normal    Dental:  Any loose or missing teeth verified with patient      Anesthesia Plan  Type of Anesthesia, risks & benefits discussed:    Anesthesia Type: Gen Natural Airway  Intra-op Monitoring Plan: Standard ASA Monitors  Post Op Pain Control Plan: multimodal analgesia  Induction:  IV  Airway Plan: Direct, Post-Induction  Informed Consent: Informed consent signed with the Patient and all parties understand the risks and agree with anesthesia plan.  All questions answered.   ASA Score: 3  Day of Surgery Review of History & Physical: H&P Update referred to the surgeon/provider.    Ready For Surgery From Anesthesia Perspective.     .

## 2024-08-28 NOTE — TRANSFER OF CARE
"Anesthesia Transfer of Care Note    Patient: Angel Bell    Procedure(s) Performed: Procedure(s) (LRB):  ESOPHAGOGASTRODUODENOSCOPY (EGD) (N/A)    Patient location: GI    Anesthesia Type: general    Transport from OR: Transported from OR on room air with adequate spontaneous ventilation    Post pain: adequate analgesia    Post assessment: no apparent anesthetic complications and tolerated procedure well    Post vital signs: stable    Level of consciousness: responds to stimulation    Nausea/Vomiting: no vomiting    Complications: none    Transfer of care protocol was followed      Last vitals: Visit Vitals  BP (!) 161/78 (BP Location: Left arm, Patient Position: Lying)   Pulse 103   Temp 36.6 °C (97.9 °F) (Skin)   Resp 18   Ht 5' 6" (1.676 m)   Wt 68 kg (150 lb)   SpO2 100%   BMI 24.21 kg/m²     "

## 2024-08-28 NOTE — H&P
Short Stay Endoscopy History and Physical    PCP - Tiago Ruiz MD    Procedure - EGD  ASA - III  Mallampati - per anesthesia  History of Anesthesia problems - no  Family history Anesthesia problems - no     HPI:  This is a 63 y.o. male here for evaluation of : Esophagitis    ROS:  Constitutional: No fevers, chills, No weight loss  ENT: No allergies  CV: No chest pain  Pulm: No shortness of breath  GI: see HPI  Derm: No rash    Medical History:  has a past medical history of Diabetes mellitus, Disorder of kidney and ureter, Hyperlipemia, Hypertension, Iron deficiency anemia, Migraine headache, PTSD (post-traumatic stress disorder), and Stage 3a chronic kidney disease.    Surgical History:  has a past surgical history that includes Laser enucleation of prostate (N/A, 8/29/2022) and Esophagogastroduodenoscopy (N/A, 6/18/2024).    Family History: family history includes Diabetes in his mother.. Otherwise no colon cancer, inflammatory bowel disease, or GI malignancies.    Social History:  reports that he has never smoked. He has never used smokeless tobacco. He reports that he does not drink alcohol and does not use drugs.    Review of patient's allergies indicates:  No Known Allergies    Medications:   Medications Prior to Admission   Medication Sig Dispense Refill Last Dose    atorvastatin (LIPITOR) 40 MG tablet TAKE 1 TABLET(40 MG) BY MOUTH EVERY DAY 90 tablet 3     blood sugar diagnostic (TRUE METRIX GLUCOSE TEST STRIP) Strp TEST 4 TIMES A  each 11     dapagliflozin propanediol (FARXIGA) 10 mg tablet Take 1 tablet (10 mg total) by mouth once daily. 30 tablet 11     dulaglutide (TRULICITY) 3 mg/0.5 mL pen injector Inject 3 mg into the skin every 7 days. 12 pen 3     ferrous sulfate 325 (65 FE) MG EC tablet Take 1 tablet (325 mg total) by mouth once daily. 30 tablet 11     finerenone (KERENDIA) 10 mg Tab Take 1 tablet by mouth once daily. 90 tablet 11     furosemide (LASIX) 40 MG tablet Take 1 tablet (40 mg  "total) by mouth daily as needed (swelling). 30 tablet 11     glucose 4 GM chewable tablet Take 4 tablets (16 g total) by mouth as needed for Low blood sugar (If having symptoms of blurry vision, palpitations, confusion, shakiness.  Please check sugars and if sugar below 70 please take 4 tablets and re-check sugar everry 15 minutes until sugars are above 70 and symptoms resolve.). 50 tablet 12     insulin glargine U-100, Lantus, (LANTUS SOLOSTAR U-100 INSULIN) 100 unit/mL (3 mL) InPn pen Inject 5 Units into the skin every evening. 15 mL 3     lancets 33 gauge Misc USE 4 TIMES A  each 11     losartan (COZAAR) 50 MG tablet Take 1 tablet (50 mg total) by mouth once daily. 90 tablet 3     metoprolol succinate (TOPROL-XL) 200 MG 24 hr tablet Take 1 tablet (200 mg total) by mouth once daily. 90 tablet 1     NIFEdipine (PROCARDIA-XL) 90 MG (OSM) 24 hr tablet Take 1 tablet (90 mg total) by mouth once daily. 90 tablet 3     ondansetron (ZOFRAN-ODT) 4 MG TbDL as needed.       pantoprazole (PROTONIX) 40 MG tablet Take 1 tablet (40 mg total) by mouth 2 (two) times daily. 60 tablet 1     patiromer calcium sorbitex (VELTASSA) 8.4 gram PwPk Take 1 packet (8.4 g total) by mouth once daily. 90 packet 3     pen needle, diabetic 31 gauge x 5/16" Ndle use 4 times a day 300 each 11     promethazine (PHENERGAN) 25 MG tablet        sildenafiL (VIAGRA) 100 MG tablet Take 1 tablet (100 mg total) by mouth daily as needed for Erectile Dysfunction (1 hour before sexual activity on an empty stomach.). 30 tablet 5          Objective Findings:    Vital Signs: see nursing notes  Physical Exam:  General Appearance: Well appearing in no acute distress  Eyes:    No scleral icterus  ENT: Neck supple  Lungs: CTA anteriorly  Heart:  S1, S2 normal, no murmurs heard  Abdomen: Soft, non tender, non distended with positive bowel sounds. No hepatosplenomegaly, ascites, or mass  Extremities: no edema  Skin: No rash      Labs:  Lab Results   Component " Value Date    WBC 7.09 08/13/2024    HGB 10.7 (L) 08/13/2024    HCT 32.8 (L) 08/13/2024     08/13/2024    CHOL 104 (L) 05/29/2024    TRIG 69 05/29/2024    HDL 39 (L) 05/29/2024    ALT 17 06/25/2024    AST 15 06/25/2024     08/13/2024    K 4.5 08/13/2024     08/13/2024    CREATININE 1.48 (H) 08/13/2024    BUN 15 08/13/2024    CO2 25 08/13/2024    TSH 0.415 06/15/2024    INR 1.0 08/16/2022    HGBA1C 5.8 (H) 05/29/2024       I have explained the risks and benefits of endoscopy procedures to the patient including but not limited to bleeding, perforation, infection, and death.    Mik Benítez MD

## 2024-08-28 NOTE — ANESTHESIA POSTPROCEDURE EVALUATION
Anesthesia Post Evaluation    Patient: Angel Bell    Procedure(s) Performed: Procedure(s) (LRB):  ESOPHAGOGASTRODUODENOSCOPY (EGD) (N/A)    Final Anesthesia Type: general      Patient location during evaluation: PACU  Patient participation: Yes- Able to Participate  Level of consciousness: awake and alert  Post-procedure vital signs: reviewed and stable  Pain management: adequate  Airway patency: patent  MRELY mitigation strategies: Multimodal analgesia  PONV status at discharge: No PONV  Anesthetic complications: no      Cardiovascular status: hemodynamically stable  Respiratory status: spontaneous ventilation and room air  Hydration status: euvolemic  Follow-up not needed.              Vitals Value Taken Time   /86 08/28/24 0915   Temp 37.1 °C (98.8 °F) 08/28/24 0845   Pulse 91 08/28/24 0915   Resp 19 08/28/24 0915   SpO2 100 % 08/28/24 0915         Event Time   Out of Recovery 09:20:00         Pain/Lobo Score: Lobo Score: 9 (8/28/2024  9:15 AM)

## 2024-08-28 NOTE — PLAN OF CARE
Recovery complete. Patient recovered to baseline. Discharge instructions reviewed with patient. VSS.    07-May-2018

## 2024-09-26 DIAGNOSIS — E11.65 TYPE 2 DIABETES MELLITUS WITH HYPERGLYCEMIA, WITH LONG-TERM CURRENT USE OF INSULIN: ICD-10-CM

## 2024-09-26 DIAGNOSIS — Z79.4 TYPE 2 DIABETES MELLITUS WITH HYPERGLYCEMIA, WITH LONG-TERM CURRENT USE OF INSULIN: ICD-10-CM

## 2024-09-28 RX ORDER — PEN NEEDLE, DIABETIC 30 GX3/16"
NEEDLE, DISPOSABLE MISCELLANEOUS
Qty: 300 EACH | Refills: 11 | Status: SHIPPED | OUTPATIENT
Start: 2024-09-28

## 2024-10-30 ENCOUNTER — TELEPHONE (OUTPATIENT)
Dept: OPTOMETRY | Facility: CLINIC | Age: 64
End: 2024-10-30
Payer: COMMERCIAL

## 2024-11-12 ENCOUNTER — TELEPHONE (OUTPATIENT)
Dept: OPTOMETRY | Facility: CLINIC | Age: 64
End: 2024-11-12
Payer: COMMERCIAL

## 2024-11-14 ENCOUNTER — OFFICE VISIT (OUTPATIENT)
Dept: OPTOMETRY | Facility: CLINIC | Age: 64
End: 2024-11-14
Payer: MEDICARE

## 2024-11-14 DIAGNOSIS — I10 PRIMARY HYPERTENSION: ICD-10-CM

## 2024-11-14 DIAGNOSIS — E11.9 TYPE 2 DIABETES MELLITUS WITHOUT OPHTHALMIC MANIFESTATIONS: ICD-10-CM

## 2024-11-14 DIAGNOSIS — E11.65 TYPE 2 DIABETES MELLITUS WITH HYPERGLYCEMIA, WITH LONG-TERM CURRENT USE OF INSULIN: Primary | ICD-10-CM

## 2024-11-14 DIAGNOSIS — H26.9 CORTICAL CATARACT OF BOTH EYES: ICD-10-CM

## 2024-11-14 DIAGNOSIS — E11.3293 TYPE 2 DIABETES MELLITUS WITH BOTH EYES AFFECTED BY MILD NONPROLIFERATIVE RETINOPATHY WITHOUT MACULAR EDEMA, WITHOUT LONG-TERM CURRENT USE OF INSULIN: ICD-10-CM

## 2024-11-14 DIAGNOSIS — Z79.4 TYPE 2 DIABETES MELLITUS WITH HYPERGLYCEMIA, WITH LONG-TERM CURRENT USE OF INSULIN: Primary | ICD-10-CM

## 2024-11-14 PROCEDURE — 99213 OFFICE O/P EST LOW 20 MIN: CPT | Mod: PBBFAC,PO | Performed by: OPTOMETRIST

## 2024-11-14 PROCEDURE — 99999 PR PBB SHADOW E&M-EST. PATIENT-LVL III: CPT | Mod: PBBFAC,,, | Performed by: OPTOMETRIST

## 2024-11-14 PROCEDURE — 92134 CPTRZ OPH DX IMG PST SGM RTA: CPT | Mod: PBBFAC,PO | Performed by: OPTOMETRIST

## 2024-11-14 NOTE — PROGRESS NOTES
HPI    Unable to get AR    Here for DM eye exam    Eye meds: None    63 year old states vision is blurry OU. States he has glasses, but didn't   bring the glasses and says doesn't help. C/o  vision is worst at night.   Denies flashes, floaters and diplopia. Denies itchy or scratchy eyes   Last edited by Daya Graf on 11/14/2024  2:16 PM.            Assessment /Plan     For exam results, see Encounter Report.    Type 2 diabetes mellitus with hyperglycemia, with long-term current use of insulin  Type 2 diabetes mellitus without ophthalmic manifestations  Type 2 diabetes mellitus with both eyes affected by mild nonproliferative retinopathy without macular edema, without long-term current use of insulin  -    Today  Posterior Segment OCT Retina-OD WNL OU  Primary hypertension  2022  Color Fundus Photography - OU - Both Eyes            -  h/o CWS in posterior pole     Cortical cataract of both eyes  NS (nuclear sclerosis), right              VS consult for cat eval     Vision loss of left eye              Pt reports injury to OS approx 20 years ago           RTC 1 year

## 2024-11-18 ENCOUNTER — PATIENT OUTREACH (OUTPATIENT)
Dept: ADMINISTRATIVE | Facility: HOSPITAL | Age: 64
End: 2024-11-18
Payer: COMMERCIAL

## 2024-11-18 NOTE — PROGRESS NOTES
Population Health Chart Review & Patient Outreach Details      Additional [facility] Notes:               Updates Requested / Reviewed:      Updated Care Coordination Note, Care Everywhere, and Immunizations Reconciliation Completed or Queried: [facility] Topics Overdue:      [facility] Score: 2     Colon Cancer Screening  Foot Exam    Influenza Vaccine  RSV Vaccine                  Health Maintenance Topic(s) Outreach Outcomes & Actions Taken:    Eye Exam - Outreach Outcomes & Actions Taken  : completed 11/14/2024. Note in chart

## 2024-11-19 ENCOUNTER — OFFICE VISIT (OUTPATIENT)
Dept: PODIATRY | Facility: CLINIC | Age: 64
End: 2024-11-19
Payer: MEDICARE

## 2024-11-19 VITALS
BODY MASS INDEX: 24.1 KG/M2 | SYSTOLIC BLOOD PRESSURE: 154 MMHG | HEART RATE: 101 BPM | HEIGHT: 66 IN | DIASTOLIC BLOOD PRESSURE: 74 MMHG | WEIGHT: 149.94 LBS

## 2024-11-19 DIAGNOSIS — E11.9 ENCOUNTER FOR DIABETIC FOOT EXAM: Primary | ICD-10-CM

## 2024-11-19 DIAGNOSIS — B35.1 ONYCHOMYCOSIS DUE TO DERMATOPHYTE: ICD-10-CM

## 2024-11-19 PROCEDURE — 99999 PR PBB SHADOW E&M-EST. PATIENT-LVL III: CPT | Mod: PBBFAC,,, | Performed by: STUDENT IN AN ORGANIZED HEALTH CARE EDUCATION/TRAINING PROGRAM

## 2024-11-19 PROCEDURE — 99214 OFFICE O/P EST MOD 30 MIN: CPT | Mod: S$PBB,,, | Performed by: STUDENT IN AN ORGANIZED HEALTH CARE EDUCATION/TRAINING PROGRAM

## 2024-11-19 PROCEDURE — 99213 OFFICE O/P EST LOW 20 MIN: CPT | Mod: PBBFAC,PN | Performed by: STUDENT IN AN ORGANIZED HEALTH CARE EDUCATION/TRAINING PROGRAM

## 2024-11-19 RX ORDER — CICLOPIROX 80 MG/ML
SOLUTION TOPICAL NIGHTLY
Qty: 6.6 ML | Refills: 3 | Status: SHIPPED | OUTPATIENT
Start: 2024-11-19

## 2024-11-19 NOTE — PROGRESS NOTES
Subjective:      Patient ID: Angel Bell is a 63 y.o. male.    Chief Complaint: Diabetic Foot Exam (Diabetic foot exam)    Angel is a 63 y.o. male who presents to the clinic upon referral from Dr. Cathi dugan. provider found  for evaluation and treatment of diabetic feet. Angel has a past medical history of Diabetes mellitus, Disorder of kidney and ureter, Hyperlipemia, Hypertension, Iron deficiency anemia, Migraine headache, PTSD (post-traumatic stress disorder), and Stage 3a chronic kidney disease. Patient relates no major problem with feet. Only complaints today consist of here for a diabetic foot exam. Relates to elongated and thickened toenails that are difficult to trim. No other pedal complaints.     11/19/24: Seen today for annual diabetic foot exam.    PCP: Tiago Ruiz MD    Date Last Seen by PCP: per above  Current shoe gear: Casual shoes    Hemoglobin A1C   Date Value Ref Range Status   05/29/2024 5.8 (H) 4.0 - 5.6 % Final     Comment:     ADA Screening Guidelines:  5.7-6.4%  Consistent with prediabetes  >or=6.5%  Consistent with diabetes    High levels of fetal hemoglobin interfere with the HbA1C  assay. Heterozygous hemoglobin variants (HbS, HgC, etc)do  not significantly interfere with this assay.   However, presence of multiple variants may affect accuracy.     01/25/2024 7.9 (H) 4.0 - 5.6 % Final     Comment:     ADA Screening Guidelines:  5.7-6.4%  Consistent with prediabetes  >or=6.5%  Consistent with diabetes    High levels of fetal hemoglobin interfere with the HbA1C  assay. Heterozygous hemoglobin variants (HbS, HgC, etc)do  not significantly interfere with this assay.   However, presence of multiple variants may affect accuracy.     01/25/2024 7.8 (H) 4.0 - 5.6 % Final     Comment:     ADA Screening Guidelines:  5.7-6.4%  Consistent with prediabetes  >or=6.5%  Consistent with diabetes    High levels of fetal hemoglobin interfere with the HbA1C  assay. Heterozygous hemoglobin variants (HbS, HgC,  etc)do  not significantly interfere with this assay.   However, presence of multiple variants may affect accuracy.             Review of Systems   Constitutional: Negative for chills, decreased appetite, diaphoresis and fever.   HENT:  Negative for congestion and hearing loss.    Cardiovascular:  Negative for chest pain, claudication, leg swelling and syncope.   Respiratory:  Negative for cough and shortness of breath.    Skin:  Positive for dry skin and nail changes. Negative for color change, flushing, itching, poor wound healing and rash.   Musculoskeletal:  Negative for arthritis, back pain, joint pain and joint swelling.   Gastrointestinal:  Negative for nausea and vomiting.   Neurological:  Negative for focal weakness, paresthesias and weakness.   Psychiatric/Behavioral:  Negative for altered mental status. The patient is not nervous/anxious.            Objective:      Physical Exam  Constitutional:       General: He is not in acute distress.     Appearance: Normal appearance. He is well-developed. He is not diaphoretic.   Cardiovascular:      Pulses:           Dorsalis pedis pulses are 2+ on the right side and 2+ on the left side.        Posterior tibial pulses are 2+ on the right side and 2+ on the left side.      Comments: Dorsalis pedis and posterior tibial pulses are within normal limits. Skin temperature is within normal limits. Toes are cool to touch and feet are warm proximally. Hair growth is within normal limits. Skin is normotrophic and without hyperpigmentation. No edema noted. No varicosities or spider veins noted, bilaterally.     Musculoskeletal:         General: No tenderness.      Comments: Adequate joint range of motion without pain, limitation, nor crepitation to foot and ankle joints. Muscle strength is 5/5 in all groups bilaterally.       Feet:      Right foot:      Protective Sensation: 10 sites tested.  10 sites sensed.      Skin integrity: Dry skin present. No ulcer, blister, erythema  or warmth.      Left foot:      Protective Sensation: 10 sites tested.  10 sites sensed.      Skin integrity: Dry skin present. No ulcer, blister, erythema or warmth.   Skin:     General: Skin is warm and dry.      Capillary Refill: Capillary refill takes less than 2 seconds.      Comments: Skin is warm and dry, no acute signs of infection noted bilaterally      Toenails are thickened by 2-4 mm's, dystrophic, and are darkened in coloration with subungual fungal debris.     Otherwise, no open wounds, macerations or hyperkeratotic lesions, bilaterally            Neurological:      Mental Status: He is alert and oriented to person, place, and time.      Sensory: No sensory deficit.      Motor: No abnormal muscle tone.      Comments: Light touch within normal limits. [place] 5.07 monofilamant testing is within normal limits.    Psychiatric:         Mood and Affect: Mood normal.         Behavior: Behavior normal.         Thought Content: Thought content normal.               Assessment:       Encounter Diagnoses   Name Primary?    Encounter for diabetic foot exam Yes    Onychomycosis due to dermatophyte            Plan:       Angel was seen today for diabetic foot exam.    Diagnoses and all orders for this visit:    Encounter for diabetic foot exam  -     Foot Exam Performed    Onychomycosis due to dermatophyte    Other orders  -     ciclopirox (PENLAC) 8 % Soln; Apply topically nightly.        I counseled the patient on his conditions, their implications and medical management.  Discussed importance of keeping feet dry and changing socks and shoes on a regular basis to prevent spread of toenail fungus. Discussed treatment options for fungal toenails including topical home remedies, topical over the counter and prescription medications as well as oral prescription medications and laser treatment. All pros and cons discussed of each treatment. Patient elects for topical treatment. Script dispensed.   I advised  him on keeping nails neatly trimmed, removing all sharp and loose edges, filing the nail as necessary to prevent damage to nail plate and prevent unnecessary pulling of toenail. Also advised to avoid tight fitting shoes to prevent pressure related issues. Recommend shoes with wide toe box or open toed shoe to reduce pressure.   Toenails debrided 1-10, using sterile nail nippers, as a courtesy, without incident. Patient tolerated well. Assisted by Lulu Regalado LPN.   Annual diabetic foot exam performed today. Shoe inspection. Diabetic Foot Education. Patient reminded of the importance of good nutrition and blood sugar control to help prevent podiatric complications of diabetes. Patient instructed on proper foot hygeine. We discussed wearing proper shoe gear, daily foot inspections, never walking without protective shoe gear, never putting sharp instruments to feet.  Return to clinic in 1 year, sooner PRN

## 2024-12-02 ENCOUNTER — OFFICE VISIT (OUTPATIENT)
Dept: ENDOCRINOLOGY | Facility: CLINIC | Age: 64
End: 2024-12-02
Payer: MEDICARE

## 2024-12-02 VITALS
OXYGEN SATURATION: 97 % | DIASTOLIC BLOOD PRESSURE: 78 MMHG | SYSTOLIC BLOOD PRESSURE: 164 MMHG | WEIGHT: 145.75 LBS | BODY MASS INDEX: 23.52 KG/M2 | HEART RATE: 80 BPM

## 2024-12-02 DIAGNOSIS — Z79.4 TYPE 2 DIABETES MELLITUS WITH HYPERGLYCEMIA, WITH LONG-TERM CURRENT USE OF INSULIN: Primary | ICD-10-CM

## 2024-12-02 DIAGNOSIS — E78.2 MIXED HYPERLIPIDEMIA: ICD-10-CM

## 2024-12-02 DIAGNOSIS — E11.65 TYPE 2 DIABETES MELLITUS WITH HYPERGLYCEMIA, WITH LONG-TERM CURRENT USE OF INSULIN: Primary | ICD-10-CM

## 2024-12-02 PROCEDURE — 99999 PR PBB SHADOW E&M-EST. PATIENT-LVL IV: CPT | Mod: PBBFAC,,, | Performed by: INTERNAL MEDICINE

## 2024-12-02 PROCEDURE — 99214 OFFICE O/P EST MOD 30 MIN: CPT | Mod: S$PBB,,, | Performed by: INTERNAL MEDICINE

## 2024-12-02 PROCEDURE — G2211 COMPLEX E/M VISIT ADD ON: HCPCS | Mod: S$PBB,,, | Performed by: INTERNAL MEDICINE

## 2024-12-02 PROCEDURE — 99214 OFFICE O/P EST MOD 30 MIN: CPT | Mod: PBBFAC | Performed by: INTERNAL MEDICINE

## 2024-12-02 RX ORDER — TIRZEPATIDE 2.5 MG/.5ML
2.5 INJECTION, SOLUTION SUBCUTANEOUS
Qty: 4 PEN | Refills: 0 | Status: SHIPPED | OUTPATIENT
Start: 2024-12-02

## 2024-12-02 RX ORDER — LANCETS 33 GAUGE
EACH MISCELLANEOUS
Qty: 400 EACH | Refills: 3 | Status: SHIPPED | OUTPATIENT
Start: 2024-12-02

## 2024-12-02 RX ORDER — TIRZEPATIDE 5 MG/.5ML
5 INJECTION, SOLUTION SUBCUTANEOUS
Qty: 4 PEN | Refills: 3 | Status: SHIPPED | OUTPATIENT
Start: 2025-01-02

## 2024-12-02 NOTE — PROGRESS NOTES
ENDOCRINOLOGY CLINIC    Subjective:      Chief Complaint: Type 2 diabetes    HPI:   Angel Bell is a 64 y.o. male who presents for follow-up of type 2 diabetes. Patient was last seen in the clinic on 06/03/2024.    Diabetes Hx:  Diagnosed w/ DM: 2016  Complications:   Retinopathy: No   Last eye exam: : 11/14/2024  Neuropathy: No. Sees podiatry for foot and nail care.   Last foot exam: : 11/19/2024.   Nephropathy: Yes, CKD and follows up with Nephrology  Cardiovascular: Denies  Gastroparesis: Denies  DKA/HHS:  DKA in 2022.    Severe Hypoglycemia: Denies  Hypoglycemia unawareness: Denies  Hypoglycemic episodes: None recently      Current meds:   Lantus 5 units at bedtime  Trulicity 3 mg SC weekly.  Farxiga 10 mg daily (Started on 3/2023 by nephrology)     Reports some nausea/vomiting on the Trulicity and wants to switch to an alternative GLP 1 RA.    Compliance with meds: Yes     Previous meds:  Metformin  NovoLog     Home glucose checks: 3 times a day - on recall blood sugars running between 95-low 100s.  Compliant: Yes    Diet/Exercise:   Takes 2 meals a day, occasional snacks  Reports taking adequate water   He will start walking regularly.    Diabetes education:  Few years back - declines referral.    Last A1c:   Lab Results   Component Value Date    HGBA1C 5.8 (H) 05/29/2024    HGBA1C 7.9 (H) 01/25/2024    HGBA1C 7.8 (H) 01/25/2024     Microalbumin:   Lab Results   Component Value Date    LABMICR 16.0 05/29/2024    CREATRANDUR 35.2 06/15/2024    MICALBCREAT 20.6 05/29/2024     Lab Results   Component Value Date    EGFRNORACEVR 52.5 (A) 08/13/2024    CREATININE 1.48 (H) 08/13/2024     Lipids:   Lab Results   Component Value Date    CHOL 104 (L) 05/29/2024    TRIG 69 05/29/2024    HDL 39 (L) 05/29/2024    LDLCALC 51.2 (L) 05/29/2024    CHOLHDL 37.5 05/29/2024     TSH:  Lab Results   Component Value Date    TSH 0.415 06/15/2024     Lab Results   Component Value Date    QNRPVKCZ00 903 06/17/2022     Lab Results    Component Value Date    HGB 10.7 (L) 08/13/2024        Aspirin: No  Statins: Yes  ACEI/ARB: Yes    Fatty liver: Denies    HTN: On Losartan, Metoprolol, Nifedipine. Takes lasix as needed.   Blood pressures are normal at home.     Denies history of pancreatitis or personal/FH of medullary thyroid cancer.  History recurrent UTI/fungal infection:  None recently.  Has history of chronic cystitis.    Polyuria/Polydipsia: Denies  Has BPH      FHx of DM: Yes, mother  Heart disease: No  Hyperlipidemia: No    ROS: see HPI     Objective:     Physical Exam     BP (!) 164/78 (BP Location: Right arm, Patient Position: Sitting)   Pulse 80   Wt 66.1 kg (145 lb 11.6 oz)   SpO2 97%   BMI 23.52 kg/m²     Wt Readings from Last 3 Encounters:   12/02/24 66.1 kg (145 lb 11.6 oz)   11/19/24 68 kg (149 lb 14.6 oz)   08/28/24 68 kg (150 lb)       Constitutional:  Pleasant,  in no acute distress.   HENT:   Head:    Normocephalic and atraumatic.   Eyes:    EOMI. No scleral icterus.   Cardiovascular:  Normal rate  Respiratory:   Effort normal   Neurological:  No tremor  Skin:    Skin is warm, dry  Extremity:  No edema    Foot exam:  06/03/2024: Normal sensation monofilament testing bilaterally.  No ulcers or fissures seen.  Has onychomycosis.    Injection sites normal w/o lipohypertrophy    LABORATORY REVIEW:  See HPI for other labs reviewed today      Chemistry        Component Value Date/Time     08/13/2024 0742    K 4.5 08/13/2024 0742     08/13/2024 0742    CO2 25 08/13/2024 0742    BUN 15 08/13/2024 0742    CREATININE 1.48 (H) 08/13/2024 0742     08/13/2024 0742        Component Value Date/Time    CALCIUM 9.7 08/13/2024 0742    ALKPHOS 54 (L) 06/25/2024 1050    AST 15 06/25/2024 1050    ALT 17 06/25/2024 1050    BILITOT 0.2 06/25/2024 1050    ESTGFRAFRICA 50.3 (A) 07/18/2022 0743    EGFRNONAA 43.5 (A) 07/18/2022 0743          Lab Results   Component Value Date    HGBA1C 5.8 (H) 05/29/2024    HGBA1C 7.9 (H)  01/25/2024    HGBA1C 7.8 (H) 01/25/2024     Other labs reviewed today in [facility]    Assessment/Plan:       1. Type 2 diabetes mellitus with hyperglycemia, with long-term current use of insulin  Assessment & Plan:    Recent A1c was 5.8.  Patient has labs ordered by his nephrologist.  Reports some nausea/vomiting on the Trulicity and wants to try an alternative medication.  Discussed switching to Mounjaro.    Change in diabetes regimen:  Start Mounjaro 2.5 mg once a week and if tolerated for 4 weeks, increase dose to 5 mg weekly.  Continue Lantus 5 units at bedtime.  Continue Farxiga.    Call if any side effects from the medications  Call if blood sugars are persistently less than 70 or greater than 200.   Patient was not interested in a CGM.    Discussed importance of diet and lifestyle modifications for diabetes management  Hypoglycemia management discussed. Carry glucose tablets or snacks at all times.     Complications:  Follow up for regular diabetes eye exam  Daily self examination of feet.  Microalbumin: Monitor. On ARBs    Lab Results   Component Value Date    HGBA1C 5.8 (H) 05/29/2024         Orders:  -     tirzepatide (MOUNJARO) 2.5 mg/0.5 mL PnIj; Inject 2.5 mg into the skin every 7 days. In tolerated for 4 weeks, increase dose to 5 mg every 7 days.  Dispense: 4 Pen; Refill: 0  -     tirzepatide (MOUNJARO) 5 mg/0.5 mL PnIj; Inject 5 mg into the skin every 7 days.  Dispense: 4 Pen; Refill: 3  -     blood sugar diagnostic (TRUE METRIX GLUCOSE TEST STRIP) Strp; TEST 4 TIMES A DAILY AS DIRECTED  Dispense: 400 each; Refill: 3  -     lancets 33 gauge Misc; Test 4 times daily as directed  Dispense: 400 each; Refill: 3    2. Mixed hyperlipidemia  Assessment & Plan:    Continue statin.  Monitor lipids.               Follow-up in 6 months.    Gloria Edgar MD

## 2024-12-02 NOTE — ASSESSMENT & PLAN NOTE
Recent A1c was 5.8.  Patient has labs ordered by his nephrologist.  Reports some nausea/vomiting on the Trulicity and wants to try an alternative medication.  Discussed switching to Mounjaro.    Change in diabetes regimen:  Start Mounjaro 2.5 mg once a week and if tolerated for 4 weeks, increase dose to 5 mg weekly.  Continue Lantus 5 units at bedtime.  Continue Farxiga.    Call if any side effects from the medications  Call if blood sugars are persistently less than 70 or greater than 200.   Patient was not interested in a CGM.    Discussed importance of diet and lifestyle modifications for diabetes management  Hypoglycemia management discussed. Carry glucose tablets or snacks at all times.     Complications:  Follow up for regular diabetes eye exam  Daily self examination of feet.  Microalbumin: Monitor. On ARBs    Lab Results   Component Value Date    HGBA1C 5.8 (H) 05/29/2024

## 2024-12-02 NOTE — PATIENT INSTRUCTIONS
Change in your diabetes medications:   Start Mounjaro 2.5 mg every 7 days.  If tolerated for 4 weeks, increase dose to 5 mg every 7 days.  Continue your Lantus and Farxiga.    Stopped Trulicity.    Call if you have any side effects from the medication:   Mounjaro:  Nausea, vomiting, diarrhea, constipation, decreased appetite, abdominal pain, pancreatitis, gastroparesis, worsening of retinopathy, dehydration and acute kidney injury. Avoid this medication if you have any history of pancreatitis or have personal or family history of medullary thyroid cancer.   Farxiga:  Dehydration, increased urination, urinary tract infections, yeast infections, diabetic ketoacidosis.  Call if you have any foot infections.     Check blood sugars before meals and bedtime.   Call if blood sugars are frequently less than 70 or above 200.    Call if you need prescriptions for medications.  Carry glucose tablets or snacks with you at all times.     Follow-up in 6 months.

## 2024-12-03 RX ORDER — PANTOPRAZOLE SODIUM 40 MG/1
40 TABLET, DELAYED RELEASE ORAL 2 TIMES DAILY
Qty: 60 TABLET | Refills: 1 | Status: SHIPPED | OUTPATIENT
Start: 2024-12-03 | End: 2025-12-03

## 2024-12-10 ENCOUNTER — PATIENT MESSAGE (OUTPATIENT)
Dept: ADMINISTRATIVE | Facility: HOSPITAL | Age: 64
End: 2024-12-10
Payer: COMMERCIAL

## 2024-12-10 RX ORDER — FERROUS SULFATE 325(65) MG
325 TABLET, DELAYED RELEASE (ENTERIC COATED) ORAL DAILY
Qty: 30 TABLET | Refills: 11 | Status: SHIPPED | OUTPATIENT
Start: 2024-12-10

## 2024-12-12 ENCOUNTER — PATIENT OUTREACH (OUTPATIENT)
Dept: ADMINISTRATIVE | Facility: HOSPITAL | Age: 64
End: 2024-12-12
Payer: COMMERCIAL

## 2024-12-12 VITALS — DIASTOLIC BLOOD PRESSURE: 74 MMHG | SYSTOLIC BLOOD PRESSURE: 132 MMHG

## 2024-12-12 NOTE — PROGRESS NOTES
Population Health Chart Review & Patient Outreach Details      Additional [facility] Health Notes:               Updates Requested / Reviewed:      Updated Care Coordination Note         Health Maintenance Topics Overdue:      VB Score: 3     Colon Cancer Screening  Hemoglobin A1c  Uncontrolled BP    Influenza Vaccine  RSV Vaccine                  Health Maintenance Topic(s) Outreach Outcomes & Actions Taken:    Blood Pressure - Outreach Outcomes & Actions Taken  : Remote Blood Pressure Reading Captured

## 2024-12-22 DIAGNOSIS — Z79.4 TYPE 2 DIABETES MELLITUS WITH HYPERGLYCEMIA, WITH LONG-TERM CURRENT USE OF INSULIN: ICD-10-CM

## 2024-12-22 DIAGNOSIS — E11.65 TYPE 2 DIABETES MELLITUS WITH HYPERGLYCEMIA, WITH LONG-TERM CURRENT USE OF INSULIN: ICD-10-CM

## 2024-12-23 RX ORDER — TIRZEPATIDE 2.5 MG/.5ML
2.5 INJECTION, SOLUTION SUBCUTANEOUS
Qty: 4 PEN | Refills: 0 | OUTPATIENT
Start: 2024-12-23

## 2025-01-16 ENCOUNTER — LAB VISIT (OUTPATIENT)
Dept: LAB | Facility: HOSPITAL | Age: 65
End: 2025-01-16
Attending: INTERNAL MEDICINE
Payer: MEDICARE

## 2025-01-16 DIAGNOSIS — N18.32 TYPE 2 DIABETES MELLITUS WITH STAGE 3B CHRONIC KIDNEY DISEASE, WITHOUT LONG-TERM CURRENT USE OF INSULIN: ICD-10-CM

## 2025-01-16 DIAGNOSIS — E11.22 TYPE 2 DIABETES MELLITUS WITH STAGE 3B CHRONIC KIDNEY DISEASE, WITHOUT LONG-TERM CURRENT USE OF INSULIN: ICD-10-CM

## 2025-01-16 DIAGNOSIS — N25.81 SECONDARY HYPERPARATHYROIDISM OF RENAL ORIGIN: ICD-10-CM

## 2025-01-16 LAB
ALBUMIN SERPL BCP-MCNC: 4.2 G/DL (ref 3.5–5.2)
ANION GAP SERPL CALC-SCNC: 9 MMOL/L (ref 8–16)
BASOPHILS # BLD AUTO: 0.03 K/UL (ref 0–0.2)
BASOPHILS NFR BLD: 0.4 % (ref 0–1.9)
CALCIUM SERPL-MCNC: 9.3 MG/DL (ref 8.7–10.5)
CHLORIDE SERPL-SCNC: 104 MMOL/L (ref 95–110)
CO2 SERPL-SCNC: 25 MMOL/L (ref 23–29)
CREAT SERPL-MCNC: 1.65 MG/DL (ref 0.5–1.4)
DIFFERENTIAL METHOD BLD: ABNORMAL
EOSINOPHIL # BLD AUTO: 0 K/UL (ref 0–0.5)
EOSINOPHIL NFR BLD: 0.5 % (ref 0–8)
ERYTHROCYTE [DISTWIDTH] IN BLOOD BY AUTOMATED COUNT: 15.5 % (ref 11.5–14.5)
EST. GFR  (NO RACE VARIABLE): 45.8 ML/MIN/1.73 M^2
ESTIMATED AVG GLUCOSE: 117 MG/DL (ref 68–131)
GLUCOSE SERPL-MCNC: 104 MG/DL (ref 70–110)
HBA1C MFR BLD: 5.7 % (ref 4–5.6)
HCT VFR BLD AUTO: 39 % (ref 40–54)
HGB BLD-MCNC: 13.2 G/DL (ref 14–18)
IMM GRANULOCYTES # BLD AUTO: 0.04 K/UL (ref 0–0.04)
IMM GRANULOCYTES NFR BLD AUTO: 0.5 % (ref 0–0.5)
LYMPHOCYTES # BLD AUTO: 3.5 K/UL (ref 1–4.8)
LYMPHOCYTES NFR BLD: 47.3 % (ref 18–48)
MAGNESIUM SERPL-MCNC: 2.4 MG/DL (ref 1.6–2.6)
MCH RBC QN AUTO: 29.4 PG (ref 27–31)
MCHC RBC AUTO-ENTMCNC: 33.8 G/DL (ref 32–36)
MCV RBC AUTO: 87 FL (ref 82–98)
MONOCYTES # BLD AUTO: 0.7 K/UL (ref 0.3–1)
MONOCYTES NFR BLD: 9.7 % (ref 4–15)
NEUTROPHILS # BLD AUTO: 3 K/UL (ref 1.8–7.7)
NEUTROPHILS NFR BLD: 41.6 % (ref 38–73)
NRBC BLD-RTO: 0 /100 WBC
PHOSPHATE SERPL-MCNC: 3.6 MG/DL (ref 2.7–4.5)
PLATELET # BLD AUTO: 228 K/UL (ref 150–450)
PMV BLD AUTO: 9.5 FL (ref 9.2–12.9)
POTASSIUM SERPL-SCNC: 3.7 MMOL/L (ref 3.5–5.1)
PTH-INTACT SERPL-MCNC: 147.6 PG/ML (ref 9–77)
RBC # BLD AUTO: 4.49 M/UL (ref 4.6–6.2)
SODIUM SERPL-SCNC: 138 MMOL/L (ref 136–145)
URATE SERPL-MCNC: 6 MG/DL (ref 3.4–7)
UUN UR-MCNC: 23 MG/DL (ref 2–20)
WBC # BLD AUTO: 7.33 K/UL (ref 3.9–12.7)

## 2025-01-16 PROCEDURE — 83970 ASSAY OF PARATHORMONE: CPT | Mod: PN | Performed by: INTERNAL MEDICINE

## 2025-01-16 PROCEDURE — 80069 RENAL FUNCTION PANEL: CPT | Mod: PN | Performed by: INTERNAL MEDICINE

## 2025-01-16 PROCEDURE — 84550 ASSAY OF BLOOD/URIC ACID: CPT | Performed by: INTERNAL MEDICINE

## 2025-01-16 PROCEDURE — 85025 COMPLETE CBC W/AUTO DIFF WBC: CPT | Mod: PN | Performed by: INTERNAL MEDICINE

## 2025-01-16 PROCEDURE — 36415 COLL VENOUS BLD VENIPUNCTURE: CPT | Mod: PN | Performed by: INTERNAL MEDICINE

## 2025-01-16 PROCEDURE — 83036 HEMOGLOBIN GLYCOSYLATED A1C: CPT | Performed by: INTERNAL MEDICINE

## 2025-01-16 PROCEDURE — 83735 ASSAY OF MAGNESIUM: CPT | Mod: PN | Performed by: INTERNAL MEDICINE

## 2025-01-17 RX ORDER — FERROUS SULFATE 325(65) MG
TABLET, DELAYED RELEASE (ENTERIC COATED) ORAL
Qty: 30 TABLET | Refills: 11 | Status: SHIPPED | OUTPATIENT
Start: 2025-01-17

## 2025-01-27 NOTE — PROGRESS NOTES
Cardiology Clinic note    Subjective:   Patient ID:  Angel Bell is a 64 y.o. male who presents for evaluation of tachycardia, abnormal heart beat    HPI    1/28/2025: Here for F/U HTN, HLD, PACs. Seen in clinic unaccompanied reports overall doing pretty well. Suffering from chronic hiccups, working to get treatment. Staying active. Stable from CV perspective, no new CV s/s. Tachycardia/PACs well controlled on current regimen. Echo/ holter reviewed with patient as below. Patient denies CP, SOB/DON, orthopnea, PND, syncope, palpitations, LE edema. Reports compliance and tolerance with all medications.    8/1/2024: 64 yo M with hx of DM, HTN, HLD, CKD3a present to clinic for evaluation of tachycardia, irregular heart beat. Seen in clinic reports overall doing well. Recent hospitalization with hyponatremia, follows close with nephrology, doing well since D/C. Staying active, walks frequently. Reports he's had a fast heart beat all his life, never diagnosed with A. Fib or known to have any arrhythmias. Patient denies CP, SOB/DON, orthopnea, PND, syncope, palpitations. Intermittent LE edema. Reports compliance and tolerance with all medications.  - EMR reviewed including past hospitalizations and 12-leads to [facility], no noted hx of A. Fib but with long-stating history of tachycardia.       CV Hx  ------------------    FH  -none    SH  -never smoker, never drinker  -DM2    Echo 8/2024    Left Ventricle: The left ventricle is normal in size. Normal wall thickness. There is normal systolic function. Biplane (2D) method of discs ejection fraction is 63%. There is normal diastolic function.    Right Ventricle: Normal right ventricular cavity size. Systolic function is normal. TAPSE is 2.59 cm.    Mitral Valve: There is mild regurgitation.    Tricuspid Valve: There is mild regurgitation.    Pulmonary Artery: The estimated pulmonary artery systolic pressure is 29 mmHg.    IVC/SVC: Normal venous pressure at 3 mmHg.    Holter  08/2024    The predominant rhythm is sinus.    There were very rare PVCs    There were very frequent PACs    The average heart rate was 86 BPM. The minimum heart rate was 56 BPM. The maximum heart rate was 132 BPM    Echo 4/2022  Concentric remodeling and normal systolic function.  The estimated ejection fraction is 55%.  Normal left ventricular diastolic function.  Normal right ventricular size with normal right ventricular systolic function.  Normal central venous pressure (3 mmHg).  Trivial pericardial effusion.    Past Medical History:   Diagnosis Date    Diabetes mellitus     Disorder of kidney and ureter     Hyperlipemia     Hypertension     Iron deficiency anemia     Migraine headache     PTSD (post-traumatic stress disorder)     Stage 3a chronic kidney disease           Patient Active Problem List    Diagnosis Date Noted    Premature atrial contractions 01/28/2025    Hyponatremia 06/15/2024    Extrapyramidal syndrome 06/15/2024    Acute encephalopathy 06/15/2024    CKD (chronic kidney disease) 06/15/2024    Erectile dysfunction 11/03/2022     Prior to catheter  Sildenafil started 11/2022      Type 2 diabetes mellitus with hyperglycemia, with long-term current use of insulin 06/16/2022    Anemia due to chronic kidney disease 06/16/2022    Tachycardia 06/11/2022    Normocytic anemia 04/29/2022    Benign prostatic hyperplasia with urinary obstruction 04/29/2022     AUA SS 4/2 post op HoLEP, PVR 82  AUA SS current: 5/1, PVR 10      Family history of prostate cancer in father 08/16/2016    Hypertension 07/12/2016    Hyperlipidemia 07/04/2016    Type 2 diabetes, uncontrolled, with renal manifestation 07/03/2016    Hyperglycemia 07/03/2016       Patient's Medications   New Prescriptions    No medications on file   Previous Medications    ATORVASTATIN (LIPITOR) 40 MG TABLET    TAKE 1 TABLET(40 MG) BY MOUTH EVERY DAY    BLOOD SUGAR DIAGNOSTIC (TRUE METRIX GLUCOSE TEST STRIP) STRP    TEST 4 TIMES A DAILY AS DIRECTED  "   CICLOPIROX (PENLAC) 8 % SOLN    Apply topically nightly.    DAPAGLIFLOZIN PROPANEDIOL (FARXIGA) 10 MG TABLET    Take 1 tablet (10 mg total) by mouth once daily.    FERROUS SULFATE 325 (65 FE) MG EC TABLET    TAKE 1 TABLET BY MOUTH ONCE DAILY    FINERENONE (KERENDIA) 10 MG TAB    Take 1 tablet by mouth once daily.    FUROSEMIDE (LASIX) 40 MG TABLET    Take 1 tablet (40 mg total) by mouth daily as needed (swelling).    GLUCOSE 4 GM CHEWABLE TABLET    Take 4 tablets (16 g total) by mouth as needed for Low blood sugar (If having symptoms of blurry vision, palpitations, confusion, shakiness.  Please check sugars and if sugar below 70 please take 4 tablets and re-check sugar everry 15 minutes until sugars are above 70 and symptoms resolve.).    INSULIN GLARGINE U-100, LANTUS, (LANTUS SOLOSTAR U-100 INSULIN) 100 UNIT/ML (3 ML) INPN PEN    Inject 5 Units into the skin every evening.    LANCETS 33 GAUGE MISC    Test 4 times daily as directed    LOSARTAN (COZAAR) 50 MG TABLET    Take 1 tablet (50 mg total) by mouth once daily.    METOPROLOL SUCCINATE (TOPROL-XL) 200 MG 24 HR TABLET    Take 1 tablet (200 mg total) by mouth once daily.    NIFEDIPINE (PROCARDIA-XL) 90 MG (OSM) 24 HR TABLET    Take 1 tablet (90 mg total) by mouth once daily.    ONDANSETRON (ZOFRAN-ODT) 4 MG TBDL    as needed.    PANTOPRAZOLE (PROTONIX) 40 MG TABLET    Take 1 tablet (40 mg total) by mouth 2 (two) times daily.    PATIROMER CALCIUM SORBITEX (VELTASSA) 8.4 GRAM PWPK    Take 1 packet (8.4 g total) by mouth once daily.    PEN NEEDLE, DIABETIC 31 GAUGE X 5/16" NDLE    use 4 times a day    PROMETHAZINE (PHENERGAN) 25 MG TABLET        SILDENAFIL (VIAGRA) 100 MG TABLET    Take 1 tablet (100 mg total) by mouth daily as needed for Erectile Dysfunction (1 hour before sexual activity on an empty stomach.).    TIRZEPATIDE (MOUNJARO) 2.5 MG/0.5 ML PNIJ    Inject 2.5 mg into the skin every 7 days. If tolerated for 4 weeks, increase dose to 5 mg every 7 days. "    TIRZEPATIDE (MOUNJARO) 5 MG/0.5 ML PNIJ    Inject 5 mg into the skin every 7 days.   Modified Medications    No medications on file   Discontinued Medications    No medications on file        Review of Systems   Constitutional: Negative for chills, decreased appetite, diaphoresis, malaise/fatigue, weight gain and weight loss.   Cardiovascular:  Negative for chest pain, claudication, dyspnea on exertion, irregular heartbeat, leg swelling, near-syncope, orthopnea, palpitations, paroxysmal nocturnal dyspnea and syncope.   Respiratory:  Negative for cough, hemoptysis, shortness of breath and snoring.    Gastrointestinal:  Negative for bloating, abdominal pain, nausea and vomiting.   Neurological:  Negative for light-headedness and weakness.       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     Social Drivers of Health     Financial Resource Strain: Low Risk  (8/2/2024)    Overall Financial Resource Strain (CARDIA)     Difficulty of Paying Living Expenses: Not hard at all   Food Insecurity: No Food Insecurity (8/2/2024)    Hunger Vital Sign     Worried About Running Out of Food in the Last Year: Never true     Ran Out of Food in the Last Year: Never true   Transportation Needs: No Transportation Needs (6/17/2024)    TRANSPORTATION NEEDS     Transportation : No   Physical Activity: Insufficiently Active (8/2/2024)    Exercise Vital Sign     Days of Exercise per Week: 2 days     Minutes of Exercise per Session: 40 min   Stress: No Stress Concern Present (6/17/2024)    [employer] - Occupational Stress Questionnaire     Feeling of Stress : Not at all   Housing Stability: Low Risk   (8/2/2024)    Housing Stability Vital Sign     Unable to Pay for Housing in the Last Year: No     Homeless in the Last Year: No            Objective:   Vitals  Vitals:    01/28/25 1444 01/28/25 1447   BP: (!) 174/85 (!) 169/79   Pulse: 97    SpO2: 98%    Weight: 67 kg (147 lb 11.3 oz)           Physical Exam  Vitals and nursing note reviewed.   Constitutional:       Appearance: Normal appearance.   Cardiovascular:      Rate and Rhythm: Normal rate and regular rhythm.      Heart sounds: No murmur heard.     No gallop.   Pulmonary:      Effort: Pulmonary effort is normal.      Breath sounds: Normal breath sounds.   Abdominal:      General: Bowel sounds are normal. There is no distension.      Palpations: Abdomen is soft.      Tenderness: There is no abdominal tenderness.   Musculoskeletal:      Right lower leg: No edema.      Left lower leg: No edema.   Skin:     General: Skin is warm and dry.   Neurological:      Mental Status: He is alert and oriented to person, place, and time.         Lab Results    Lab Results   Component Value Date    WBC 7.33 01/16/2025    HGB 13.2 (L) 01/16/2025    HCT 39.0 (L) 01/16/2025    HCT 39 04/28/2022    MCV 87 01/16/2025       Lab Results   Component Value Date     01/16/2025    INR 1.0 08/16/2022       Lab Results   Component Value Date    K 3.7 01/16/2025    MG 2.4 01/16/2025    BUN 23 (H) 01/16/2025    CREATININE 1.65 (H) 01/16/2025       Lab Results   Component Value Date     01/16/2025    HGBA1C 5.7 (H) 01/16/2025       Lab Results   Component Value Date    AST 15 06/25/2024    ALT 17 06/25/2024    ALBUMIN 4.2 01/16/2025    PROT 6.6 06/25/2024       Lab Results   Component Value Date    CHOL 104 (L) 05/29/2024    HDL 39 (L) 05/29/2024    LDLCALC 51.2 (L) 05/29/2024    TRIG 69 05/29/2024       Lab Results   Component Value Date    BNP 62 06/16/2022         Assessment:     1. Tachycardia    2. Primary hypertension    3. Hyperlipidemia, unspecified hyperlipidemia type     4. Premature atrial contractions    5. Stage 3 chronic kidney disease, unspecified whether stage 3a or 3b CKD          Plan:     Tachycardia, abnormal heart beat, PACs  -EMR reviewed, no noted evidence of A. Fib  -48-hour holter with frequent PACs, reviewed with patient as above  -chronic, stable, asymptomatic -  tolerating max dose toprol - continue    2. HTN  -HTN in clinic, reports good control at home  -continue regimen as above - to contact clinic with sustained BP > 140/80    3. CKD3  -worsening, Cr 1.6, eGFR 46  -avoid NSAIDs, nephrotoxic agents  -continue F/U with nephrology         The ASCVD Risk score (Crow DK, et al., 2019) failed to calculate for the following reasons:    The valid total cholesterol range is 130 to 320 mg/dL    No orders of the defined types were placed in this encounter.      He expressed verbal understanding and agreed with the plan    Follow up in 12m    Pertinent cardiac images and EKG reviewed independently.    Continue with current medical plan and lifestyle changes.  Return sooner for concerns or questions. If symptoms persist go to the ED.  I have reviewed all pertinent data including patient's medical history in detail and updated the computerized patient record.     Counseling included discussion regarding imaging findings, diagnosis, possibilities, treatment options, risks and benefits.    Thank you for the opportunity to care for this patient. Will be available for questions if needed.        Signed:  Cirilo Ash DNP  01/28/2025

## 2025-01-28 ENCOUNTER — OFFICE VISIT (OUTPATIENT)
Dept: CARDIOLOGY | Facility: CLINIC | Age: 65
End: 2025-01-28
Payer: MEDICARE

## 2025-01-28 VITALS
SYSTOLIC BLOOD PRESSURE: 169 MMHG | DIASTOLIC BLOOD PRESSURE: 79 MMHG | WEIGHT: 147.69 LBS | OXYGEN SATURATION: 98 % | BODY MASS INDEX: 23.84 KG/M2 | HEART RATE: 97 BPM

## 2025-01-28 DIAGNOSIS — I10 PRIMARY HYPERTENSION: ICD-10-CM

## 2025-01-28 DIAGNOSIS — I49.1 PREMATURE ATRIAL CONTRACTIONS: Chronic | ICD-10-CM

## 2025-01-28 DIAGNOSIS — N18.30 STAGE 3 CHRONIC KIDNEY DISEASE, UNSPECIFIED WHETHER STAGE 3A OR 3B CKD: ICD-10-CM

## 2025-01-28 DIAGNOSIS — E78.5 HYPERLIPIDEMIA, UNSPECIFIED HYPERLIPIDEMIA TYPE: ICD-10-CM

## 2025-01-28 DIAGNOSIS — R00.0 TACHYCARDIA: Primary | ICD-10-CM

## 2025-01-28 PROCEDURE — 99999 PR PBB SHADOW E&M-EST. PATIENT-LVL IV: CPT | Mod: PBBFAC,,,

## 2025-01-28 PROCEDURE — 99214 OFFICE O/P EST MOD 30 MIN: CPT | Mod: PBBFAC,PO

## 2025-01-28 PROCEDURE — 99214 OFFICE O/P EST MOD 30 MIN: CPT | Mod: S$PBB,,,

## 2025-01-30 ENCOUNTER — PATIENT OUTREACH (OUTPATIENT)
Dept: ADMINISTRATIVE | Facility: HOSPITAL | Age: 65
End: 2025-01-30
Payer: COMMERCIAL

## 2025-01-30 NOTE — PROGRESS NOTES
Attempted to reach patient regarding overdue colon cancer screening, no answer. Left message.   Portal sent

## 2025-02-03 ENCOUNTER — OFFICE VISIT (OUTPATIENT)
Dept: FAMILY MEDICINE | Facility: CLINIC | Age: 65
End: 2025-02-03
Payer: COMMERCIAL

## 2025-02-03 VITALS
SYSTOLIC BLOOD PRESSURE: 140 MMHG | WEIGHT: 147.69 LBS | HEIGHT: 66 IN | OXYGEN SATURATION: 100 % | TEMPERATURE: 98 F | DIASTOLIC BLOOD PRESSURE: 90 MMHG | BODY MASS INDEX: 23.74 KG/M2 | HEART RATE: 90 BPM

## 2025-02-03 DIAGNOSIS — I10 PRIMARY HYPERTENSION: ICD-10-CM

## 2025-02-03 DIAGNOSIS — E11.3293 TYPE 2 DIABETES MELLITUS WITH BOTH EYES AFFECTED BY MILD NONPROLIFERATIVE RETINOPATHY WITHOUT MACULAR EDEMA, WITHOUT LONG-TERM CURRENT USE OF INSULIN: Primary | ICD-10-CM

## 2025-02-03 DIAGNOSIS — N25.81 SECONDARY HYPERPARATHYROIDISM OF RENAL ORIGIN: ICD-10-CM

## 2025-02-03 DIAGNOSIS — Z79.4 TYPE 2 DIABETES MELLITUS WITH HYPERGLYCEMIA, WITH LONG-TERM CURRENT USE OF INSULIN: ICD-10-CM

## 2025-02-03 DIAGNOSIS — E78.5 HYPERLIPIDEMIA, UNSPECIFIED HYPERLIPIDEMIA TYPE: ICD-10-CM

## 2025-02-03 DIAGNOSIS — R56.9 SEIZURE: ICD-10-CM

## 2025-02-03 DIAGNOSIS — E11.65 TYPE 2 DIABETES MELLITUS WITH HYPERGLYCEMIA, WITH LONG-TERM CURRENT USE OF INSULIN: ICD-10-CM

## 2025-02-03 DIAGNOSIS — N18.32 STAGE 3B CHRONIC KIDNEY DISEASE: ICD-10-CM

## 2025-02-03 DIAGNOSIS — Z12.5 SCREENING FOR PROSTATE CANCER: ICD-10-CM

## 2025-02-03 DIAGNOSIS — Z12.11 COLON CANCER SCREENING: ICD-10-CM

## 2025-02-03 PROCEDURE — 99397 PER PM REEVAL EST PAT 65+ YR: CPT | Mod: GZ,S$PBB,, | Performed by: FAMILY MEDICINE

## 2025-02-03 PROCEDURE — 99999 PR PBB SHADOW E&M-EST. PATIENT-LVL V: CPT | Mod: PBBFAC,,, | Performed by: FAMILY MEDICINE

## 2025-02-03 PROCEDURE — 99215 OFFICE O/P EST HI 40 MIN: CPT | Mod: PBBFAC,PO | Performed by: FAMILY MEDICINE

## 2025-02-06 ENCOUNTER — PATIENT OUTREACH (OUTPATIENT)
Dept: ADMINISTRATIVE | Facility: HOSPITAL | Age: 65
End: 2025-02-06
Payer: COMMERCIAL

## 2025-02-06 NOTE — PROGRESS NOTES
Health Maintenance Topic(s) Outreach Outcomes & Actions Taken:    Colorectal Cancer Screening - Outreach Outcomes & Actions Taken  : [employer] was given to patient on 02/03/25

## 2025-02-06 NOTE — PROGRESS NOTES
(Portions of this note were dictated using voice recognition software and may contain dictation related errors in spelling/grammar/syntax not found on text review)    CC:   Chief Complaint   Patient presents with    Annual Exam       HPI: 64 y.o. male presented for annual examination.He has chronic medical conditions significant for type 2 diabetes mellitus, hypertension, hyperlipidemia, migraine headaches, BPH.     He takes Lantuss 5 units nightly, mounjaro 5 mg weekly, farxiga 10 mg daily, last hba1c was stable. He reports blood sugars at are in the range on home monitoring, follows up with endocrinology     He reports adherence with bp medication, takes procardia 90 mg and Toprol  mg, however, his bp and HR fluctuates, mostly normal     He follows up with nephrology for CKD, next appointment in April      He does not smoke, has no toxic habits.     No other concerns    Past Medical History:   Diagnosis Date    Diabetes mellitus     Disorder of kidney and ureter     Hyperlipemia     Hypertension     Iron deficiency anemia     Migraine headache     PTSD (post-traumatic stress disorder)     Stage 3a chronic kidney disease        Past Surgical History:   Procedure Laterality Date    ESOPHAGOGASTRODUODENOSCOPY N/A 06/18/2024    Procedure: EGD (ESOPHAGOGASTRODUODENOSCOPY);  Surgeon: Mik Benítez MD;  Location: [facility];  Service: Endoscopy;  Laterality: N/A;    ESOPHAGOGASTRODUODENOSCOPY  08/28/2024    ESOPHAGOGASTRODUODENOSCOPY N/A 8/28/2024    Procedure: ESOPHAGOGASTRODUODENOSCOPY (EGD);  Surgeon: Mik Benítez MD;  Location: [facility];  Service: Endoscopy;  Laterality: N/A;  8/21 precall complete- Trulicity stopped on 8/21/24-RB    LASER ENUCLEATION OF PROSTATE N/A 08/29/2022    Procedure: ENUCLEATION, PROSTATE, USING LASER  Time: 180 minutes Equipment: high powered 100W laser with virtual basket, morcellator, scopes for HoLEP from [facility];  Surgeon: Ted Garvin MD;  Location:  [facility] OR;  Service: Urology;  Laterality: N/A;  fortec confirmed CW 8/26  confirmation # [id number]       Family History   Problem Relation Name Age of Onset    Diabetes Mother         Social History     Tobacco Use    Smoking status: Never    Smokeless tobacco: Never   Substance Use Topics    Alcohol use: No    Drug use: No       Lab Results   Component Value Date    WBC 7.33 01/16/2025    HGB 13.2 (L) 01/16/2025    HCT 39.0 (L) 01/16/2025    MCV 87 01/16/2025     01/16/2025    CHOL 104 (L) 05/29/2024    TRIG 69 05/29/2024    HDL 39 (L) 05/29/2024    ALT 17 06/25/2024    AST 15 06/25/2024    BILITOT 0.2 06/25/2024    ALKPHOS 54 (L) 06/25/2024     01/16/2025    K 3.7 01/16/2025     01/16/2025    CREATININE 1.65 (H) 01/16/2025    ESTGFRAFRICA 50.3 (A) 07/18/2022    EGFRNONAA 43.5 (A) 07/18/2022    CALCIUM 9.3 01/16/2025    ALBUMIN 4.2 01/16/2025    BUN 23 (H) 01/16/2025    CO2 25 01/16/2025    TSH 0.415 06/15/2024    INR 1.0 08/16/2022    HGBA1C 5.7 (H) 01/16/2025    MICALBCREAT 72.5 (H) 01/16/2025    LDLCALC 51.2 (L) 05/29/2024     01/16/2025    TJTZFYRB21FD 20 (L) 01/25/2024             Vital signs reviewed  PE:   APPEARANCE: Well nourished, well developed, in no acute distress.    HEAD: Normocephalic, atraumatic.  EYES: EOMI.  Conjunctivae noninjected.  NOSE: Mucosa pink. Airway clear.  NECK: Supple with no cervical lymphadenopathy.    CHEST: Good inspiratory effort. Lungs clear to auscultation with no wheezes or crackles.  CARDIOVASCULAR: Normal S1, S2. No rubs, murmurs, or gallops.  ABDOMEN: Bowel sounds normal. Not distended. Soft. No tenderness or masses. No organomegaly.  EXTREMITIES: No edema, cyanosis, or clubbing.    Review of Systems   Constitutional:  Negative for activity change, chills, fatigue and unexpected weight change.   HENT:  Negative for hearing loss, rhinorrhea and trouble swallowing.    Eyes:  Negative for discharge and visual disturbance.   Respiratory:  Negative for  chest tightness and wheezing.    Cardiovascular:  Negative for chest pain and palpitations.   Gastrointestinal:  Negative for blood in stool, constipation, diarrhea and vomiting.   Endocrine: Negative for polydipsia and polyuria.   Genitourinary:  Negative for difficulty urinating, hematuria and urgency.   Musculoskeletal:  Negative for arthralgias, joint swelling and neck pain.   Neurological:  Negative for weakness and headaches.   Psychiatric/Behavioral:  Negative for confusion, dysphoric mood and suicidal ideas. The patient is not nervous/anxious.    All other systems reviewed and are negative.      IMPRESSION  1. 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 Seizure    3. Stage 3b chronic kidney disease    4. Secondary hyperparathyroidism of renal origin    5. Type 2 diabetes mellitus with hyperglycemia, with long-term current use of insulin    6. Colon cancer screening    7. Primary hypertension    8. Hyperlipidemia, unspecified hyperlipidemia type            PLAN      1. Type 2 diabetes mellitus with both eyes affected by mild nonproliferative retinopathy without macular edema, without long-term current use of insulin (Primary)    - Lipid Panel; Future  - TSH; Future      2. Seizure    Stable, no recent seizures      3. Stage 3b chronic kidney disease    Followed by nephrology    Most recent 45    Avoid nephrotoxic agents      4. Secondary hyperparathyroidism of renal origin    Followed by nephrology      5. Type 2 diabetes mellitus with hyperglycemia, with long-term current use of insulin    Stable, last hba1c 5.7    Continue current medications      6. Colon cancer screening    - Fecal Immunochemical Test (iFOBT); Future      7. Primary hypertension    Slight elevated    Continue current medications    E visit follow up in 2 weeks      8. Hyperlipidemia, unspecified hyperlipidemia type    Stable      On statin      9. Screening for prostate cancer    - PSA, Screening; Future       SCREENINGS        Age/demographic appropriate health maintenance:    Fit kit given     PSA ordered      Health Maintenance Due   Topic Date Due    Colorectal Cancer Screening  Never done    RSV Vaccine (Age 60+ and Pregnant patients) (1 - Risk 60-74 years 1-dose series) Never done    PROSTATE-SPECIFIC ANTIGEN  06/01/2024    Influenza Vaccine (1) 09/01/2024    COVID-19 Vaccine (6 - 2024-25 season) 09/01/2024           Spent adequate time in obtaining history and explaining differentials     40 minutes spent during this visit of which greater than 50% devoted to face-face counseling and coordination of care regarding diagnosis and management plan       Tiago Ruiz   2/6/2025

## 2025-02-24 DIAGNOSIS — Z00.00 ENCOUNTER FOR MEDICARE ANNUAL WELLNESS EXAM: ICD-10-CM

## 2025-02-25 ENCOUNTER — PATIENT MESSAGE (OUTPATIENT)
Dept: ADMINISTRATIVE | Facility: HOSPITAL | Age: 65
End: 2025-02-25
Payer: COMMERCIAL

## 2025-04-07 ENCOUNTER — LAB VISIT (OUTPATIENT)
Dept: LAB | Facility: HOSPITAL | Age: 65
End: 2025-04-07
Attending: FAMILY MEDICINE
Payer: MEDICARE

## 2025-04-07 DIAGNOSIS — E11.22 TYPE 2 DIABETES MELLITUS WITH STAGE 3B CHRONIC KIDNEY DISEASE, WITHOUT LONG-TERM CURRENT USE OF INSULIN: ICD-10-CM

## 2025-04-07 DIAGNOSIS — D63.1 ANEMIA IN STAGE 3B CHRONIC KIDNEY DISEASE: ICD-10-CM

## 2025-04-07 DIAGNOSIS — E55.9 VITAMIN D DEFICIENCY: ICD-10-CM

## 2025-04-07 DIAGNOSIS — N18.32 TYPE 2 DIABETES MELLITUS WITH STAGE 3B CHRONIC KIDNEY DISEASE, WITHOUT LONG-TERM CURRENT USE OF INSULIN: ICD-10-CM

## 2025-04-07 DIAGNOSIS — N18.32 ANEMIA IN STAGE 3B CHRONIC KIDNEY DISEASE: ICD-10-CM

## 2025-04-07 DIAGNOSIS — N18.32 STAGE 3B CHRONIC KIDNEY DISEASE: ICD-10-CM

## 2025-04-07 DIAGNOSIS — N04.9 NEPHROTIC SYNDROME: ICD-10-CM

## 2025-04-07 DIAGNOSIS — N25.81 SECONDARY HYPERPARATHYROIDISM OF RENAL ORIGIN: ICD-10-CM

## 2025-04-07 DIAGNOSIS — Z12.5 SCREENING FOR PROSTATE CANCER: ICD-10-CM

## 2025-04-07 DIAGNOSIS — E11.3293 TYPE 2 DIABETES MELLITUS WITH BOTH EYES AFFECTED BY MILD NONPROLIFERATIVE RETINOPATHY WITHOUT MACULAR EDEMA, WITHOUT LONG-TERM CURRENT USE OF INSULIN: ICD-10-CM

## 2025-04-07 LAB
25(OH)D3+25(OH)D2 SERPL-MCNC: 16 NG/ML (ref 30–96)
ABSOLUTE EOSINOPHIL (OHS): 0.03 K/UL
ABSOLUTE MONOCYTE (OHS): 0.69 K/UL (ref 0.3–1)
ABSOLUTE NEUTROPHIL COUNT (OHS): 3.37 K/UL (ref 1.8–7.7)
ALBUMIN SERPL BCP-MCNC: 4.4 G/DL (ref 3.5–5.2)
ALBUMIN/CREAT UR: 244.1 UG/MG
ANION GAP (OHS): 10 MMOL/L (ref 8–16)
BASOPHILS # BLD AUTO: 0.02 K/UL
BASOPHILS NFR BLD AUTO: 0.3 %
BILIRUB UR QL STRIP.AUTO: NEGATIVE
BUN SERPL-MCNC: 17 MG/DL (ref 2–20)
CALCIUM SERPL-MCNC: 9.4 MG/DL (ref 8.7–10.5)
CHLORIDE SERPL-SCNC: 102 MMOL/L (ref 95–110)
CHOLEST SERPL-MCNC: 131 MG/DL (ref 120–199)
CHOLEST/HDLC SERPL: 2.1 {RATIO} (ref 2–5)
CLARITY UR: CLEAR
CO2 SERPL-SCNC: 22 MMOL/L (ref 23–29)
COLOR UR AUTO: YELLOW
CREAT SERPL-MCNC: 1.6 MG/DL (ref 0.5–1.4)
CREAT UR-MCNC: 65.2 MG/DL (ref 23–375)
CREAT UR-MCNC: 68 MG/DL (ref 23–375)
EAG (OHS): 117 MG/DL (ref 68–131)
ERYTHROCYTE [DISTWIDTH] IN BLOOD BY AUTOMATED COUNT: 14.3 % (ref 11.5–14.5)
FERRITIN SERPL-MCNC: 67.8 NG/ML (ref 20–300)
GFR SERPLBLD CREATININE-BSD FMLA CKD-EPI: 48 ML/MIN/1.73/M2
GLUCOSE SERPL-MCNC: 98 MG/DL (ref 70–110)
GLUCOSE UR QL STRIP: NEGATIVE
HBA1C MFR BLD: 5.7 % (ref 4–5.6)
HCT VFR BLD AUTO: 40.5 % (ref 40–54)
HDLC SERPL-MCNC: 63 MG/DL (ref 40–75)
HDLC SERPL: 48.1 % (ref 20–50)
HGB BLD-MCNC: 13.9 GM/DL (ref 14–18)
HGB UR QL STRIP: ABNORMAL
HOLD SPECIMEN: NORMAL
IMM GRANULOCYTES # BLD AUTO: 0.02 K/UL (ref 0–0.04)
IMM GRANULOCYTES NFR BLD AUTO: 0.3 % (ref 0–0.5)
IRON SATN MFR SERPL: 15 % (ref 20–50)
IRON SERPL-MCNC: 52 UG/DL (ref 45–160)
KETONES UR QL STRIP: NEGATIVE
LDLC SERPL CALC-MCNC: 55.4 MG/DL (ref 63–159)
LEUKOCYTE ESTERASE UR QL STRIP: NEGATIVE
LYMPHOCYTES # BLD AUTO: 2.65 K/UL (ref 1–4.8)
MAGNESIUM SERPL-MCNC: 2.2 MG/DL (ref 1.6–2.6)
MCH RBC QN AUTO: 30.2 PG (ref 27–31)
MCHC RBC AUTO-ENTMCNC: 34.3 G/DL (ref 32–36)
MCV RBC AUTO: 88 FL (ref 82–98)
MICROALBUMIN UR-MCNC: 166 UG/ML (ref ?–5000)
NITRITE UR QL STRIP: NEGATIVE
NONHDLC SERPL-MCNC: 68 MG/DL
NUCLEATED RBC (/100WBC) (OHS): 0 /100 WBC
PH UR STRIP: 6 [PH]
PHOSPHATE SERPL-MCNC: 2.7 MG/DL (ref 2.7–4.5)
PLATELET # BLD AUTO: 215 K/UL (ref 150–450)
PMV BLD AUTO: 9.5 FL (ref 9.2–12.9)
POTASSIUM SERPL-SCNC: 4.4 MMOL/L (ref 3.5–5.1)
PROT UR QL STRIP: ABNORMAL
PROT UR-MCNC: 33 MG/DL
PROT/CREAT UR: 0.51 MG/G{CREAT}
PSA SERPL-MCNC: 0.95 NG/ML
PTH-INTACT SERPL-MCNC: 151 PG/ML (ref 9–77)
RBC # BLD AUTO: 4.6 M/UL (ref 4.6–6.2)
RELATIVE EOSINOPHIL (OHS): 0.4 %
RELATIVE LYMPHOCYTE (OHS): 39.1 % (ref 18–48)
RELATIVE MONOCYTE (OHS): 10.2 % (ref 4–15)
RELATIVE NEUTROPHIL (OHS): 49.7 % (ref 38–73)
SODIUM SERPL-SCNC: 134 MMOL/L (ref 136–145)
SP GR UR STRIP: 1.01
TIBC SERPL-MCNC: 351 UG/DL (ref 250–450)
TRANSFERRIN SERPL-MCNC: 237 MG/DL (ref 200–375)
TRIGL SERPL-MCNC: 63 MG/DL (ref 30–150)
TSH SERPL-ACNC: 0.8 UIU/ML (ref 0.4–4)
URATE SERPL-MCNC: 7.1 MG/DL (ref 3.4–7)
UROBILINOGEN UR STRIP-ACNC: NEGATIVE EU/DL
WBC # BLD AUTO: 6.78 K/UL (ref 3.9–12.7)

## 2025-04-07 PROCEDURE — 84156 ASSAY OF PROTEIN URINE: CPT | Mod: PN

## 2025-04-07 PROCEDURE — 82043 UR ALBUMIN QUANTITATIVE: CPT | Mod: PN

## 2025-04-07 PROCEDURE — 82728 ASSAY OF FERRITIN: CPT | Mod: PN

## 2025-04-07 PROCEDURE — 82306 VITAMIN D 25 HYDROXY: CPT | Mod: PN

## 2025-04-07 PROCEDURE — 84550 ASSAY OF BLOOD/URIC ACID: CPT | Mod: PN

## 2025-04-07 PROCEDURE — 84443 ASSAY THYROID STIM HORMONE: CPT | Mod: PN

## 2025-04-07 PROCEDURE — 83718 ASSAY OF LIPOPROTEIN: CPT | Mod: PN

## 2025-04-07 PROCEDURE — 84153 ASSAY OF PSA TOTAL: CPT | Mod: PN

## 2025-04-07 PROCEDURE — 83540 ASSAY OF IRON: CPT | Mod: PN

## 2025-04-07 PROCEDURE — 83735 ASSAY OF MAGNESIUM: CPT | Mod: PN

## 2025-04-07 PROCEDURE — 82565 ASSAY OF CREATININE: CPT | Mod: PN

## 2025-04-07 PROCEDURE — 36415 COLL VENOUS BLD VENIPUNCTURE: CPT | Mod: PN

## 2025-04-07 PROCEDURE — 85025 COMPLETE CBC W/AUTO DIFF WBC: CPT | Mod: PN

## 2025-04-07 PROCEDURE — 83036 HEMOGLOBIN GLYCOSYLATED A1C: CPT | Mod: PN

## 2025-04-07 PROCEDURE — 83970 ASSAY OF PARATHORMONE: CPT | Mod: PN

## 2025-04-07 PROCEDURE — 81003 URINALYSIS AUTO W/O SCOPE: CPT | Mod: PN

## 2025-04-08 RX ORDER — PANTOPRAZOLE SODIUM 40 MG/1
40 TABLET, DELAYED RELEASE ORAL 2 TIMES DAILY
Qty: 60 TABLET | Refills: 1 | Status: SHIPPED | OUTPATIENT
Start: 2025-04-08 | End: 2026-04-08

## 2025-04-10 ENCOUNTER — RESULTS FOLLOW-UP (OUTPATIENT)
Dept: FAMILY MEDICINE | Facility: CLINIC | Age: 65
End: 2025-04-10

## 2025-04-30 ENCOUNTER — PATIENT MESSAGE (OUTPATIENT)
Dept: ADMINISTRATIVE | Facility: HOSPITAL | Age: 65
End: 2025-04-30
Payer: MEDICARE

## 2025-05-20 DIAGNOSIS — E78.5 HYPERLIPIDEMIA, UNSPECIFIED HYPERLIPIDEMIA TYPE: ICD-10-CM

## 2025-05-21 RX ORDER — ATORVASTATIN CALCIUM 40 MG/1
40 TABLET, FILM COATED ORAL
Qty: 90 TABLET | Refills: 3 | Status: SHIPPED | OUTPATIENT
Start: 2025-05-21

## 2025-05-22 ENCOUNTER — OFFICE VISIT (OUTPATIENT)
Dept: ENDOCRINOLOGY | Facility: CLINIC | Age: 65
End: 2025-05-22
Payer: MEDICARE

## 2025-05-22 VITALS
WEIGHT: 146.38 LBS | BODY MASS INDEX: 23.63 KG/M2 | OXYGEN SATURATION: 99 % | HEART RATE: 96 BPM | SYSTOLIC BLOOD PRESSURE: 158 MMHG | DIASTOLIC BLOOD PRESSURE: 75 MMHG

## 2025-05-22 DIAGNOSIS — E11.65 TYPE 2 DIABETES MELLITUS WITH HYPERGLYCEMIA, WITH LONG-TERM CURRENT USE OF INSULIN: Primary | ICD-10-CM

## 2025-05-22 DIAGNOSIS — E78.2 MIXED HYPERLIPIDEMIA: ICD-10-CM

## 2025-05-22 DIAGNOSIS — Z79.4 TYPE 2 DIABETES MELLITUS WITH HYPERGLYCEMIA, WITH LONG-TERM CURRENT USE OF INSULIN: Primary | ICD-10-CM

## 2025-05-22 PROCEDURE — 99999 PR PBB SHADOW E&M-EST. PATIENT-LVL IV: CPT | Mod: PBBFAC,,, | Performed by: INTERNAL MEDICINE

## 2025-05-22 PROCEDURE — G2211 COMPLEX E/M VISIT ADD ON: HCPCS | Mod: S$PBB,,, | Performed by: INTERNAL MEDICINE

## 2025-05-22 PROCEDURE — 99214 OFFICE O/P EST MOD 30 MIN: CPT | Mod: PBBFAC | Performed by: INTERNAL MEDICINE

## 2025-05-22 PROCEDURE — 99214 OFFICE O/P EST MOD 30 MIN: CPT | Mod: S$PBB,,, | Performed by: INTERNAL MEDICINE

## 2025-05-22 RX ORDER — IBUPROFEN 200 MG
16 TABLET ORAL
COMMUNITY
Start: 2025-05-22 | End: 2026-05-22

## 2025-05-22 NOTE — ASSESSMENT & PLAN NOTE
Recent A1c was stable at 5.7.  Mounjaro dose decreased by his nephrologist due to decrease in GFR.  Tolerating it well.  Does report that the medication is expensive  Patient wants to see if he can come off his Lantus since his fasting blood sugars are sometimes less than 100.    Change in diabetes regimen:  Continue Mounjaro 2.5 mg weekly.  Continue Farxiga 10 mg daily    Discontinue Lantus. Discussed restarting Lantus at a lower dose if his blood sugars trend up.    Call if any side effects from the medications  Call if blood sugars are persistently less than 70 or greater than 200.   Patient was not interested in a CGM.    Discussed importance of diet and lifestyle modifications for diabetes management  Hypoglycemia management discussed. Carry glucose tablets or snacks at all times.     Complications:  Follow up for regular diabetes eye exam  Daily self examination of feet.  Microalbumin: Monitor. On ARBs    Lab Results   Component Value Date    HGBA1C 5.7 (H) 04/07/2025

## 2025-05-22 NOTE — PROGRESS NOTES
ENDOCRINOLOGY CLINIC    Subjective:      Chief Complaint: Type 2 diabetes    HPI:   Angel Bell is a 64 y.o. male who presents for follow-up of type 2 diabetes. Patient was last seen in the clinic on 12/02/2024.    Diabetes Hx:  Diagnosed w/ DM: 2016  Complications:   Retinopathy: No, has cataract.    Last eye exam: : 11/14/2024  Neuropathy: No. Sees podiatry for foot and nail care.   Last foot exam: : 11/19/2024.   Nephropathy: Yes, CKD and follows up with Nephrology  Cardiovascular: Denies  Gastroparesis: Denies  DKA/HHS:  DKA in 2022.    Severe Hypoglycemia: Denies  Hypoglycemia unawareness: Denies  Hypoglycemic episodes: None recently, fasting blood sugars some days <100. Carries glucose tablets.      Current meds:   Lantus 5 units at bedtime  Mounjaro 2.5 mg weekly - tolerating well. Decreased by his nephrologist due to decrease in GFR.  Farxiga 10 mg daily (Started on 3/2023 by nephrology)       Compliance with meds: Yes     Previous meds:  Metformin  NovoLog   Trulicity 3 mg SC weekly - reported nausea/vomiting.     Home glucose checks: 3 times a day - on recall blood sugars running between 95-low 100s.  Compliant: Yes    Diet/Exercise:   Takes 2 meals a day, occasional snacks  Reports taking adequate water   He will start walking regularly.    Diabetes education:  Few years back - declines referral.    Last A1c:   Lab Results   Component Value Date    HGBA1C 5.7 (H) 04/07/2025    HGBA1C 5.7 (H) 01/16/2025    HGBA1C 5.8 (H) 05/29/2024     Microalbumin:   Lab Results   Component Value Date    LABMICR 166.0 04/07/2025    CREATRANDUR 68.0 04/07/2025    CREATRANDUR 65.2 04/07/2025    MICALBCREAT 244.1 (H) 04/07/2025     Lab Results   Component Value Date    EGFRNORACEVR 48 (L) 04/07/2025    CREATININE 1.6 (H) 04/07/2025     Lipids:   Lab Results   Component Value Date    CHOL 131 04/07/2025    TRIG 63 04/07/2025    HDL 63 04/07/2025    LDLCALC 55.4 (L) 04/07/2025    CHOLHDL 48.1 04/07/2025     TSH:  Lab  Results   Component Value Date    TSH 0.802 04/07/2025     Lab Results   Component Value Date    LNHFRVQQ41 903 06/17/2022     Lab Results   Component Value Date    HGB 13.9 (L) 04/07/2025        Aspirin: No  Statins: Yes  ACEI/ARB: Yes    Fatty liver: Denies    HTN: On Losartan, Metoprolol, Nifedipine. Takes lasix as needed, not taking for a year.   Blood pressures are normal at home.     Denies history of pancreatitis or personal/FH of medullary thyroid cancer.  History recurrent UTI/fungal infection:  None recently.  Has history of chronic cystitis.    Polyuria/Polydipsia: Denies  Has BPH      FHx of DM: Yes, mother  Heart disease: No  Hyperlipidemia: No    ROS: see HPI     Objective:     Physical Exam     BP (!) 158/75 (BP Location: Right arm, Patient Position: Sitting)   Pulse 96   Wt 66.4 kg (146 lb 6.2 oz)   SpO2 99%   BMI 23.63 kg/m²     Wt Readings from Last 3 Encounters:   04/15/25 66.7 kg (147 lb)   02/03/25 67 kg (147 lb 11.3 oz)   02/03/25 68 kg (150 lb)       Constitutional:  Pleasant,  in no acute distress.   HENT:   Eyes:    No scleral icterus.   Cardiovascular:  Normal rate and rhythm.   Respiratory:   Effort normal   Neurological:  No tremor    Foot exam:  Normal sensation monofilament testing bilaterally.  No ulcers or fissures seen. Has onychomycosis.        LABORATORY REVIEW:  See HPI for other labs reviewed today      Chemistry        Component Value Date/Time     (L) 04/07/2025 0728     01/16/2025 0712    K 4.4 04/07/2025 0728    K 3.7 01/16/2025 0712     04/07/2025 0728     01/16/2025 0712    CO2 22 (L) 04/07/2025 0728    CO2 25 01/16/2025 0712    BUN 17 04/07/2025 0728    CREATININE 1.6 (H) 04/07/2025 0728    GLU 98 04/07/2025 0728     01/16/2025 0712        Component Value Date/Time    CALCIUM 9.4 04/07/2025 0728    CALCIUM 9.3 01/16/2025 0712    ALKPHOS 54 (L) 06/25/2024 1050    AST 15 06/25/2024 1050    ALT 17 06/25/2024 1050    BILITOT 0.2 06/25/2024  1050    ESTGFRAFRICA 50.3 (A) 07/18/2022 0743    EGFRNONAA 43.5 (A) 07/18/2022 0743          Lab Results   Component Value Date    HGBA1C 5.7 (H) 04/07/2025    HGBA1C 5.7 (H) 01/16/2025    HGBA1C 5.8 (H) 05/29/2024     Other labs reviewed today in HPI    Assessment/Plan:     1. Type 2 diabetes mellitus with hyperglycemia, with long-term current use of insulin  Assessment & Plan:    Recent A1c was stable at 5.7.  Mounjaro dose decreased by his nephrologist due to decrease in GFR.  Tolerating it well.  Does report that the medication is expensive  Patient wants to see if he can come off his Lantus since his fasting blood sugars are sometimes less than 100.    Change in diabetes regimen:  Continue Mounjaro 2.5 mg weekly.  Continue Farxiga 10 mg daily    Discontinue Lantus. Discussed restarting Lantus at a lower dose if his blood sugars trend up.    Call if any side effects from the medications  Call if blood sugars are persistently less than 70 or greater than 200.   Patient was not interested in a CGM.    Discussed importance of diet and lifestyle modifications for diabetes management  Hypoglycemia management discussed. Carry glucose tablets or snacks at all times.     Complications:  Follow up for regular diabetes eye exam  Daily self examination of feet.  Microalbumin: Monitor. On ARBs    Lab Results   Component Value Date    HGBA1C 5.7 (H) 04/07/2025         Orders:  -     glucose 4 GM chewable tablet; Take 4 tablets (16 g total) by mouth as needed for Low blood sugar (If having symptoms of blurry vision, palpitations, confusion, shakiness.  Please check sugars and if sugar below 70 please take 4 tablets and re-check sugar everry 15 minutes until sugars are above 70 and symptoms resolve.).    2. Mixed hyperlipidemia  Assessment & Plan:    Continue statin.  Monitor lipids.                 Follow-up in 6 months with labs.    Gloria Edgar MD

## 2025-05-22 NOTE — PATIENT INSTRUCTIONS
Change in your diabetes medications:   Continue Mounjaro 2.5 mg weekly  Continue Farxiga 10 mg daily    Discontinue Lantus. We can restart Lantus at lower dose if your fasting blood sugars start trending up.      Call if you have any side effects from the medication:   Mounjaro:  Nausea, vomiting, diarrhea, constipation, decreased appetite, abdominal pain.  Other side effects include pancreatitis, gastroparesis, worsening of retinopathy, dehydration and acute kidney injury. Avoid this medication if you have any history of pancreatitis or have personal or family history of medullary thyroid cancer.     Farxiga:  Dehydration, increased urination, urinary tract infections, yeast infections, diabetic ketoacidosis.  Call if you have any foot infections.     Keep well hydrated.    Check blood sugars before meals and bedtime.   Call if blood sugars are frequently less than 70 or above 200.      Call if you need prescriptions for medications.  Carry glucose tablets or snacks with you at all times.     Follow-up in 6 months with labs.

## 2025-05-23 ENCOUNTER — OFFICE VISIT (OUTPATIENT)
Dept: UROLOGY | Facility: CLINIC | Age: 65
End: 2025-05-23
Payer: MEDICARE

## 2025-05-23 VITALS
DIASTOLIC BLOOD PRESSURE: 84 MMHG | WEIGHT: 149.69 LBS | BODY MASS INDEX: 24.16 KG/M2 | HEART RATE: 102 BPM | SYSTOLIC BLOOD PRESSURE: 166 MMHG

## 2025-05-23 DIAGNOSIS — N40.1 BENIGN PROSTATIC HYPERPLASIA WITH URINARY OBSTRUCTION: Primary | ICD-10-CM

## 2025-05-23 DIAGNOSIS — N13.8 BENIGN PROSTATIC HYPERPLASIA WITH URINARY OBSTRUCTION: Primary | ICD-10-CM

## 2025-05-23 DIAGNOSIS — N52.9 ERECTILE DYSFUNCTION, UNSPECIFIED ERECTILE DYSFUNCTION TYPE: ICD-10-CM

## 2025-05-23 LAB — POC RESIDUAL URINE VOLUME: 0 ML (ref 0–100)

## 2025-05-23 PROCEDURE — 99999 PR PBB SHADOW E&M-EST. PATIENT-LVL III: CPT | Mod: PBBFAC,,, | Performed by: STUDENT IN AN ORGANIZED HEALTH CARE EDUCATION/TRAINING PROGRAM

## 2025-05-23 PROCEDURE — 99214 OFFICE O/P EST MOD 30 MIN: CPT | Mod: S$PBB,,, | Performed by: STUDENT IN AN ORGANIZED HEALTH CARE EDUCATION/TRAINING PROGRAM

## 2025-05-23 PROCEDURE — 99999PBSHW POCT BLADDER SCAN: Mod: PBBFAC,,,

## 2025-05-23 PROCEDURE — 99213 OFFICE O/P EST LOW 20 MIN: CPT | Mod: PBBFAC,25 | Performed by: STUDENT IN AN ORGANIZED HEALTH CARE EDUCATION/TRAINING PROGRAM

## 2025-05-23 PROCEDURE — 51798 US URINE CAPACITY MEASURE: CPT | Mod: PBBFAC | Performed by: STUDENT IN AN ORGANIZED HEALTH CARE EDUCATION/TRAINING PROGRAM

## 2025-05-23 RX ORDER — TADALAFIL 20 MG/1
20 TABLET ORAL
Qty: 30 TABLET | Refills: 5 | Status: SHIPPED | OUTPATIENT
Start: 2025-05-23 | End: 2026-05-23

## 2025-05-24 NOTE — PROGRESS NOTES
It was great to see Angel today.    Angel Bell is a pleasant 64 y.o. male presenting for management of BPH/LUTS, ED and PSA screening.     History of Present Illness     - Patient reports that Viagra, which he has been taking for erectile dysfunction, is not effective. He has been taking the full 100 mg dose as prescribed, 45 minutes before sexual activity on an empty stomach.   - Patient was last evaluated by Dr. Boykin in May 2024 for follow-up.  - He underwent a HoLEP in August 2022. Patient had his PSA checked a few months ago, with a result of 0.95.  - Patient denies problems from surgery, concerns with urination, blood in the urine, and burning when urinating. He denies history of heart attack or stroke.    MEDICAL HISTORY:  - Erectile dysfunction  - Diabetes    FAMILY HISTORY:  - Father: Prostate cancer    SURGICAL HISTORY:  - HoLEP procedure: August 2022            Past Medical History:   Diagnosis Date    Diabetes mellitus     Disorder of kidney and ureter     Hyperlipemia     Hypertension     Iron deficiency anemia     Migraine headache     PTSD (post-traumatic stress disorder)     Stage 3a chronic kidney disease       Past Surgical History:   Procedure Laterality Date    ESOPHAGOGASTRODUODENOSCOPY N/A 06/18/2024    Procedure: EGD (ESOPHAGOGASTRODUODENOSCOPY);  Surgeon: Mik Benítez MD;  Location: [facility];  Service: Endoscopy;  Laterality: N/A;    ESOPHAGOGASTRODUODENOSCOPY  08/28/2024    ESOPHAGOGASTRODUODENOSCOPY N/A 8/28/2024    Procedure: ESOPHAGOGASTRODUODENOSCOPY (EGD);  Surgeon: Mik Benítez MD;  Location: [facility];  Service: Endoscopy;  Laterality: N/A;  8/21 precall complete- Trulicity stopped on 8/21/24-RB    LASER ENUCLEATION OF PROSTATE N/A 08/29/2022    Procedure: ENUCLEATION, PROSTATE, USING LASER  Time: 180 minutes Equipment: high powered 100W laser with virtual basket, morcellator, scopes for HoLEP from [facility];  Surgeon: Ted Garvin MD;  Location:  [facility] OR;  Service: Urology;  Laterality: N/A;  fortec confirmed CW 8/26  confirmation # [id number]       ROS: as per HPI    Tobacco Use History[1]     Physical Exam     General: No acute distress. Nontoxic appearing.  HENT: Normocephalic. Atraumatic.  Respiratory: Normal respiratory effort. No conversational dyspnea. No audible wheezing.  Abdomen: No obvious distension.  Skin: No visible abnormalities.  Extremities: No edema upper extremities. No edema lower extremities.  Neurological: Alert and oriented x3. Normal speech.  Psychiatric: Normal mood. Normal affect. No evidence of SI.     Data review  Prior internal/external notes have been reviewed: Yes  Care Everywhere reviewed for external records:  Yes    Pertinent labs and imaging independently reviewed include:   Lab Results   Component Value Date     (L) 04/07/2025    K 4.4 04/07/2025    CREATININE 1.6 (H) 04/07/2025    EGFRNORACEVR 48 (L) 04/07/2025     Lab Results   Component Value Date    HGBA1C 5.7 (H) 04/07/2025      Lab Results   Component Value Date    PSA 0.95 04/07/2025       PVR 0cc after a 120cc void today    Problems addressed during today's encounter  1. Benign prostatic hyperplasia with urinary obstruction  Overview:  AUA SS 4/2 post op HoLEP, PVR 82  AUA SS current: 5/1, PVR 10    Orders:  -     POCT Bladder Scan    2. Erectile dysfunction, unspecified erectile dysfunction type  Overview:  Prior to catheter  Sildenafil started 11/2022      Other orders  -     tadalafiL (CIALIS) 20 MG Tab; Take 1 tablet (20 mg total) by mouth every 48 hours as needed.  Dispense: 30 tablet; Refill: 5         Plan for problems listed above includes:   Assessment & Plan    ERECTILE DYSFUNCTION:   Erectile dysfunction is the main concern.   Viagra 100 mg has been ineffective and discontinued.   Provided information as well as a booklet on erectile dysfunction treatment options, including penile prosthesis and other alternatives.He elects to continue to try PO  medication for now.   Started Cialis (tadalafil) for erectile dysfunction; explained side effects are similar to Viagra, including potential vision changes.    LUTS:  -Well controlled post-HoLEP. Continue to observe.     FAMILY HISTORY OF PROSTATE CANCER:   Family history of prostate cancer (father).           Risk for nontreatment of mentioned condition(s) includes, but is not limited to:   Continuation or worsening of the current condition(s)  Impaired sexual function    Further evaluation ordered today:   Voiding studies (e.g. Uroflow, PVRs, UDS)    This note was generated with the assistance of ambient listening technology. Verbal consent was obtained by the patient and accompanying visitor(s) for the recording of patient appointment to facilitate this note. I attest to having reviewed and edited the generated note for accuracy, though some syntax or spelling errors may persist. Please contact the author of this note for any clarification.     Ralf Torres MD           [1]   Social History  Tobacco Use   Smoking Status Never   Smokeless Tobacco Never

## 2025-06-01 DIAGNOSIS — E11.65 TYPE 2 DIABETES MELLITUS WITH HYPERGLYCEMIA, WITH LONG-TERM CURRENT USE OF INSULIN: ICD-10-CM

## 2025-06-01 DIAGNOSIS — Z79.4 TYPE 2 DIABETES MELLITUS WITH HYPERGLYCEMIA, WITH LONG-TERM CURRENT USE OF INSULIN: ICD-10-CM

## 2025-06-02 RX ORDER — PANTOPRAZOLE SODIUM 40 MG/1
40 TABLET, DELAYED RELEASE ORAL 2 TIMES DAILY
Qty: 60 TABLET | Refills: 1 | Status: SHIPPED | OUTPATIENT
Start: 2025-06-02 | End: 2026-06-02

## 2025-06-02 RX ORDER — IBUPROFEN 200 MG
16 TABLET ORAL
COMMUNITY
Start: 2025-06-02 | End: 2026-06-02

## 2025-06-06 ENCOUNTER — PATIENT MESSAGE (OUTPATIENT)
Dept: ADMINISTRATIVE | Facility: HOSPITAL | Age: 65
End: 2025-06-06
Payer: MEDICARE

## 2025-06-18 ENCOUNTER — PATIENT MESSAGE (OUTPATIENT)
Dept: ADMINISTRATIVE | Facility: HOSPITAL | Age: 65
End: 2025-06-18
Payer: MEDICARE

## 2025-06-27 ENCOUNTER — TELEPHONE (OUTPATIENT)
Dept: ADMINISTRATIVE | Facility: HOSPITAL | Age: 65
End: 2025-06-27
Payer: MEDICARE

## 2025-06-27 VITALS — SYSTOLIC BLOOD PRESSURE: 118 MMHG | DIASTOLIC BLOOD PRESSURE: 74 MMHG

## 2025-07-28 RX ORDER — PANTOPRAZOLE SODIUM 40 MG/1
40 TABLET, DELAYED RELEASE ORAL 2 TIMES DAILY
Qty: 60 TABLET | Refills: 1 | Status: SHIPPED | OUTPATIENT
Start: 2025-07-28 | End: 2026-07-28

## 2025-08-03 DIAGNOSIS — E11.65 TYPE 2 DIABETES MELLITUS WITH HYPERGLYCEMIA, WITH LONG-TERM CURRENT USE OF INSULIN: ICD-10-CM

## 2025-08-03 DIAGNOSIS — Z79.4 TYPE 2 DIABETES MELLITUS WITH HYPERGLYCEMIA, WITH LONG-TERM CURRENT USE OF INSULIN: ICD-10-CM

## 2025-08-04 RX ORDER — INSULIN GLARGINE 100 [IU]/ML
5 INJECTION, SOLUTION SUBCUTANEOUS NIGHTLY
Qty: 15 ML | Refills: 3 | Status: SHIPPED | OUTPATIENT
Start: 2025-08-04 | End: 2026-08-04

## 2025-08-15 ENCOUNTER — LAB VISIT (OUTPATIENT)
Dept: LAB | Facility: HOSPITAL | Age: 65
End: 2025-08-15
Attending: INTERNAL MEDICINE
Payer: MEDICARE

## 2025-08-15 DIAGNOSIS — N18.32 TYPE 2 DIABETES MELLITUS WITH STAGE 3B CHRONIC KIDNEY DISEASE, WITHOUT LONG-TERM CURRENT USE OF INSULIN: ICD-10-CM

## 2025-08-15 DIAGNOSIS — N18.32 ANEMIA IN STAGE 3B CHRONIC KIDNEY DISEASE: ICD-10-CM

## 2025-08-15 DIAGNOSIS — E11.22 TYPE 2 DIABETES MELLITUS WITH STAGE 3B CHRONIC KIDNEY DISEASE, WITHOUT LONG-TERM CURRENT USE OF INSULIN: ICD-10-CM

## 2025-08-15 DIAGNOSIS — N25.81 SECONDARY HYPERPARATHYROIDISM OF RENAL ORIGIN: ICD-10-CM

## 2025-08-15 DIAGNOSIS — D63.1 ANEMIA IN STAGE 3B CHRONIC KIDNEY DISEASE: ICD-10-CM

## 2025-08-15 LAB
ABSOLUTE EOSINOPHIL (OHS): 0.02 K/UL
ABSOLUTE MONOCYTE (OHS): 0.76 K/UL (ref 0.3–1)
ABSOLUTE NEUTROPHIL COUNT (OHS): 3.08 K/UL (ref 1.8–7.7)
ALBUMIN SERPL BCP-MCNC: 4.5 G/DL (ref 3.5–5.2)
ALBUMIN/CREAT UR: 84.6 UG/MG
ANION GAP (OHS): 9 MMOL/L (ref 8–16)
BASOPHILS # BLD AUTO: 0.03 K/UL
BASOPHILS NFR BLD AUTO: 0.4 %
BILIRUB UR QL STRIP.AUTO: NEGATIVE
BUN SERPL-MCNC: 22 MG/DL (ref 2–20)
CALCIUM SERPL-MCNC: 9.3 MG/DL (ref 8.7–10.5)
CHLORIDE SERPL-SCNC: 98 MMOL/L (ref 95–110)
CLARITY UR: CLEAR
CO2 SERPL-SCNC: 24 MMOL/L (ref 23–29)
COLOR UR AUTO: YELLOW
CREAT SERPL-MCNC: 1.8 MG/DL (ref 0.5–1.4)
CREAT UR-MCNC: 122 MG/DL (ref 23–375)
CREAT UR-MCNC: 123 MG/DL (ref 23–375)
EAG (OHS): 123 MG/DL (ref 68–131)
ERYTHROCYTE [DISTWIDTH] IN BLOOD BY AUTOMATED COUNT: 13.5 % (ref 11.5–14.5)
FERRITIN SERPL-MCNC: 104.9 NG/ML (ref 20–300)
GFR SERPLBLD CREATININE-BSD FMLA CKD-EPI: 41 ML/MIN/1.73/M2
GLUCOSE SERPL-MCNC: 108 MG/DL (ref 70–110)
GLUCOSE UR QL STRIP: NEGATIVE
HBA1C MFR BLD: 5.9 % (ref 4–5.6)
HCT VFR BLD AUTO: 40.8 % (ref 40–54)
HGB BLD-MCNC: 14.4 GM/DL (ref 14–18)
HGB UR QL STRIP: ABNORMAL
IMM GRANULOCYTES # BLD AUTO: 0.04 K/UL (ref 0–0.04)
IMM GRANULOCYTES NFR BLD AUTO: 0.6 % (ref 0–0.5)
IRON SATN MFR SERPL: 24 % (ref 20–50)
IRON SERPL-MCNC: 86 UG/DL (ref 45–160)
KETONES UR QL STRIP: NEGATIVE
LEUKOCYTE ESTERASE UR QL STRIP: NEGATIVE
LYMPHOCYTES # BLD AUTO: 3.07 K/UL (ref 1–4.8)
MAGNESIUM SERPL-MCNC: 2 MG/DL (ref 1.6–2.6)
MCH RBC QN AUTO: 30.6 PG (ref 27–31)
MCHC RBC AUTO-ENTMCNC: 35.3 G/DL (ref 32–36)
MCV RBC AUTO: 87 FL (ref 82–98)
MICROALBUMIN UR-MCNC: 104 UG/ML (ref ?–5000)
NITRITE UR QL STRIP: NEGATIVE
NUCLEATED RBC (/100WBC) (OHS): 0 /100 WBC
PH UR STRIP: 6 [PH]
PHOSPHATE SERPL-MCNC: 3.7 MG/DL (ref 2.7–4.5)
PLATELET # BLD AUTO: 243 K/UL (ref 150–450)
PMV BLD AUTO: 9.3 FL (ref 9.2–12.9)
POTASSIUM SERPL-SCNC: 4 MMOL/L (ref 3.5–5.1)
PROT UR QL STRIP: ABNORMAL
PROT UR-MCNC: 25 MG/DL
PROT/CREAT UR: 0.2 MG/G{CREAT}
PTH-INTACT SERPL-MCNC: 122.1 PG/ML (ref 9–77)
RBC # BLD AUTO: 4.7 M/UL (ref 4.6–6.2)
RELATIVE EOSINOPHIL (OHS): 0.3 %
RELATIVE LYMPHOCYTE (OHS): 43.9 % (ref 18–48)
RELATIVE MONOCYTE (OHS): 10.9 % (ref 4–15)
RELATIVE NEUTROPHIL (OHS): 43.9 % (ref 38–73)
SODIUM SERPL-SCNC: 131 MMOL/L (ref 136–145)
SP GR UR STRIP: 1.01
TIBC SERPL-MCNC: 355 UG/DL (ref 250–450)
TRANSFERRIN SERPL-MCNC: 240 MG/DL (ref 200–375)
URATE SERPL-MCNC: 5.7 MG/DL (ref 3.4–7)
UROBILINOGEN UR STRIP-ACNC: NEGATIVE EU/DL
WBC # BLD AUTO: 7 K/UL (ref 3.9–12.7)

## 2025-08-15 PROCEDURE — 81003 URINALYSIS AUTO W/O SCOPE: CPT | Mod: PN

## 2025-08-15 PROCEDURE — 83735 ASSAY OF MAGNESIUM: CPT | Mod: PN

## 2025-08-15 PROCEDURE — 83970 ASSAY OF PARATHORMONE: CPT | Mod: PN

## 2025-08-15 PROCEDURE — 84550 ASSAY OF BLOOD/URIC ACID: CPT | Mod: PN

## 2025-08-15 PROCEDURE — 80069 RENAL FUNCTION PANEL: CPT | Mod: PN

## 2025-08-15 PROCEDURE — 82570 ASSAY OF URINE CREATININE: CPT | Mod: PN

## 2025-08-15 PROCEDURE — 84466 ASSAY OF TRANSFERRIN: CPT | Mod: PN

## 2025-08-15 PROCEDURE — 36415 COLL VENOUS BLD VENIPUNCTURE: CPT | Mod: PN

## 2025-08-15 PROCEDURE — 82043 UR ALBUMIN QUANTITATIVE: CPT | Mod: PN

## 2025-08-15 PROCEDURE — 82728 ASSAY OF FERRITIN: CPT | Mod: PN

## 2025-08-15 PROCEDURE — 85025 COMPLETE CBC W/AUTO DIFF WBC: CPT | Mod: PN

## 2025-08-15 PROCEDURE — 83036 HEMOGLOBIN GLYCOSYLATED A1C: CPT | Mod: PN

## 2025-08-25 ENCOUNTER — PATIENT OUTREACH (OUTPATIENT)
Dept: ADMINISTRATIVE | Facility: HOSPITAL | Age: 65
End: 2025-08-25
Payer: MEDICARE

## (undated) DEVICE — SET IRR URLGY 2LINE UNIV SPIKE

## (undated) DEVICE — GLOVE BIOGEL SKINSENSE PI 8.0

## (undated) DEVICE — GOWN POLY REINF BRTH SLV XL

## (undated) DEVICE — SYR 10CC LUER LOCK

## (undated) DEVICE — DRAPE LEGGINGS CUFF 31X48IN

## (undated) DEVICE — DRAPE T CYSTOSCOPY STERILE

## (undated) DEVICE — TRAY VAGINAL WET PREP

## (undated) DEVICE — GAUZE SPONGE 4X4 12PLY

## (undated) DEVICE — CONTAINER SPEC NS 16OZ

## (undated) DEVICE — SOL IRR NACL .9% 3000ML

## (undated) DEVICE — CATH URETERAL LASER 7.1FR 40CM

## (undated) DEVICE — SYR 50ML CATH TIP

## (undated) DEVICE — Device

## (undated) DEVICE — JELLY LUBRICANT STERILE 4 OZ

## (undated) DEVICE — TOWEL OR DISP STRL BLUE 4/PK

## (undated) DEVICE — BAG LINGEMAN DRAIN UROLOGY